# Patient Record
Sex: FEMALE | Race: WHITE | Employment: UNEMPLOYED | URBAN - NONMETROPOLITAN AREA
[De-identification: names, ages, dates, MRNs, and addresses within clinical notes are randomized per-mention and may not be internally consistent; named-entity substitution may affect disease eponyms.]

---

## 2017-02-14 ENCOUNTER — OFFICE VISIT (OUTPATIENT)
Dept: INTERNAL MEDICINE | Age: 57
End: 2017-02-14

## 2017-02-14 VITALS
BODY MASS INDEX: 29.66 KG/M2 | WEIGHT: 178 LBS | HEART RATE: 60 BPM | SYSTOLIC BLOOD PRESSURE: 140 MMHG | HEIGHT: 65 IN | DIASTOLIC BLOOD PRESSURE: 90 MMHG

## 2017-02-14 DIAGNOSIS — D75.89 MACROCYTIC: ICD-10-CM

## 2017-02-14 DIAGNOSIS — I10 ESSENTIAL HYPERTENSION: Primary | ICD-10-CM

## 2017-02-14 DIAGNOSIS — N95.1 MENOPAUSAL HOT FLUSHES: ICD-10-CM

## 2017-02-14 PROCEDURE — 99214 OFFICE O/P EST MOD 30 MIN: CPT | Performed by: INTERNAL MEDICINE

## 2017-02-14 RX ORDER — LISINOPRIL 10 MG/1
10 TABLET ORAL DAILY
Qty: 30 TABLET | Refills: 5 | Status: SHIPPED | OUTPATIENT
Start: 2017-02-14 | End: 2017-05-30 | Stop reason: SDUPTHER

## 2017-02-14 RX ORDER — VENLAFAXINE HYDROCHLORIDE 37.5 MG/1
37.5 CAPSULE, EXTENDED RELEASE ORAL DAILY
Qty: 90 CAPSULE | Refills: 1 | Status: CANCELLED | OUTPATIENT
Start: 2017-02-14

## 2017-02-14 RX ORDER — METOPROLOL SUCCINATE 50 MG/1
50 TABLET, EXTENDED RELEASE ORAL DAILY
Qty: 90 TABLET | Refills: 1 | Status: SHIPPED | OUTPATIENT
Start: 2017-02-14 | End: 2017-05-30 | Stop reason: SDUPTHER

## 2017-02-14 RX ORDER — VENLAFAXINE HYDROCHLORIDE 75 MG/1
75 CAPSULE, EXTENDED RELEASE ORAL DAILY
Qty: 30 CAPSULE | Refills: 5 | Status: SHIPPED | OUTPATIENT
Start: 2017-02-14 | End: 2017-05-30 | Stop reason: SDUPTHER

## 2017-02-14 RX ORDER — IBUPROFEN 800 MG/1
800 TABLET ORAL EVERY 8 HOURS PRN
Qty: 60 TABLET | Refills: 5 | Status: CANCELLED | OUTPATIENT
Start: 2017-02-14

## 2017-04-06 LAB
ABSOLUTE BASO #: 0 K/UL (ref 0–0.1)
ABSOLUTE EOS #: 0.1 K/UL (ref 0.1–0.4)
ABSOLUTE LYMPH #: 1.5 K/UL (ref 0.8–5.2)
ABSOLUTE MONO #: 0.5 K/UL (ref 0.1–0.9)
ABSOLUTE NEUT #: 1.7 K/UL (ref 1.3–9.1)
ANION GAP SERPL CALCULATED.3IONS-SCNC: 12 MMOL/L
BASOPHILS RELATIVE PERCENT: 0.5 %
BUN BLDV-MCNC: 14 MG/DL (ref 10–20)
CALCIUM SERPL-MCNC: 9.6 MG/DL (ref 8.7–10.8)
CHLORIDE BLD-SCNC: 101 MMOL/L (ref 95–111)
CO2: 28 MMOL/L (ref 21–32)
CREAT SERPL-MCNC: 0.7 MG/DL (ref 0.5–1.3)
EGFR AFRICAN AMERICAN: 105
EGFR IF NONAFRICAN AMERICAN: 87
EOSINOPHILS RELATIVE PERCENT: 2.3 %
FOLATE: 14.48 NG/ML
GLUCOSE: 89 MG/DL (ref 70–100)
HCT VFR BLD CALC: 38.2 % (ref 36–48)
HEMOGLOBIN: 13 G/DL (ref 12–16)
LYMPHOCYTE %: 40.2 %
MCH RBC QN AUTO: 31.8 PG (ref 27–34)
MCHC RBC AUTO-ENTMCNC: 34 G/DL (ref 31–36)
MCV RBC AUTO: 93.4 FL (ref 80–100)
MONOCYTES # BLD: 12 %
NEUTROPHILS RELATIVE PERCENT: 45 %
PDW BLD-RTO: 11.9 % (ref 10.8–14.8)
PLATELETS: 201 K/UL (ref 150–450)
POTASSIUM SERPL-SCNC: 4 MMOL/L (ref 3.5–5.4)
RBC: 4.09 M/UL (ref 4–5.5)
SODIUM BLD-SCNC: 137 MMOL/L (ref 134–147)
VITAMIN B-12: 286 PG/ML (ref 211–911)
WBC: 3.8 K/UL (ref 3.7–10.8)

## 2017-05-30 ENCOUNTER — OFFICE VISIT (OUTPATIENT)
Dept: INTERNAL MEDICINE | Age: 57
End: 2017-05-30

## 2017-05-30 VITALS — SYSTOLIC BLOOD PRESSURE: 116 MMHG | HEART RATE: 56 BPM | DIASTOLIC BLOOD PRESSURE: 80 MMHG

## 2017-05-30 DIAGNOSIS — E55.9 VITAMIN D DEFICIENCY: ICD-10-CM

## 2017-05-30 DIAGNOSIS — G89.29 CHRONIC PAIN OF RIGHT KNEE: ICD-10-CM

## 2017-05-30 DIAGNOSIS — I10 ESSENTIAL HYPERTENSION: Primary | ICD-10-CM

## 2017-05-30 DIAGNOSIS — R71.8 ELEVATED MCV: ICD-10-CM

## 2017-05-30 DIAGNOSIS — N95.1 MENOPAUSAL HOT FLUSHES: ICD-10-CM

## 2017-05-30 DIAGNOSIS — M25.561 CHRONIC PAIN OF RIGHT KNEE: ICD-10-CM

## 2017-05-30 PROCEDURE — 99213 OFFICE O/P EST LOW 20 MIN: CPT | Performed by: INTERNAL MEDICINE

## 2017-05-30 PROCEDURE — 93000 ELECTROCARDIOGRAM COMPLETE: CPT | Performed by: INTERNAL MEDICINE

## 2017-05-30 RX ORDER — VENLAFAXINE HYDROCHLORIDE 75 MG/1
75 CAPSULE, EXTENDED RELEASE ORAL DAILY
Qty: 90 CAPSULE | Refills: 1 | Status: SHIPPED | OUTPATIENT
Start: 2017-05-30 | End: 2017-09-14 | Stop reason: SDUPTHER

## 2017-05-30 RX ORDER — METOPROLOL SUCCINATE 50 MG/1
50 TABLET, EXTENDED RELEASE ORAL DAILY
Qty: 90 TABLET | Refills: 1 | Status: SHIPPED | OUTPATIENT
Start: 2017-05-30 | End: 2017-09-14 | Stop reason: SDUPTHER

## 2017-05-30 RX ORDER — LISINOPRIL 10 MG/1
10 TABLET ORAL DAILY
Qty: 90 TABLET | Refills: 1 | Status: SHIPPED | OUTPATIENT
Start: 2017-05-30 | End: 2017-09-14 | Stop reason: SDUPTHER

## 2017-05-30 ASSESSMENT — PATIENT HEALTH QUESTIONNAIRE - PHQ9
SUM OF ALL RESPONSES TO PHQ9 QUESTIONS 1 & 2: 0
SUM OF ALL RESPONSES TO PHQ QUESTIONS 1-9: 0
1. LITTLE INTEREST OR PLEASURE IN DOING THINGS: 0
2. FEELING DOWN, DEPRESSED OR HOPELESS: 0

## 2017-09-08 LAB
ABSOLUTE BASO #: 0 K/UL (ref 0–0.1)
ABSOLUTE EOS #: 0.1 K/UL (ref 0.1–0.4)
ABSOLUTE LYMPH #: 1.5 K/UL (ref 0.8–5.2)
ABSOLUTE MONO #: 0.6 K/UL (ref 0.1–0.9)
ABSOLUTE NEUT #: 1.9 K/UL (ref 1.3–9.1)
ANION GAP SERPL CALCULATED.3IONS-SCNC: 9 MMOL/L
BASOPHILS RELATIVE PERCENT: 0.7 %
BUN BLDV-MCNC: 11 MG/DL (ref 10–20)
CALCIUM SERPL-MCNC: 9.4 MG/DL (ref 8.7–10.8)
CHLORIDE BLD-SCNC: 101 MMOL/L (ref 95–111)
CHOLESTEROL: 151 MG/DL
CO2: 28 MMOL/L (ref 21–32)
CREAT SERPL-MCNC: 0.7 MG/DL (ref 0.5–1.3)
EGFR AFRICAN AMERICAN: 105
EGFR IF NONAFRICAN AMERICAN: 87
EOSINOPHILS RELATIVE PERCENT: 3.1 %
GLUCOSE: 90 MG/DL (ref 70–100)
HCT VFR BLD CALC: 38.7 % (ref 36–48)
HEMOGLOBIN: 13.3 G/DL (ref 12–16)
LYMPHOCYTE %: 37.1 %
MCH RBC QN AUTO: 32.4 PG (ref 27–34)
MCHC RBC AUTO-ENTMCNC: 34.4 G/DL (ref 31–36)
MCV RBC AUTO: 94.2 FL (ref 80–100)
MONOCYTES # BLD: 14.2 %
NEUTROPHILS RELATIVE PERCENT: 44.7 %
PDW BLD-RTO: 11.6 % (ref 10.8–14.8)
PLATELETS: 185 K/UL (ref 150–450)
POTASSIUM SERPL-SCNC: 4.2 MMOL/L (ref 3.5–5.4)
RBC: 4.11 M/UL (ref 4–5.5)
SODIUM BLD-SCNC: 134 MMOL/L (ref 134–147)
WBC: 4.2 K/UL (ref 3.7–10.8)

## 2017-09-09 LAB — VITAMIN D 25-HYDROXY: 61 NG/ML

## 2017-09-14 ENCOUNTER — OFFICE VISIT (OUTPATIENT)
Dept: INTERNAL MEDICINE CLINIC | Age: 57
End: 2017-09-14
Payer: COMMERCIAL

## 2017-09-14 VITALS
BODY MASS INDEX: 30.32 KG/M2 | DIASTOLIC BLOOD PRESSURE: 78 MMHG | HEIGHT: 65 IN | HEART RATE: 72 BPM | SYSTOLIC BLOOD PRESSURE: 124 MMHG | WEIGHT: 182 LBS

## 2017-09-14 DIAGNOSIS — I10 ESSENTIAL HYPERTENSION: ICD-10-CM

## 2017-09-14 DIAGNOSIS — N95.1 MENOPAUSAL HOT FLUSHES: ICD-10-CM

## 2017-09-14 DIAGNOSIS — Z12.39 BREAST CANCER SCREENING: ICD-10-CM

## 2017-09-14 DIAGNOSIS — Z00.00 NORMAL PHYSICAL EXAM: Primary | ICD-10-CM

## 2017-09-14 PROCEDURE — 99396 PREV VISIT EST AGE 40-64: CPT | Performed by: INTERNAL MEDICINE

## 2017-09-14 RX ORDER — LISINOPRIL 10 MG/1
10 TABLET ORAL DAILY
Qty: 90 TABLET | Refills: 1 | Status: CANCELLED | OUTPATIENT
Start: 2017-09-14

## 2017-09-14 RX ORDER — METOPROLOL SUCCINATE 50 MG/1
50 TABLET, EXTENDED RELEASE ORAL DAILY
Qty: 90 TABLET | Refills: 1 | Status: CANCELLED | OUTPATIENT
Start: 2017-09-14

## 2017-09-14 RX ORDER — VENLAFAXINE HYDROCHLORIDE 75 MG/1
75 CAPSULE, EXTENDED RELEASE ORAL DAILY
Qty: 90 CAPSULE | Refills: 1 | Status: CANCELLED | OUTPATIENT
Start: 2017-09-14

## 2017-09-15 RX ORDER — METOPROLOL SUCCINATE 50 MG/1
50 TABLET, EXTENDED RELEASE ORAL DAILY
Qty: 90 TABLET | Refills: 1 | Status: SHIPPED | OUTPATIENT
Start: 2017-09-15 | End: 2018-03-15 | Stop reason: SDUPTHER

## 2017-09-15 RX ORDER — VENLAFAXINE HYDROCHLORIDE 75 MG/1
75 CAPSULE, EXTENDED RELEASE ORAL DAILY
Qty: 90 CAPSULE | Refills: 1 | Status: SHIPPED | OUTPATIENT
Start: 2017-09-15 | End: 2018-03-15 | Stop reason: SDUPTHER

## 2017-09-15 RX ORDER — LISINOPRIL 10 MG/1
10 TABLET ORAL DAILY
Qty: 90 TABLET | Refills: 1 | Status: SHIPPED | OUTPATIENT
Start: 2017-09-15 | End: 2018-03-15 | Stop reason: SDUPTHER

## 2017-11-01 ENCOUNTER — HOSPITAL ENCOUNTER (OUTPATIENT)
Dept: WOMENS IMAGING | Age: 57
Discharge: HOME | End: 2017-11-01
Payer: COMMERCIAL

## 2017-11-01 DIAGNOSIS — Z12.39 BREAST CANCER SCREENING: ICD-10-CM

## 2017-11-01 PROCEDURE — G0202 SCR MAMMO BI INCL CAD: HCPCS

## 2018-03-15 ENCOUNTER — OFFICE VISIT (OUTPATIENT)
Dept: INTERNAL MEDICINE CLINIC | Age: 58
End: 2018-03-15
Payer: COMMERCIAL

## 2018-03-15 VITALS
SYSTOLIC BLOOD PRESSURE: 128 MMHG | BODY MASS INDEX: 30.82 KG/M2 | WEIGHT: 185 LBS | HEIGHT: 65 IN | DIASTOLIC BLOOD PRESSURE: 80 MMHG | HEART RATE: 68 BPM

## 2018-03-15 DIAGNOSIS — I10 ESSENTIAL HYPERTENSION: Primary | ICD-10-CM

## 2018-03-15 DIAGNOSIS — E55.9 VITAMIN D DEFICIENCY: ICD-10-CM

## 2018-03-15 DIAGNOSIS — N95.1 MENOPAUSAL HOT FLUSHES: ICD-10-CM

## 2018-03-15 PROCEDURE — 99213 OFFICE O/P EST LOW 20 MIN: CPT | Performed by: INTERNAL MEDICINE

## 2018-03-15 RX ORDER — METOPROLOL SUCCINATE 50 MG/1
50 TABLET, EXTENDED RELEASE ORAL DAILY
Qty: 90 TABLET | Refills: 1 | Status: SHIPPED | OUTPATIENT
Start: 2018-03-15 | End: 2019-02-20 | Stop reason: SDUPTHER

## 2018-03-15 RX ORDER — VENLAFAXINE HYDROCHLORIDE 75 MG/1
75 CAPSULE, EXTENDED RELEASE ORAL DAILY
Qty: 90 CAPSULE | Refills: 1 | Status: SHIPPED | OUTPATIENT
Start: 2018-03-15 | End: 2019-02-20 | Stop reason: SDUPTHER

## 2018-03-15 RX ORDER — LISINOPRIL 10 MG/1
10 TABLET ORAL DAILY
Qty: 90 TABLET | Refills: 1 | Status: SHIPPED | OUTPATIENT
Start: 2018-03-15 | End: 2019-02-20 | Stop reason: SDUPTHER

## 2018-03-15 RX ORDER — THIAMINE HCL 100 MG
TABLET ORAL DAILY
COMMUNITY
End: 2019-11-21 | Stop reason: DRUGHIGH

## 2018-03-15 NOTE — PROGRESS NOTES
Patient:  Liz Solorzano  Date:  3/15/2018      1960    Chief Complaint   Patient presents with    Hypertension    Other     hot flushes       Pt is a 65 y.o. female who presents for 6 month follow up. Hypertension - BP controlled at visit today. Continue metoprolol and lisinopril. Headaches now resolved that BP controlled. BMP normal 9/2017 - repeat BMP due now. Hot flushes x 10 years, with partial hysterectomy (hysterectomy done due to headaches associated with periods). She was having multiple episodes a day. At previous visit started Effexor. Did well at first but then hot flashes seem more frequent. At previous visit - increased Effexor -this initially helped but then a little worse. Suggested trying OTC black cohosh. Elevated MCV- repeated CBC with B12 and folate 4/2017 - normal MCV, B12 is low normal and folate normal. She is really asymptomatic - no fatigue, mental confusion, etc.  Will continue to follow. CBC 9/2017 normal.    Hip pain due to congenital hip dislocation - takes ibuprofen prn. She was having right knee pain but thinks related to uneven length of legs. Suggested [facility] Serna[facility] to get shoe lift. Now knee pain resolved and rarely using NSAIDs. She did not get shoe lift. Allergic rhinitis - stable, continue Flonase. History of vitamin D deficiency - level 68 8/2016, 61 9/2017. Continue current supplementation. HM -  9/2017. Past Medical History:   Diagnosis Date    Allergic rhinitis     Flonase prn    Congenital hip dislocation 1960    right leg is longer, better with weight loss    Hot flushes, perimenopausal     Hypertension 2011    Seen by MONI Florez CNP diagnosed < 1 year    IBS (irritable bowel syndrome)     ? diagnosis - diarrhea with milk products and meat    Osteoarthritis     right knee pain    Snoring     denies apnea, mild daytime somnolence, just lost 35# so has more energy, denies waking coughing or choking, BMI 27, neck circ.  13.5 OTC black cohosh. Vitamin D deficiency - normal level 9/2017, check lab yearly. Elevated MCV - now normal MCV and B12 low normal.  Continue to follow. Hip and knee pain - continue Ibuprofen but always take with food and call if any stomach upset. Suggested she take her shoe to [facility] and he can add a shoe lift. She states pain much improved on its own and never did get shoe lift. Allergic rhinitis - stable, continue Flonase. Lab due now, wellness visit in 6 months. She typically wants a wellness visit in fall and yearly visit in spring. Merrick Hendrickson MD    [facility] [place] [address]  [address]  [address] [place] [address]  Dept: [phone]  Dept Fax: [phone]  Loc: [phone]    Disregard below.   Review of Systems   Unable to perform ROS: Other

## 2018-04-14 LAB
ANION GAP SERPL CALCULATED.3IONS-SCNC: 13 MEQ/L (ref 10–19)
BUN BLDV-MCNC: 17 MG/DL (ref 8–23)
CALCIUM SERPL-MCNC: 9.6 MG/DL (ref 8.5–10.5)
CHLORIDE BLD-SCNC: 97 MEQ/L (ref 95–107)
CO2: 27 MEQ/L (ref 19–31)
CREAT SERPL-MCNC: 0.7 MG/DL (ref 0.6–1.3)
EGFR AFRICAN AMERICAN: 112.3 ML/MIN/1.73 M2
EGFR IF NONAFRICAN AMERICAN: 96.9 ML/MIN/1.73 M2
GLUCOSE: 89 MG/DL (ref 70–99)
POTASSIUM SERPL-SCNC: 5 MEQ/L (ref 3.5–5.4)
SODIUM BLD-SCNC: 137 MEQ/L (ref 135–146)

## 2018-04-18 ENCOUNTER — TELEPHONE (OUTPATIENT)
Dept: INTERNAL MEDICINE CLINIC | Age: 58
End: 2018-04-18

## 2018-07-14 ENCOUNTER — HOSPITAL ENCOUNTER (INPATIENT)
Age: 58
LOS: 1 days | Discharge: HOME | DRG: 343 | End: 2018-07-16
Attending: EMERGENCY MEDICINE | Admitting: SURGERY
Payer: COMMERCIAL

## 2018-07-14 DIAGNOSIS — K35.80 ACUTE APPENDICITIS, UNSPECIFIED ACUTE APPENDICITIS TYPE: Primary | ICD-10-CM

## 2018-07-14 LAB
AMYLASE: 57 U/L (ref 20–104)
ANION GAP SERPL CALCULATED.3IONS-SCNC: 15 MEQ/L (ref 8–16)
BASOPHILS # BLD: 0.1 %
BASOPHILS ABSOLUTE: 0 THOU/MM3 (ref 0–0.1)
BUN BLDV-MCNC: 12 MG/DL (ref 7–22)
CALCIUM SERPL-MCNC: 9.6 MG/DL (ref 8.5–10.5)
CHLORIDE BLD-SCNC: 99 MEQ/L (ref 98–111)
CO2: 23 MEQ/L (ref 23–33)
CREAT SERPL-MCNC: 0.7 MG/DL (ref 0.4–1.2)
EOSINOPHIL # BLD: 0.6 %
EOSINOPHILS ABSOLUTE: 0 THOU/MM3 (ref 0–0.4)
ERYTHROCYTE [DISTWIDTH] IN BLOOD BY AUTOMATED COUNT: 12 % (ref 11.5–14.5)
ERYTHROCYTE [DISTWIDTH] IN BLOOD BY AUTOMATED COUNT: 42 FL (ref 35–45)
GFR SERPL CREATININE-BSD FRML MDRD: 86 ML/MIN/1.73M2
GLUCOSE BLD-MCNC: 120 MG/DL (ref 70–108)
HCT VFR BLD CALC: 37.5 % (ref 37–47)
HEMOGLOBIN: 13.2 GM/DL (ref 12–16)
IMMATURE GRANS (ABS): 0.03 THOU/MM3 (ref 0–0.07)
IMMATURE GRANULOCYTES: 0.4 %
LIPASE: 22.3 U/L (ref 5.6–51.3)
LYMPHOCYTES # BLD: 11 %
LYMPHOCYTES ABSOLUTE: 0.8 THOU/MM3 (ref 1–4.8)
MAGNESIUM: 1.7 MG/DL (ref 1.6–2.4)
MCH RBC QN AUTO: 33.4 PG (ref 26–33)
MCHC RBC AUTO-ENTMCNC: 35.2 GM/DL (ref 32.2–35.5)
MCV RBC AUTO: 94.9 FL (ref 81–99)
MONOCYTES # BLD: 8.9 %
MONOCYTES ABSOLUTE: 0.6 THOU/MM3 (ref 0.4–1.3)
NUCLEATED RED BLOOD CELLS: 0 /100 WBC
OSMOLALITY CALCULATION: 274.8 MOSMOL/KG (ref 275–300)
PLATELET # BLD: 176 THOU/MM3 (ref 130–400)
PMV BLD AUTO: 11.3 FL (ref 9.4–12.4)
POTASSIUM SERPL-SCNC: 4.1 MEQ/L (ref 3.5–5.2)
RBC # BLD: 3.95 MILL/MM3 (ref 4.2–5.4)
SEG NEUTROPHILS: 79 %
SEGMENTED NEUTROPHILS ABSOLUTE COUNT: 5.6 THOU/MM3 (ref 1.8–7.7)
SODIUM BLD-SCNC: 137 MEQ/L (ref 135–145)
WBC # BLD: 7.1 THOU/MM3 (ref 4.8–10.8)

## 2018-07-14 PROCEDURE — 82150 ASSAY OF AMYLASE: CPT

## 2018-07-14 PROCEDURE — 85025 COMPLETE CBC W/AUTO DIFF WBC: CPT

## 2018-07-14 PROCEDURE — 83690 ASSAY OF LIPASE: CPT

## 2018-07-14 PROCEDURE — 36415 COLL VENOUS BLD VENIPUNCTURE: CPT

## 2018-07-14 PROCEDURE — 80048 BASIC METABOLIC PNL TOTAL CA: CPT

## 2018-07-14 PROCEDURE — 83735 ASSAY OF MAGNESIUM: CPT

## 2018-07-14 PROCEDURE — 99285 EMERGENCY DEPT VISIT HI MDM: CPT

## 2018-07-14 ASSESSMENT — PAIN DESCRIPTION - LOCATION
LOCATION: ABDOMEN
LOCATION: ABDOMEN

## 2018-07-14 ASSESSMENT — PAIN SCALES - GENERAL
PAINLEVEL_OUTOF10: 8
PAINLEVEL_OUTOF10: 9

## 2018-07-14 ASSESSMENT — PAIN DESCRIPTION - PAIN TYPE
TYPE: ACUTE PAIN
TYPE: ACUTE PAIN

## 2018-07-15 ENCOUNTER — APPOINTMENT (OUTPATIENT)
Dept: CT IMAGING | Age: 58
DRG: 343 | End: 2018-07-15
Payer: COMMERCIAL

## 2018-07-15 ENCOUNTER — ANESTHESIA EVENT (OUTPATIENT)
Dept: OPERATING ROOM | Age: 58
DRG: 343 | End: 2018-07-15
Payer: COMMERCIAL

## 2018-07-15 ENCOUNTER — ANESTHESIA (OUTPATIENT)
Dept: OPERATING ROOM | Age: 58
DRG: 343 | End: 2018-07-15
Payer: COMMERCIAL

## 2018-07-15 VITALS
DIASTOLIC BLOOD PRESSURE: 83 MMHG | SYSTOLIC BLOOD PRESSURE: 139 MMHG | OXYGEN SATURATION: 99 % | RESPIRATION RATE: 18 BRPM | TEMPERATURE: 98.6 F

## 2018-07-15 PROBLEM — K37 APPENDICITIS: Status: ACTIVE | Noted: 2018-07-15

## 2018-07-15 PROBLEM — R10.0 SURGICAL ABDOMEN: Status: ACTIVE | Noted: 2018-07-15

## 2018-07-15 LAB
BACTERIA: ABNORMAL /HPF
BILIRUBIN URINE: NEGATIVE
BLOOD, URINE: NEGATIVE
CASTS 2: ABNORMAL /LPF
CASTS UA: ABNORMAL /LPF
CHARACTER, URINE: CLEAR
COLOR: YELLOW
CRYSTALS, UA: ABNORMAL
EPITHELIAL CELLS, UA: ABNORMAL /HPF
GLUCOSE URINE: NEGATIVE MG/DL
KETONES, URINE: NEGATIVE
LEUKOCYTE ESTERASE, URINE: ABNORMAL
MISCELLANEOUS 2: ABNORMAL
NITRITE, URINE: NEGATIVE
PH UA: 6
PROTEIN UA: NEGATIVE
RBC URINE: ABNORMAL /HPF
RENAL EPITHELIAL, UA: ABNORMAL
SPECIFIC GRAVITY, URINE: > 1.03 (ref 1–1.03)
UROBILINOGEN, URINE: 0.2 EU/DL
WBC UA: ABNORMAL /HPF
YEAST: ABNORMAL

## 2018-07-15 PROCEDURE — 74177 CT ABD & PELVIS W/CONTRAST: CPT

## 2018-07-15 PROCEDURE — 44970 LAPAROSCOPY APPENDECTOMY: CPT | Performed by: SURGERY

## 2018-07-15 PROCEDURE — 6360000002 HC RX W HCPCS: Performed by: EMERGENCY MEDICINE

## 2018-07-15 PROCEDURE — 7100000000 HC PACU RECOVERY - FIRST 15 MIN: Performed by: SURGERY

## 2018-07-15 PROCEDURE — 99222 1ST HOSP IP/OBS MODERATE 55: CPT | Performed by: SURGERY

## 2018-07-15 PROCEDURE — 49585 REPAIR UMBILICAL HERN,5+Y/O,REDUC: CPT | Performed by: SURGERY

## 2018-07-15 PROCEDURE — 0DTJ4ZZ RESECTION OF APPENDIX, PERCUTANEOUS ENDOSCOPIC APPROACH: ICD-10-PCS | Performed by: SURGERY

## 2018-07-15 PROCEDURE — 6370000000 HC RX 637 (ALT 250 FOR IP): Performed by: NURSE ANESTHETIST, CERTIFIED REGISTERED

## 2018-07-15 PROCEDURE — 6360000002 HC RX W HCPCS: Performed by: NURSE ANESTHETIST, CERTIFIED REGISTERED

## 2018-07-15 PROCEDURE — 2580000003 HC RX 258: Performed by: SURGERY

## 2018-07-15 PROCEDURE — 0WQF4ZZ REPAIR ABDOMINAL WALL, PERCUTANEOUS ENDOSCOPIC APPROACH: ICD-10-PCS | Performed by: SURGERY

## 2018-07-15 PROCEDURE — 2500000003 HC RX 250 WO HCPCS: Performed by: SURGERY

## 2018-07-15 PROCEDURE — 3700000001 HC ADD 15 MINUTES (ANESTHESIA): Performed by: SURGERY

## 2018-07-15 PROCEDURE — 6370000000 HC RX 637 (ALT 250 FOR IP): Performed by: SURGERY

## 2018-07-15 PROCEDURE — 6360000004 HC RX CONTRAST MEDICATION: Performed by: EMERGENCY MEDICINE

## 2018-07-15 PROCEDURE — 2500000003 HC RX 250 WO HCPCS: Performed by: NURSE ANESTHETIST, CERTIFIED REGISTERED

## 2018-07-15 PROCEDURE — 3600000013 HC SURGERY LEVEL 3 ADDTL 15MIN: Performed by: SURGERY

## 2018-07-15 PROCEDURE — 2709999900 HC NON-CHARGEABLE SUPPLY

## 2018-07-15 PROCEDURE — 96374 THER/PROPH/DIAG INJ IV PUSH: CPT

## 2018-07-15 PROCEDURE — 87086 URINE CULTURE/COLONY COUNT: CPT

## 2018-07-15 PROCEDURE — 88304 TISSUE EXAM BY PATHOLOGIST: CPT

## 2018-07-15 PROCEDURE — 2709999900 HC NON-CHARGEABLE SUPPLY: Performed by: SURGERY

## 2018-07-15 PROCEDURE — 81001 URINALYSIS AUTO W/SCOPE: CPT

## 2018-07-15 PROCEDURE — 2720000010 HC SURG SUPPLY STERILE: Performed by: SURGERY

## 2018-07-15 PROCEDURE — 3600000003 HC SURGERY LEVEL 3 BASE: Performed by: SURGERY

## 2018-07-15 PROCEDURE — 7100000001 HC PACU RECOVERY - ADDTL 15 MIN: Performed by: SURGERY

## 2018-07-15 PROCEDURE — 2780000010 HC IMPLANT OTHER: Performed by: SURGERY

## 2018-07-15 PROCEDURE — 1200000000 HC SEMI PRIVATE

## 2018-07-15 PROCEDURE — C1773 RET DEV, INSERTABLE: HCPCS | Performed by: SURGERY

## 2018-07-15 PROCEDURE — 3700000000 HC ANESTHESIA ATTENDED CARE: Performed by: SURGERY

## 2018-07-15 DEVICE — RELOAD STPL H1-2.5X45MM VASC THN TISS WHT 6 ROW B FRM SGL: Type: IMPLANTABLE DEVICE | Site: ABDOMEN | Status: FUNCTIONAL

## 2018-07-15 RX ORDER — ONDANSETRON 2 MG/ML
INJECTION INTRAMUSCULAR; INTRAVENOUS PRN
Status: DISCONTINUED | OUTPATIENT
Start: 2018-07-15 | End: 2018-07-15 | Stop reason: SDUPTHER

## 2018-07-15 RX ORDER — MORPHINE SULFATE 4 MG/ML
4 INJECTION, SOLUTION INTRAMUSCULAR; INTRAVENOUS EVERY 4 HOURS PRN
Status: ACTIVE | OUTPATIENT
Start: 2018-07-15 | End: 2018-07-16

## 2018-07-15 RX ORDER — OXYCODONE HYDROCHLORIDE AND ACETAMINOPHEN 5; 325 MG/1; MG/1
1 TABLET ORAL EVERY 4 HOURS PRN
Status: DISCONTINUED | OUTPATIENT
Start: 2018-07-15 | End: 2018-07-16 | Stop reason: HOSPADM

## 2018-07-15 RX ORDER — METOPROLOL SUCCINATE 50 MG/1
50 TABLET, EXTENDED RELEASE ORAL DAILY
Status: DISCONTINUED | OUTPATIENT
Start: 2018-07-15 | End: 2018-07-16 | Stop reason: HOSPADM

## 2018-07-15 RX ORDER — ACETAMINOPHEN 325 MG/1
650 TABLET ORAL EVERY 4 HOURS PRN
Status: DISCONTINUED | OUTPATIENT
Start: 2018-07-15 | End: 2018-07-16 | Stop reason: HOSPADM

## 2018-07-15 RX ORDER — VENLAFAXINE HYDROCHLORIDE 75 MG/1
75 CAPSULE, EXTENDED RELEASE ORAL DAILY
Status: DISCONTINUED | OUTPATIENT
Start: 2018-07-15 | End: 2018-07-16 | Stop reason: HOSPADM

## 2018-07-15 RX ORDER — BUPIVACAINE HYDROCHLORIDE AND EPINEPHRINE 5; 5 MG/ML; UG/ML
INJECTION, SOLUTION EPIDURAL; INTRACAUDAL; PERINEURAL PRN
Status: DISCONTINUED | OUTPATIENT
Start: 2018-07-15 | End: 2018-07-15 | Stop reason: HOSPADM

## 2018-07-15 RX ORDER — LISINOPRIL 10 MG/1
10 TABLET ORAL DAILY
Status: DISCONTINUED | OUTPATIENT
Start: 2018-07-15 | End: 2018-07-16 | Stop reason: HOSPADM

## 2018-07-15 RX ORDER — GLYCOPYRROLATE 1 MG/5 ML
SYRINGE (ML) INTRAVENOUS PRN
Status: DISCONTINUED | OUTPATIENT
Start: 2018-07-15 | End: 2018-07-15 | Stop reason: SDUPTHER

## 2018-07-15 RX ORDER — ONDANSETRON 2 MG/ML
4 INJECTION INTRAMUSCULAR; INTRAVENOUS ONCE
Status: COMPLETED | OUTPATIENT
Start: 2018-07-15 | End: 2018-07-15

## 2018-07-15 RX ORDER — MIDAZOLAM HYDROCHLORIDE 1 MG/ML
INJECTION INTRAMUSCULAR; INTRAVENOUS PRN
Status: DISCONTINUED | OUTPATIENT
Start: 2018-07-15 | End: 2018-07-15 | Stop reason: SDUPTHER

## 2018-07-15 RX ORDER — SODIUM CHLORIDE 9 MG/ML
INJECTION, SOLUTION INTRAVENOUS CONTINUOUS
Status: DISCONTINUED | OUTPATIENT
Start: 2018-07-15 | End: 2018-07-15 | Stop reason: HOSPADM

## 2018-07-15 RX ORDER — NEOSTIGMINE METHYLSULFATE 1 MG/ML
INJECTION, SOLUTION INTRAVENOUS PRN
Status: DISCONTINUED | OUTPATIENT
Start: 2018-07-15 | End: 2018-07-15 | Stop reason: SDUPTHER

## 2018-07-15 RX ORDER — SODIUM CHLORIDE 0.9 % (FLUSH) 0.9 %
10 SYRINGE (ML) INJECTION PRN
Status: DISCONTINUED | OUTPATIENT
Start: 2018-07-15 | End: 2018-07-16 | Stop reason: HOSPADM

## 2018-07-15 RX ORDER — LIDOCAINE HYDROCHLORIDE 20 MG/ML
INJECTION, SOLUTION INFILTRATION; PERINEURAL PRN
Status: DISCONTINUED | OUTPATIENT
Start: 2018-07-15 | End: 2018-07-15 | Stop reason: SDUPTHER

## 2018-07-15 RX ORDER — SCOLOPAMINE TRANSDERMAL SYSTEM 1 MG/1
PATCH, EXTENDED RELEASE TRANSDERMAL PRN
Status: DISCONTINUED | OUTPATIENT
Start: 2018-07-15 | End: 2018-07-15 | Stop reason: SDUPTHER

## 2018-07-15 RX ORDER — PROPOFOL 10 MG/ML
INJECTION, EMULSION INTRAVENOUS PRN
Status: DISCONTINUED | OUTPATIENT
Start: 2018-07-15 | End: 2018-07-15 | Stop reason: SDUPTHER

## 2018-07-15 RX ORDER — ONDANSETRON 2 MG/ML
4 INJECTION INTRAMUSCULAR; INTRAVENOUS EVERY 6 HOURS PRN
Status: DISCONTINUED | OUTPATIENT
Start: 2018-07-15 | End: 2018-07-16 | Stop reason: HOSPADM

## 2018-07-15 RX ORDER — SODIUM CHLORIDE 0.9 % (FLUSH) 0.9 %
10 SYRINGE (ML) INJECTION EVERY 12 HOURS SCHEDULED
Status: DISCONTINUED | OUTPATIENT
Start: 2018-07-15 | End: 2018-07-16 | Stop reason: HOSPADM

## 2018-07-15 RX ORDER — ONDANSETRON 2 MG/ML
4 INJECTION INTRAMUSCULAR; INTRAVENOUS EVERY 6 HOURS PRN
Status: DISCONTINUED | OUTPATIENT
Start: 2018-07-15 | End: 2018-07-15 | Stop reason: SDUPTHER

## 2018-07-15 RX ORDER — ROCURONIUM BROMIDE 10 MG/ML
INJECTION, SOLUTION INTRAVENOUS PRN
Status: DISCONTINUED | OUTPATIENT
Start: 2018-07-15 | End: 2018-07-15 | Stop reason: SDUPTHER

## 2018-07-15 RX ORDER — DEXAMETHASONE SODIUM PHOSPHATE 4 MG/ML
INJECTION, SOLUTION INTRA-ARTICULAR; INTRALESIONAL; INTRAMUSCULAR; INTRAVENOUS; SOFT TISSUE PRN
Status: DISCONTINUED | OUTPATIENT
Start: 2018-07-15 | End: 2018-07-15 | Stop reason: SDUPTHER

## 2018-07-15 RX ORDER — SUCCINYLCHOLINE CHLORIDE 20 MG/ML
INJECTION INTRAMUSCULAR; INTRAVENOUS PRN
Status: DISCONTINUED | OUTPATIENT
Start: 2018-07-15 | End: 2018-07-15 | Stop reason: SDUPTHER

## 2018-07-15 RX ORDER — FENTANYL CITRATE 50 UG/ML
INJECTION, SOLUTION INTRAMUSCULAR; INTRAVENOUS PRN
Status: DISCONTINUED | OUTPATIENT
Start: 2018-07-15 | End: 2018-07-15 | Stop reason: SDUPTHER

## 2018-07-15 RX ORDER — CEFTRIAXONE 1 G/1
INJECTION, POWDER, FOR SOLUTION INTRAMUSCULAR; INTRAVENOUS PRN
Status: DISCONTINUED | OUTPATIENT
Start: 2018-07-15 | End: 2018-07-15 | Stop reason: SDUPTHER

## 2018-07-15 RX ORDER — SODIUM CHLORIDE 9 MG/ML
INJECTION, SOLUTION INTRAVENOUS CONTINUOUS
Status: DISCONTINUED | OUTPATIENT
Start: 2018-07-15 | End: 2018-07-16 | Stop reason: HOSPADM

## 2018-07-15 RX ADMIN — MIDAZOLAM HYDROCHLORIDE 2 MG: 1 INJECTION, SOLUTION INTRAMUSCULAR; INTRAVENOUS at 02:03

## 2018-07-15 RX ADMIN — PROPOFOL 200 MG: 10 INJECTION, EMULSION INTRAVENOUS at 02:11

## 2018-07-15 RX ADMIN — METOPROLOL SUCCINATE 50 MG: 50 TABLET, FILM COATED, EXTENDED RELEASE ORAL at 10:07

## 2018-07-15 RX ADMIN — DEXAMETHASONE SODIUM PHOSPHATE 10 MG: 4 INJECTION, SOLUTION INTRAMUSCULAR; INTRAVENOUS at 02:11

## 2018-07-15 RX ADMIN — Medication 0.6 MG: at 02:42

## 2018-07-15 RX ADMIN — LIDOCAINE HYDROCHLORIDE 100 MG: 20 INJECTION, SOLUTION INFILTRATION; PERINEURAL at 02:11

## 2018-07-15 RX ADMIN — Medication 0.2 MG: at 02:11

## 2018-07-15 RX ADMIN — OXYCODONE HYDROCHLORIDE AND ACETAMINOPHEN 1 TABLET: 5; 325 TABLET ORAL at 13:19

## 2018-07-15 RX ADMIN — SCOPALAMINE 1 PATCH: 1 PATCH, EXTENDED RELEASE TRANSDERMAL at 02:02

## 2018-07-15 RX ADMIN — SODIUM CHLORIDE: 9 INJECTION, SOLUTION INTRAVENOUS at 11:55

## 2018-07-15 RX ADMIN — ONDANSETRON HYDROCHLORIDE 4 MG: 4 INJECTION, SOLUTION INTRAMUSCULAR; INTRAVENOUS at 00:39

## 2018-07-15 RX ADMIN — IOPAMIDOL 80 ML: 755 INJECTION, SOLUTION INTRAVENOUS at 00:08

## 2018-07-15 RX ADMIN — CEFTRIAXONE SODIUM 1 G: 1 INJECTION, POWDER, FOR SOLUTION INTRAMUSCULAR; INTRAVENOUS at 02:21

## 2018-07-15 RX ADMIN — ONDANSETRON HYDROCHLORIDE 4 MG: 4 INJECTION, SOLUTION INTRAMUSCULAR; INTRAVENOUS at 02:11

## 2018-07-15 RX ADMIN — Medication 120 MG: at 02:11

## 2018-07-15 RX ADMIN — FENTANYL CITRATE 50 MCG: 50 INJECTION INTRAMUSCULAR; INTRAVENOUS at 02:25

## 2018-07-15 RX ADMIN — ROCURONIUM BROMIDE 30 MG: 10 INJECTION INTRAVENOUS at 02:21

## 2018-07-15 RX ADMIN — FENTANYL CITRATE 50 MCG: 50 INJECTION INTRAMUSCULAR; INTRAVENOUS at 02:11

## 2018-07-15 RX ADMIN — NEOSTIGMINE METHYLSULFATE 3 MG: 1 INJECTION, SOLUTION INTRAVENOUS at 02:42

## 2018-07-15 ASSESSMENT — PULMONARY FUNCTION TESTS
PIF_VALUE: 1
PIF_VALUE: 21
PIF_VALUE: 1
PIF_VALUE: 3
PIF_VALUE: 20
PIF_VALUE: 19
PIF_VALUE: 27
PIF_VALUE: 24
PIF_VALUE: 19
PIF_VALUE: 3
PIF_VALUE: 1
PIF_VALUE: 20
PIF_VALUE: 1
PIF_VALUE: 23
PIF_VALUE: 19
PIF_VALUE: 21
PIF_VALUE: 28
PIF_VALUE: 26
PIF_VALUE: 22
PIF_VALUE: 18
PIF_VALUE: 19
PIF_VALUE: 28
PIF_VALUE: 2
PIF_VALUE: 26
PIF_VALUE: 18
PIF_VALUE: 21
PIF_VALUE: 19
PIF_VALUE: 27
PIF_VALUE: 1
PIF_VALUE: 1
PIF_VALUE: 27
PIF_VALUE: 20
PIF_VALUE: 21
PIF_VALUE: 18
PIF_VALUE: 3
PIF_VALUE: 17
PIF_VALUE: 28
PIF_VALUE: 2
PIF_VALUE: 19
PIF_VALUE: 20
PIF_VALUE: 29
PIF_VALUE: 19
PIF_VALUE: 19
PIF_VALUE: 20
PIF_VALUE: 20
PIF_VALUE: 19
PIF_VALUE: 19
PIF_VALUE: 1
PIF_VALUE: 19
PIF_VALUE: 7
PIF_VALUE: 21
PIF_VALUE: 20
PIF_VALUE: 20
PIF_VALUE: 28
PIF_VALUE: 19
PIF_VALUE: 20
PIF_VALUE: 26
PIF_VALUE: 1
PIF_VALUE: 2
PIF_VALUE: 27
PIF_VALUE: 1

## 2018-07-15 ASSESSMENT — PAIN SCALES - GENERAL
PAINLEVEL_OUTOF10: 0
PAINLEVEL_OUTOF10: 2
PAINLEVEL_OUTOF10: 0
PAINLEVEL_OUTOF10: 2
PAINLEVEL_OUTOF10: 2
PAINLEVEL_OUTOF10: 4
PAINLEVEL_OUTOF10: 5
PAINLEVEL_OUTOF10: 0

## 2018-07-15 ASSESSMENT — PAIN DESCRIPTION - DESCRIPTORS
DESCRIPTORS: CRAMPING
DESCRIPTORS: CRAMPING
DESCRIPTORS: CRAMPING;DISCOMFORT;PRESSURE

## 2018-07-15 ASSESSMENT — PAIN DESCRIPTION - ORIENTATION
ORIENTATION: OTHER (COMMENT)
ORIENTATION: OTHER (COMMENT)
ORIENTATION: RIGHT;LEFT;MID

## 2018-07-15 ASSESSMENT — ENCOUNTER SYMPTOMS
NAUSEA: 1
VOMITING: 0
WHEEZING: 0
ABDOMINAL PAIN: 1
EYE PAIN: 0
DIARRHEA: 1
EYE DISCHARGE: 0
SHORTNESS OF BREATH: 0
BACK PAIN: 0
RHINORRHEA: 0
COUGH: 0
SORE THROAT: 0

## 2018-07-15 ASSESSMENT — PAIN DESCRIPTION - LOCATION
LOCATION: ABDOMEN

## 2018-07-15 ASSESSMENT — PAIN DESCRIPTION - PAIN TYPE
TYPE: SURGICAL PAIN
TYPE: ACUTE PAIN

## 2018-07-15 NOTE — ED NOTES
Dr. Retia Closs is at bedside to update the patient on POC. Will continue to monitor the patient.      Tamika Steiner RN  07/14/18 3146

## 2018-07-15 NOTE — H&P
[address] [place] [address]                         PREOPERATIVE HISTORY AND PHYSICAL    PATIENT NAME: Sasha Penaloza                       :        1960  MED REC NO:   [id number]                           ROOM:       5730  ACCOUNT NO:   [de-identified]                           ADMIT DATE: 2018  PROVIDER:     Alon Tenorio. Telly Gale MD    CHIEF COMPLAINT:  Right lower quadrant pain, probable appendicitis. HISTORY OF PRESENT ILLNESS:  The patient is a 80-year-old white female, who  states that approximately 48 hours ago she had onset of some nausea  followed by some diarrhea. She had a little bit of pain then on Friday,  but then approximately 5 o'clock on the evening of 2018, she began  having periumbilical pain that localized down to the right lower quadrant. She denies any prior history of pain. Otherwise, her bowel movements are  normal.  She has had no urinary symptomatology, and the patient presented  to the emergency room for evaluation. She does have a normal white count  of 7.1, but the CT scan shows what is felt to be appendicitis, and she has  an exam consistent with same, and surgical evaluation has been requested. She has had a partial hysterectomy. Her ovaries are still intact. She  does have a family history of appendicitis. PAST MEDICAL HISTORY:  Medical illnesses, the patient has a history of  hypertension and a history of some allergic rhinitis. MEDICATIONS:  Prinivil, Effexor, Toprol, and other supplements of vitamin  D, calcium, and magnesium. PAST SURGICAL HISTORY:  Include multiple surgeries on her left hip as she  had a congenital abnormality at birth and has had several surgeries on the  left hip. She has had orthopedic procedures on the right knee, and she has  also had a partial hysterectomy, ovaries intact. ALLERGIES:  SULFA.     FAMILY HISTORY:  Positive for hypercholesterolism and skin cancer in her  father. SOCIAL HISTORY:  She is nonsmoker. She is a social drinker. She works  with an office job. REVIEW OF SYSTEMS:  A 10-point review of systems is otherwise negative  including no recent weight loss. PHYSICAL EXAMINATION:  GENERAL:  The patient is a 79-year-old white female, who appears her age. HEAD, EARS, EYES, NOSE, AND THROAT:  The pupils are equal.  EOMs are  intact. Sclerae are clear. TMJs are normal.  External auditory canal is  normal.  NECK:  Shows no masses. CARDIOVASCULAR:  S1, S2 without extra sounds. LUNGS:  The respirations are equal bilaterally. ABDOMEN:  She is moderately obese. All quadrants are negative to palpation  except right over McBurney's point, she has clear-cut rebound tenderness. Groins are negative. EXTREMITIES:  Upper and lower extremities, she has good radial pulses. Good range of motion of the arms and hands. Lower extremities, well-healed  incisions over the left hip and right knee. Moves the feet appropriately. LABORATORY DATA:  Revealed sodium 137, potassium 4.1, BUN of 12, creatinine  of 0.7. Magnesium level was normal.  Calcium level was normal.  Amylase  was 57. White count was 7.1, hemoglobin 13.2. CT scan, again as  mentioned, has been read as probable acute appendicitis. No evidence of  abscess along with a fatty liver. ASSESSMENT AND PLAN:  1. Acute appendicitis by history, exam, and CT, although her white blood  cell count is normal.  2.  Hypertension. 3.  Allergic rhinitis. 4.  History of congenital abnormalities of the left hip. PLAN:  We discussed appendicitis with the patient and the current standard  of care in this [place] to be removing the appendix. We did discuss that  in the future, antibiotics may be used to treat appendicitis in this  country as has been going on in [facility]; however, again the current standard  of care is removing the appendix. We discussed the risk of rupture.   We  also

## 2018-07-15 NOTE — ANESTHESIA POSTPROCEDURE EVALUATION
Department of Anesthesiology  Postprocedure Note    Patient: Aleksandr Cobb  MRN: [id number]  YOB: 1960  Date of evaluation: 7/15/2018  Time:  3:08 AM     Procedure Summary     Date:  07/15/18 Room / Location:  [facility] [address] / [address] OR    Anesthesia Start:  0202 Anesthesia Stop:  0308    Procedure:  APPENDECTOMY LAPAROSCOPIC (N/A Abdomen) Diagnosis:  (Appendicitis)    Surgeon: Jesús Ruiz MD Responsible Provider:  Claudia Grimes MD    Anesthesia Type:  general ASA Status:  2 - Emergent          Anesthesia Type: general    Ramírez Phase I:      Ramírez Phase II:      Last vitals: Reviewed and per EMR flowsheets.        Anesthesia Post Evaluation    Patient location during evaluation: PACU  Patient participation: complete - patient participated  Level of consciousness: awake and alert  Pain score: 0  Airway patency: patent  Nausea & Vomiting: no nausea and no vomiting  Complications: no  Cardiovascular status: blood pressure returned to baseline and hemodynamically stable  Respiratory status: acceptable, spontaneous ventilation and room air  Hydration status: euvolemic

## 2018-07-15 NOTE — PLAN OF CARE
Problem: SAFETY  Goal: Free from accidental physical injury  Outcome: Met This Shift  Safety precautions maintained as documented. Call light within reach. Problem: PAIN  Goal: Patient's pain/discomfort is manageable  Outcome: Met This Shift  Not having much pain according to pt, denies the need for pain med    Problem: SKIN INTEGRITY  Goal: Skin integrity is maintained or improved  Outcome: Ongoing  Stab wounds covered with steri strips and bandaids, no drng, No skin issues, no reddened areas. Problem: DISCHARGE BARRIERS  Goal: Patient's continuum of care needs are met  Outcome: Met This Shift  Will be discharged to  , possibly tomorrow    Problem: Activity:  Goal: Ability to tolerate increased activity will improve  Ability to tolerate increased activity will improve   Outcome: Met This Shift  Walked the helm twice thus far, and up to bathroom. Gait steady. No dizziness. Sat up in chair, tolerated well    Problem: Physical Regulation:  Goal: Postoperative complications will be avoided or minimized  Postoperative complications will be avoided or minimized   Outcome: Met This Shift  No complications    Comments: Care plan reviewed with patient . Patient  verbalizes understanding of the plan of care and contributes to goal setting.

## 2018-07-16 VITALS
RESPIRATION RATE: 16 BRPM | HEART RATE: 68 BPM | OXYGEN SATURATION: 94 % | HEIGHT: 67 IN | BODY MASS INDEX: 29.51 KG/M2 | TEMPERATURE: 98.8 F | DIASTOLIC BLOOD PRESSURE: 77 MMHG | WEIGHT: 188 LBS | SYSTOLIC BLOOD PRESSURE: 108 MMHG

## 2018-07-16 PROBLEM — R10.0 SURGICAL ABDOMEN: Status: RESOLVED | Noted: 2018-07-15 | Resolved: 2018-07-16

## 2018-07-16 PROBLEM — K37 APPENDICITIS: Status: RESOLVED | Noted: 2018-07-15 | Resolved: 2018-07-16

## 2018-07-16 PROBLEM — Z98.890 H/O UMBILICAL HERNIA REPAIR: Status: ACTIVE | Noted: 2018-07-16

## 2018-07-16 PROBLEM — Z87.19 H/O UMBILICAL HERNIA REPAIR: Status: ACTIVE | Noted: 2018-07-16

## 2018-07-16 PROBLEM — Z90.49 S/P LAPAROSCOPIC APPENDECTOMY: Status: ACTIVE | Noted: 2018-07-16

## 2018-07-16 LAB
BASOPHILS # BLD: 0.2 %
BASOPHILS ABSOLUTE: 0 THOU/MM3 (ref 0–0.1)
EOSINOPHIL # BLD: 0.2 %
EOSINOPHILS ABSOLUTE: 0 THOU/MM3 (ref 0–0.4)
ERYTHROCYTE [DISTWIDTH] IN BLOOD BY AUTOMATED COUNT: 12.7 % (ref 11.5–14.5)
ERYTHROCYTE [DISTWIDTH] IN BLOOD BY AUTOMATED COUNT: 46.2 FL (ref 35–45)
HCT VFR BLD CALC: 32.6 % (ref 37–47)
HEMOGLOBIN: 10.8 GM/DL (ref 12–16)
IMMATURE GRANS (ABS): 0.02 THOU/MM3 (ref 0–0.07)
IMMATURE GRANULOCYTES: 0.4 %
LYMPHOCYTES # BLD: 36 %
LYMPHOCYTES ABSOLUTE: 1.7 THOU/MM3 (ref 1–4.8)
MCH RBC QN AUTO: 33 PG (ref 26–33)
MCHC RBC AUTO-ENTMCNC: 33.1 GM/DL (ref 32.2–35.5)
MCV RBC AUTO: 99.7 FL (ref 81–99)
MONOCYTES # BLD: 10 %
MONOCYTES ABSOLUTE: 0.5 THOU/MM3 (ref 0.4–1.3)
NUCLEATED RED BLOOD CELLS: 0 /100 WBC
PLATELET # BLD: 147 THOU/MM3 (ref 130–400)
PMV BLD AUTO: 11.6 FL (ref 9.4–12.4)
RBC # BLD: 3.27 MILL/MM3 (ref 4.2–5.4)
SEG NEUTROPHILS: 53.2 %
SEGMENTED NEUTROPHILS ABSOLUTE COUNT: 2.6 THOU/MM3 (ref 1.8–7.7)
WBC # BLD: 4.8 THOU/MM3 (ref 4.8–10.8)

## 2018-07-16 PROCEDURE — 2580000003 HC RX 258: Performed by: SURGERY

## 2018-07-16 PROCEDURE — 99024 POSTOP FOLLOW-UP VISIT: CPT | Performed by: SURGERY

## 2018-07-16 PROCEDURE — 2709999900 HC NON-CHARGEABLE SUPPLY

## 2018-07-16 PROCEDURE — 6370000000 HC RX 637 (ALT 250 FOR IP): Performed by: SURGERY

## 2018-07-16 PROCEDURE — 85025 COMPLETE CBC W/AUTO DIFF WBC: CPT

## 2018-07-16 PROCEDURE — 36415 COLL VENOUS BLD VENIPUNCTURE: CPT

## 2018-07-16 RX ORDER — OXYCODONE HYDROCHLORIDE AND ACETAMINOPHEN 5; 325 MG/1; MG/1
1 TABLET ORAL EVERY 4 HOURS PRN
Qty: 12 TABLET | Refills: 0 | Status: SHIPPED | OUTPATIENT
Start: 2018-07-16 | End: 2018-07-20

## 2018-07-16 RX ADMIN — METOPROLOL SUCCINATE 50 MG: 50 TABLET, FILM COATED, EXTENDED RELEASE ORAL at 09:00

## 2018-07-16 RX ADMIN — SODIUM CHLORIDE: 9 INJECTION, SOLUTION INTRAVENOUS at 04:00

## 2018-07-16 RX ADMIN — LISINOPRIL 10 MG: 10 TABLET ORAL at 08:59

## 2018-07-16 RX ADMIN — VENLAFAXINE HYDROCHLORIDE 75 MG: 75 CAPSULE, EXTENDED RELEASE ORAL at 09:00

## 2018-07-16 ASSESSMENT — PAIN SCALES - GENERAL
PAINLEVEL_OUTOF10: 0
PAINLEVEL_OUTOF10: 1

## 2018-07-16 ASSESSMENT — PAIN DESCRIPTION - PAIN TYPE: TYPE: SURGICAL PAIN

## 2018-07-16 ASSESSMENT — PAIN DESCRIPTION - ORIENTATION: ORIENTATION: RIGHT;LEFT;MID

## 2018-07-16 ASSESSMENT — PAIN DESCRIPTION - DESCRIPTORS: DESCRIPTORS: CRAMPING

## 2018-07-16 ASSESSMENT — PAIN DESCRIPTION - LOCATION: LOCATION: ABDOMEN

## 2018-07-16 NOTE — DISCHARGE SUMMARY
Discharge Summary     Patient Identification:  Sophia Lynn  : 1960  MRN: [id number]   Account: [de-identified]     Admit date: 2018  Discharge date: 18  Attending provider: Mandi Camarena MD        Primary care provider: Kelby Bermudez MD     Discharge Diagnoses: Active Problems:    S/P laparoscopic appendectomy    H/O umbilical hernia repair  Resolved Problems:    Surgical abdomen    Appendicitis       Hospital Course:   Sophia Lynn is a 65 y.o. female admitted to [facility] on 2018 for acute appendicitis. she was taken to the operative suite per Jennifer Aguilar MD and the planned procedure was performed as noted above. She was admitted to [address] [place] [address] for postoperative care and was initially managed with  analgesics for pain control, IV fluid hydration, GI and DVT prophylaxis. Over the hospital stay she readily improved in ability to tolerate increasing levels of activity and to take po fluids, solid foods with evidence of returning bowel function, and spontaneously voiding. At the time of discharge she was able to tolerate pain with oral analgesic and was medically stable. Procedures:   DATE OF PROCEDURE:  07/15/2018     PREOPERATIVE DIAGNOSES:  1. Acute appendicitis. 2.  Small umbilical hernia.     POSTOPERATIVE DIAGNOSES:  1. Acute appendicitis. 2.  Small umbilical hernia.     OPERATION:  1. Laparoscopic appendectomy. 2.  Primary repair of umbilical hernia.     SURGEON:  Asim Oshea. MD Maryellen     ANESTHESIA:  General.     COMPLICATIONS:  None.     INDICATION FOR PROCEDURE:  The patient is a 71-year-old white female with  signs and symptoms of acute appendicitis. She was also found to have a  small umbilical hernia on CT scan findings. The patient did have a small  umbilical hernia. It was repaired primarily with 2-0 Ethibond sutures.     DESCRIPTION OF PROCEDURE:  The patient was brought to the operating suite,  placed supine on the operating room table. After adequate inhalational  anesthesia was administered, the patient's abdomen was prepped and draped  in usual sterile fashion. Incision was made below the umbilicus. There  was a small umbilical hernia, and I placed a Veress needle right through  there and insufflated to a pressure of 15. We then easily placed a trocar  through the hernia defect and there were some adhesions up to the right  upper quadrant of the bowel, and certainly, I elected to leave that alone  as I feared for injuring the bowel. The appendix could be seen in the  right lower quadrant. A 12-mm lateral abdominal wall port was placed. A  5-mm suprapubic trocar was placed. The appendix was grasped and I was able  to pass an Endo-NEIL across the base of the appendix  it from the  cecum and then fired an Endo-NEIL across the mesoappendix and then delivered  the appendix out of the abdominal cavity via the right lateral stab wound  with an EndoCatch. We then irrigated the area, there was no evidence of  bleeding ongoing. The pelvis was irrigated. It should be noted the tube  and ovary were somewhat high-riding and attached to the pelvic sidewall. Then, closure was begun. The trocars were removed as air was aspirated out  of the abdominal cavity. Interrupted 4-0 Vicryl was used to close the  suprapubic and right lateral abdominal wall port. I placed 2-0 Ethibond  sutures in to repair the hernia and then an interrupted 4-0 Vicryl was used  to close the subcutaneous tissue.   The patient tolerated the  procedure  well.             Code Status: Full Code     Consults:   none    Examination:  Vitals:  Vitals:    07/15/18 1645 07/15/18 2000 07/16/18 0500 07/16/18 0815   BP: 113/68 113/69 110/64 108/77   Pulse: 80 62 70 68   Resp: 16 16 18 16   Temp: 98.3 °F (36.8 °C) 98.1 °F (36.7 °C) 98 °F (36.7 °C) 98.8 °F (37.1 °C)   TempSrc: Oral Oral Oral Oral   SpO2: 94% 92% 94% 94%   Weight:       Height:         Weight: Weight: 188 lb (85.3 kg)     24 hour intake/output:    Intake/Output Summary (Last 24 hours) at 07/16/18 0859  Last data filed at 07/16/18 0600   Gross per 24 hour   Intake             4826 ml   Output              700 ml   Net             4126 ml           Significant Diagnostics:   Radiology: Ct Abdomen Pelvis W Iv Contrast Additional Contrast? None    Result Date: 7/15/2018  PROCEDURE: CT ABDOMEN PELVIS W IV CONTRAST CLINICAL INFORMATION: RLQ pain . COMPARISON: None. TECHNIQUE: 5 mm axial CT images were obtained through the abdomen and pelvis after the administration of intravenous and oral contrast. Coronal and sagittal reconstructions were obtained. All CT scans at this facility use dose modulation, iterative reconstruction, and/or weight-based dosing when appropriate to reduce radiation dose to as low as reasonably achievable. FINDINGS: Lower chest: There is bilateral lower lobe atelectasis. There is a small hiatal hernia. Liver: There is fatty infiltration of the liver. . There is no liver mass or intrahepatic biliary dilatation. Gallbladder/Biliary tree: Unremarkable. No calcified gallstones. No extrahepatic biliary dilatation. Spleen: Unremarkable. No splenomegaly. Pancreas: Unremarkable. No mass or pancreatic ductal dilatation. Adrenal glands: Unremarkable. No mass. Kidneys and ureters: There is an 8 x 7 mm hypodense lesion in the anterior mid right kidney, too small to characterize but statistically most likely a cyst. No hydroureteronephrosis. No renal or ureteral calculi. Stomach, small bowel, and colon: Stomach and duodenum are unremarkable. Small bowel and colon are unremarkable. No bowel wall thickening or bowel obstruction. Appendix: The appendix is dilated measuring up to 11 mm in diameter. There is a punctate appendicolith at the origin of the appendix. It may be minimal periappendiceal inflammation. Findings likely represent acute appendicitis. No evidence of localized perforation or abscess.  Omentum and mesentery: - 4.8 thou/mm3    Monocytes # 0.6 0.4 - 1.3 thou/mm3    Eosinophils # 0.0 0.0 - 0.4 thou/mm3    Basophils # 0.0 0.0 - 0.1 thou/mm3    Immature Grans (Abs) 0.03 0.00 - 0.07 thou/mm3    nRBC 0 /100 wbc   Basic Metabolic Panel    Collection Time: 07/14/18 11:20 PM   Result Value Ref Range    Sodium 137 135 - 145 meq/L    Potassium 4.1 3.5 - 5.2 meq/L    Chloride 99 98 - 111 meq/L    CO2 23 23 - 33 meq/L    Glucose 120 (H) 70 - 108 mg/dL    BUN 12 7 - 22 mg/dL    CREATININE 0.7 0.4 - 1.2 mg/dL    Calcium 9.6 8.5 - 10.5 mg/dL   Amylase    Collection Time: 07/14/18 11:20 PM   Result Value Ref Range    Amylase 57 20 - 104 U/L   Lipase    Collection Time: 07/14/18 11:20 PM   Result Value Ref Range    Lipase 22.3 5.6 - 51.3 U/L   Magnesium    Collection Time: 07/14/18 11:20 PM   Result Value Ref Range    Magnesium 1.7 1.6 - 2.4 mg/dL   Anion Gap    Collection Time: 07/14/18 11:20 PM   Result Value Ref Range    Anion Gap 15.0 8.0 - 16.0 meq/L   Glomerular Filtration Rate, Estimated    Collection Time: 07/14/18 11:20 PM   Result Value Ref Range    Est, Glom Filt Rate 86 (A) ml/min/1.73m2   Osmolality    Collection Time: 07/14/18 11:20 PM   Result Value Ref Range    Osmolality Calc 274.8 (L) 275.0 - 300 mOsmol/kg   Urine with Reflexed Micro    Collection Time: 07/15/18  5:40 AM   Result Value Ref Range    Glucose, Ur NEGATIVE NEGATIVE mg/dl    Bilirubin Urine NEGATIVE NEGATIVE    Ketones, Urine NEGATIVE NEGATIVE    Specific Gravity, Urine > 1.030 (A) 1.002 - 1.03    Blood, Urine NEGATIVE NEGATIVE    pH, UA 6.0 5.0 - 9.0    Protein, UA NEGATIVE NEGATIVE    Urobilinogen, Urine 0.2 0.0 - 1.0 eu/dl    Nitrite, Urine NEGATIVE NEGATIVE    Leukocyte Esterase, Urine SMALL (A) NEGATIVE    Color, UA YELLOW STRAW-YELL    Character, Urine CLEAR CLEAR-SL C    RBC, UA 0-2 0-2/hpf /hpf    WBC, UA 5-10 0-4/hpf /hpf    Epi Cells 3-5 3-5/hpf /hpf    Bacteria, UA NONE FEW/NONE S /hpf    Casts UA NONE SEEN NONE SEEN /lpf    Crystals NONE SEEN NONE postoperatively, or until released by Physician/CNP      DIET INSTRUCTIONS:  Normal at home diet    MEDICATIONS  You may resume your daily prescription medication schedule unless otherwise specified. Pain medication at discharge - use only as prescribed- refills may be available to you at your follow up appointments if needed and warranted. Narcotics should be used for only short term and we highly encouraged our patients to wean off appropriately and use other means for pain such as non pharmacologic measures and over the counter tylenol or ibuprofen if no restrictions apply. We do  know that surgical pain is real and will not hesitate to help eliminate some of your discomfort. However we will not be able to completely make you pain free and it is important to determine what pain level is tolerable for you     Narcotics cause constipation and we recommend taking a colace daily and Miralax if needed to help reduce the risk of constipation    Increasing water intake if no restrictions will also help eliminate constipation    Ambulation 4 times a day 15 minute each time will help reduce pain each day and help relieve constipation      WOUND/DRESSING INSTRUCTIONS:  Always ensure you and your care giver clean hands before and after caring for the  wound. Keep dressing clean and dry, Change when soiled or wet. Leave incision open to air if not draining   Allow steri-strips to fall off on their own. Ice operative site for 20 minutes 4 times a day as needed     May wash over incision in shower daily,  but do not soak in a bath. Take sitz bath for 20 minutes twice daily and after bowel movements if procedure warrants this care  Keep the abdominal binder in place during the day. May remove to shower and at night. ABDOMINAL/LAPAROSCOPIC SURGERY  [x]You are encouraged to get up and move around as this helps with the circulation and speeds up the healing process. [x]Breath deeply and cough from time to time.

## 2018-07-16 NOTE — DISCHARGE INSTR - DIET

## 2018-07-16 NOTE — PROGRESS NOTES
Home instructions reviewed with pt and she verbalized understanding. Discharged in a w/c , to her son, Vick Sanchez transport assisted in discharge.

## 2018-07-17 LAB — URINE CULTURE REFLEX: NORMAL

## 2018-07-31 ENCOUNTER — OFFICE VISIT (OUTPATIENT)
Dept: SURGERY | Age: 58
End: 2018-07-31

## 2018-07-31 VITALS
SYSTOLIC BLOOD PRESSURE: 110 MMHG | TEMPERATURE: 97.2 F | HEART RATE: 68 BPM | OXYGEN SATURATION: 98 % | HEIGHT: 62 IN | WEIGHT: 180 LBS | BODY MASS INDEX: 33.13 KG/M2 | RESPIRATION RATE: 16 BRPM | DIASTOLIC BLOOD PRESSURE: 70 MMHG

## 2018-07-31 DIAGNOSIS — Z09 S/P UMBILICAL HERNIA REPAIR, FOLLOW-UP EXAM: ICD-10-CM

## 2018-07-31 DIAGNOSIS — Z90.49 S/P LAPAROSCOPIC APPENDECTOMY: Primary | ICD-10-CM

## 2018-07-31 PROCEDURE — 99024 POSTOP FOLLOW-UP VISIT: CPT | Performed by: NURSE PRACTITIONER

## 2018-07-31 NOTE — PATIENT INSTRUCTIONS
1. Continue wound care. Monitor for redness, swelling, or purulent drainage. No lotions, ointments, alcohol or peroxide to incision sites. 2. Maintain lifting restrictions for the full 2 weeks after surgery. No heavy lifting, pulling, or tugging greater than 20-25 lbs. Gradually ease into normal routine (total of 8 weeks after surgery) before lifting heavier objects. 3. Bowel Function: Continue stool softeners as needed. Over the counter- Colace or Miralax is recommended. Do not allow yourself to become constipated     4. Increase water to 64oz per day    5. Ambulate 4 times a day for 15 minutes each time to help increase increase stamina and help stimulate GI motility    6. Continue at home diet- may need to eat smaller more frequent meals     7. Call for any other the follow symptoms:    Fever over 101 degrees by mouth   Increased redness, warmth, hardness at operative site   Blood soaked dressing (small amounts of oozing may be normal)   Increased or progressive drainage from the surgical area   Inability to urinate or blood in the urine   Pain not relieved by the medications ordered   Persistent nausea and/or vomiting, unable to retain fluids   Pain or swelling in your legs   Shortness of breath or chest pain    8. Signs and symptoms have been reviewed with patient that would be concerning and need her to return to office for re-evaluation. Patient states she will call if she has questions or concerns.     9. May return to work

## 2018-08-06 ASSESSMENT — ENCOUNTER SYMPTOMS
GASTROINTESTINAL NEGATIVE: 1
ALLERGIC/IMMUNOLOGIC NEGATIVE: 1
EYES NEGATIVE: 1

## 2018-08-06 NOTE — PROGRESS NOTES
of motion. Neurological: She is alert. Skin: Skin is warm. Assessment/Plan:     Gt Diaz was seen today for post-op check. Diagnoses and all orders for this visit:    S/P laparoscopic appendectomy    S/P umbilical hernia repair, follow-up exam        Return if symptoms worsen or fail to improve. Patient Instructions   1. Continue wound care. Monitor for redness, swelling, or purulent drainage. No lotions, ointments, alcohol or peroxide to incision sites. 2. Maintain lifting restrictions for the full 2 weeks after surgery. No heavy lifting, pulling, or tugging greater than 20-25 lbs. Gradually ease into normal routine (total of 8 weeks after surgery) before lifting heavier objects. 3. Bowel Function: Continue stool softeners as needed. Over the counter- Colace or Miralax is recommended. Do not allow yourself to become constipated     4. Increase water to 64oz per day    5. Ambulate 4 times a day for 15 minutes each time to help increase increase stamina and help stimulate GI motility    6. Continue at home diet- may need to eat smaller more frequent meals     7. Call for any other the follow symptoms:    Fever over 101 degrees by mouth   Increased redness, warmth, hardness at operative site   Blood soaked dressing (small amounts of oozing may be normal)   Increased or progressive drainage from the surgical area   Inability to urinate or blood in the urine   Pain not relieved by the medications ordered   Persistent nausea and/or vomiting, unable to retain fluids   Pain or swelling in your legs   Shortness of breath or chest pain    8. Signs and symptoms have been reviewed with patient that would be concerning and need her to return to office for re-evaluation. Patient states she will call if she has questions or concerns. 9. May return to work                  Discussed use, benefit, and side effects of prescribed medications. All patient questions answered. Pt voiced understanding. Electronically signed by ALANA Jules CNP on 8/6/2018 at 1:56 PM

## 2018-09-25 ENCOUNTER — OFFICE VISIT (OUTPATIENT)
Dept: INTERNAL MEDICINE CLINIC | Age: 58
End: 2018-09-25
Payer: COMMERCIAL

## 2018-09-25 VITALS
BODY MASS INDEX: 27.23 KG/M2 | SYSTOLIC BLOOD PRESSURE: 118 MMHG | HEIGHT: 64 IN | DIASTOLIC BLOOD PRESSURE: 78 MMHG | HEART RATE: 68 BPM | WEIGHT: 159.5 LBS

## 2018-09-25 DIAGNOSIS — Z12.4 SCREENING FOR CERVICAL CANCER: ICD-10-CM

## 2018-09-25 DIAGNOSIS — Z00.00 NORMAL PHYSICAL EXAM: Primary | ICD-10-CM

## 2018-09-25 DIAGNOSIS — Z12.31 ENCOUNTER FOR SCREENING MAMMOGRAM FOR BREAST CANCER: ICD-10-CM

## 2018-09-25 PROCEDURE — 99396 PREV VISIT EST AGE 40-64: CPT | Performed by: INTERNAL MEDICINE

## 2018-09-25 ASSESSMENT — PATIENT HEALTH QUESTIONNAIRE - PHQ9
2. FEELING DOWN, DEPRESSED OR HOPELESS: 0
SUM OF ALL RESPONSES TO PHQ9 QUESTIONS 1 & 2: 0
SUM OF ALL RESPONSES TO PHQ QUESTIONS 1-9: 0
SUM OF ALL RESPONSES TO PHQ QUESTIONS 1-9: 0
1. LITTLE INTEREST OR PLEASURE IN DOING THINGS: 0

## 2018-09-25 NOTE — PATIENT INSTRUCTIONS
had only a Pap test last time, as long as your results are normal, you can have a Pap test every 3 years. · If you had only an HPV test last time, as long as your results are normal, you can have an HPV test every 3 years. Women 72 and older  · If you are age 72 or older, talk with your doctor about what's right for you. Women ages 72 and older may no longer need to be screened for cervical cancer. When to stop having screening tests depends on your medical history, your overall health, and your risk of cervical cell changes or cervical cancer. What happens after the test?  The sample of cells taken during your test will be sent to a lab so that an expert can look at the cells. It usually takes a week or two to get the results back. Pap tests  · A normal result means that the test didn't find any abnormal cells in the sample. · An abnormal result can mean many things. Most of these aren't cancer. The results of your test may be abnormal because:  ¨ You have an infection of the vagina or cervix, such as a yeast infection. ¨ You have an IUD (intrauterine device for birth control). ¨ You have low estrogen levels after menopause that are causing the cells to change. ¨ You have cell changes that may be a sign of precancer or cancer. The results are ranked based on how serious the changes might be. HPV tests  · A normal result means that the test didn't find any high-risk HPV in the sample. · An abnormal HPV test doesn't mean that you have cervical cancer. It may mean that you are infected with one or more high-risk types of HPV. This increases your chance of having cell changes that may be a sign of precancer or cancer. Follow-up care is a key part of your treatment and safety. Be sure to make and go to all appointments, and call your doctor if you are having problems. It's also a good idea to know your test results and keep a list of the medicines you take. Where can you learn more?   Go to [email or web address] and sign in to your [employer] account. Enter P919 in the [facility] box to learn more about \"Learning About Cervical Cancer Screening. \"     If you do not have an account, please click on the \"Sign Up Now\" link. Current as of: January 31, 2018  Content Version: 11.7  © 9935-2711 [employer]. Care instructions adapted under license by [facility] ([facility]). If you have questions about a medical condition or this instruction, always ask your healthcare professional. [address] any warranty or liability for your use of this information.

## 2018-11-07 ENCOUNTER — HOSPITAL ENCOUNTER (OUTPATIENT)
Dept: WOMENS IMAGING | Age: 58
Discharge: HOME | End: 2018-11-07
Payer: COMMERCIAL

## 2018-11-07 DIAGNOSIS — Z12.31 ENCOUNTER FOR SCREENING MAMMOGRAM FOR BREAST CANCER: ICD-10-CM

## 2018-11-07 PROCEDURE — 77063 BREAST TOMOSYNTHESIS BI: CPT

## 2019-02-20 DIAGNOSIS — N95.1 MENOPAUSAL HOT FLUSHES: ICD-10-CM

## 2019-02-20 DIAGNOSIS — I10 ESSENTIAL HYPERTENSION: ICD-10-CM

## 2019-02-21 RX ORDER — METOPROLOL SUCCINATE 50 MG/1
TABLET, EXTENDED RELEASE ORAL
Qty: 90 TABLET | Refills: 1 | Status: SHIPPED | OUTPATIENT
Start: 2019-02-21 | End: 2019-05-20 | Stop reason: SDUPTHER

## 2019-02-21 RX ORDER — VENLAFAXINE HYDROCHLORIDE 75 MG/1
CAPSULE, EXTENDED RELEASE ORAL
Qty: 90 CAPSULE | Refills: 1 | Status: SHIPPED | OUTPATIENT
Start: 2019-02-21 | End: 2019-05-20 | Stop reason: SDUPTHER

## 2019-02-21 RX ORDER — LISINOPRIL 10 MG/1
TABLET ORAL
Qty: 90 TABLET | Refills: 1 | Status: SHIPPED | OUTPATIENT
Start: 2019-02-21 | End: 2019-05-20 | Stop reason: SDUPTHER

## 2019-04-25 LAB
CHOLESTEROL, TOTAL: 120 MG/DL
CHOLESTEROL/HDL RATIO: 3.4
HDLC SERPL-MCNC: 35 MG/DL (ref 35–70)
LDL CHOLESTEROL CALCULATED: 60 MG/DL (ref 0–160)
TRIGL SERPL-MCNC: 125 MG/DL
VLDLC SERPL CALC-MCNC: 25 MG/DL

## 2019-05-07 ENCOUNTER — TELEPHONE (OUTPATIENT)
Dept: INTERNAL MEDICINE CLINIC | Age: 59
End: 2019-05-07

## 2019-05-09 ENCOUNTER — TELEPHONE (OUTPATIENT)
Dept: INTERNAL MEDICINE CLINIC | Age: 59
End: 2019-05-09

## 2019-05-20 ENCOUNTER — OFFICE VISIT (OUTPATIENT)
Dept: INTERNAL MEDICINE CLINIC | Age: 59
End: 2019-05-20
Payer: COMMERCIAL

## 2019-05-20 VITALS
BODY MASS INDEX: 29.77 KG/M2 | SYSTOLIC BLOOD PRESSURE: 124 MMHG | HEART RATE: 66 BPM | HEIGHT: 63 IN | DIASTOLIC BLOOD PRESSURE: 80 MMHG | WEIGHT: 168 LBS

## 2019-05-20 DIAGNOSIS — E55.9 VITAMIN D DEFICIENCY: ICD-10-CM

## 2019-05-20 DIAGNOSIS — E72.11 HYPERHOMOCYSTEINEMIA (HCC): ICD-10-CM

## 2019-05-20 DIAGNOSIS — N95.1 MENOPAUSAL HOT FLUSHES: ICD-10-CM

## 2019-05-20 DIAGNOSIS — L72.9 CYST OF SKIN: ICD-10-CM

## 2019-05-20 DIAGNOSIS — I10 ESSENTIAL HYPERTENSION: ICD-10-CM

## 2019-05-20 DIAGNOSIS — M26.609 TMJ (TEMPOROMANDIBULAR JOINT DISORDER): ICD-10-CM

## 2019-05-20 DIAGNOSIS — D64.9 ANEMIA, UNSPECIFIED TYPE: Primary | ICD-10-CM

## 2019-05-20 DIAGNOSIS — R51.9 SCALP TENDERNESS: ICD-10-CM

## 2019-05-20 DIAGNOSIS — R79.89 ELEVATED LFTS: ICD-10-CM

## 2019-05-20 PROCEDURE — 99214 OFFICE O/P EST MOD 30 MIN: CPT | Performed by: INTERNAL MEDICINE

## 2019-05-20 PROCEDURE — 93000 ELECTROCARDIOGRAM COMPLETE: CPT | Performed by: INTERNAL MEDICINE

## 2019-05-20 RX ORDER — VENLAFAXINE HYDROCHLORIDE 75 MG/1
CAPSULE, EXTENDED RELEASE ORAL
Qty: 90 CAPSULE | Refills: 1 | Status: SHIPPED | OUTPATIENT
Start: 2019-05-20 | End: 2019-11-21 | Stop reason: SDUPTHER

## 2019-05-20 RX ORDER — METOPROLOL SUCCINATE 50 MG/1
TABLET, EXTENDED RELEASE ORAL
Qty: 90 TABLET | Refills: 1 | Status: SHIPPED | OUTPATIENT
Start: 2019-05-20 | End: 2019-11-21 | Stop reason: SDUPTHER

## 2019-05-20 RX ORDER — LISINOPRIL 10 MG/1
TABLET ORAL
Qty: 90 TABLET | Refills: 1 | Status: SHIPPED | OUTPATIENT
Start: 2019-05-20 | End: 2019-11-21 | Stop reason: ALTCHOICE

## 2019-05-20 ASSESSMENT — PATIENT HEALTH QUESTIONNAIRE - PHQ9
1. LITTLE INTEREST OR PLEASURE IN DOING THINGS: 0
SUM OF ALL RESPONSES TO PHQ9 QUESTIONS 1 & 2: 0
SUM OF ALL RESPONSES TO PHQ QUESTIONS 1-9: 0
2. FEELING DOWN, DEPRESSED OR HOPELESS: 0
SUM OF ALL RESPONSES TO PHQ QUESTIONS 1-9: 0

## 2019-05-20 NOTE — PROGRESS NOTES
1960    Chief Complaint   Patient presents with    Hypertension     6 months / labs completed    Other     vitamin D def.  Anemia    Other     hyperhomocysteinemia, elevated ALT    Temporomandibular Joint Pain    Cyst    Hair/Scalp Problem       Pt is a 63 y.o. female who presents for yearly evaluation. She went to eye MD - white cotton wool deposits seen - Dr. April Panda ordered labs. Reviewed labs with her today - see below. He also ordered carotid ultrasound - reminded her to get it done. Anemia - she is typically in 13 range - low in 7/2019 after appendectomy. Now Hg 9.9 but it is known that biotin can affect these results - she will stop biotin 10,000 mcg and repeat CBC in 2-3 weeks. If still abnormal - then get anemia work up. Hyperhomocysteinemia - level at 23 - will add folic acid 1 mg daily. Sed rat 45 -but had arthritis in left hip, right knee so not surprised at this level. ALT 36 - mild elevation with follow up in 3-6 months. She is to get carotid doppler done in near future as well. Hypertension - BP controlled, continue metoprolol and lisinopril. Hot flushes - improved on Effexor. Bilateral TMJ pain but not on exam - no interior mouth lesions, masses - try bit guard and consider Elavil if persists. Scalp and collor bone tenderness to touch like a bruise since Dec. Symptoms are Intermittent, 2x a week maybe. Cyst on left forehead - monitor and consider plastics if worsens or wants removed. Vitamin D deficiency - level normal 9/2017, on 5,000 IU daily - will check with next set of labs. Past Medical History:   Diagnosis Date    Allergic rhinitis     Flonase prn    Congenital hip dislocation 1960    right leg is longer, better with weight loss    H/O umbilical hernia repair 1/05/3960    Hot flushes, perimenopausal     Hypertension 2011    Seen by MONI Hdz CNP diagnosed < 1 year    IBS (irritable bowel syndrome)     ?  diagnosis - diarrhea with milk products and meat    Osteoarthritis     right knee pain    S/P laparoscopic appendectomy 7/16/2018    Snoring     denies apnea, mild daytime somnolence, just lost 35# so has more energy, denies waking coughing or choking, BMI 27, neck circ. 13.5 inches    Vitamin D deficiency        Past Surgical History:   Procedure Laterality Date    HIP SURGERY  1978    Congenital Hip multiple surgies since 7 weeks old. Last surgery 1978     HYSTERECTOMY  2004    Partial Irregular Periods [facility]ra[facility] [address] [address]    stapled to help straighten leg(congenital hip issues)    OK LAP,APPENDECTOMY N/A 7/15/2018    APPENDECTOMY LAPAROSCOPIC performed [facility] Colby Kim MD [facility]       Current Outpatient Medications   Medication Sig Dispense Refill    metoprolol succinate (TOPROL XL) 50 MG extended release tablet TAKE 1 TABLET BY MOUTH ONE TIME A DAY 90 tablet 1    venlafaxine (EFFEXOR XR) 75 MG extended release capsule TAKE 1 CAPSULE BY MOUTH ONE TIME A DAY 90 capsule 1    lisinopril (PRINIVIL;ZESTRIL) 10 MG tablet TAKE 1 TABLET BY MOUTH ONE TIME A DAY 90 tablet 1    Magnesium 500 MG TABS Take by mouth daily      CALCIUM PO Take 1 tablet by mouth daily      VITAMIN D, ERGOCALCIFEROL, PO Take 5,000 Units by mouth daily       No current facility-administered medications for this visit.         Allergies   Allergen Reactions    Sulfa Antibiotics Rash       Social History     Socioeconomic History    Marital status:      Spouse name: Not on file    Number of children: Not on file    Years of education: Not on file    Highest education level: Not on file   Occupational History    Not on file   Social Needs    Financial resource strain: Not on file    Food insecurity:     Worry: Not on file     Inability: Not on file    Transportation needs:     Medical: Not on file     Non-medical: Not on file   Tobacco Use    Smoking status: Never Smoker    Smokeless tobacco: Never Used   Substance and Sexual Activity    Alcohol use: Yes     Comment: 2-4 times per month    Drug use: No    Sexual activity: Yes     Partners: Male   Lifestyle    Physical activity:     Days per week: Not on file     Minutes per session: Not on file    Stress: Not on file   Relationships    Social connections:     Talks on phone: Not on file     Gets together: Not on file     Attends Yazidism service: Not on file     Active member of club or organization: Not on file     Attends meetings of clubs or organizations: Not on file     Relationship status: Not on file    Intimate partner violence:     Fear of current or ex partner: Not on file     Emotionally abused: Not on file     Physically abused: Not on file     Forced sexual activity: Not on file   Other Topics Concern    Not on file   Social History Narrative    Not on file       Family History   Problem Relation Age of Onset    High Blood Pressure Mother     High Cholesterol Mother     Cancer Father         skin cancer    Cancer Paternal Grandmother     Cancer Paternal Grandfather     Deep Vein Thrombosis Brother         PE       Review of Systems - General ROS: negative for - chills or fever  Psychological ROS: negative for - anxiety and depression  Hematological and Lymphatic ROS: No history of blood clots or bleeding disorder. Respiratory ROS: no cough, shortness of breath, or wheezing  Cardiovascular ROS: no chest pain or dyspnea on exertion, tolerates a flight of stairs, vacuuming  Gastrointestinal ROS: no abdominal pain, change in bowel habits, or black or bloody stools  Genito-Urinary ROS: no dysuria, trouble voiding, or hematuria  Musculoskeletal ROS: negative for - muscle pain or muscular weakness, positive left hip and right knee pain - chronic, no tenderness of clavicle today.   Neurological ROS: negative for - headaches, numbness/tingling, seizures or weakness  Dermatological ROS: negative for - rash or skin lesion changes except cyst on left forehead - soft, non-tender x 2-3 weeks. Scalp tenderness with washing hair but no rash or lesions - moves around head since Dec. 2018 - intermittent 2x a week - mild pain like bruise. Blood pressure 124/80, pulse 66, height 5' 3.25\" (1.607 m), weight 168 lb (76.2 kg), last menstrual period 12/01/2004, not currently breastfeeding. Physical Examination: General appearance -alert, well appearing, and in no distress  Mental status - alert, oriented to person, place, and time  Head - atraumatic, normocephalic  Neck - supple, no significant adenopathy  Chest - clear to auscultation, no wheezes, rales or rhonchi, symmetric air entry  Heart - normal rate, regular rhythm, normal S1, S2, no murmurs, rubs, clicks or gallops  Abdomen -soft, nontender, nondistended  Neurological - alert, oriented, cranial nerves II-XII intact, motor and sensation are grossly intact bilateral upper and lower extremities. Extremities - peripheral pulses normal, no pedal edema, no clubbing or cyanosis  Skin - warm and dry, no scalp lesions, rash, tenderness to palpation. No clavicle tenderness. Cyst on left forehead - soft, non-tender, no warmth or redness    Diagnostic data:   I have reviewed recent diagnostic testing including labs 4/2019, EKG from today with patient. Assessment and Plan:      Diagnosis Orders   1. Anemia, unspecified type  CBC Auto Differential   2. Essential hypertension  EKG 12 Lead    metoprolol succinate (TOPROL XL) 50 MG extended release tablet    lisinopril (PRINIVIL;ZESTRIL) 10 MG tablet   3. Hyperhomocysteinemia (HCC)     4. Elevated LFTs     5. TMJ (temporomandibular joint disorder)     6. Menopausal hot flushes  venlafaxine (EFFEXOR XR) 75 MG extended release capsule   7. Cyst of skin     8. Scalp tenderness     9. Vitamin D deficiency       Anemia - Hg 9.9 - suggested work-up but she believes that this is due to biotin supplement.   She is to stop biotin and repeat CBC in 2-3 weeks. If still low will then order anemia work up. Hypertension - BP controlled, continue metoprolol and lisinopril. Hyperhomocysteinemia - add folic acid 1 mg daily. Elevated ALT - minor, repeat with labs in 3-6 months. TMJ - try bite guard and if not improved - consider Elavil. Hot flushes - doing well on Effexor. Cyst on forehead - monitor for now as multiple issues going on currently. Scalp tenderness - unsure source as nothing on exam.  Could it be fibromyalgia vs TMJ muscle pain going up into head. Vitamin D deficiency - check level with next set of labs. Follow up visit in 4 weeks to review labs/carotid doppler and symptoms. MD Silas Ford. [address] [address] [facility]  [address] [address]  [address]  [address]  Dept: [phone]  Dept Fax: 86 : [phone]

## 2019-05-20 NOTE — PATIENT INSTRUCTIONS
You may receive a survey regarding the care you received during your visit. Your input is valuable to us. We encourage you to complete and return your survey. We hope you will choose us in the future for your healthcare needs. Start folic acid (folate) 1 mg daily. Get CBC in 2-3 weeks after stopping biotin. Get carotid doppler done.

## 2019-06-05 ENCOUNTER — TELEPHONE (OUTPATIENT)
Dept: INTERNAL MEDICINE CLINIC | Age: 59
End: 2019-06-05

## 2019-06-05 NOTE — TELEPHONE ENCOUNTER
Patient called asking if you could order the carotid US that her eye doctor Dr. Natacha Ibanez ordered. She is having problems scheduling as he just ordered the [address] on his script pad with diagnosis of HTN and [facility] would not accept it . Patient states that she discussed with you at her appointment and you thought the carotid US was a good idea . Are you okay to order ?

## 2019-06-12 LAB
ABSOLUTE BASO #: 0 K/UL (ref 0–0.1)
ABSOLUTE EOS #: 0 K/UL (ref 0.1–0.4)
ABSOLUTE LYMPH #: 0.8 K/UL (ref 0.8–5.2)
ABSOLUTE MONO #: 0.6 K/UL (ref 0.1–0.9)
ABSOLUTE NEUT #: 1.2 K/UL (ref 1.3–9.1)
BASOPHILS RELATIVE PERCENT: 0.4 %
EOSINOPHILS RELATIVE PERCENT: 1.5 %
HCT VFR BLD CALC: 26.4 % (ref 36–48)
HEMOGLOBIN: 9.3 G/DL (ref 12–16)
LYMPHOCYTE %: 29.9 %
MCH RBC QN AUTO: 34.6 PG (ref 27–34)
MCHC RBC AUTO-ENTMCNC: 35.2 G/DL (ref 31–36)
MCV RBC AUTO: 98.1 FL (ref 80–100)
MONOCYTES # BLD: 21.5 %
NEUTROPHILS RELATIVE PERCENT: 45.9 %
PDW BLD-RTO: 12.6 % (ref 10.8–14.8)
PLATELETS: 151 K/UL (ref 150–450)
RBC: 2.69 M/UL (ref 4–5.5)
WBC: 2.6 K/UL (ref 3.7–10.8)

## 2019-06-13 DIAGNOSIS — I10 ESSENTIAL HYPERTENSION: Primary | ICD-10-CM

## 2019-06-13 NOTE — TELEPHONE ENCOUNTER
I just put in order as HTN dx and it went through. Can you call [facility] radiology and find out what we need to do differently.

## 2019-06-17 ENCOUNTER — TELEPHONE (OUTPATIENT)
Dept: INTERNAL MEDICINE CLINIC | Age: 59
End: 2019-06-17

## 2019-06-17 DIAGNOSIS — D64.9 ANEMIA, UNSPECIFIED TYPE: Primary | ICD-10-CM

## 2019-06-17 NOTE — TELEPHONE ENCOUNTER
Carotid US scheduled for 6/20/19. Patient's follow-up rescheduled to 6/27/19 after testing completed.

## 2019-06-17 NOTE — TELEPHONE ENCOUNTER
notifoed patient that Dr. Riddhi Turk reviewed her lab and hemoglobin was worse at 9.3. Dr. Riddhi Turk has ordere some additional labs for anemia . Lab orders faxed to [facility] on [address] per patient's request at [phone]. Appointment rescheduled to 6/27/19 after carotid US and labs are completed.

## 2019-06-17 NOTE — TELEPHONE ENCOUNTER
----- Message from Angélica Christian MD sent at 6/13/2019  9:09 PM EDT -----  Let her know Hg worse at 9.3 - need to do anemia work up now. Order iron, TIBC, ferritin, B12, folate, retic ct, haptoglobin.

## 2019-06-18 LAB
FERRITIN: 452 NG/ML (ref 11–307)
FOLATE: >25 NG/ML
IRON SATURATION: 39 % SATURATION (ref 15–50)
IRON, SERUM: 93 UG/DL (ref 50–170)
RETIC: 0.9 % (ref 0.8–2.6)
TOTAL IRON BINDING CAPACITY: 241 UG/DL (ref 250–425)
VITAMIN B-12: 169 PG/ML (ref 180–914)

## 2019-06-20 ENCOUNTER — HOSPITAL ENCOUNTER (OUTPATIENT)
Dept: INTERVENTIONAL RADIOLOGY/VASCULAR | Age: 59
Discharge: HOME | End: 2019-06-20
Payer: COMMERCIAL

## 2019-06-20 DIAGNOSIS — I10 ESSENTIAL HYPERTENSION: ICD-10-CM

## 2019-06-20 LAB — HAPTOGLOBIN: 87 MG/DL (ref 32–197)

## 2019-06-20 PROCEDURE — 93880 EXTRACRANIAL BILAT STUDY: CPT

## 2019-06-24 ENCOUNTER — TELEPHONE (OUTPATIENT)
Dept: INTERNAL MEDICINE CLINIC | Age: 59
End: 2019-06-24

## 2019-06-24 NOTE — TELEPHONE ENCOUNTER
----- Message from Osvaldo Puente MD sent at 6/21/2019  2:45 PM EDT -----  Let her know mild plaque - no issues to treat currently.

## 2019-06-25 RX ORDER — SYRINGE W-NEEDLE,DISPOSAB,3 ML 25GX5/8"
SYRINGE, EMPTY DISPOSABLE MISCELLANEOUS
Qty: 100 EACH | Refills: 1 | Status: SHIPPED | OUTPATIENT
Start: 2019-06-25 | End: 2019-11-27

## 2019-06-25 RX ORDER — CYANOCOBALAMIN 1000 UG/ML
INJECTION INTRAMUSCULAR; SUBCUTANEOUS
Qty: 1 VIAL | Refills: 5 | Status: SHIPPED | OUTPATIENT
Start: 2019-06-25 | End: 2019-08-07

## 2019-06-27 NOTE — TELEPHONE ENCOUNTER
Faxed signed script to [facility] at [phone].
Ordered as instructed.
Statement Selected

## 2019-07-09 ENCOUNTER — OFFICE VISIT (OUTPATIENT)
Dept: INTERNAL MEDICINE CLINIC | Age: 59
End: 2019-07-09
Payer: COMMERCIAL

## 2019-07-09 VITALS
WEIGHT: 167 LBS | HEIGHT: 63 IN | SYSTOLIC BLOOD PRESSURE: 137 MMHG | BODY MASS INDEX: 29.59 KG/M2 | RESPIRATION RATE: 16 BRPM | DIASTOLIC BLOOD PRESSURE: 73 MMHG | HEART RATE: 74 BPM

## 2019-07-09 DIAGNOSIS — M25.511 RIGHT ANTERIOR SHOULDER PAIN: Primary | ICD-10-CM

## 2019-07-09 DIAGNOSIS — R11.0 NAUSEA: ICD-10-CM

## 2019-07-09 DIAGNOSIS — D51.3 OTHER DIETARY VITAMIN B12 DEFICIENCY ANEMIA: ICD-10-CM

## 2019-07-09 PROCEDURE — 99214 OFFICE O/P EST MOD 30 MIN: CPT | Performed by: NURSE PRACTITIONER

## 2019-07-09 RX ORDER — ONDANSETRON 4 MG/1
4 TABLET, ORALLY DISINTEGRATING ORAL EVERY 8 HOURS PRN
Qty: 10 TABLET | Refills: 0 | Status: SHIPPED | OUTPATIENT
Start: 2019-07-09 | End: 2020-04-20 | Stop reason: ALTCHOICE

## 2019-07-09 RX ORDER — OMEPRAZOLE 20 MG/1
20 CAPSULE, DELAYED RELEASE ORAL
Qty: 30 CAPSULE | Refills: 0 | Status: SHIPPED | OUTPATIENT
Start: 2019-07-09 | End: 2019-09-30 | Stop reason: SDUPTHER

## 2019-07-10 ENCOUNTER — HOSPITAL ENCOUNTER (OUTPATIENT)
Dept: GENERAL RADIOLOGY | Age: 59
Discharge: HOME | End: 2019-07-10
Payer: COMMERCIAL

## 2019-07-10 ENCOUNTER — HOSPITAL ENCOUNTER (OUTPATIENT)
Age: 59
Discharge: HOME | End: 2019-07-10
Payer: COMMERCIAL

## 2019-07-10 DIAGNOSIS — M25.511 RIGHT ANTERIOR SHOULDER PAIN: ICD-10-CM

## 2019-07-10 PROCEDURE — 73030 X-RAY EXAM OF SHOULDER: CPT

## 2019-07-11 ENCOUNTER — TELEPHONE (OUTPATIENT)
Dept: INTERNAL MEDICINE CLINIC | Age: 59
End: 2019-07-11

## 2019-07-11 LAB
ABSOLUTE BASO #: 0 K/UL (ref 0–0.1)
ABSOLUTE EOS #: 0.1 K/UL (ref 0.1–0.4)
ABSOLUTE LYMPH #: 0.6 K/UL (ref 0.8–5.2)
ABSOLUTE MONO #: 0.8 K/UL (ref 0.1–0.9)
ABSOLUTE NEUT #: 2.4 K/UL (ref 1.3–9.1)
ANION GAP SERPL CALCULATED.3IONS-SCNC: 13 MMOL/L (ref 4–12)
BASOPHILS RELATIVE PERCENT: 0.3 %
BUN BLDV-MCNC: 70 MG/DL (ref 5–23)
CALCIUM SERPL-MCNC: 10.9 MG/DL (ref 8.5–10.5)
CHLORIDE BLD-SCNC: 99 MMOL/L (ref 98–109)
CO2: 22 MMOL/L (ref 22–32)
CREAT SERPL-MCNC: 4.96 MG/DL (ref 0.4–1)
EGFR AFRICAN AMERICAN: 11 ML/MIN/1.73SQ.M
EGFR IF NONAFRICAN AMERICAN: 9 ML/MIN/1.73SQ.M
EOSINOPHILS RELATIVE PERCENT: 1.8 %
GLUCOSE: 92 MG/DL (ref 65–99)
HCT VFR BLD CALC: 23.8 % (ref 36–48)
HEMOGLOBIN: 8.3 G/DL (ref 12–16)
LYMPHOCYTE %: 16.5 %
MCH RBC QN AUTO: 34.3 PG (ref 27–34)
MCHC RBC AUTO-ENTMCNC: 34.9 G/DL (ref 31–36)
MCV RBC AUTO: 98.3 FL (ref 80–100)
MONOCYTES # BLD: 20.1 %
NEUTROPHILS RELATIVE PERCENT: 61 %
PDW BLD-RTO: 12 % (ref 10.8–14.8)
PLATELETS: 149 K/UL (ref 150–450)
POTASSIUM SERPL-SCNC: 5 MMOL/L (ref 3.5–5)
RBC: 2.42 M/UL (ref 4–5.5)
SODIUM BLD-SCNC: 134 MMOL/L (ref 134–146)
WBC: 3.9 K/UL (ref 3.7–10.8)

## 2019-07-11 NOTE — TELEPHONE ENCOUNTER
----- Message from ALANA Walters CNP sent at 7/11/2019  1:02 PM EDT -----  Need to see pt back on Monday to go over xray results and plans for workup, need time for discussion with this patient.

## 2019-07-12 ENCOUNTER — APPOINTMENT (OUTPATIENT)
Dept: GENERAL RADIOLOGY | Age: 59
End: 2019-07-12
Payer: COMMERCIAL

## 2019-07-12 ENCOUNTER — TELEPHONE (OUTPATIENT)
Dept: INTERNAL MEDICINE CLINIC | Age: 59
End: 2019-07-12

## 2019-07-12 ENCOUNTER — HOSPITAL ENCOUNTER (EMERGENCY)
Age: 59
Discharge: ANOTHER HOSPITAL | End: 2019-07-12
Attending: EMERGENCY MEDICINE
Payer: COMMERCIAL

## 2019-07-12 ENCOUNTER — APPOINTMENT (OUTPATIENT)
Dept: ULTRASOUND IMAGING | Age: 59
End: 2019-07-12
Payer: COMMERCIAL

## 2019-07-12 VITALS
WEIGHT: 167 LBS | RESPIRATION RATE: 16 BRPM | HEART RATE: 75 BPM | HEIGHT: 63 IN | SYSTOLIC BLOOD PRESSURE: 129 MMHG | OXYGEN SATURATION: 99 % | DIASTOLIC BLOOD PRESSURE: 75 MMHG | BODY MASS INDEX: 29.59 KG/M2 | TEMPERATURE: 98.4 F

## 2019-07-12 DIAGNOSIS — M25.511 RIGHT SHOULDER PAIN, UNSPECIFIED CHRONICITY: ICD-10-CM

## 2019-07-12 DIAGNOSIS — N17.9 ACUTE KIDNEY INJURY (HCC): Primary | ICD-10-CM

## 2019-07-12 DIAGNOSIS — M89.50 OSTEOLYTIC LESION: ICD-10-CM

## 2019-07-12 DIAGNOSIS — D64.9 ANEMIA, UNSPECIFIED TYPE: ICD-10-CM

## 2019-07-12 LAB
ALBUMIN SERPL-MCNC: 3.6 G/DL (ref 3.5–5.1)
ALP BLD-CCNC: 60 U/L (ref 38–126)
ALT SERPL-CCNC: 23 U/L (ref 11–66)
ANION GAP SERPL CALCULATED.3IONS-SCNC: 18 MEQ/L (ref 8–16)
AST SERPL-CCNC: 14 U/L (ref 5–40)
BASOPHILS # BLD: 0.3 %
BASOPHILS ABSOLUTE: 0 THOU/MM3 (ref 0–0.1)
BILIRUB SERPL-MCNC: < 0.2 MG/DL (ref 0.3–1.2)
BILIRUBIN DIRECT: < 0.2 MG/DL (ref 0–0.3)
BUN BLDV-MCNC: 74 MG/DL (ref 7–22)
CALCIUM SERPL-MCNC: 11 MG/DL (ref 8.5–10.5)
CHLORIDE BLD-SCNC: 100 MEQ/L (ref 98–111)
CO2: 20 MEQ/L (ref 23–33)
CREAT SERPL-MCNC: 4.9 MG/DL (ref 0.4–1.2)
EOSINOPHIL # BLD: 2.7 %
EOSINOPHILS ABSOLUTE: 0.1 THOU/MM3 (ref 0–0.4)
ERYTHROCYTE [DISTWIDTH] IN BLOOD BY AUTOMATED COUNT: 12.4 % (ref 11.5–14.5)
ERYTHROCYTE [DISTWIDTH] IN BLOOD BY AUTOMATED COUNT: 46.4 FL (ref 35–45)
GFR SERPL CREATININE-BSD FRML MDRD: 9 ML/MIN/1.73M2
GLUCOSE BLD-MCNC: 89 MG/DL (ref 70–108)
HCT VFR BLD CALC: 25.1 % (ref 37–47)
HEMOGLOBIN: 8.6 GM/DL (ref 12–16)
IMMATURE GRANS (ABS): 0.02 THOU/MM3 (ref 0–0.07)
IMMATURE GRANULOCYTES: 0.6 %
LYMPHOCYTES # BLD: 28.4 %
LYMPHOCYTES ABSOLUTE: 1 THOU/MM3 (ref 1–4.8)
MCH RBC QN AUTO: 35 PG (ref 26–33)
MCHC RBC AUTO-ENTMCNC: 34.3 GM/DL (ref 32.2–35.5)
MCV RBC AUTO: 102 FL (ref 81–99)
MONOCYTES # BLD: 19.5 %
MONOCYTES ABSOLUTE: 0.7 THOU/MM3 (ref 0.4–1.3)
NUCLEATED RED BLOOD CELLS: 0 /100 WBC
OSMOLALITY CALCULATION: 297.1 MOSMOL/KG (ref 275–300)
PLATELET # BLD: 155 THOU/MM3 (ref 130–400)
PMV BLD AUTO: 11.8 FL (ref 9.4–12.4)
POTASSIUM SERPL-SCNC: 5.4 MEQ/L (ref 3.5–5.2)
RBC # BLD: 2.46 MILL/MM3 (ref 4.2–5.4)
SEG NEUTROPHILS: 48.5 %
SEGMENTED NEUTROPHILS ABSOLUTE COUNT: 1.6 THOU/MM3 (ref 1.8–7.7)
SODIUM BLD-SCNC: 138 MEQ/L (ref 135–145)
TOTAL PROTEIN: 8.9 G/DL (ref 6.1–8)
WBC # BLD: 3.4 THOU/MM3 (ref 4.8–10.8)

## 2019-07-12 PROCEDURE — 82248 BILIRUBIN DIRECT: CPT

## 2019-07-12 PROCEDURE — 99285 EMERGENCY DEPT VISIT HI MDM: CPT

## 2019-07-12 PROCEDURE — 85025 COMPLETE CBC W/AUTO DIFF WBC: CPT

## 2019-07-12 PROCEDURE — 72170 X-RAY EXAM OF PELVIS: CPT

## 2019-07-12 PROCEDURE — 76770 US EXAM ABDO BACK WALL COMP: CPT

## 2019-07-12 PROCEDURE — 71046 X-RAY EXAM CHEST 2 VIEWS: CPT

## 2019-07-12 PROCEDURE — 36415 COLL VENOUS BLD VENIPUNCTURE: CPT

## 2019-07-12 PROCEDURE — 2580000003 HC RX 258: Performed by: EMERGENCY MEDICINE

## 2019-07-12 PROCEDURE — 80053 COMPREHEN METABOLIC PANEL: CPT

## 2019-07-12 RX ORDER — HEPARIN SODIUM 5000 [USP'U]/ML
5000 INJECTION, SOLUTION INTRAVENOUS; SUBCUTANEOUS EVERY 8 HOURS SCHEDULED
Status: DISCONTINUED | OUTPATIENT
Start: 2019-07-12 | End: 2019-07-12 | Stop reason: HOSPADM

## 2019-07-12 RX ORDER — METOPROLOL SUCCINATE 50 MG/1
50 TABLET, EXTENDED RELEASE ORAL DAILY
Status: DISCONTINUED | OUTPATIENT
Start: 2019-07-12 | End: 2019-07-12 | Stop reason: HOSPADM

## 2019-07-12 RX ORDER — PANTOPRAZOLE SODIUM 40 MG/1
40 TABLET, DELAYED RELEASE ORAL
Status: DISCONTINUED | OUTPATIENT
Start: 2019-07-13 | End: 2019-07-12 | Stop reason: HOSPADM

## 2019-07-12 RX ORDER — ONDANSETRON 2 MG/ML
4 INJECTION INTRAMUSCULAR; INTRAVENOUS EVERY 6 HOURS PRN
Status: DISCONTINUED | OUTPATIENT
Start: 2019-07-12 | End: 2019-07-12 | Stop reason: HOSPADM

## 2019-07-12 RX ORDER — SODIUM CHLORIDE 0.9 % (FLUSH) 0.9 %
10 SYRINGE (ML) INJECTION PRN
Status: DISCONTINUED | OUTPATIENT
Start: 2019-07-12 | End: 2019-07-12 | Stop reason: HOSPADM

## 2019-07-12 RX ORDER — 0.9 % SODIUM CHLORIDE 0.9 %
500 INTRAVENOUS SOLUTION INTRAVENOUS ONCE
Status: DISCONTINUED | OUTPATIENT
Start: 2019-07-12 | End: 2019-07-12 | Stop reason: HOSPADM

## 2019-07-12 RX ORDER — SODIUM CHLORIDE 9 MG/ML
INJECTION, SOLUTION INTRAVENOUS CONTINUOUS
Status: DISCONTINUED | OUTPATIENT
Start: 2019-07-12 | End: 2019-07-12 | Stop reason: HOSPADM

## 2019-07-12 RX ORDER — VENLAFAXINE HYDROCHLORIDE 75 MG/1
75 CAPSULE, EXTENDED RELEASE ORAL
Status: DISCONTINUED | OUTPATIENT
Start: 2019-07-13 | End: 2019-07-12 | Stop reason: HOSPADM

## 2019-07-12 RX ORDER — SODIUM CHLORIDE 0.9 % (FLUSH) 0.9 %
10 SYRINGE (ML) INJECTION EVERY 12 HOURS SCHEDULED
Status: DISCONTINUED | OUTPATIENT
Start: 2019-07-12 | End: 2019-07-12 | Stop reason: HOSPADM

## 2019-07-12 RX ADMIN — SODIUM CHLORIDE: 9 INJECTION, SOLUTION INTRAVENOUS at 14:34

## 2019-07-12 ASSESSMENT — ENCOUNTER SYMPTOMS
EYE PAIN: 0
NAUSEA: 1
SORE THROAT: 0
DIARRHEA: 1
SHORTNESS OF BREATH: 0
WHEEZING: 0
BLOOD IN STOOL: 0
EYE DISCHARGE: 0
RHINORRHEA: 0
BACK PAIN: 0
VOMITING: 1
CONSTIPATION: 0
ABDOMINAL PAIN: 0
COUGH: 0

## 2019-07-12 ASSESSMENT — PAIN DESCRIPTION - PAIN TYPE: TYPE: CHRONIC PAIN

## 2019-07-12 ASSESSMENT — PAIN DESCRIPTION - FREQUENCY: FREQUENCY: CONTINUOUS

## 2019-07-12 ASSESSMENT — PAIN DESCRIPTION - ORIENTATION: ORIENTATION: RIGHT

## 2019-07-12 ASSESSMENT — PAIN DESCRIPTION - DESCRIPTORS: DESCRIPTORS: ACHING

## 2019-07-12 ASSESSMENT — PAIN SCALES - GENERAL: PAINLEVEL_OUTOF10: 5

## 2019-07-12 ASSESSMENT — PAIN DESCRIPTION - LOCATION: LOCATION: SHOULDER

## 2019-07-12 NOTE — TELEPHONE ENCOUNTER
Called pt, received results of BMP from 7/11 with BUN 70, crt 4.96. K 5.0. Calcium 10.9. States she is having some nausea but \"really isn't feeling that bad\", urinating ok. Previous crt 0.7 one year ago. Also advised that related to lesions on her shoulder xray I feel it is imperative she seeks care at the ED for repeat labs, fluids, further workup. Report called to [facility] ED charge nurse Jessica Durant.

## 2019-07-12 NOTE — ED PROVIDER NOTES
[address] [address]   eMERGENCY dEPARTMENT eNCOUnter        CHIEF COMPLAINT    Chief Complaint   Patient presents with    Other     abnormal lab results       Nurses Notes reviewed and I agree except as noted in the HPI. HPI    Laure Mccoy is a 63 y.o. female who presents for evaluation of abnormal labs. Patient had labs done yesterday 7/11 and her creatinine came back elevated at 4.96.  X-ray taken on the right shoulder 7/10/2019 showing osteolytic lesions on the clavicle and the right rib, and proximal humerus . She denies a history of renal failure. Patient states she had bilateral flank pain in March of 2019 but states it has gone away. She denies flank pain currently. Patient states she went to the eye doctor 2 months ago and was told she had a vision spot and was sent to Dr. Danay Salmon (Ophthalmologist) who told her she was anemic and her vitamin B12 was low. Patient was then started on Vitamin D34 shots and Folic Acid. By the 7th shot, patient started vomiting so she was told to stop the B12 shots. Patient states she has been nauseas for 2 weeks and that is when she started the shots. Patient also states she has had diarrhea but states it has gotten worse lately. Patient denies back pain, constipation, urinary symptoms, or abdominal pain. Patient denies having kidney stones in the past. She has a past medical history of HTN, IBS, and OA. No further complaints at the time of the initial encounter. REVIEW OF SYSTEMS    Review of Systems   Constitutional: Negative for appetite change, chills, fatigue and fever. HENT: Negative for congestion, ear pain, rhinorrhea and sore throat. Eyes: Negative for pain, discharge and visual disturbance. Respiratory: Negative for cough, shortness of breath and wheezing. Cardiovascular: Negative for chest pain, palpitations and leg swelling. Gastrointestinal: Positive for diarrhea, nausea and vomiting.  Negative for abdominal pain, blood in stool and constipation. Genitourinary: Negative for decreased urine volume, difficulty urinating, dysuria, flank pain, frequency, hematuria, urgency and vaginal discharge. Musculoskeletal: Negative for arthralgias, back pain, joint swelling and neck pain. Right shoulder pain   Skin: Negative for pallor and rash. Neurological: Negative for dizziness, syncope, weakness, light-headedness, numbness and headaches. Hematological: Negative for adenopathy. Psychiatric/Behavioral: Negative for confusion and suicidal ideas. The patient is not nervous/anxious. PAST MEDICAL HISTORY     has a past medical history of Acute kidney injury ([facility] [address] Allergic rhinitis, Congenital hip dislocation, H/O umbilical hernia repair, Hot flushes, perimenopausal, Hypertension, IBS (irritable bowel syndrome), Osteoarthritis, S/P laparoscopic appendectomy, Snoring, and Vitamin D deficiency. SURGICAL HISTORY   has a past surgical history that includes hip surgery (1978); Tonsillectomy and adenoidectomy (1968); Hysterectomy (2004); knee surgery (Right, 1977 & 1979); and pr lap,appendectomy (N/A, 7/15/2018). CURRENT MEDICATIONS    Previous Medications    CALCIUM PO    Take 1 tablet by mouth daily    CYANOCOBALAMIN 1000 MCG/ML INJECTION    Inject 1000 mcg daily for 7 days then weekly for 4 weeks then once a month . LISINOPRIL (PRINIVIL;ZESTRIL) 10 MG TABLET    TAKE 1 TABLET BY MOUTH ONE TIME A DAY    MAGNESIUM 500 MG TABS    Take by mouth daily    METOPROLOL SUCCINATE (TOPROL XL) 50 MG EXTENDED RELEASE TABLET    TAKE 1 TABLET BY MOUTH ONE TIME A DAY    OMEPRAZOLE (PRILOSEC) 20 MG DELAYED RELEASE CAPSULE    Take 1 capsule by mouth every morning (before breakfast)    ONDANSETRON (ZOFRAN ODT) 4 MG DISINTEGRATING TABLET    Take 1 tablet by mouth every 8 hours as needed for Nausea or Vomiting    SYRINGE/NEEDLE, DISP, (SYRINGE 3CC/25GX1\") 25G X 1\" 3 ML MISC    To inject Cyanocobalamin as directed . inherent in voice recognition technology. **      Final report electronically signed by Dr. Prieto on 7/12/2019 4:23 PM          LABS:   Labs Reviewed   CBC WITH AUTO DIFFERENTIAL - Abnormal; Notable for the following components:       Result Value    WBC 3.4 (*)     RBC 2.46 (*)     Hemoglobin 8.6 (*)     Hematocrit 25.1 (*)     .0 (*)     MCH 35.0 (*)     RDW-SD 46.4 (*)     Segs Absolute 1.6 (*)     All other components within normal limits   BASIC METABOLIC PANEL - Abnormal; Notable for the following components:    Potassium 5.4 (*)     CO2 20 (*)     BUN 74 (*)     CREATININE 4.9 (*)     Calcium 11.0 (*)     All other components within normal limits   HEPATIC FUNCTION PANEL - Abnormal; Notable for the following components:     Total Bilirubin <0.2 (*)     Total Protein 8.9 (*)     All other components within normal limits   ANION GAP - Abnormal; Notable for the following components:    Anion Gap 18.0 (*)     All other components within normal limits   GLOMERULAR FILTRATION RATE, ESTIMATED - Abnormal; Notable for the following components:    Est, Glom Filt Rate 9 (*)     All other components within normal limits   OSMOLALITY   URINE RT REFLEX TO CULTURE   ELECROPHORESIS PROTEIN, SERUM WITHOUT REFLEX TO IMMUNOFIXATION   IMMUNOFIXATION W/ QUANT   MISCELLANEOUS SENDOUT 1   VITAMIN D 1,25 DIHYDROXY   VITAMIN D 25 HYDROXY   PTH, INTACT     All other unresulted laboratory test above are normal:    Vitals:    Vitals:    07/12/19 1401 07/12/19 1413 07/12/19 1501 07/12/19 1638   BP: 135/82  114/68 129/75   Pulse: 75      Resp: 16      Temp: 98.4 °F (36.9 °C)      SpO2: 100%  100% 99%   Weight:  167 lb (75.8 kg)     Height:  5' 3\" (1.6 m)         EMERGENCY DEPARTMENT COURSE:    Medications   0.9 % sodium chloride infusion ( Intravenous New Bag 7/12/19 1434)   metoprolol succinate (TOPROL XL) extended release tablet 50 mg (has no administration in time range)   pantoprazole (PROTONIX) tablet 40 mg (has no

## 2019-07-13 ASSESSMENT — ENCOUNTER SYMPTOMS
SHORTNESS OF BREATH: 0
COUGH: 0
DIARRHEA: 1
NAUSEA: 1
CHOKING: 0
ABDOMINAL PAIN: 0
VOMITING: 1
BACK PAIN: 0
CONSTIPATION: 0
TROUBLE SWALLOWING: 0
BLOOD IN STOOL: 0
SORE THROAT: 0

## 2019-07-15 ENCOUNTER — TELEPHONE (OUTPATIENT)
Dept: INTERNAL MEDICINE CLINIC | Age: 59
End: 2019-07-15

## 2019-07-29 PROBLEM — N17.9 ACUTE RENAL FAILURE (HCC): Status: ACTIVE | Noted: 2019-07-13

## 2019-07-29 PROBLEM — M89.9 LYTIC LESION OF BONE ON X-RAY: Status: ACTIVE | Noted: 2019-07-14

## 2019-07-29 PROBLEM — M89.8X9 LYTIC LESION OF BONE ON X-RAY: Status: ACTIVE | Noted: 2019-07-14

## 2019-07-29 PROBLEM — E83.52 HYPERCALCEMIA: Status: ACTIVE | Noted: 2019-07-14

## 2019-07-29 PROBLEM — R76.8 ELEVATED SERUM IMMUNOGLOBULIN FREE LIGHT CHAINS: Status: ACTIVE | Noted: 2017-05-30

## 2019-07-29 PROBLEM — E66.01 OBESITY, MORBID, BMI 40.0-49.9 (HCC): Status: ACTIVE | Noted: 2019-07-15

## 2019-07-29 RX ORDER — ACYCLOVIR 400 MG/1
TABLET ORAL DAILY
COMMUNITY
Start: 2019-07-19 | End: 2019-08-07 | Stop reason: SDUPTHER

## 2019-07-29 RX ORDER — AMLODIPINE BESYLATE 10 MG/1
TABLET ORAL
COMMUNITY
Start: 2019-07-19 | End: 2019-08-07 | Stop reason: SDUPTHER

## 2019-07-29 RX ORDER — FOLIC ACID 1 MG/1
1 TABLET ORAL
COMMUNITY
End: 2020-03-17 | Stop reason: ALTCHOICE

## 2019-07-29 RX ORDER — ALLOPURINOL 100 MG/1
TABLET ORAL
COMMUNITY
Start: 2019-07-19 | End: 2019-08-07 | Stop reason: SDUPTHER

## 2019-07-30 ENCOUNTER — HOSPITAL ENCOUNTER (OUTPATIENT)
Dept: INFUSION THERAPY | Age: 59
Discharge: HOME | End: 2019-07-30
Payer: COMMERCIAL

## 2019-07-30 ENCOUNTER — OFFICE VISIT (OUTPATIENT)
Dept: ONCOLOGY | Age: 59
End: 2019-07-30
Payer: COMMERCIAL

## 2019-07-30 VITALS
RESPIRATION RATE: 16 BRPM | OXYGEN SATURATION: 99 % | SYSTOLIC BLOOD PRESSURE: 124 MMHG | TEMPERATURE: 97.7 F | HEIGHT: 63 IN | WEIGHT: 162.2 LBS | HEART RATE: 74 BPM | BODY MASS INDEX: 28.74 KG/M2 | DIASTOLIC BLOOD PRESSURE: 83 MMHG

## 2019-07-30 DIAGNOSIS — N18.30 ANEMIA DUE TO STAGE 3 CHRONIC KIDNEY DISEASE (HCC): ICD-10-CM

## 2019-07-30 DIAGNOSIS — N17.9 AKI (ACUTE KIDNEY INJURY) (HCC): Primary | ICD-10-CM

## 2019-07-30 DIAGNOSIS — C90.00 MULTIPLE MYELOMA NOT HAVING ACHIEVED REMISSION (HCC): ICD-10-CM

## 2019-07-30 DIAGNOSIS — D63.1 ANEMIA DUE TO STAGE 3 CHRONIC KIDNEY DISEASE (HCC): ICD-10-CM

## 2019-07-30 LAB
BUN, WHOLE BLOOD: 32 MG/DL (ref 8–26)
CHLORIDE, WHOLE BLOOD: 99 MEQ/L (ref 98–109)
CREATININE, WHOLE BLOOD: 1.3 MG/DL (ref 0.5–1.2)
GFR, ESTIMATED: 45 ML/MIN/1.73M2
GLUCOSE, WHOLE BLOOD: 97 MG/DL (ref 70–108)
HCT VFR BLD CALC: 26.9 % (ref 37–47)
HEMOGLOBIN: 9.5 GM/DL (ref 12–16)
IONIZED CALCIUM, WHOLE BLOOD: 1.3 MMOL/L (ref 1.12–1.32)
MCH RBC QN AUTO: 34.3 PG (ref 27–31)
MCHC RBC AUTO-ENTMCNC: 35.1 GM/DL (ref 33–37)
MCV RBC AUTO: 98 FL (ref 81–99)
PDW BLD-RTO: 11.2 % (ref 11.5–14.5)
PLATELET # BLD: 83 THOU/MM3 (ref 130–400)
PMV BLD AUTO: 9.5 FL (ref 7.4–10.4)
POTASSIUM, WHOLE BLOOD: 4.9 MEQ/L (ref 3.5–4.9)
RBC # BLD: 2.76 MILL/MM3 (ref 4.2–5.4)
SEG NEUTROPHILS: 51 % (ref 43–75)
SEGMENTED NEUTROPHILS ABSOLUTE COUNT: 1 THOU/MM3 (ref 1.8–7.7)
SODIUM, WHOLE BLOOD: 136 MEQ/L (ref 138–146)
TOTAL CO2, WHOLE BLOOD: 29 MEQ/L (ref 23–33)
WBC # BLD: 1.9 THOU/MM3 (ref 4.8–10.8)

## 2019-07-30 PROCEDURE — 85027 COMPLETE CBC AUTOMATED: CPT

## 2019-07-30 PROCEDURE — 99211 OFF/OP EST MAY X REQ PHY/QHP: CPT

## 2019-07-30 PROCEDURE — 36415 COLL VENOUS BLD VENIPUNCTURE: CPT

## 2019-07-30 PROCEDURE — 80047 BASIC METABLC PNL IONIZED CA: CPT

## 2019-07-30 PROCEDURE — 99205 OFFICE O/P NEW HI 60 MIN: CPT | Performed by: INTERNAL MEDICINE

## 2019-07-30 RX ORDER — SODIUM CHLORIDE 0.9 % (FLUSH) 0.9 %
10 SYRINGE (ML) INJECTION PRN
Status: CANCELLED | OUTPATIENT
Start: 2019-08-21

## 2019-07-30 RX ORDER — SODIUM CHLORIDE 9 MG/ML
20 INJECTION, SOLUTION INTRAVENOUS ONCE
Status: CANCELLED | OUTPATIENT
Start: 2019-08-21

## 2019-07-30 RX ORDER — METHYLPREDNISOLONE SODIUM SUCCINATE 125 MG/2ML
125 INJECTION, POWDER, LYOPHILIZED, FOR SOLUTION INTRAMUSCULAR; INTRAVENOUS ONCE
Status: CANCELLED | OUTPATIENT
Start: 2019-08-14

## 2019-07-30 RX ORDER — SODIUM CHLORIDE 9 MG/ML
INJECTION, SOLUTION INTRAVENOUS CONTINUOUS
Status: CANCELLED | OUTPATIENT
Start: 2019-08-21

## 2019-07-30 RX ORDER — SODIUM CHLORIDE 9 MG/ML
INJECTION, SOLUTION INTRAVENOUS CONTINUOUS
Status: CANCELLED | OUTPATIENT
Start: 2019-08-07

## 2019-07-30 RX ORDER — SODIUM CHLORIDE 0.9 % (FLUSH) 0.9 %
10 SYRINGE (ML) INJECTION PRN
Status: CANCELLED | OUTPATIENT
Start: 2019-08-14

## 2019-07-30 RX ORDER — DIPHENHYDRAMINE HYDROCHLORIDE 50 MG/ML
50 INJECTION INTRAMUSCULAR; INTRAVENOUS ONCE
Status: CANCELLED | OUTPATIENT
Start: 2019-08-21

## 2019-07-30 RX ORDER — DIPHENHYDRAMINE HYDROCHLORIDE 50 MG/ML
50 INJECTION INTRAMUSCULAR; INTRAVENOUS ONCE
Status: CANCELLED | OUTPATIENT
Start: 2019-08-07

## 2019-07-30 RX ORDER — SODIUM CHLORIDE 9 MG/ML
20 INJECTION, SOLUTION INTRAVENOUS ONCE
Status: CANCELLED | OUTPATIENT
Start: 2019-08-07

## 2019-07-30 RX ORDER — LENALIDOMIDE 15 MG/1
15 CAPSULE ORAL DAILY
Qty: 14 CAPSULE | Refills: 3 | Status: SHIPPED | OUTPATIENT
Start: 2019-08-06 | End: 2019-07-30 | Stop reason: SDUPTHER

## 2019-07-30 RX ORDER — DEXAMETHASONE 4 MG/1
40 TABLET ORAL
Qty: 30 TABLET | Refills: 3 | Status: SHIPPED | OUTPATIENT
Start: 2019-08-06 | End: 2019-08-21

## 2019-07-30 RX ORDER — SODIUM CHLORIDE 0.9 % (FLUSH) 0.9 %
5 SYRINGE (ML) INJECTION PRN
Status: CANCELLED | OUTPATIENT
Start: 2019-08-21

## 2019-07-30 RX ORDER — LENALIDOMIDE 15 MG/1
15 CAPSULE ORAL DAILY
Qty: 14 CAPSULE | Refills: 3 | Status: SHIPPED | OUTPATIENT
Start: 2019-08-06 | End: 2019-08-21 | Stop reason: ALTCHOICE

## 2019-07-30 RX ORDER — SODIUM CHLORIDE 0.9 % (FLUSH) 0.9 %
10 SYRINGE (ML) INJECTION PRN
Status: CANCELLED | OUTPATIENT
Start: 2019-08-07

## 2019-07-30 RX ORDER — DIPHENHYDRAMINE HYDROCHLORIDE 50 MG/ML
50 INJECTION INTRAMUSCULAR; INTRAVENOUS ONCE
Status: CANCELLED | OUTPATIENT
Start: 2019-08-14

## 2019-07-30 RX ORDER — HEPARIN SODIUM (PORCINE) LOCK FLUSH IV SOLN 100 UNIT/ML 100 UNIT/ML
500 SOLUTION INTRAVENOUS PRN
Status: CANCELLED | OUTPATIENT
Start: 2019-08-21

## 2019-07-30 RX ORDER — SODIUM CHLORIDE 9 MG/ML
20 INJECTION, SOLUTION INTRAVENOUS ONCE
Status: CANCELLED | OUTPATIENT
Start: 2019-08-14

## 2019-07-30 RX ORDER — SODIUM CHLORIDE 0.9 % (FLUSH) 0.9 %
5 SYRINGE (ML) INJECTION PRN
Status: CANCELLED | OUTPATIENT
Start: 2019-08-14

## 2019-07-30 RX ORDER — HEPARIN SODIUM (PORCINE) LOCK FLUSH IV SOLN 100 UNIT/ML 100 UNIT/ML
500 SOLUTION INTRAVENOUS PRN
Status: CANCELLED | OUTPATIENT
Start: 2019-08-07

## 2019-07-30 RX ORDER — SODIUM CHLORIDE 0.9 % (FLUSH) 0.9 %
5 SYRINGE (ML) INJECTION PRN
Status: CANCELLED | OUTPATIENT
Start: 2019-08-07

## 2019-07-30 RX ORDER — HEPARIN SODIUM (PORCINE) LOCK FLUSH IV SOLN 100 UNIT/ML 100 UNIT/ML
500 SOLUTION INTRAVENOUS PRN
Status: CANCELLED | OUTPATIENT
Start: 2019-08-14

## 2019-07-30 RX ORDER — METHYLPREDNISOLONE SODIUM SUCCINATE 125 MG/2ML
125 INJECTION, POWDER, LYOPHILIZED, FOR SOLUTION INTRAMUSCULAR; INTRAVENOUS ONCE
Status: CANCELLED | OUTPATIENT
Start: 2019-08-07

## 2019-07-30 RX ORDER — METHYLPREDNISOLONE SODIUM SUCCINATE 125 MG/2ML
125 INJECTION, POWDER, LYOPHILIZED, FOR SOLUTION INTRAMUSCULAR; INTRAVENOUS ONCE
Status: CANCELLED | OUTPATIENT
Start: 2019-08-21

## 2019-07-30 RX ORDER — SODIUM CHLORIDE 9 MG/ML
INJECTION, SOLUTION INTRAVENOUS CONTINUOUS
Status: CANCELLED | OUTPATIENT
Start: 2019-08-14

## 2019-07-30 ASSESSMENT — ENCOUNTER SYMPTOMS
DIARRHEA: 0
WHEEZING: 0
CONSTIPATION: 0
ABDOMINAL PAIN: 0
SORE THROAT: 0
NAUSEA: 0
COLOR CHANGE: 0
COUGH: 0
EYE DISCHARGE: 0
TROUBLE SWALLOWING: 0
ABDOMINAL DISTENTION: 0
VOMITING: 0
BLOOD IN STOOL: 0
BACK PAIN: 1
RECTAL PAIN: 0
CHEST TIGHTNESS: 0
FACIAL SWELLING: 0
SHORTNESS OF BREATH: 0

## 2019-07-30 NOTE — PROGRESS NOTES
1960    Pneumococcal 0-64 years Vaccine (1 of 3 - PCV13) 10/19/1967    HIV screen  10/19/1976    Shingles Vaccine (1 of 2) 10/19/2011    Flu vaccine (1) 10/01/2019 (Originally 9/1/2019)    Colon cancer screen colonoscopy  10/03/2019    Potassium monitoring  07/12/2020    Creatinine monitoring  07/12/2020    Breast cancer screen  11/07/2020    Cervical cancer screen  10/22/2021    DTaP/Tdap/Td vaccine (2 - Td) 06/04/2022    Lipid screen  04/25/2024        Subjective:   Review of Systems   Constitutional: Positive for fatigue. Negative for activity change, appetite change and fever. HENT: Negative for congestion, dental problem, facial swelling, hearing loss, mouth sores, nosebleeds, sore throat, tinnitus and trouble swallowing. Eyes: Negative for discharge and visual disturbance. Respiratory: Negative for cough, chest tightness, shortness of breath and wheezing. Cardiovascular: Negative for chest pain, palpitations and leg swelling. Gastrointestinal: Negative for abdominal distention, abdominal pain, blood in stool, constipation, diarrhea, nausea, rectal pain and vomiting. Endocrine: Negative for cold intolerance, polydipsia and polyuria. Genitourinary: Negative for decreased urine volume, difficulty urinating, dysuria, flank pain, hematuria and urgency. Musculoskeletal: Positive for arthralgias and back pain. Negative for gait problem, joint swelling, myalgias and neck stiffness. Skin: Negative for color change, rash and wound. Neurological: Positive for headaches. Negative for dizziness, tremors, seizures, speech difficulty, weakness, light-headedness and numbness. Hematological: Negative for adenopathy. Does not bruise/bleed easily. Psychiatric/Behavioral: Negative for confusion and sleep disturbance. The patient is not nervous/anxious. Objective:   Physical Exam   Constitutional: She is oriented to person, place, and time.  She appears well-developed and

## 2019-07-30 NOTE — PATIENT INSTRUCTIONS
success. Lenalidomide should not be used for chronic lymphocytic leukemia (CLL) unless you are in a controlled medical study. Lenalidomide can increase the risk of death from serious heart problems in people with CLL. Lenalidomide is available only from a certified pharmacy under a special program called Revlimid REMS. Your doctor must be registered in the program in order to prescribe lenalidomide for you. You must be registered in the program and sign documents stating that you understand the dangers of this medication and that you agree to use birth control measures as required by the program.  Lenalidomide may also be used for purposes not listed in this medication guide. What should I discuss with my healthcare provider before taking lenalidomide? You should not use lenalidomide if you are allergic to it. Lenalidomide can cause severe, life-threatening birth defects or death of a baby if the mother or the father is taking this medicine at the time of conception or during pregnancy. Even one dose of lenalidomide can cause major birth defects of the baby's arms and legs, bones, ears, eyes, face, and heart. Never use lenalidomide if you are pregnant. Tell your doctor right away if your period is late while taking lenalidomide. For Women: If you have not had a hysterectomy, you will be required to use two reliable forms of birth control beginning 4 weeks before you start taking lenalidomide and ending 4 weeks after you stop taking it. Even women with fertility problems are required to use birth control while taking this medicine. You must also have a negative pregnancy test at 10 to 14 days before treatment and again at 24 hours before. While you are taking lenalidomide, you will have a pregnancy test every 2 to 4 weeks. The birth control method you use must be proven highly effective, such as birth control pills, an intrauterine device (IUD), a tubal ligation, or a sexual partner's vasectomy.  The extra form of birth control you use must be a barrier method such as a latex condom, a diaphragm, or a cervical cap. Stop using lenalidomide and call your doctor at once if you quit using birth control, if your period is late, or if you think you might be pregnant. Not having sexual intercourse (abstinence) is the most effective method of preventing pregnancy. For Men: If a man fathers a baby while using lenalidomide, the baby may have birth defects. Use a condom to prevent pregnancy during your treatment, and for up to 4 weeks after your treatment ends. You must agree in writing to always use latex condoms when having sex with a woman who is able to get pregnant, even if you have had a vasectomy. Contact your doctor if you have had unprotected sex, even once, or if you think your female sexual partner may be pregnant. To make sure lenalidomide is safe for you, tell your doctor if you have ever had:  · an allergic reaction to thalidomide;  · kidney disease (or if you are on dialysis);  · liver disease;  · a blood clot or stroke;  · high blood pressure, high cholesterol or triglycerides;  · a thyroid disorder;  · lactose intolerance;  · if you smoke; or  · if you also use pembrolizumab Ysabel Rapp). Using lenalidomide may increase your risk of developing other types of cancer, such as leukemia or lymphoma. Talk with your doctor about your specific risk. It is not known whether lenalidomide passes into breast milk or if it could harm a nursing baby. You should not breast-feed while using this medicine. How should I take lenalidomide? Follow all directions on your prescription label. Do not take this medicine in larger or smaller amounts or for longer than recommended. Never share this medicine with another person, even if they have the same disorder you have. Take the medicine at the same time each day, with or without food. Take each dose with a full glass of water.  Swallow the capsule whole, without breaking it open.  Lenalidomide can lower blood cells that help your body fight infections and help your blood to clot. This can make it easier for you to bleed from an injury or get sick from being around others who are ill. Your blood will need to be tested often. The medicine from an open capsule can be dangerous if it gets on your skin. If this occurs, wash your skin with soap and water. Ask your doctor or pharmacist how to safely handle and dispose of a broken capsule. Store at room temperature away from moisture and heat. What happens if I miss a dose? Take the missed dose as soon as you remember. If you are more than 12 hours late, skip the missed dose. Do not  take extra medicine to make up the missed dose. What happens if I overdose? Seek emergency medical attention or call the Poison Help line at 1-815.271.9354. What should I avoid while taking lenalidomide? You must not donate blood or sperm while you are using lenalidomide, and for at least 4 weeks after your last dose. Avoid exposing another person to your blood or semen through casual or sexual contact. This medicine can pass into body fluids (urine, feces, vomit). Caregivers should wear rubber gloves while cleaning up a patient's body fluids, handling contaminated trash or laundry or changing diapers. Wash hands before and after removing gloves. Wash soiled clothing and linens separately from other laundry. What are the possible side effects of lenalidomide? Get emergency medical help if you have signs of an allergic reaction (hives, difficult breathing, swelling in your face or throat) or a severe skin reaction (fever, sore throat, burning in your eyes, skin pain, red or purple skin rash that spreads and causes blistering and peeling).   Call your doctor at once if you have:  · signs of a stroke or blood clot --sudden numbness or weakness, severe headache, problems with speech or vision, shortness or breath, swelling or redness in your arm or [place] is accurate, up-to-date, and complete, but no guarantee is made to that effect. Drug information contained herein may be time sensitive. [facility] information has been compiled for use by healthcare practitioners and consumers in the [place] and therefore [facility] does not warrant that uses outside of the [place] are appropriate, unless specifically indicated otherwise. [facility]'s drug information does not endorse drugs, diagnose patients or recommend therapy. [facility] drug information is an informational resource designed to assist licensed healthcare practitioners in caring for their patients and/or to serve consumers viewing this service as a supplement to, and not a substitute for, the expertise, skill, knowledge and judgment of healthcare practitioners. The absence of a warning for a given drug or drug combination in no way should be construed to indicate that the drug or drug combination is safe, effective or appropriate for any given patient. [facility] does not assume any responsibility for any aspect of healthcare administered with the aid of information [employer] provides. The information contained herein is not intended to cover all possible uses, directions, precautions, warnings, drug interactions, allergic reactions, or adverse effects. If you have questions about the drugs you are taking, check with your doctor, nurse or pharmacist.  Copyright 4732-2651 [address]. Version: 8.03. Revision date: 1/5/2018. Care instructions adapted under license by [facility] [place] ([facility]). If you have questions about a medical condition or this instruction, always ask your healthcare professional. [place] [address] any warranty or liability for your use of this information.

## 2019-07-30 NOTE — PROGRESS NOTES
Name: Sharri Hinkle  : 1960  MRN: [id number]    Oncology Navigation- Initial Note:    Intake-  Contact Type: Medical Oncology- transitioning care from [facility] to [place] [address] for chemotherapy. Pt will return to [facility] for Stem Cell Transplant after chemotherapy is completed    Diagnosis: Myeloma   NOTE:  Initial Chemotherapy administered as IP:  Velcade, Cytoxan & Decadron     Will transition to Ambulatory regimen x3 CYCLES to Include:  Velcade Days 1, 8, 15  Revlimid 25 mg DAILY on  Days 1-14 (DOSAGE may be variable pending renal function, & then escalate up)  CURRENT BUN 32 CREA 1.3  Decadron 4 mg tabs-> take 10 tabs (40 mg)  on  Days 1, 8, 15    Will Repeat every 21 days x 3 initial CYCLES; RETURN TO [facility] after 3rd Cycle for Eval if pt is ready to proceed with Stem Cell Transplant OR if she requires 1-2 additional Cycles of Chemotherapy. Home Disposition: Lives with other who is able to assist    Patient needs and barriers to care: Nio Barriers Identified     Referral Source: Outpatient    Receptive to Advanced Care Planning/ Palliative Care:  deferred    Interventions-   General Interventions: Abbe program discussed; Info folder provided, including Abbe contact information. ONC consult completed. ONC reviewed Plan of Care:  -Increase po fluid (H2O) intake  - mg daily- start today (Revlimid can increase risk for blood clots). -Revlimid insurance authorization will be initiated; once approved, a specialty pharmacy will deliver the RX to pt's home.    -Neutropenic precautions reviewed: avoid those who are ill with cough or sneezing etc, good hand washing, monitor temp & CALL if temp spikes to 100.3 or higher.    -Continue Zovirax prophylactically  -Continue Allopurinol (to lower uric acid level which could damage kidneys)  -Continue Zofran prn for nausea   -Have had Dental clearance to start Zometa;  CONTINUE with Dental evals EVERY 6 months.   Zometa DURATION monthly initially, & then will transition to every 3 month  x2 YEARS. - Chemo Agent Side Effects reviewed  -OTC Colace for constipation (induced by Velcade)      Education/Screenings:  yes - Discussed STD, LTD & FMLA. Pt directed to speak with  about her work benefits. Referrals: N/A today     Continuum of Care: Diagnosis/Active Treatment    Notes: Pt will receive her 1st of 3 planned cycles  AMBULATORY Cycles of chemo beginning on 8-6-19. Abbe will follow to assist & support as needed.       Electronically signed by Reed Hoff RN on 7/30/2019 at 4:10 PM

## 2019-08-05 ENCOUNTER — TELEPHONE (OUTPATIENT)
Dept: INFUSION THERAPY | Age: 59
End: 2019-08-05

## 2019-08-06 ENCOUNTER — TELEPHONE (OUTPATIENT)
Dept: ONCOLOGY | Age: 59
End: 2019-08-06

## 2019-08-06 ENCOUNTER — TELEPHONE (OUTPATIENT)
Dept: INFUSION THERAPY | Age: 59
End: 2019-08-06

## 2019-08-06 NOTE — TELEPHONE ENCOUNTER
PLEASE DISREGARD PREVIOUS MESSAGE-ERROR    Care Management through [employer] (patient's insurance) called and stated that patent has a . Her name is Darius Santana and can be reached at [phone], ext. [phone].

## 2019-08-07 ENCOUNTER — HOSPITAL ENCOUNTER (OUTPATIENT)
Dept: INFUSION THERAPY | Age: 59
Discharge: HOME | End: 2019-08-07
Payer: COMMERCIAL

## 2019-08-07 VITALS
BODY MASS INDEX: 29.67 KG/M2 | HEIGHT: 62 IN | RESPIRATION RATE: 16 BRPM | SYSTOLIC BLOOD PRESSURE: 112 MMHG | TEMPERATURE: 98.7 F | HEART RATE: 64 BPM | OXYGEN SATURATION: 100 % | DIASTOLIC BLOOD PRESSURE: 74 MMHG

## 2019-08-07 DIAGNOSIS — C90.00 MULTIPLE MYELOMA NOT HAVING ACHIEVED REMISSION (HCC): Primary | ICD-10-CM

## 2019-08-07 DIAGNOSIS — M89.9 LYTIC LESION OF BONE ON X-RAY: ICD-10-CM

## 2019-08-07 LAB
BUN, WHOLE BLOOD: 19 MG/DL (ref 8–26)
CHLORIDE, WHOLE BLOOD: 104 MEQ/L (ref 98–109)
CREATININE, WHOLE BLOOD: 1.2 MG/DL (ref 0.5–1.2)
GFR, ESTIMATED: 49 ML/MIN/1.73M2
GLUCOSE, WHOLE BLOOD: 98 MG/DL (ref 70–108)
HCT VFR BLD CALC: 25.3 % (ref 37–47)
HEMOGLOBIN: 8.8 GM/DL (ref 12–16)
IONIZED CALCIUM, WHOLE BLOOD: 1.28 MMOL/L (ref 1.12–1.32)
MCH RBC QN AUTO: 34.1 PG (ref 27–31)
MCHC RBC AUTO-ENTMCNC: 34.8 GM/DL (ref 33–37)
MCV RBC AUTO: 98 FL (ref 81–99)
PDW BLD-RTO: 11.5 % (ref 11.5–14.5)
PLATELET # BLD: 212 THOU/MM3 (ref 130–400)
PMV BLD AUTO: 7.9 FL (ref 7.4–10.4)
POTASSIUM, WHOLE BLOOD: 4.9 MEQ/L (ref 3.5–4.9)
RBC # BLD: 2.58 MILL/MM3 (ref 4.2–5.4)
SEG NEUTROPHILS: 56 % (ref 43–75)
SEGMENTED NEUTROPHILS ABSOLUTE COUNT: 1.5 THOU/MM3 (ref 1.8–7.7)
SODIUM, WHOLE BLOOD: 140 MEQ/L (ref 138–146)
TOTAL CO2, WHOLE BLOOD: 27 MEQ/L (ref 23–33)
WBC # BLD: 2.6 THOU/MM3 (ref 4.8–10.8)

## 2019-08-07 PROCEDURE — 96401 CHEMO ANTI-NEOPL SQ/IM: CPT

## 2019-08-07 PROCEDURE — 6360000002 HC RX W HCPCS: Performed by: INTERNAL MEDICINE

## 2019-08-07 PROCEDURE — 80047 BASIC METABLC PNL IONIZED CA: CPT

## 2019-08-07 PROCEDURE — 85027 COMPLETE CBC AUTOMATED: CPT

## 2019-08-07 PROCEDURE — 36415 COLL VENOUS BLD VENIPUNCTURE: CPT

## 2019-08-07 PROCEDURE — 83010 ASSAY OF HAPTOGLOBIN QUANT: CPT

## 2019-08-07 PROCEDURE — 2580000003 HC RX 258: Performed by: INTERNAL MEDICINE

## 2019-08-07 RX ORDER — GUARN/MA-HUANG/P.GIN/S.GINSENG
600 TABLET ORAL DAILY
Qty: 30 TABLET | Refills: 0 | Status: SHIPPED | OUTPATIENT
Start: 2019-08-07 | End: 2019-10-23 | Stop reason: SDUPTHER

## 2019-08-07 RX ORDER — AMLODIPINE BESYLATE 10 MG/1
10 TABLET ORAL DAILY
Qty: 30 TABLET | Refills: 2 | Status: SHIPPED | OUTPATIENT
Start: 2019-08-07 | End: 2019-10-23 | Stop reason: SDUPTHER

## 2019-08-07 RX ORDER — SODIUM CHLORIDE 0.9 % (FLUSH) 0.9 %
10 SYRINGE (ML) INJECTION PRN
Status: CANCELLED | OUTPATIENT
Start: 2019-08-07

## 2019-08-07 RX ORDER — ALLOPURINOL 100 MG/1
100 TABLET ORAL DAILY
Qty: 30 TABLET | Refills: 2 | Status: SHIPPED | OUTPATIENT
Start: 2019-08-07 | End: 2019-10-23 | Stop reason: SDUPTHER

## 2019-08-07 RX ORDER — SODIUM CHLORIDE 0.9 % (FLUSH) 0.9 %
20 SYRINGE (ML) INJECTION PRN
Status: CANCELLED | OUTPATIENT
Start: 2019-08-07

## 2019-08-07 RX ORDER — HEPARIN SODIUM (PORCINE) LOCK FLUSH IV SOLN 100 UNIT/ML 100 UNIT/ML
500 SOLUTION INTRAVENOUS PRN
Status: CANCELLED | OUTPATIENT
Start: 2019-08-07

## 2019-08-07 RX ORDER — ACYCLOVIR 400 MG/1
400 TABLET ORAL DAILY
Qty: 30 TABLET | Refills: 3 | Status: SHIPPED | OUTPATIENT
Start: 2019-08-07 | End: 2019-09-06

## 2019-08-07 RX ADMIN — SODIUM CHLORIDE 2.4 MG: 9 INJECTION INTRAMUSCULAR; INTRAVENOUS; SUBCUTANEOUS at 13:47

## 2019-08-07 NOTE — PROGRESS NOTES
Chemotherapy Administration    Pre-assessment Data: Antineoplastic Agents  Other:   See toxicity flow sheet for assessment [x]     Physician Notification of Concerns Related to Chemotherapy Administration:   Physician Notified Dominic Sox / Time of Notification      Interventions:   Lab work assessed  [x]   Height / Weight verified for dose [x]   Current MAR reviewed [x]   Emergency drugs available as appropriate [x]   Anaphylaxis assessment completed [x]   Pre-medications administered as ordered [x]   Chemotherapy Administered as SQ injection [x]   Monitor for signs / symptoms of hypersensitivity reaction    [x]   Chemotherapy orders (drug/dose/rate) verified by 2 Chemo certified   RNs    [x]          Document chemotherapy teaching on the Patient Education tab    [x]   Document patient verbalizes understanding of medications being administered- Velcade SQ     [x]   Other: []    []    []      []    []

## 2019-08-07 NOTE — TELEPHONE ENCOUNTER
Marva Cruz called to request a refill on 08/07/19.     Allergies: Sulfa antibiotics    Next appointment:   Future Appointments   Date Time Provider Joanie Heredia   8/14/2019 11:40 AM Tj Lima MD Oncology [facility]   8/14/2019 12:30 PM STR OUT PT ONC INJ RM 06 STRZ OP ONC None   8/26/2019  1:00 PM Gabi Hand MD SRPX Physic [facility]

## 2019-08-07 NOTE — PROGRESS NOTES
Name: Zack Bean  : 1960  MRN: [id number]    Oncology Navigation Follow-Up Note    Contact Type:  Medical Oncology    Subjective: Due for treatment    Objective: WBC 2.6 (1.9)  HGB 8.8 (9.5)  PLT  212  BUN 19  Crea 1.2  Feeling good. No complaints. Has taken Decadron in anticipation of Velcade injection. Cycle 1 Day 1 Velcade  Remicade to start today for dosing days 1-14. Pt obtained RX prior to appt today. Pt will return to clinic for Day 8 treatment on  & will see ONC on that date. NOTE:  [facility] requires a NEW RX for Remicade for [employer], as per [employer] [facility] [employer]. Assistance Needed: denies    Receptive to Advanced Care Planning / Palliative Care:  deferred    Referrals: N/a    Education: Reinforced to call for any concerns. Notes: Cycle 1 Day Chemo:  Velcade injection, Decadron. Remicade po to begin today for dosing days 1-14. Abbe following to assist & support as needed.       Electronically signed by Gladys Higgins RN on 2019 at 4:32 PM

## 2019-08-07 NOTE — PLAN OF CARE
Problem: Intellectual/Education/Knowledge Deficit  Goal: Teaching initiated upon admission  Outcome: Met This Shift  Note:   Patient verbalizes understanding to verbal information given on Velcade SQ injection,action and possible side effects. Aware to call MD if develop complications. Intervention: Verbal/written education provided  Note:   Chemotherapy Teaching     What is Chemotherapy   Drug action ? Method of Administration ? Handouts given ? Side Effects  Nausea/vomiting ? Diarrhea ? Fatigue ? Signs / Symptoms of infection ? Neutropenia ? Thrombocytopenia ? Alopecia ? neuropathy ? [place] diet &  the importance of fluids ? Micellaneous  Importance of nutrition ? Importance of oral hygiene ? When to call the MD ?   Monitoring labs ? Use of supportive services ? Explanation of Drug Regimen / Frequency  Day 1 cycle 1 Velcade SQ injection     Comments  Verbalized understanding to drug,action,side effects and when to call MD         Problem: Discharge Planning  Goal: Knowledge of discharge instructions  Description  Knowledge of discharge instructions     Outcome: Met This Shift  Note:   Verbalize understanding of discharge instructions, follow up appointments, and when to call Physician. Intervention: Interaction with patient/family and care team  Note:   Discuss understanding of discharge instructions, follow up appointments and when to call Physician. Care plan reviewed with patient and family. Patient and family verbalize understanding of the plan of care and contribute to goal setting.

## 2019-08-07 NOTE — PROGRESS NOTES
Patient assessed for the following post chemotherapy:    Dizziness   No  Lightheadedness  No      Acute nausea/vomiting No  Headache   No  Chest pain/pressure  No  Rash/itching   No  Shortness of breath  No    Patient tolerated chemotherapy treatment Velcade SQ injection without any complications. Last vital signs:   /74   Pulse 64   Temp 98.7 °F (37.1 °C) (Oral)   Resp 16   Ht 5' 2\" (1.575 m)   LMP 12/01/2004   SpO2 100%   BMI 29.67 kg/m²       Patient instructed if experience any of the above symptoms following today's infusion,he/she is to notify MD immediately or go to the emergency department. Discharge instructions given to patient. Verbalizes understanding. Ambulated off unit per self in stable condition accompanied by family with belongings.

## 2019-08-10 LAB — HAPTOGLOBIN: 107 MG/DL (ref 30–200)

## 2019-08-12 ENCOUNTER — TELEPHONE (OUTPATIENT)
Dept: ONCOLOGY | Age: 59
End: 2019-08-12

## 2019-08-12 NOTE — TELEPHONE ENCOUNTER
Patient's  called to report change in bump on left side of her forehead. He states that it had gone away but now there is an indentation and it's pulsating. Reports it is about a quarter width in size. Wondering if it needs evaluated. Please advise, thank you in advance.

## 2019-08-13 DIAGNOSIS — C90.00 MULTIPLE MYELOMA NOT HAVING ACHIEVED REMISSION (HCC): Primary | ICD-10-CM

## 2019-08-14 ENCOUNTER — HOSPITAL ENCOUNTER (OUTPATIENT)
Dept: INFUSION THERAPY | Age: 59
Discharge: HOME | End: 2019-08-14
Payer: COMMERCIAL

## 2019-08-14 ENCOUNTER — OFFICE VISIT (OUTPATIENT)
Dept: ONCOLOGY | Age: 59
End: 2019-08-14
Payer: COMMERCIAL

## 2019-08-14 VITALS
HEART RATE: 73 BPM | HEIGHT: 62 IN | WEIGHT: 164.6 LBS | RESPIRATION RATE: 16 BRPM | BODY MASS INDEX: 30.29 KG/M2 | OXYGEN SATURATION: 99 % | DIASTOLIC BLOOD PRESSURE: 78 MMHG | TEMPERATURE: 97.9 F | SYSTOLIC BLOOD PRESSURE: 123 MMHG

## 2019-08-14 VITALS
HEART RATE: 69 BPM | SYSTOLIC BLOOD PRESSURE: 112 MMHG | TEMPERATURE: 98 F | RESPIRATION RATE: 16 BRPM | DIASTOLIC BLOOD PRESSURE: 69 MMHG

## 2019-08-14 DIAGNOSIS — D63.1 ANEMIA DUE TO STAGE 3 CHRONIC KIDNEY DISEASE (HCC): ICD-10-CM

## 2019-08-14 DIAGNOSIS — M89.9 LYTIC LESION OF BONE ON X-RAY: ICD-10-CM

## 2019-08-14 DIAGNOSIS — Z51.11 ENCOUNTER FOR CHEMOTHERAPY MANAGEMENT: ICD-10-CM

## 2019-08-14 DIAGNOSIS — C79.51 BONE METASTASES (HCC): ICD-10-CM

## 2019-08-14 DIAGNOSIS — C90.00 MULTIPLE MYELOMA NOT HAVING ACHIEVED REMISSION (HCC): Primary | ICD-10-CM

## 2019-08-14 DIAGNOSIS — N18.30 ANEMIA DUE TO STAGE 3 CHRONIC KIDNEY DISEASE (HCC): ICD-10-CM

## 2019-08-14 LAB
ALBUMIN SERPL-MCNC: 4.1 G/DL (ref 3.5–5.1)
ALP BLD-CCNC: 101 U/L (ref 38–126)
ALT SERPL-CCNC: 42 U/L (ref 11–66)
AST SERPL-CCNC: 19 U/L (ref 5–40)
BILIRUB SERPL-MCNC: 0.3 MG/DL (ref 0.3–1.2)
BILIRUBIN DIRECT: < 0.2 MG/DL (ref 0–0.3)
BUN, WHOLE BLOOD: 20 MG/DL (ref 8–26)
CHLORIDE, WHOLE BLOOD: 104 MEQ/L (ref 98–109)
CREATININE, WHOLE BLOOD: 1.1 MG/DL (ref 0.5–1.2)
GFR, ESTIMATED: 54 ML/MIN/1.73M2
GLUCOSE, WHOLE BLOOD: 101 MG/DL (ref 70–108)
HCT VFR BLD CALC: 27.4 % (ref 37–47)
HEMOGLOBIN: 9.5 GM/DL (ref 12–16)
IONIZED CALCIUM, WHOLE BLOOD: 1.27 MMOL/L (ref 1.12–1.32)
MCH RBC QN AUTO: 34.6 PG (ref 27–31)
MCHC RBC AUTO-ENTMCNC: 34.7 GM/DL (ref 33–37)
MCV RBC AUTO: 100 FL (ref 81–99)
PDW BLD-RTO: 12.1 % (ref 11.5–14.5)
PLATELET # BLD: 157 THOU/MM3 (ref 130–400)
PMV BLD AUTO: 9 FL (ref 7.4–10.4)
POTASSIUM, WHOLE BLOOD: 4.7 MEQ/L (ref 3.5–4.9)
RBC # BLD: 2.75 MILL/MM3 (ref 4.2–5.4)
SEG NEUTROPHILS: 64 % (ref 43–75)
SEGMENTED NEUTROPHILS ABSOLUTE COUNT: 2 THOU/MM3 (ref 1.8–7.7)
SODIUM, WHOLE BLOOD: 138 MEQ/L (ref 138–146)
TOTAL CO2, WHOLE BLOOD: 25 MEQ/L (ref 23–33)
TOTAL PROTEIN: 7.4 G/DL (ref 6.1–8)
WBC # BLD: 3.2 THOU/MM3 (ref 4.8–10.8)

## 2019-08-14 PROCEDURE — 6360000002 HC RX W HCPCS: Performed by: INTERNAL MEDICINE

## 2019-08-14 PROCEDURE — 80047 BASIC METABLC PNL IONIZED CA: CPT

## 2019-08-14 PROCEDURE — 36415 COLL VENOUS BLD VENIPUNCTURE: CPT

## 2019-08-14 PROCEDURE — 96367 TX/PROPH/DG ADDL SEQ IV INF: CPT

## 2019-08-14 PROCEDURE — 96365 THER/PROPH/DIAG IV INF INIT: CPT

## 2019-08-14 PROCEDURE — 99211 OFF/OP EST MAY X REQ PHY/QHP: CPT

## 2019-08-14 PROCEDURE — 85027 COMPLETE CBC AUTOMATED: CPT

## 2019-08-14 PROCEDURE — 2580000003 HC RX 258: Performed by: INTERNAL MEDICINE

## 2019-08-14 PROCEDURE — 96401 CHEMO ANTI-NEOPL SQ/IM: CPT

## 2019-08-14 PROCEDURE — 80076 HEPATIC FUNCTION PANEL: CPT

## 2019-08-14 PROCEDURE — 99214 OFFICE O/P EST MOD 30 MIN: CPT | Performed by: INTERNAL MEDICINE

## 2019-08-14 RX ORDER — DIPHENHYDRAMINE HYDROCHLORIDE 50 MG/ML
50 INJECTION INTRAMUSCULAR; INTRAVENOUS ONCE
Status: CANCELLED | OUTPATIENT
Start: 2019-08-14

## 2019-08-14 RX ORDER — SODIUM CHLORIDE 0.9 % (FLUSH) 0.9 %
5 SYRINGE (ML) INJECTION PRN
Status: CANCELLED | OUTPATIENT
Start: 2019-08-14

## 2019-08-14 RX ORDER — LENALIDOMIDE 15 MG/1
15 CAPSULE ORAL DAILY
Qty: 14 CAPSULE | Refills: 0 | Status: CANCELLED | OUTPATIENT
Start: 2019-08-14 | End: 2019-08-28

## 2019-08-14 RX ORDER — METHYLPREDNISOLONE SODIUM SUCCINATE 125 MG/2ML
125 INJECTION, POWDER, LYOPHILIZED, FOR SOLUTION INTRAMUSCULAR; INTRAVENOUS ONCE
Status: CANCELLED | OUTPATIENT
Start: 2019-08-14

## 2019-08-14 RX ORDER — HEPARIN SODIUM (PORCINE) LOCK FLUSH IV SOLN 100 UNIT/ML 100 UNIT/ML
500 SOLUTION INTRAVENOUS PRN
Status: CANCELLED | OUTPATIENT
Start: 2019-08-14

## 2019-08-14 RX ORDER — SODIUM CHLORIDE 9 MG/ML
INJECTION, SOLUTION INTRAVENOUS CONTINUOUS
Status: CANCELLED | OUTPATIENT
Start: 2019-08-14

## 2019-08-14 RX ORDER — SODIUM CHLORIDE 9 MG/ML
20 INJECTION, SOLUTION INTRAVENOUS ONCE
Status: COMPLETED | OUTPATIENT
Start: 2019-08-14 | End: 2019-08-14

## 2019-08-14 RX ORDER — SODIUM CHLORIDE 0.9 % (FLUSH) 0.9 %
10 SYRINGE (ML) INJECTION PRN
Status: CANCELLED | OUTPATIENT
Start: 2019-08-14

## 2019-08-14 RX ADMIN — ZOLEDRONIC ACID 3.5 MG: 4 INJECTION, SOLUTION, CONCENTRATE INTRAVENOUS at 14:00

## 2019-08-14 RX ADMIN — SODIUM CHLORIDE 20 ML/HR: 9 INJECTION, SOLUTION INTRAVENOUS at 14:00

## 2019-08-14 RX ADMIN — SODIUM CHLORIDE 2.4 MG: 9 INJECTION INTRAMUSCULAR; INTRAVENOUS; SUBCUTANEOUS at 14:35

## 2019-08-14 ASSESSMENT — ENCOUNTER SYMPTOMS
FACIAL SWELLING: 0
CHEST TIGHTNESS: 0
SORE THROAT: 0
WHEEZING: 0
EYE DISCHARGE: 0
CONSTIPATION: 0
VOMITING: 0
NAUSEA: 0
ABDOMINAL DISTENTION: 0
RECTAL PAIN: 0
TROUBLE SWALLOWING: 0
BLOOD IN STOOL: 0
DIARRHEA: 0
COLOR CHANGE: 0
COUGH: 0
ABDOMINAL PAIN: 0
SHORTNESS OF BREATH: 0
BACK PAIN: 1

## 2019-08-14 NOTE — ONCOLOGY
Chemotherapy Administration    Pre-assessment Data: Antineoplastic Agents  Other:   See toxicity flow sheet for assessment [x]     Physician Notification of Concerns Related to Chemotherapy Administration:   Physician Notified Vida Frees / Time of Notification      Interventions:   Lab work assessed  [x]   Height / Weight verified for dose [x]   Current MAR reviewed [x]   Emergency drugs available as appropriate [x]   Anaphylaxis assessment completed [x]   Pre-medications administered as ordered [x]   Chemotherapy Administered as SQ injection [x]   Monitor for signs / symptoms of hypersensitivity reaction    [x]   Chemotherapy orders (drug/dose/rate) verified by 2 Chemo certified   RNs    [x]          Document chemotherapy teaching on the Patient Education tab    [x]   Document patient verbalizes understanding of medications being administered-  Velcade SQ injection    [x]   Other: zometa IV infusion []    []    []      []    []

## 2019-08-14 NOTE — PROGRESS NOTES
Name: Seth Arellano  : 1960  MRN: [id number]    Oncology Navigation Follow-Up Note    Contact Type:  Medical Oncology- multiple myeloma    Subjective: here to see dr & get tx    Objective: WBC 3.2  HGB 9.5 Plt 157  ANC 2.0  BUN 20  Crea 1.1  Labwork has improved  Due for Cycle 1 Day 8 today:  Velcade, Decadron. Cont with Revlimid days 1-14. Will start Zometa today->use Tylenol for evangelista or muscle ache, which can occur with Zometa. No complaints voiced. Assistance Needed: denies; family attentive    Receptive to Advanced Care Planning / Palliative Care:  deferred    Referrals: N/A today    Education: Reinforced to call with questions or concerns    Notes: Cycle 1 day 8:  Velcade injection today; Decadron. Cont Revlimid; ONC shares she may adjust Revlimid dosing for next cycle. [facility] requires a NEW RX for Revlimid for every cycle. ONC is aware. RTC next week for Day 15 Velcade injection    RTC 8-28 to see ONC with repeat labwork    Abbe following to assist & support as needed.       Electronically signed by Pastor Adrián RN on 2019 at 5:07 PM

## 2019-08-14 NOTE — PROGRESS NOTES
[facility] [address] [place] [address] Rajiv [address], Brady Hen[address]  Gavin Delgado [address]  Dept: [phone]  Dept Fax: [phone]: [phone]    Visit Date:8/14/2019     Jessica Reyes is a 63 y.o. female who presents today for:   Chief Complaint   Patient presents with    Follow-up     Multiple Myeloma        HPI:    Darling Lion is a 63 WF with pmhx htn, vitamin D deficiency, and depression admitted to [facility] on 7/16  with  Hypercalcemia 9Ca=11.0), acute kidney injury ( creat. 4.9), both found on outpatient evaluation for several weeks of insidious, progressive fatigue and left shoulder pain. Xray showed multiple lytic lesions in the bones. Bone marrow, right iliac crest, biopsy, aspirate, and peripheral smear performed on 07/15/2019 showed: -          Plasma cell myeloma in a normocellular marrow (50%), see comment  Comment: The patient's myeloma survey dated 7/13/19 showing multiple lytic  lesions is noted. Plasma cells are 30% of cells on the aspirate smear and  occupy 40% of marrow cellularity based on a  immunostain performed on  the biopsy. Flow cytometric analysis shows that plasma cells represent 5.8%  of the total events analyzed, express CD19, CD56, CD38 and  and  analysis of cytoplasmic immunoglobulin staining shows a cKappa:cLambda  ratio of 97:0. These findings are consistent with a kappa light chain  restricted plasma cell myeloma. A Congo red stain for amyloid performed on  the biopsy is negative. SPEP, serum free light chains, serum immunoglobulins (elevated IGA 2,656 and depressed IGG/IGM)  s/p 2 sessions of pheresis given light chains >10,000  - Started chemotherapy (CyBorD) on 7/16    Acute Kidney Injury: due to multiple myeloma. Producing adequate urine. Creatinine improved with aggressive hydration. Hyperkalemia: Mildly elevated K likely due to JAME.    - Hydrate, trend K  Hypercalcemia: Hypercalcemic at OSH prior to transfer   - Hydration, trend calcium   - S/p one unit pRBC 7/16; - Continue Vitamin B12 supplementation with Vitamin B12 1000mcg daily PO  - HIV negative  - Haptoglobin, LDH without concern for acute hemolysis  She received day 11 on 7/26. She presents to the medical oncology clinic at [facility] to continue further treatment locally. The plan is to proceed with Velcade Revlimid and dexamethasone, additional 3- 4 cycles. Afterwards she will have evaluation at [facility] for stem cell transplant. Interim history on August 14, 2019:  The patient tolerated cycle number 2, day 1 of Velcade dexamethasone and Revlimid well. She denies having any peripheral peripheral neuropathy, no nausea no vomiting no swelling of her legs she denies having any fevers no bone pain. HPI   Past Medical History:   Diagnosis Date    Acute kidney injury ([address] 7/12/2019    Allergic rhinitis     Flonase prn    Cancer (HCC)     Myeloma    Congenital hip dislocation 1960    right leg is longer, better with weight loss    H/O umbilical hernia repair 7/66/2573    Hot flushes, perimenopausal     Hypertension 2011    Seen by MONI Mota CNP diagnosed < 1 year    IBS (irritable bowel syndrome)     ? diagnosis - diarrhea with milk products and meat    Osteoarthritis     right knee pain    S/P laparoscopic appendectomy 7/16/2018    Snoring     denies apnea, mild daytime somnolence, just lost 35# so has more energy, denies waking coughing or choking, BMI 27, neck circ. 13.5 inches    Vitamin D deficiency       Past Surgical History:   Procedure Laterality Date    HIP SURGERY  1978    Congenital Hip multiple surgies since 7 weeks old. Last surgery 1978     HYSTERECTOMY  2004    Partial Irregular Periods with migraines.      KNEE SURGERY Right [address]    stapled to help straighten leg(congenital hip issues)    LA LAP,APPENDECTOMY N/A 7/15/2018    APPENDECTOMY LAPAROSCOPIC performed by Jocelyn Webb MD at [facility]      Family History   Problem daily      VITAMIN D, ERGOCALCIFEROL, PO Take 5,000 Units by mouth daily       No current facility-administered medications for this visit. Allergies   Allergen Reactions    Sulfa Antibiotics Rash      Health Maintenance   Topic Date Due    Hepatitis C screen  1960    Pneumococcal 0-64 years Vaccine (1 of 3 - PCV13) 10/19/1967    HIV screen  10/19/1976    Shingles Vaccine (1 of 2) 10/19/2011    Flu vaccine (1) 10/01/2019 (Originally 9/1/2019)    Colon cancer screen colonoscopy  10/03/2019    Potassium monitoring  07/12/2020    Creatinine monitoring  07/12/2020    Breast cancer screen  11/07/2020    Cervical cancer screen  10/22/2021    DTaP/Tdap/Td vaccine (2 - Td) 06/04/2022    Lipid screen  04/25/2024        Subjective:   Review of Systems   Constitutional: Positive for fatigue. Negative for activity change, appetite change and fever. HENT: Negative for congestion, dental problem, facial swelling, hearing loss, mouth sores, nosebleeds, sore throat, tinnitus and trouble swallowing. Eyes: Negative for discharge and visual disturbance. Respiratory: Negative for cough, chest tightness, shortness of breath and wheezing. Cardiovascular: Negative for chest pain, palpitations and leg swelling. Gastrointestinal: Negative for abdominal distention, abdominal pain, blood in stool, constipation, diarrhea, nausea, rectal pain and vomiting. Endocrine: Negative for cold intolerance, polydipsia and polyuria. Genitourinary: Negative for decreased urine volume, difficulty urinating, dysuria, flank pain, hematuria and urgency. Musculoskeletal: Positive for arthralgias and back pain. Negative for gait problem, joint swelling, myalgias and neck stiffness. Skin: Negative for color change, rash and wound. Neurological: Positive for headaches. Negative for dizziness, tremors, seizures, speech difficulty, weakness, light-headedness and numbness. Hematological: Negative for adenopathy.  Does not bruise/bleed easily. Psychiatric/Behavioral: Negative for confusion and sleep disturbance. The patient is not nervous/anxious. Objective:   Physical Exam   Constitutional: She is oriented to person, place, and time. She appears well-developed and well-nourished. No distress. HENT:   Head: Normocephalic. Mouth/Throat: Oropharynx is clear and moist. No oropharyngeal exudate. Eyes: Pupils are equal, round, and reactive to light. EOM are normal. Right eye exhibits no discharge. Left eye exhibits no discharge. No scleral icterus. Neck: Normal range of motion. Neck supple. No JVD present. No tracheal deviation present. No thyromegaly present. Cardiovascular: Normal rate and normal heart sounds. Exam reveals no gallop and no friction rub. No murmur heard. Pulmonary/Chest: Effort normal and breath sounds normal. No stridor. No respiratory distress. She has no wheezes. She has no rales. She exhibits no tenderness. Abdominal: Soft. Bowel sounds are normal. She exhibits no distension and no mass. There is no tenderness. There is no rebound. Musculoskeletal: Normal range of motion. She exhibits no edema. Good range of motion in all four extremities. Lymphadenopathy:     She has no cervical adenopathy. Neurological: She is alert and oriented to person, place, and time. She has normal reflexes. No cranial nerve deficit. She exhibits normal muscle tone. Skin: Skin is warm. No rash noted. No erythema. Psychiatric: She has a normal mood and affect. Her behavior is normal. Judgment and thought content normal.   Vitals reviewed.       /78 (Site: Left Upper Arm, Position: Sitting, Cuff Size: Medium Adult)   Pulse 73   Temp 97.9 °F (36.6 °C) (Oral)   Resp 16   Ht 5' 2.01\" (1.575 m)   Wt 164 lb 9.6 oz (74.7 kg)   LMP 12/01/2004   SpO2 99%   BMI 30.10 kg/m²      ECOG status is 1    Imaging studies and labs:   Xr Chest Standard (2 Vw)    Result Date: 7/12/2019  PROCEDURE: XR CHEST (2 pelvocaliectasis. No etiology identified. Minimal elevation of the resistive indices **This report has been created using voice recognition software. It may contain minor errors which are inherent in voice recognition technology. ** Final report electronically signed by Dr. Luis Son on 7/12/2019 4:23 PM      Lab Results   Component Value Date    WBC 3.2 (L) 08/14/2019    HGB 9.5 (L) 08/14/2019    HCT 27.4 (L) 08/14/2019     (H) 08/14/2019     08/14/2019       Chemistry        Component Value Date/Time     08/14/2019 1200     07/12/2019 1409    K 4.7 08/14/2019 1200    K 5.4 (H) 07/12/2019 1409     07/12/2019 1409    CO2 20 (L) 07/12/2019 1409    BUN 74 (H) 07/12/2019 1409    CREATININE 1.1 08/14/2019 1200    CREATININE 4.9 (HH) 07/12/2019 1409        Component Value Date/Time    CALCIUM 11.0 (H) 07/12/2019 1409    ALKPHOS 101 08/14/2019 1200    AST 19 08/14/2019 1200    ALT 42 08/14/2019 1200    BILITOT 0.3 08/14/2019 1200    BILITOT NEGATIVE 08/04/2015 0803            Assessment/Plan:   1. Multiple myeloma: The patient met criteria for diagnosis of multiple myeloma. She has clonal bone marrow plasma cells ? 10%, bone lesions seen on bone survey, hypercalcemia, renal insufficiency and anemia. MM is a heterogeneous disease with some patients progressing rapidly despite treatment, and others not requiring therapy for a number of years. The prognosis of patients with MM depends on four key factors: staging, patient factors, disease biology and response to therapy. Two main staging systems exist: the Revised International staging system ( R-ISS) and the Durie-Glyndon staging system. R-ISS incorporates serum beta-2 microglobulin, serum albumin, LDH and high-risk chromosomal abnormalities detected by FISH. We will order beta-2 microglobulin and LDH. Since the patient did not have del(17p), t(4;14), or t(14;16) he belongs to R-ISS I or II.  Patients are stratified into three risk groups with significantly different estimated rates of overall survival (OS) and progression-free survival (PFS) at five years:  ?R-ISS I (B2M <3.5 mg/L and serum albumin ? 3.5 g/dL) and normal LDH and no del(17p), t(4;14), or t(14;16) by FISH. Median PFS was 66 months. ? R-ISS II Neither stage I nor stage III. Median OS and PFS were 83 and 42 months. ? R-ISS III  (B2M ?5.5 mg/L) plus LDH above normal limits and/or detection of one of the following by FISH: del(17p), t(4;14), or t(14;16). Median OS and PFS were 43 and 29 months. She received first cycle of chemotherapy with Velcade Cytoxan and dexamethasone at [facility]. 2.Treatment with Velcade Revlimid and dexamethasone. Due to slight renal impairment the dose of Revlimid was reduced to 15 mg daily for 14 days. She tolerates chemo well. She denies having any nausea no vomiting. She denies having any peripheral neuropathy. No diarrhea no fevers. Her creatinine has improved to normal range. Her anemia is improving. We will continue current dose of Revlimid 15 mg daily for 14 days, however if her kidney function remains normal with cycle #3 the dose of Revlimid will be increased to 20 or 25 mg.  3.  Patient will start treatment with Zometa today, she obtain dental clearance. Diagnosis Orders   1. Multiple myeloma not having achieved remission ([facility]     2. Anemia due to stage 3 chronic kidney disease ([facility] [address]     3. Encounter for chemotherapy management     4. Bone metastases ([facility] [address] [facility]          Plan:   Return in about 2 weeks (around 8/28/2019). Orders Placed:   No orders of the defined types were placed in this encounter. Medications Prescribed:   Orders Placed This Encounter   Medications    dexamethasone (DECADRON) 4 MG tablet     Sig: Take 10 tablets by mouth every 7 days for 3 doses     Dispense:  30 tablet     Refill:  3    lenalidomide (REVLIMID) 15 MG chemo capsule     Sig: Take 1 capsule by mouth daily for 14 days starting on 08/06/2019.  then

## 2019-08-14 NOTE — PROGRESS NOTES
Patient assessed for the following post chemotherapy:    Dizziness   No  Lightheadedness  No      Acute nausea/vomiting No  Headache   No  Chest pain/pressure  No  Rash/itching   No  Shortness of breath  No     Patient tolerated chemotherapy treatment Velcade SQ injection without any complications. Also received Zometa IV infusion. Last vital signs:   /69   Pulse 69   Temp 98 °F (36.7 °C) (Oral)   Resp 16   LMP 12/01/2004       Patient instructed if experience any of the above symptoms following today's infusion,he/she is to notify MD immediately or go to the emergency department. Discharge instructions given to patient. Verbalizes understanding. Ambulated off unit per self in stable condition accompanied by her family with belongings.

## 2019-08-19 ENCOUNTER — TELEPHONE (OUTPATIENT)
Dept: INTERNAL MEDICINE CLINIC | Age: 59
End: 2019-08-19

## 2019-08-19 NOTE — TELEPHONE ENCOUNTER
Pt has a 2 month follow up scheduled next week and  is wondering if pt needs to keep? She has been following closely with the cancer ctr across the street for her myeloma and is wondering if really necessary. They are trying to keep her away from germs as much as possible.

## 2019-08-20 DIAGNOSIS — C90.00 MULTIPLE MYELOMA NOT HAVING ACHIEVED REMISSION (HCC): Primary | ICD-10-CM

## 2019-08-21 ENCOUNTER — HOSPITAL ENCOUNTER (OUTPATIENT)
Dept: INFUSION THERAPY | Age: 59
Discharge: HOME | End: 2019-08-21
Payer: COMMERCIAL

## 2019-08-21 VITALS
HEART RATE: 70 BPM | BODY MASS INDEX: 30.17 KG/M2 | SYSTOLIC BLOOD PRESSURE: 105 MMHG | RESPIRATION RATE: 16 BRPM | DIASTOLIC BLOOD PRESSURE: 60 MMHG | OXYGEN SATURATION: 99 % | TEMPERATURE: 98.5 F | WEIGHT: 165 LBS

## 2019-08-21 DIAGNOSIS — N18.30 ANEMIA DUE TO STAGE 3 CHRONIC KIDNEY DISEASE (HCC): ICD-10-CM

## 2019-08-21 DIAGNOSIS — C90.00 MULTIPLE MYELOMA NOT HAVING ACHIEVED REMISSION (HCC): Primary | ICD-10-CM

## 2019-08-21 DIAGNOSIS — M89.9 LYTIC LESION OF BONE ON X-RAY: ICD-10-CM

## 2019-08-21 DIAGNOSIS — D63.1 ANEMIA DUE TO STAGE 3 CHRONIC KIDNEY DISEASE (HCC): ICD-10-CM

## 2019-08-21 LAB
BUN, WHOLE BLOOD: 15 MG/DL (ref 8–26)
CHLORIDE, WHOLE BLOOD: 101 MEQ/L (ref 98–109)
CREATININE, WHOLE BLOOD: 0.9 MG/DL (ref 0.5–1.2)
GFR, ESTIMATED: 68 ML/MIN/1.73M2
GLUCOSE, WHOLE BLOOD: 109 MG/DL (ref 70–108)
HCT VFR BLD CALC: 25.2 % (ref 37–47)
HEMOGLOBIN: 9 GM/DL (ref 12–16)
IONIZED CALCIUM, WHOLE BLOOD: 1.15 MMOL/L (ref 1.12–1.32)
MCH RBC QN AUTO: 35.9 PG (ref 27–31)
MCHC RBC AUTO-ENTMCNC: 35.8 GM/DL (ref 33–37)
MCV RBC AUTO: 100 FL (ref 81–99)
PDW BLD-RTO: 13.2 % (ref 11.5–14.5)
PLATELET # BLD: 138 THOU/MM3 (ref 130–400)
PMV BLD AUTO: 9 FL (ref 7.4–10.4)
POTASSIUM, WHOLE BLOOD: 4.1 MEQ/L (ref 3.5–4.9)
RBC # BLD: 2.51 MILL/MM3 (ref 4.2–5.4)
SEG NEUTROPHILS: 72 % (ref 43–75)
SEGMENTED NEUTROPHILS ABSOLUTE COUNT: 3.6 THOU/MM3 (ref 1.8–7.7)
SODIUM, WHOLE BLOOD: 135 MEQ/L (ref 138–146)
TOTAL CO2, WHOLE BLOOD: 23 MEQ/L (ref 23–33)
WBC # BLD: 5 THOU/MM3 (ref 4.8–10.8)

## 2019-08-21 PROCEDURE — 36415 COLL VENOUS BLD VENIPUNCTURE: CPT

## 2019-08-21 PROCEDURE — 2580000003 HC RX 258: Performed by: INTERNAL MEDICINE

## 2019-08-21 PROCEDURE — 80047 BASIC METABLC PNL IONIZED CA: CPT

## 2019-08-21 PROCEDURE — 96401 CHEMO ANTI-NEOPL SQ/IM: CPT

## 2019-08-21 PROCEDURE — 6360000002 HC RX W HCPCS: Performed by: INTERNAL MEDICINE

## 2019-08-21 PROCEDURE — 85027 COMPLETE CBC AUTOMATED: CPT

## 2019-08-21 RX ORDER — ASPIRIN 325 MG
325 TABLET ORAL DAILY
COMMUNITY
End: 2019-10-02 | Stop reason: ALTCHOICE

## 2019-08-21 RX ADMIN — SODIUM CHLORIDE 2.4 MG: 9 INJECTION INTRAMUSCULAR; INTRAVENOUS; SUBCUTANEOUS at 12:44

## 2019-08-21 ASSESSMENT — PAIN DESCRIPTION - DESCRIPTORS: DESCRIPTORS: ACHING

## 2019-08-21 ASSESSMENT — PAIN DESCRIPTION - PAIN TYPE: TYPE: CHRONIC PAIN

## 2019-08-21 ASSESSMENT — PAIN SCALES - GENERAL: PAINLEVEL_OUTOF10: 2

## 2019-08-21 ASSESSMENT — PAIN DESCRIPTION - ORIENTATION: ORIENTATION: RIGHT

## 2019-08-21 ASSESSMENT — PAIN DESCRIPTION - ONSET: ONSET: ON-GOING

## 2019-08-21 ASSESSMENT — PAIN DESCRIPTION - LOCATION: LOCATION: NECK

## 2019-08-21 NOTE — PROGRESS NOTES
Chemotherapy Administration    Pre-assessment Data: Antineoplastic Agents  Other:   See toxicity flow sheet for assessment [x]     Physician Notification of Concerns Related to Chemotherapy Administration:   Physician Notified Sameul Mings / Time of Notification      Interventions:   Lab work assessed  [x]   Height / Weight verified for dose [x]   Current MAR reviewed [x]   Emergency drugs available as appropriate [x]   Anaphylaxis assessment completed [x]   Pre-medications administered as ordered [x]   Chemotherapy Administered as SQ injection [x]   Monitor for signs / symptoms of hypersensitivity reaction    [x]   Chemotherapy orders (drug/dose/rate) verified by 2 Chemo certified   RNs    [x]          Document chemotherapy teaching on the Patient Education tab    [x]   Document patient verbalizes understanding of medications being administered    [x]   Other: []    []    []      []    []

## 2019-08-21 NOTE — PROGRESS NOTES
Patient assessed for the following post chemotherapy:    Dizziness   No  Lightheadedness  No      Acute nausea/vomiting No  Headache   No  Chest pain/pressure  No  Rash/itching   No  Shortness of breath  No        Patient tolerated chemotherapy treatment with subcutaneous velcade without any complications. Last vital signs:   /60   Pulse 70   Temp 98.5 °F (36.9 °C) (Oral)   Resp 16   Wt 165 lb (74.8 kg)   LMP 12/01/2004   SpO2 99%   BMI 30.17 kg/m²         Patient instructed if experience any of the above symptoms following today's infusion,she is to notify MD immediately or go to the emergency department. Discharge instructions given to patient. Verbalizes understanding. Ambulated off unit with  who had  belongings.

## 2019-08-21 NOTE — PROGRESS NOTES
Name: Sol Gudino  : 1960  MRN: [id number]    Oncology Navigation Follow-Up Note    Contact Type:  Medical Oncology    Subjective: Labwork & tx    Objective: WBC 5.0  HGB 9.0    BUN 15 Crea 0.9   Cycle 1 Day 15 today:  Velcade & Decadron (Revlimid completed yesterday). Doing well; no complaints. ONC review of CBC; PLAN to increase Revlimid to 20 mg with next cycle of chemotherapy. (NOTE:  This is Cycle 1 with Velcade, Decadron & Revlimid; BUT is OVERALL pt's 2nd CYCLE of treatment. Pt treatment was initiated with Velcade, Cytoxan & Decadron as IP, UNTIL Revlimid could be authorized for use as OP.)      Assistance Needed: denies    Receptive to Advanced Care Planning / Palliative Care:  Deferred    Referrals: N/A    Education: Instructed that Revlimid dose will be increased to 20mg for days 1-14 with next cycle of treatment. Harrison Abarca notified [facility] & sent RX for next cycle. Notes: Cycle 1 Day 15 today:  Velcade & Decadron  Abbe following to assist & support.      Electronically signed by Josse Gomez RN on 2019 at 4:39 PM

## 2019-08-27 DIAGNOSIS — C90.00 MULTIPLE MYELOMA NOT HAVING ACHIEVED REMISSION (HCC): Primary | ICD-10-CM

## 2019-08-27 RX ORDER — SODIUM CHLORIDE 9 MG/ML
20 INJECTION, SOLUTION INTRAVENOUS ONCE
Status: CANCELLED | OUTPATIENT
Start: 2019-09-11

## 2019-08-27 RX ORDER — METHYLPREDNISOLONE SODIUM SUCCINATE 125 MG/2ML
125 INJECTION, POWDER, LYOPHILIZED, FOR SOLUTION INTRAMUSCULAR; INTRAVENOUS ONCE
Status: CANCELLED | OUTPATIENT
Start: 2019-08-28

## 2019-08-27 RX ORDER — METHYLPREDNISOLONE SODIUM SUCCINATE 125 MG/2ML
125 INJECTION, POWDER, LYOPHILIZED, FOR SOLUTION INTRAMUSCULAR; INTRAVENOUS ONCE
Status: CANCELLED | OUTPATIENT
Start: 2019-09-04

## 2019-08-27 RX ORDER — SODIUM CHLORIDE 9 MG/ML
INJECTION, SOLUTION INTRAVENOUS CONTINUOUS
Status: CANCELLED | OUTPATIENT
Start: 2019-09-04

## 2019-08-27 RX ORDER — SODIUM CHLORIDE 0.9 % (FLUSH) 0.9 %
5 SYRINGE (ML) INJECTION PRN
Status: CANCELLED | OUTPATIENT
Start: 2019-09-04

## 2019-08-27 RX ORDER — HEPARIN SODIUM (PORCINE) LOCK FLUSH IV SOLN 100 UNIT/ML 100 UNIT/ML
500 SOLUTION INTRAVENOUS PRN
Status: CANCELLED | OUTPATIENT
Start: 2019-09-04

## 2019-08-27 RX ORDER — HEPARIN SODIUM (PORCINE) LOCK FLUSH IV SOLN 100 UNIT/ML 100 UNIT/ML
500 SOLUTION INTRAVENOUS PRN
Status: CANCELLED | OUTPATIENT
Start: 2019-09-11

## 2019-08-27 RX ORDER — SODIUM CHLORIDE 9 MG/ML
INJECTION, SOLUTION INTRAVENOUS CONTINUOUS
Status: CANCELLED | OUTPATIENT
Start: 2019-08-28

## 2019-08-27 RX ORDER — DIPHENHYDRAMINE HYDROCHLORIDE 50 MG/ML
50 INJECTION INTRAMUSCULAR; INTRAVENOUS ONCE
Status: CANCELLED | OUTPATIENT
Start: 2019-09-11

## 2019-08-27 RX ORDER — METHYLPREDNISOLONE SODIUM SUCCINATE 125 MG/2ML
125 INJECTION, POWDER, LYOPHILIZED, FOR SOLUTION INTRAMUSCULAR; INTRAVENOUS ONCE
Status: CANCELLED | OUTPATIENT
Start: 2019-09-11

## 2019-08-27 RX ORDER — SODIUM CHLORIDE 0.9 % (FLUSH) 0.9 %
10 SYRINGE (ML) INJECTION PRN
Status: CANCELLED | OUTPATIENT
Start: 2019-08-28

## 2019-08-27 RX ORDER — SODIUM CHLORIDE 9 MG/ML
20 INJECTION, SOLUTION INTRAVENOUS ONCE
Status: CANCELLED | OUTPATIENT
Start: 2019-09-04

## 2019-08-27 RX ORDER — SODIUM CHLORIDE 0.9 % (FLUSH) 0.9 %
10 SYRINGE (ML) INJECTION PRN
Status: CANCELLED | OUTPATIENT
Start: 2019-09-04

## 2019-08-27 RX ORDER — DIPHENHYDRAMINE HYDROCHLORIDE 50 MG/ML
50 INJECTION INTRAMUSCULAR; INTRAVENOUS ONCE
Status: CANCELLED | OUTPATIENT
Start: 2019-08-28

## 2019-08-27 RX ORDER — SODIUM CHLORIDE 0.9 % (FLUSH) 0.9 %
5 SYRINGE (ML) INJECTION PRN
Status: CANCELLED | OUTPATIENT
Start: 2019-08-28

## 2019-08-27 RX ORDER — SODIUM CHLORIDE 9 MG/ML
20 INJECTION, SOLUTION INTRAVENOUS ONCE
Status: CANCELLED | OUTPATIENT
Start: 2019-08-28

## 2019-08-27 RX ORDER — SODIUM CHLORIDE 9 MG/ML
INJECTION, SOLUTION INTRAVENOUS CONTINUOUS
Status: CANCELLED | OUTPATIENT
Start: 2019-09-11

## 2019-08-27 RX ORDER — SODIUM CHLORIDE 0.9 % (FLUSH) 0.9 %
10 SYRINGE (ML) INJECTION PRN
Status: CANCELLED | OUTPATIENT
Start: 2019-09-11

## 2019-08-27 RX ORDER — DIPHENHYDRAMINE HYDROCHLORIDE 50 MG/ML
50 INJECTION INTRAMUSCULAR; INTRAVENOUS ONCE
Status: CANCELLED | OUTPATIENT
Start: 2019-09-04

## 2019-08-27 RX ORDER — HEPARIN SODIUM (PORCINE) LOCK FLUSH IV SOLN 100 UNIT/ML 100 UNIT/ML
500 SOLUTION INTRAVENOUS PRN
Status: CANCELLED | OUTPATIENT
Start: 2019-08-28

## 2019-08-27 RX ORDER — SODIUM CHLORIDE 0.9 % (FLUSH) 0.9 %
5 SYRINGE (ML) INJECTION PRN
Status: CANCELLED | OUTPATIENT
Start: 2019-09-11

## 2019-08-28 ENCOUNTER — OFFICE VISIT (OUTPATIENT)
Dept: ONCOLOGY | Age: 59
End: 2019-08-28
Payer: COMMERCIAL

## 2019-08-28 ENCOUNTER — HOSPITAL ENCOUNTER (OUTPATIENT)
Dept: INFUSION THERAPY | Age: 59
Discharge: HOME | End: 2019-08-28
Payer: COMMERCIAL

## 2019-08-28 VITALS
BODY MASS INDEX: 31.1 KG/M2 | HEIGHT: 62 IN | DIASTOLIC BLOOD PRESSURE: 68 MMHG | TEMPERATURE: 98.2 F | WEIGHT: 169 LBS | HEART RATE: 69 BPM | SYSTOLIC BLOOD PRESSURE: 103 MMHG | RESPIRATION RATE: 18 BRPM | OXYGEN SATURATION: 100 %

## 2019-08-28 VITALS
HEART RATE: 69 BPM | WEIGHT: 169 LBS | HEIGHT: 62 IN | OXYGEN SATURATION: 100 % | RESPIRATION RATE: 12 BRPM | DIASTOLIC BLOOD PRESSURE: 68 MMHG | SYSTOLIC BLOOD PRESSURE: 103 MMHG | BODY MASS INDEX: 31.1 KG/M2 | TEMPERATURE: 98.2 F

## 2019-08-28 DIAGNOSIS — C90.00 MULTIPLE MYELOMA NOT HAVING ACHIEVED REMISSION (HCC): Primary | ICD-10-CM

## 2019-08-28 DIAGNOSIS — D63.1 ANEMIA DUE TO STAGE 3 CHRONIC KIDNEY DISEASE (HCC): ICD-10-CM

## 2019-08-28 DIAGNOSIS — M89.9 LYTIC LESION OF BONE ON X-RAY: ICD-10-CM

## 2019-08-28 DIAGNOSIS — Z51.11 ENCOUNTER FOR CHEMOTHERAPY MANAGEMENT: ICD-10-CM

## 2019-08-28 DIAGNOSIS — N18.30 ANEMIA DUE TO STAGE 3 CHRONIC KIDNEY DISEASE (HCC): ICD-10-CM

## 2019-08-28 LAB
ALBUMIN SERPL-MCNC: 3.8 G/DL (ref 3.5–5.1)
ALP BLD-CCNC: 138 U/L (ref 38–126)
ALT SERPL-CCNC: 24 U/L (ref 11–66)
AST SERPL-CCNC: 12 U/L (ref 5–40)
BILIRUB SERPL-MCNC: 0.3 MG/DL (ref 0.3–1.2)
BILIRUBIN DIRECT: < 0.2 MG/DL (ref 0–0.3)
BUN, WHOLE BLOOD: 17 MG/DL (ref 8–26)
CHLORIDE, WHOLE BLOOD: 105 MEQ/L (ref 98–109)
CREATININE, WHOLE BLOOD: 0.9 MG/DL (ref 0.5–1.2)
GFR, ESTIMATED: 68 ML/MIN/1.73M2
GLUCOSE, WHOLE BLOOD: 82 MG/DL (ref 70–108)
HCT VFR BLD CALC: 24.6 % (ref 37–47)
HEMOGLOBIN: 8.6 GM/DL (ref 12–16)
IONIZED CALCIUM, WHOLE BLOOD: 1.28 MMOL/L (ref 1.12–1.32)
MCH RBC QN AUTO: 35.6 PG (ref 27–31)
MCHC RBC AUTO-ENTMCNC: 35.1 GM/DL (ref 33–37)
MCV RBC AUTO: 101 FL (ref 81–99)
PDW BLD-RTO: 13.3 % (ref 11.5–14.5)
PLATELET # BLD: 257 THOU/MM3 (ref 130–400)
PMV BLD AUTO: 7.6 FL (ref 7.4–10.4)
POTASSIUM, WHOLE BLOOD: 4.3 MEQ/L (ref 3.5–4.9)
RBC # BLD: 2.42 MILL/MM3 (ref 4.2–5.4)
SEG NEUTROPHILS: 60 % (ref 43–75)
SEGMENTED NEUTROPHILS ABSOLUTE COUNT: 2.2 THOU/MM3 (ref 1.8–7.7)
SODIUM, WHOLE BLOOD: 139 MEQ/L (ref 138–146)
TOTAL CO2, WHOLE BLOOD: 23 MEQ/L (ref 23–33)
TOTAL PROTEIN: 6.3 G/DL (ref 6.1–8)
WBC # BLD: 3.7 THOU/MM3 (ref 4.8–10.8)

## 2019-08-28 PROCEDURE — 80047 BASIC METABLC PNL IONIZED CA: CPT

## 2019-08-28 PROCEDURE — 80076 HEPATIC FUNCTION PANEL: CPT

## 2019-08-28 PROCEDURE — 99214 OFFICE O/P EST MOD 30 MIN: CPT | Performed by: INTERNAL MEDICINE

## 2019-08-28 PROCEDURE — 83883 ASSAY NEPHELOMETRY NOT SPEC: CPT

## 2019-08-28 PROCEDURE — 96401 CHEMO ANTI-NEOPL SQ/IM: CPT

## 2019-08-28 PROCEDURE — 6360000002 HC RX W HCPCS: Performed by: INTERNAL MEDICINE

## 2019-08-28 PROCEDURE — 85027 COMPLETE CBC AUTOMATED: CPT

## 2019-08-28 PROCEDURE — 36415 COLL VENOUS BLD VENIPUNCTURE: CPT

## 2019-08-28 PROCEDURE — 2580000003 HC RX 258: Performed by: INTERNAL MEDICINE

## 2019-08-28 PROCEDURE — 99211 OFF/OP EST MAY X REQ PHY/QHP: CPT

## 2019-08-28 RX ORDER — ACYCLOVIR 400 MG/1
400 TABLET ORAL 2 TIMES DAILY
Qty: 180 TABLET | Refills: 0 | Status: SHIPPED | OUTPATIENT
Start: 2019-08-28 | End: 2019-10-23 | Stop reason: SDUPTHER

## 2019-08-28 RX ORDER — ASPIRIN 325 MG
325 TABLET ORAL DAILY
Qty: 90 TABLET | Refills: 0 | Status: SHIPPED | OUTPATIENT
Start: 2019-08-28 | End: 2019-09-25 | Stop reason: ALTCHOICE

## 2019-08-28 RX ORDER — DEXAMETHASONE 4 MG/1
40 TABLET ORAL SEE ADMIN INSTRUCTIONS
Qty: 30 TABLET | Refills: 3 | Status: SHIPPED | OUTPATIENT
Start: 2019-08-28 | End: 2020-01-30 | Stop reason: SDUPTHER

## 2019-08-28 RX ORDER — LENALIDOMIDE 25 MG/1
20 CAPSULE ORAL DAILY
Qty: 14 CAPSULE | Refills: 0
Start: 2019-08-28 | End: 2019-09-11 | Stop reason: SDUPTHER

## 2019-08-28 RX ADMIN — SODIUM CHLORIDE 2.4 MG: 9 INJECTION INTRAMUSCULAR; INTRAVENOUS; SUBCUTANEOUS at 12:05

## 2019-08-28 ASSESSMENT — ENCOUNTER SYMPTOMS
COLOR CHANGE: 0
RECTAL PAIN: 0
DIARRHEA: 0
FACIAL SWELLING: 0
VOMITING: 0
NAUSEA: 0
ABDOMINAL DISTENTION: 0
SORE THROAT: 0
TROUBLE SWALLOWING: 0
SHORTNESS OF BREATH: 0
WHEEZING: 0
CONSTIPATION: 0
CHEST TIGHTNESS: 0
BLOOD IN STOOL: 0
BACK PAIN: 1
ABDOMINAL PAIN: 0
EYE DISCHARGE: 0
COUGH: 0

## 2019-08-28 NOTE — PROGRESS NOTES
Patient assessed for the following post chemotherapy:    Dizziness   No  Lightheadedness  No      Acute nausea/vomiting No  Headache   No  Chest pain/pressure  No  Rash/itching   No  Shortness of breath  No    Patient kept for 20 minutes observation post injection chemotherapy. Patient tolerated chemotherapy treatment Velcade  without any complications. Last vital signs:   /68   Pulse 69   Temp 98.2 °F (36.8 °C) (Oral)   Resp 18   Ht 5' 2.01\" (1.575 m)   Wt 169 lb (76.7 kg)   LMP 12/01/2004   SpO2 100%   BMI 30.90 kg/m²     Patient instructed if experience any of the above symptoms following today's infusion, she is to notify MD immediately or go to the emergency department. Discharge instructions given to patient. Verbalizes understanding. Ambulated off unit per self, accompanied by family, with belongings.

## 2019-08-28 NOTE — ONCOLOGY
Chemotherapy Administration    Pre-assessment Data: Antineoplastic Agents  Other:   See toxicity flow sheet for assessment [x]     Physician Notification of Concerns Related to Chemotherapy Administration:   Physician Notified Brandi Deng / Time of Notification      Interventions:   Lab work assessed  [x]   Height / Weight verified for dose [x]   Current MAR reviewed [x]   Emergency drugs available as appropriate [x]   Anaphylaxis assessment completed [x]   Pre-medications administered as ordered [x]   Chemotherapy Administered as SQ injection [x]   Monitor for signs / symptoms of hypersensitivity reaction    [x]   Chemotherapy orders (drug/dose/rate) verified by 2 Chemo certified   RNs    [x]          Document chemotherapy teaching on the Patient Education tab    [x]   Document patient verbalizes understanding of medications being administered    [x]   Other:  Velcade []    []    []      []    []

## 2019-08-28 NOTE — PLAN OF CARE
Problem: Musculor/Skeletal Functional Status  Goal: Absence of falls  Outcome: Met This Shift  Note:   Free from falls while in O.P. Oncology. Intervention: Fall precautions  Note:   Discussed the need to use the call light for assistance when getting up to ambulate. Problem: Discharge Planning  Goal: Knowledge of discharge instructions  Description  Knowledge of discharge instructions     Outcome: Met This Shift  Note:   Verbalized understanding of discharge instructions, follow-up appointments, and when to call the physician. Intervention: Interaction with patient/family and care team  Note:   Discuss understanding of discharge instructions,follow-up appointments, and when to call the physician. Problem: Intellectual/Education/Knowledge Deficit  Goal: Teaching initiated upon admission  Outcome: Met This Shift  Note:   Patient verbalizes understanding to verbal information given on Velcade SQ,action and possible side effects. Aware to call MD if develop complications. Goal: Written Disposition Instruction form completed  Outcome: Met This Shift  Intervention: Verbal/written education provided  Note:   Chemotherapy Teaching     What is Chemotherapy   Drug action ? Method of Administration ? Handouts given ? Side Effects  Nausea/vomiting ? Diarrhea ? Fatigue ? Signs / Symptoms of infection ? Neutropenia ? Thrombocytopenia ? Alopecia ? neuropathy ? [place] diet &  the importance of fluids ? Micellaneous  Importance of nutrition ? Importance of oral hygiene ? When to call the MD ?   Monitoring labs ? Use of supportive services ? Explanation of Drug Regimen / Frequency  Velcade     Comments  Verbalized understanding to drug,action,side effects and when to call MD         Care plan reviewed with patient and family. Patient and family verbalize understanding of the plan of care and contribute to goal setting.

## 2019-08-28 NOTE — PROGRESS NOTES
[facility] [address] [facility] [address], [address] [facility] [address]  [address] [facility]  Dept: [phone]  Dept Fax: [phone]: [phone]    Visit Date:8/28/2019     Marii Braxton is a 63 y.o. female who presents today for:   Chief Complaint   Patient presents with    Follow-up     Multiple Myeloma        HPI:    Mango Mata is a 63 WF with pmhx htn, vitamin D deficiency, and depression admitted to [facility] on 7/16  with  Hypercalcemia 9Ca=11.0), acute kidney injury ( creat. 4.9), both found on outpatient evaluation for several weeks of insidious, progressive fatigue and left shoulder pain. Xray showed multiple lytic lesions in the bones. Bone marrow, right iliac crest, biopsy, aspirate, and peripheral smear performed on 07/15/2019 showed: -          Plasma cell myeloma in a normocellular marrow (50%), see comment  Comment: The patient's myeloma survey dated 7/13/19 showing multiple lytic  lesions is noted. Plasma cells are 30% of cells on the aspirate smear and  occupy 40% of marrow cellularity based on a  immunostain performed on  the biopsy. Flow cytometric analysis shows that plasma cells represent 5.8%  of the total events analyzed, express CD19, CD56, CD38 and  and  analysis of cytoplasmic immunoglobulin staining shows a cKappa:cLambda  ratio of 97:0. These findings are consistent with a kappa light chain  restricted plasma cell myeloma. A Congo red stain for amyloid performed on  the biopsy is negative. SPEP, serum free light chains, serum immunoglobulins (elevated IGA 2,656 and depressed IGG/IGM)  s/p 2 sessions of pheresis given light chains >10,000  - Started chemotherapy (CyBorD) on 7/16    Acute Kidney Injury: due to multiple myeloma. Producing adequate urine. Creatinine improved with aggressive hydration. Hyperkalemia: Mildly elevated K likely due to JAME.    - Hydrate, trend K  Hypercalcemia: Hypercalcemic at OSH prior to transfer   - Hydration, trend calcium   - S/p one unit pRBC

## 2019-08-31 LAB — KAPPA/LAMBDA FREE LIGHT CHAINS: NORMAL

## 2019-09-04 ENCOUNTER — HOSPITAL ENCOUNTER (OUTPATIENT)
Dept: INFUSION THERAPY | Age: 59
Discharge: HOME | End: 2019-09-04
Payer: COMMERCIAL

## 2019-09-04 VITALS
RESPIRATION RATE: 18 BRPM | WEIGHT: 167.8 LBS | HEIGHT: 64 IN | TEMPERATURE: 98.3 F | BODY MASS INDEX: 28.65 KG/M2 | SYSTOLIC BLOOD PRESSURE: 117 MMHG | DIASTOLIC BLOOD PRESSURE: 65 MMHG | OXYGEN SATURATION: 97 % | HEART RATE: 66 BPM

## 2019-09-04 DIAGNOSIS — C90.00 MULTIPLE MYELOMA NOT HAVING ACHIEVED REMISSION (HCC): Primary | ICD-10-CM

## 2019-09-04 DIAGNOSIS — M89.9 LYTIC LESION OF BONE ON X-RAY: ICD-10-CM

## 2019-09-04 LAB
BASINOPHIL, AUTOMATED: 0 % (ref 0–3)
EOSINOPHILS RELATIVE PERCENT: 3 % (ref 0–4)
HCT VFR BLD CALC: 26.7 % (ref 37–47)
HEMOGLOBIN: 9.4 GM/DL (ref 12–16)
LYMPHOCYTES # BLD: 12 % (ref 15–47)
MCH RBC QN AUTO: 36.1 PG (ref 27–31)
MCHC RBC AUTO-ENTMCNC: 35.2 GM/DL (ref 33–37)
MCV RBC AUTO: 102 FL (ref 81–99)
MONOCYTES: 7 % (ref 0–12)
PDW BLD-RTO: 13.5 % (ref 11.5–14.5)
PLATELET # BLD: 184 THOU/MM3 (ref 130–400)
PMV BLD AUTO: 9.1 FL (ref 7.4–10.4)
RBC # BLD: 2.61 MILL/MM3 (ref 4.2–5.4)
SEG NEUTROPHILS: 77 % (ref 43–75)
WBC # BLD: 4.5 THOU/MM3 (ref 4.8–10.8)

## 2019-09-04 PROCEDURE — 6360000002 HC RX W HCPCS: Performed by: INTERNAL MEDICINE

## 2019-09-04 PROCEDURE — 2580000003 HC RX 258: Performed by: INTERNAL MEDICINE

## 2019-09-04 PROCEDURE — 96401 CHEMO ANTI-NEOPL SQ/IM: CPT

## 2019-09-04 PROCEDURE — 36415 COLL VENOUS BLD VENIPUNCTURE: CPT

## 2019-09-04 PROCEDURE — 85025 COMPLETE CBC W/AUTO DIFF WBC: CPT

## 2019-09-04 RX ADMIN — SODIUM CHLORIDE 2.4 MG: 9 INJECTION INTRAMUSCULAR; INTRAVENOUS; SUBCUTANEOUS at 12:57

## 2019-09-04 NOTE — PROGRESS NOTES
Patient assessed for the following post chemotherapy:    Dizziness   No  Lightheadedness  No      Acute nausea/vomiting No  Headache   No  Chest pain/pressure  No  Rash/itching   No  Shortness of breath  No    Patient opted to not stay for 20 minutes observation post injection chemotherapy. Patient tolerated chemotherapy treatment velcade sq without any complications. Last vital signs:   /65   Pulse 66   Temp 98.3 °F (36.8 °C) (Oral)   Resp 18   Ht 5' 4\" (1.626 m)   Wt 167 lb 12.8 oz (76.1 kg)   LMP 12/01/2004   SpO2 97%   BMI 28.80 kg/m²         Patient instructed if experience any of the above symptoms following today's infusion,he/she is to notify MD immediately or go to the emergency department. Discharge instructions given to patient. Verbalizes understanding. Ambulated off unit per self with belongings.

## 2019-09-04 NOTE — PROGRESS NOTES
Name: Agustin Acosta  : 1960  MRN: [id number]    Oncology Navigation Follow-Up Note    Contact Type:  Medical Oncology- multiple myeloma    Subjective: Here for tx;  Day 8 Velcade & Decadron. Cont with Revlimid Day  today. Objective: WBC 4.5  HGB 9.4  Plt 184  Segs 77   Increase in fatigue, some stomach upset with revlimid dose increase. Cont with some numbness & tingling in Rt toes. Some alexander calf pain, no leg swelling & minimal ankle swelling noted. Reports some SOB with exertion & Rt clavicular pain that radiates into rt side of neck. Proceed with Day 8 chemo with Velcade today with Decadron. Pt cont with acyclovir & ASA therapy for prophylaxis. Assistance Needed: Denies    Receptive to Advanced Care Planning / Palliative Care:  deferred    Referrals: N/A today    Education: reinforced to use antiemetic if she has nausea. Pt had stopped her Protonix on her own. Abbe encouraged her to resume it. Notes: Day 8 tx today with Velcade & decadron; Revlimid continues day  today. Abbe updated ONC with pt assessment today. ONC shared pt Kappa lab results, which showed a decrease in the number & ratio score, \"reflecting a great response to tx\" & requested Abbe to notify pt accordingly. Abbe phoned pt at home & updated her on her labwork results per [facility] request.   Pt appreciative of Abbe's phone call. Abbe following to support & assist as needed.     Electronically signed by Dennise Alvarez RN on 2019 at 4:08 PM

## 2019-09-04 NOTE — ONCOLOGY
Chemotherapy Administration    Pre-assessment Data: Antineoplastic Agents  Other:   See toxicity flow sheet for assessment [x]     Physician Notification of Concerns Related to Chemotherapy Administration:   Physician Notified Álvaro Plain / Time of Notification      Interventions:   Lab work assessed  [x]   Height / Weight verified for dose [x]   Current MAR reviewed [x]   Emergency drugs available as appropriate [x]   Anaphylaxis assessment completed [x]   Pre-medications administered as ordered [x]   Chemotherapy Administered as SQ injection [x]   Monitor for signs / symptoms of hypersensitivity reaction    [x]   Chemotherapy orders (drug/dose/rate) verified by 2 Chemo certified   RNs    [x]          Document chemotherapy teaching on the Patient Education tab    [x]   Document patient verbalizes understanding of medications being administered    [x]   Other: []    []    []      []    []

## 2019-09-10 ASSESSMENT — ENCOUNTER SYMPTOMS
DIARRHEA: 0
TROUBLE SWALLOWING: 0
CHEST TIGHTNESS: 0
BACK PAIN: 1
RECTAL PAIN: 0
BLOOD IN STOOL: 0
VOMITING: 0
COLOR CHANGE: 0
CONSTIPATION: 0
SHORTNESS OF BREATH: 0
COUGH: 0
ABDOMINAL PAIN: 0
WHEEZING: 0
SORE THROAT: 0
EYE DISCHARGE: 0
ABDOMINAL DISTENTION: 0
NAUSEA: 0
FACIAL SWELLING: 0

## 2019-09-11 ENCOUNTER — HOSPITAL ENCOUNTER (OUTPATIENT)
Dept: INFUSION THERAPY | Age: 59
Discharge: HOME | End: 2019-09-11
Payer: COMMERCIAL

## 2019-09-11 ENCOUNTER — OFFICE VISIT (OUTPATIENT)
Dept: ONCOLOGY | Age: 59
End: 2019-09-11
Payer: COMMERCIAL

## 2019-09-11 VITALS
DIASTOLIC BLOOD PRESSURE: 68 MMHG | HEIGHT: 64 IN | HEART RATE: 60 BPM | SYSTOLIC BLOOD PRESSURE: 102 MMHG | OXYGEN SATURATION: 99 % | RESPIRATION RATE: 16 BRPM | TEMPERATURE: 98.3 F | BODY MASS INDEX: 28.61 KG/M2 | WEIGHT: 167.6 LBS

## 2019-09-11 VITALS
WEIGHT: 167.6 LBS | SYSTOLIC BLOOD PRESSURE: 118 MMHG | HEART RATE: 77 BPM | RESPIRATION RATE: 16 BRPM | BODY MASS INDEX: 28.61 KG/M2 | OXYGEN SATURATION: 99 % | DIASTOLIC BLOOD PRESSURE: 69 MMHG | HEIGHT: 64 IN | TEMPERATURE: 98.3 F

## 2019-09-11 DIAGNOSIS — C90.00 MULTIPLE MYELOMA NOT HAVING ACHIEVED REMISSION (HCC): ICD-10-CM

## 2019-09-11 DIAGNOSIS — M89.9 LYTIC LESION OF BONE ON X-RAY: ICD-10-CM

## 2019-09-11 DIAGNOSIS — D63.1 ANEMIA DUE TO STAGE 3 CHRONIC KIDNEY DISEASE (HCC): Primary | ICD-10-CM

## 2019-09-11 DIAGNOSIS — Z51.11 ENCOUNTER FOR CHEMOTHERAPY MANAGEMENT: ICD-10-CM

## 2019-09-11 DIAGNOSIS — N18.30 ANEMIA DUE TO STAGE 3 CHRONIC KIDNEY DISEASE (HCC): ICD-10-CM

## 2019-09-11 DIAGNOSIS — C90.00 MULTIPLE MYELOMA NOT HAVING ACHIEVED REMISSION (HCC): Primary | ICD-10-CM

## 2019-09-11 DIAGNOSIS — D63.1 ANEMIA DUE TO STAGE 3 CHRONIC KIDNEY DISEASE (HCC): ICD-10-CM

## 2019-09-11 DIAGNOSIS — N18.30 ANEMIA DUE TO STAGE 3 CHRONIC KIDNEY DISEASE (HCC): Primary | ICD-10-CM

## 2019-09-11 LAB
ALBUMIN SERPL-MCNC: 4.1 G/DL (ref 3.5–5.1)
ALP BLD-CCNC: 143 U/L (ref 38–126)
ALT SERPL-CCNC: 22 U/L (ref 11–66)
AST SERPL-CCNC: 12 U/L (ref 5–40)
BILIRUB SERPL-MCNC: 0.4 MG/DL (ref 0.3–1.2)
BILIRUBIN DIRECT: < 0.2 MG/DL (ref 0–0.3)
BUN, WHOLE BLOOD: 17 MG/DL (ref 8–26)
CHLORIDE, WHOLE BLOOD: 106 MEQ/L (ref 98–109)
CREATININE, WHOLE BLOOD: 0.8 MG/DL (ref 0.5–1.2)
GFR, ESTIMATED: 78 ML/MIN/1.73M2
GLUCOSE, WHOLE BLOOD: 100 MG/DL (ref 70–108)
HCT VFR BLD CALC: 27.7 % (ref 37–47)
HEMOGLOBIN: 9.6 GM/DL (ref 12–16)
IONIZED CALCIUM, WHOLE BLOOD: 1.21 MMOL/L (ref 1.12–1.32)
MCH RBC QN AUTO: 35.8 PG (ref 27–31)
MCHC RBC AUTO-ENTMCNC: 34.8 GM/DL (ref 33–37)
MCV RBC AUTO: 103 FL (ref 81–99)
PDW BLD-RTO: 13.9 % (ref 11.5–14.5)
PLATELET # BLD: 169 THOU/MM3 (ref 130–400)
PMV BLD AUTO: 9.3 FL (ref 7.4–10.4)
POTASSIUM, WHOLE BLOOD: 4.3 MEQ/L (ref 3.5–4.9)
RBC # BLD: 2.69 MILL/MM3 (ref 4.2–5.4)
SEG NEUTROPHILS: 68 % (ref 43–75)
SEGMENTED NEUTROPHILS ABSOLUTE COUNT: 3.2 THOU/MM3 (ref 1.8–7.7)
SODIUM, WHOLE BLOOD: 140 MEQ/L (ref 138–146)
TOTAL CO2, WHOLE BLOOD: 25 MEQ/L (ref 23–33)
TOTAL PROTEIN: 6.2 G/DL (ref 6.1–8)
WBC # BLD: 4.7 THOU/MM3 (ref 4.8–10.8)

## 2019-09-11 PROCEDURE — 85027 COMPLETE CBC AUTOMATED: CPT

## 2019-09-11 PROCEDURE — 99214 OFFICE O/P EST MOD 30 MIN: CPT | Performed by: INTERNAL MEDICINE

## 2019-09-11 PROCEDURE — 80047 BASIC METABLC PNL IONIZED CA: CPT

## 2019-09-11 PROCEDURE — 99211 OFF/OP EST MAY X REQ PHY/QHP: CPT

## 2019-09-11 PROCEDURE — 96401 CHEMO ANTI-NEOPL SQ/IM: CPT

## 2019-09-11 PROCEDURE — 2580000003 HC RX 258: Performed by: INTERNAL MEDICINE

## 2019-09-11 PROCEDURE — 6360000002 HC RX W HCPCS: Performed by: INTERNAL MEDICINE

## 2019-09-11 PROCEDURE — 96365 THER/PROPH/DIAG IV INF INIT: CPT

## 2019-09-11 PROCEDURE — 96367 TX/PROPH/DG ADDL SEQ IV INF: CPT

## 2019-09-11 PROCEDURE — 80076 HEPATIC FUNCTION PANEL: CPT

## 2019-09-11 PROCEDURE — 36415 COLL VENOUS BLD VENIPUNCTURE: CPT

## 2019-09-11 RX ORDER — LENALIDOMIDE 25 MG/1
20 CAPSULE ORAL DAILY
Qty: 14 CAPSULE | Refills: 0 | Status: SHIPPED | OUTPATIENT
Start: 2019-09-11 | End: 2019-09-25 | Stop reason: SDUPTHER

## 2019-09-11 RX ORDER — DIPHENHYDRAMINE HYDROCHLORIDE 50 MG/ML
50 INJECTION INTRAMUSCULAR; INTRAVENOUS ONCE
Status: CANCELLED | OUTPATIENT
Start: 2019-09-11

## 2019-09-11 RX ORDER — SODIUM CHLORIDE 9 MG/ML
20 INJECTION, SOLUTION INTRAVENOUS ONCE
Status: CANCELLED | OUTPATIENT
Start: 2019-09-11

## 2019-09-11 RX ORDER — HEPARIN SODIUM (PORCINE) LOCK FLUSH IV SOLN 100 UNIT/ML 100 UNIT/ML
500 SOLUTION INTRAVENOUS PRN
Status: CANCELLED | OUTPATIENT
Start: 2019-09-11

## 2019-09-11 RX ORDER — SODIUM CHLORIDE 0.9 % (FLUSH) 0.9 %
10 SYRINGE (ML) INJECTION PRN
Status: CANCELLED | OUTPATIENT
Start: 2019-09-11

## 2019-09-11 RX ORDER — SODIUM CHLORIDE 9 MG/ML
INJECTION, SOLUTION INTRAVENOUS CONTINUOUS
Status: CANCELLED | OUTPATIENT
Start: 2019-09-11

## 2019-09-11 RX ORDER — SODIUM CHLORIDE 0.9 % (FLUSH) 0.9 %
5 SYRINGE (ML) INJECTION PRN
Status: CANCELLED | OUTPATIENT
Start: 2019-09-11

## 2019-09-11 RX ORDER — METHYLPREDNISOLONE SODIUM SUCCINATE 125 MG/2ML
125 INJECTION, POWDER, LYOPHILIZED, FOR SOLUTION INTRAMUSCULAR; INTRAVENOUS ONCE
Status: CANCELLED | OUTPATIENT
Start: 2019-09-11

## 2019-09-11 RX ORDER — EPINEPHRINE 1 MG/ML
0.3 INJECTION, SOLUTION, CONCENTRATE INTRAVENOUS PRN
Status: CANCELLED | OUTPATIENT
Start: 2019-09-11

## 2019-09-11 RX ORDER — SODIUM CHLORIDE 9 MG/ML
20 INJECTION, SOLUTION INTRAVENOUS ONCE
Status: COMPLETED | OUTPATIENT
Start: 2019-09-11 | End: 2019-09-11

## 2019-09-11 RX ADMIN — ZOLEDRONIC ACID 3.5 MG: 4 INJECTION, SOLUTION, CONCENTRATE INTRAVENOUS at 09:16

## 2019-09-11 RX ADMIN — SODIUM CHLORIDE 2.4 MG: 9 INJECTION INTRAMUSCULAR; INTRAVENOUS; SUBCUTANEOUS at 09:24

## 2019-09-11 RX ADMIN — SODIUM CHLORIDE 20 ML/HR: 9 INJECTION, SOLUTION INTRAVENOUS at 09:10

## 2019-09-11 NOTE — ONCOLOGY
Chemotherapy Administration    Pre-assessment Data: Antineoplastic Agents  Other:   See toxicity flow sheet for assessment [x]     Physician Notification of Concerns Related to Chemotherapy Administration:   Physician Notified Calista Saravia / Time of Notification      Interventions:   Lab work assessed  [x]   Height / Weight verified for dose [x]   Current MAR reviewed [x]   Emergency drugs available as appropriate [x]   Anaphylaxis assessment completed [x]   Pre-medications administered as ordered [x]   Chemotherapy Administered as SQ injection [x]   Monitor for signs / symptoms of hypersensitivity reaction    [x]   Chemotherapy orders (drug/dose/rate) verified by 2 Chemo certified   RNs- Velcade    [x]          Document chemotherapy teaching on the Patient Education tab    [x]   Document patient verbalizes understanding of medications being administered    [x]   Other: []    []    []      []    []

## 2019-09-11 NOTE — PROGRESS NOTES
Patient assessed for the following post chemotherapy:    Dizziness   No  Lightheadedness  No      Acute nausea/vomiting No  Headache   No  Chest pain/pressure  No  Rash/itching   No  Shortness of breath  No    Patient kept for 20 minutes observation post infusion chemotherapy. Patient tolerated chemotherapy treatment Velcade SQ injection without any complications. Last vital signs:   /68   Pulse 60   Temp 98.3 °F (36.8 °C) (Oral)   Resp 16   Ht 5' 4\" (1.626 m)   Wt 167 lb 9.6 oz (76 kg)   LMP 12/01/2004   SpO2 99%   BMI 28.77 kg/m²     Patient instructed if experience any of the above symptoms following today's infusion, she is to notify MD immediately or go to the emergency department. Discharge instructions given to patient. Verbalizes understanding. Ambulated off unit per self in stable condition with belongings.

## 2019-09-11 NOTE — PROGRESS NOTES
to person, place, and time. She has normal reflexes. No cranial nerve deficit. She exhibits normal muscle tone. Skin: Skin is warm. No rash noted. No erythema. Psychiatric: She has a normal mood and affect. Her behavior is normal. Judgment and thought content normal.   Vitals reviewed. /69 (Site: Left Upper Arm, Position: Sitting, Cuff Size: Large Adult)   Pulse 77   Temp 98.3 °F (36.8 °C) (Oral)   Resp 16   Ht 5' 4.02\" (1.626 m)   Wt 167 lb 9.6 oz (76 kg)   LMP 12/01/2004   SpO2 99%   BMI 28.75 kg/m²      ECOG status is 1    Imaging studies and labs:     XRAY Myeloma survey on 07/13/2019 showed:  IMPRESSION:   Innumerable lytic lesions bilaterally, compatible with multiple myeloma. Lab Results   Component Value Date    WBC 4.7 (L) 09/11/2019    HGB 9.6 (L) 09/11/2019    HCT 27.7 (L) 09/11/2019     (H) 09/11/2019     09/11/2019       Chemistry        Component Value Date/Time     09/11/2019 0833     07/12/2019 1409    K 4.3 09/11/2019 0833    K 5.4 (H) 07/12/2019 1409     07/12/2019 1409    CO2 20 (L) 07/12/2019 1409    BUN 74 (H) 07/12/2019 1409    CREATININE 0.8 09/11/2019 0833    CREATININE 4.9 (HH) 07/12/2019 1409        Component Value Date/Time    CALCIUM 11.0 (H) 07/12/2019 1409    ALKPHOS 143 (H) 09/11/2019 0833    AST 12 09/11/2019 0833    ALT 22 09/11/2019 0833    BILITOT 0.4 09/11/2019 0833    BILITOT NEGATIVE 08/04/2015 0803            Assessment/Plan:   1. Multiple myeloma: The patient met criteria for diagnosis of multiple myeloma. She has clonal bone marrow plasma cells ? 10%, bone lesions seen on bone survey, hypercalcemia, renal insufficiency and anemia. She received first cycle of chemotherapy with Velcade Cytoxan and dexamethasone at [facility]. 2.Treatment with Velcade Revlimid and dexamethasone.   She received second cycle of VdR in [facility], due to slight renal impairment the dose of Revlimid was reduced to 15 mg daily for 14 days during cycle

## 2019-09-11 NOTE — PROGRESS NOTES
Name: Cleo Black  : 1960  MRN: [id number]    Oncology Navigation Follow-Up Note    Contact Type:  Medical Oncology    Subjective: see doctor & get tx    Objective: WBC 4.7 HGB 9.6   BUN 17 Crea 0.8  Due for Cycle 2 Day 15 Velcade, Decadron  Tolerating Revlimid without complaints. ONC increasing revlimid to 25mg with next cycle; VASYL Cabezas updated to submit to [facility]. Assistance Needed: denies    Receptive to Advanced Care Planning / Palliative Care:  deferred    Referrals: N/A    Education: N/A    Notes: Cycle 2 Day 15 Velcade & Decadron. Abbe following to assist & support as needed.     Electronically signed by Shaina Taylor RN on 2019 at 4:47 PM

## 2019-09-11 NOTE — PLAN OF CARE
Care plan reviewed with patient  Patient verbalized understanding of the plan of care and contribute to goal setting. Problem: Intellectual/Education/Knowledge Deficit  Goal: Teaching initiated upon admission  Outcome: Met This Shift  Note:   Patient verbalizes understanding to verbal information given on Velcade and Zometa,action and possible side effects. Aware to call MD if develop complications. Goal: Written Disposition Instruction form completed  Outcome: Met This Shift  Note:   Verbalized when to call doctor  Intervention: Verbal/written education provided  Note:   Discuss discharge instructions, follow ups and when to call doctor. Problem: Discharge Planning  Goal: Knowledge of discharge instructions  Description  Knowledge of discharge instructions     Outcome: Met This Shift  Note:   Verbalized understanding of discharge instructions, follow ups and when to call doctor   Intervention: Interaction with patient/family and care team  Note:   Discuss discharge instructions, follow ups and when to call doctor. Intervention: Discharge to appropriate level of care  Note:   Discuss discharge instructions, follow ups and when to call doctor. Problem: Falls - Risk of:  Goal: Will remain free from falls  Description  Will remain free from falls  Outcome: Met This Shift  Note:   Free from falls while in infusion center.  Verbalized understanding of fall prevention and to ask for assistance with ambulation   Intervention: Assess risk factors for falls  Description  Assess risk factors for falls  Note:   Assess for fall risk, instruct to ask for assistance with ambulation

## 2019-09-11 NOTE — PATIENT INSTRUCTIONS
1. Proceed with cycle #3 day 15 today. Zometa infusion today. 2.  RTC to chemo suite next week to start cycle #4 over Velcade Revlimid and dexamethasone. 3. RTC to see me in 3 weeks.     4.On RTC labs: CBC, BMP, LFTs

## 2019-09-17 DIAGNOSIS — C90.00 MULTIPLE MYELOMA NOT HAVING ACHIEVED REMISSION (HCC): ICD-10-CM

## 2019-09-17 DIAGNOSIS — N18.30 ANEMIA DUE TO STAGE 3 CHRONIC KIDNEY DISEASE (HCC): Primary | ICD-10-CM

## 2019-09-17 DIAGNOSIS — D63.1 ANEMIA DUE TO STAGE 3 CHRONIC KIDNEY DISEASE (HCC): Primary | ICD-10-CM

## 2019-09-18 ENCOUNTER — HOSPITAL ENCOUNTER (OUTPATIENT)
Dept: INFUSION THERAPY | Age: 59
Discharge: HOME | End: 2019-09-18
Payer: COMMERCIAL

## 2019-09-18 VITALS
SYSTOLIC BLOOD PRESSURE: 114 MMHG | DIASTOLIC BLOOD PRESSURE: 71 MMHG | BODY MASS INDEX: 28.34 KG/M2 | RESPIRATION RATE: 16 BRPM | WEIGHT: 166 LBS | HEART RATE: 64 BPM | TEMPERATURE: 97.6 F | HEIGHT: 64 IN | OXYGEN SATURATION: 95 %

## 2019-09-18 DIAGNOSIS — D63.1 ANEMIA DUE TO STAGE 3 CHRONIC KIDNEY DISEASE (HCC): Primary | ICD-10-CM

## 2019-09-18 DIAGNOSIS — C90.00 MULTIPLE MYELOMA NOT HAVING ACHIEVED REMISSION (HCC): ICD-10-CM

## 2019-09-18 DIAGNOSIS — N18.30 ANEMIA DUE TO STAGE 3 CHRONIC KIDNEY DISEASE (HCC): Primary | ICD-10-CM

## 2019-09-18 DIAGNOSIS — M89.9 LYTIC LESION OF BONE ON X-RAY: ICD-10-CM

## 2019-09-18 LAB
ALBUMIN SERPL-MCNC: 4.2 G/DL (ref 3.5–5.1)
ALP BLD-CCNC: 134 U/L (ref 38–126)
ALT SERPL-CCNC: 22 U/L (ref 11–66)
AST SERPL-CCNC: 14 U/L (ref 5–40)
BILIRUB SERPL-MCNC: 0.3 MG/DL (ref 0.3–1.2)
BILIRUBIN DIRECT: < 0.2 MG/DL (ref 0–0.3)
BUN, WHOLE BLOOD: 16 MG/DL (ref 8–26)
CHLORIDE, WHOLE BLOOD: 108 MEQ/L (ref 98–109)
CREATININE, WHOLE BLOOD: 0.8 MG/DL (ref 0.5–1.2)
GFR, ESTIMATED: 78 ML/MIN/1.73M2
GLUCOSE, WHOLE BLOOD: 103 MG/DL (ref 70–108)
HCT VFR BLD CALC: 30.4 % (ref 37–47)
HEMOGLOBIN: 10.4 GM/DL (ref 12–16)
IONIZED CALCIUM, WHOLE BLOOD: 1.21 MMOL/L (ref 1.12–1.32)
MCH RBC QN AUTO: 35.3 PG (ref 27–31)
MCHC RBC AUTO-ENTMCNC: 34.3 GM/DL (ref 33–37)
MCV RBC AUTO: 103 FL (ref 81–99)
PDW BLD-RTO: 13.8 % (ref 11.5–14.5)
PLATELET # BLD: 241 THOU/MM3 (ref 130–400)
PMV BLD AUTO: 7.9 FL (ref 7.4–10.4)
POTASSIUM, WHOLE BLOOD: 4.7 MEQ/L (ref 3.5–4.9)
RBC # BLD: 2.95 MILL/MM3 (ref 4.2–5.4)
SEG NEUTROPHILS: 53 % (ref 43–75)
SEGMENTED NEUTROPHILS ABSOLUTE COUNT: 2.2 THOU/MM3 (ref 1.8–7.7)
SODIUM, WHOLE BLOOD: 139 MEQ/L (ref 138–146)
TOTAL CO2, WHOLE BLOOD: 24 MEQ/L (ref 23–33)
TOTAL PROTEIN: 6.7 G/DL (ref 6.1–8)
WBC # BLD: 4.1 THOU/MM3 (ref 4.8–10.8)

## 2019-09-18 PROCEDURE — 80076 HEPATIC FUNCTION PANEL: CPT

## 2019-09-18 PROCEDURE — 6360000002 HC RX W HCPCS: Performed by: INTERNAL MEDICINE

## 2019-09-18 PROCEDURE — 85027 COMPLETE CBC AUTOMATED: CPT

## 2019-09-18 PROCEDURE — 36415 COLL VENOUS BLD VENIPUNCTURE: CPT

## 2019-09-18 PROCEDURE — 2580000003 HC RX 258: Performed by: INTERNAL MEDICINE

## 2019-09-18 PROCEDURE — 96401 CHEMO ANTI-NEOPL SQ/IM: CPT

## 2019-09-18 PROCEDURE — 80047 BASIC METABLC PNL IONIZED CA: CPT

## 2019-09-18 RX ORDER — SODIUM CHLORIDE 9 MG/ML
20 INJECTION, SOLUTION INTRAVENOUS ONCE
Status: CANCELLED | OUTPATIENT
Start: 2019-09-25

## 2019-09-18 RX ORDER — SODIUM CHLORIDE 9 MG/ML
INJECTION, SOLUTION INTRAVENOUS CONTINUOUS
Status: CANCELLED | OUTPATIENT
Start: 2019-10-02

## 2019-09-18 RX ORDER — DIPHENHYDRAMINE HYDROCHLORIDE 50 MG/ML
50 INJECTION INTRAMUSCULAR; INTRAVENOUS ONCE
Status: CANCELLED | OUTPATIENT
Start: 2019-10-02

## 2019-09-18 RX ORDER — DIPHENHYDRAMINE HYDROCHLORIDE 50 MG/ML
50 INJECTION INTRAMUSCULAR; INTRAVENOUS ONCE
Status: CANCELLED | OUTPATIENT
Start: 2019-09-25

## 2019-09-18 RX ORDER — HEPARIN SODIUM (PORCINE) LOCK FLUSH IV SOLN 100 UNIT/ML 100 UNIT/ML
500 SOLUTION INTRAVENOUS PRN
Status: CANCELLED | OUTPATIENT
Start: 2019-09-18

## 2019-09-18 RX ORDER — METHYLPREDNISOLONE SODIUM SUCCINATE 125 MG/2ML
125 INJECTION, POWDER, LYOPHILIZED, FOR SOLUTION INTRAMUSCULAR; INTRAVENOUS ONCE
Status: CANCELLED | OUTPATIENT
Start: 2019-09-25

## 2019-09-18 RX ORDER — SODIUM CHLORIDE 9 MG/ML
INJECTION, SOLUTION INTRAVENOUS CONTINUOUS
Status: CANCELLED | OUTPATIENT
Start: 2019-09-18

## 2019-09-18 RX ORDER — HEPARIN SODIUM (PORCINE) LOCK FLUSH IV SOLN 100 UNIT/ML 100 UNIT/ML
500 SOLUTION INTRAVENOUS PRN
Status: CANCELLED | OUTPATIENT
Start: 2019-09-25

## 2019-09-18 RX ORDER — SODIUM CHLORIDE 0.9 % (FLUSH) 0.9 %
5 SYRINGE (ML) INJECTION PRN
Status: CANCELLED | OUTPATIENT
Start: 2019-09-18

## 2019-09-18 RX ORDER — HEPARIN SODIUM (PORCINE) LOCK FLUSH IV SOLN 100 UNIT/ML 100 UNIT/ML
500 SOLUTION INTRAVENOUS PRN
Status: CANCELLED | OUTPATIENT
Start: 2019-10-02

## 2019-09-18 RX ORDER — SODIUM CHLORIDE 9 MG/ML
20 INJECTION, SOLUTION INTRAVENOUS ONCE
Status: CANCELLED | OUTPATIENT
Start: 2019-09-18

## 2019-09-18 RX ORDER — SODIUM CHLORIDE 0.9 % (FLUSH) 0.9 %
10 SYRINGE (ML) INJECTION PRN
Status: CANCELLED | OUTPATIENT
Start: 2019-09-25

## 2019-09-18 RX ORDER — SODIUM CHLORIDE 9 MG/ML
INJECTION, SOLUTION INTRAVENOUS CONTINUOUS
Status: CANCELLED | OUTPATIENT
Start: 2019-09-25

## 2019-09-18 RX ORDER — SODIUM CHLORIDE 9 MG/ML
20 INJECTION, SOLUTION INTRAVENOUS ONCE
Status: CANCELLED | OUTPATIENT
Start: 2019-10-02

## 2019-09-18 RX ORDER — METHYLPREDNISOLONE SODIUM SUCCINATE 125 MG/2ML
125 INJECTION, POWDER, LYOPHILIZED, FOR SOLUTION INTRAMUSCULAR; INTRAVENOUS ONCE
Status: CANCELLED | OUTPATIENT
Start: 2019-10-02

## 2019-09-18 RX ORDER — SODIUM CHLORIDE 0.9 % (FLUSH) 0.9 %
5 SYRINGE (ML) INJECTION PRN
Status: CANCELLED | OUTPATIENT
Start: 2019-10-02

## 2019-09-18 RX ORDER — METHYLPREDNISOLONE SODIUM SUCCINATE 125 MG/2ML
125 INJECTION, POWDER, LYOPHILIZED, FOR SOLUTION INTRAMUSCULAR; INTRAVENOUS ONCE
Status: CANCELLED | OUTPATIENT
Start: 2019-09-18

## 2019-09-18 RX ORDER — SODIUM CHLORIDE 0.9 % (FLUSH) 0.9 %
10 SYRINGE (ML) INJECTION PRN
Status: CANCELLED | OUTPATIENT
Start: 2019-10-02

## 2019-09-18 RX ORDER — DIPHENHYDRAMINE HYDROCHLORIDE 50 MG/ML
50 INJECTION INTRAMUSCULAR; INTRAVENOUS ONCE
Status: CANCELLED | OUTPATIENT
Start: 2019-09-18

## 2019-09-18 RX ORDER — SODIUM CHLORIDE 0.9 % (FLUSH) 0.9 %
10 SYRINGE (ML) INJECTION PRN
Status: CANCELLED | OUTPATIENT
Start: 2019-09-18

## 2019-09-18 RX ORDER — SODIUM CHLORIDE 0.9 % (FLUSH) 0.9 %
5 SYRINGE (ML) INJECTION PRN
Status: CANCELLED | OUTPATIENT
Start: 2019-09-25

## 2019-09-18 RX ADMIN — SODIUM CHLORIDE 2.4 MG: 9 INJECTION INTRAMUSCULAR; INTRAVENOUS; SUBCUTANEOUS at 11:38

## 2019-09-18 NOTE — ONCOLOGY
Chemotherapy Administration    Pre-assessment Data: Antineoplastic Agents  Other:   See toxicity flow sheet for assessment [x]     Physician Notification of Concerns Related to Chemotherapy Administration:   Physician Notified Doimnic Sox / Time of Notification      Interventions:   Lab work assessed  [x]   Height / Weight verified for dose [x]   Current MAR reviewed [x]   Emergency drugs available as appropriate [x]   Anaphylaxis assessment completed [x]   Pre-medications administered as ordered [x]   Chemotherapy Administered as SQ injection [x]   Monitor for signs / symptoms of hypersensitivity reaction    [x]   Chemotherapy orders (drug/dose/rate) verified by 2 Chemo certified   RNs    [x]          Document chemotherapy teaching on the Patient Education tab    [x]   Document patient verbalizes understanding of medications being administered-  Velcade SQ injection    [x]   Other: []    []    []      []    []

## 2019-09-18 NOTE — PROGRESS NOTES
Patient assessed for the following post chemotherapy:    Dizziness   No  Lightheadedness  No      Acute nausea/vomiting No  Headache   No  Chest pain/pressure  No  Rash/itching   No  Shortness of breath  No    Patient tolerated chemotherapy treatment Velcade SQ injection without any complications. Last vital signs:   /71   Pulse 64   Temp 97.6 °F (36.4 °C) (Oral)   Resp 16   Ht 5' 4\" (1.626 m)   Wt 166 lb (75.3 kg)   LMP 12/01/2004   SpO2 95%   BMI 28.49 kg/m²       Patient instructed if experience any of the above symptoms following today's injection-she is to notify MD immediately or go to the emergency department. Discharge instructions given to patient. Verbalizes understanding. Ambulated off unit per self in stable condition accompanied by her spouse with belongings.

## 2019-09-18 NOTE — PLAN OF CARE
Problem: Intellectual/Education/Knowledge Deficit  Goal: Teaching initiated upon admission  Outcome: Met This Shift  Note:   Patient verbalizes understanding to verbal information given on Velcade SQ injection,action and possible side effects. Aware to call MD if develop complications. Goal: Written Disposition Instruction form completed  Outcome: Completed  Intervention: Verbal/written education provided  Note:   Chemotherapy Teaching     What is Chemotherapy   Drug action ? Method of Administration ? Handouts given ? Side Effects  Nausea/vomiting ? Diarrhea ? Fatigue ? Signs / Symptoms of infection ? Neutropenia ? Thrombocytopenia ? Alopecia ? neuropathy ? [place] diet &  the importance of fluids ? Micellaneous  Importance of nutrition ? Importance of oral hygiene ? When to call the MD ?   Monitoring labs ? Use of supportive services ? Explanation of Drug Regimen / Frequency  Day 1 cycle 3 Velcade SQ injection     Comments  Verbalized understanding to drug,action,side effects and when to call MD         Problem: Discharge Planning  Goal: Knowledge of discharge instructions  Description  Knowledge of discharge instructions     Outcome: Met This Shift  Note:   Verbalize understanding of discharge instructions, follow up appointments, and when to call Physician. Intervention: Interaction with patient/family and care team  Note:   Discuss understanding of discharge instructions, follow up appointments and when to call Physician. Care plan reviewed with patient and spouse. Patient and spouse verbalize understanding of the plan of care and contribute to goal setting.

## 2019-09-24 ENCOUNTER — TELEPHONE (OUTPATIENT)
Dept: ONCOLOGY | Age: 59
End: 2019-09-24

## 2019-09-24 DIAGNOSIS — C90.00 MULTIPLE MYELOMA NOT HAVING ACHIEVED REMISSION (HCC): Primary | ICD-10-CM

## 2019-09-24 NOTE — TELEPHONE ENCOUNTER
Patient called and asked if she should have taken an antibiotic before her appt today to have her teeth cleaned. Spoke with [facility] and she recommended that patient should have lab work the day before her teeth cleaning and depending on results an antibiotic would be decided than. This was relayed to Matilda Brennan and she stated that she understood and will reschedule her appt and let us know.

## 2019-09-25 ENCOUNTER — HOSPITAL ENCOUNTER (OUTPATIENT)
Dept: INTERVENTIONAL RADIOLOGY/VASCULAR | Age: 59
Discharge: HOME | End: 2019-09-25
Payer: COMMERCIAL

## 2019-09-25 ENCOUNTER — HOSPITAL ENCOUNTER (OUTPATIENT)
Dept: INFUSION THERAPY | Age: 59
Discharge: HOME | End: 2019-09-25
Payer: COMMERCIAL

## 2019-09-25 VITALS
DIASTOLIC BLOOD PRESSURE: 65 MMHG | WEIGHT: 168 LBS | HEART RATE: 63 BPM | BODY MASS INDEX: 28.68 KG/M2 | RESPIRATION RATE: 16 BRPM | OXYGEN SATURATION: 97 % | SYSTOLIC BLOOD PRESSURE: 103 MMHG | TEMPERATURE: 98.4 F | HEIGHT: 64 IN

## 2019-09-25 DIAGNOSIS — C90.00 MULTIPLE MYELOMA NOT HAVING ACHIEVED REMISSION (HCC): ICD-10-CM

## 2019-09-25 DIAGNOSIS — M89.9 LYTIC LESION OF BONE ON X-RAY: ICD-10-CM

## 2019-09-25 DIAGNOSIS — C90.00 MULTIPLE MYELOMA NOT HAVING ACHIEVED REMISSION (HCC): Primary | ICD-10-CM

## 2019-09-25 DIAGNOSIS — M79.89 LEG SWELLING: ICD-10-CM

## 2019-09-25 LAB
ALBUMIN SERPL-MCNC: 4.2 G/DL (ref 3.5–5.1)
ALP BLD-CCNC: 140 U/L (ref 38–126)
ALT SERPL-CCNC: 26 U/L (ref 11–66)
AST SERPL-CCNC: 15 U/L (ref 5–40)
BILIRUB SERPL-MCNC: 0.3 MG/DL (ref 0.3–1.2)
BILIRUBIN DIRECT: < 0.2 MG/DL (ref 0–0.3)
BUN, WHOLE BLOOD: 14 MG/DL (ref 8–26)
CHLORIDE, WHOLE BLOOD: 107 MEQ/L (ref 98–109)
CREATININE, WHOLE BLOOD: 0.8 MG/DL (ref 0.5–1.2)
GFR, ESTIMATED: 78 ML/MIN/1.73M2
GLUCOSE, WHOLE BLOOD: 98 MG/DL (ref 70–108)
HCT VFR BLD CALC: 32.2 % (ref 37–47)
HEMOGLOBIN: 11.1 GM/DL (ref 12–16)
IONIZED CALCIUM, WHOLE BLOOD: 1.18 MMOL/L (ref 1.12–1.32)
MCH RBC QN AUTO: 35.6 PG (ref 27–31)
MCHC RBC AUTO-ENTMCNC: 34.4 GM/DL (ref 33–37)
MCV RBC AUTO: 104 FL (ref 81–99)
PDW BLD-RTO: 13.3 % (ref 11.5–14.5)
PLATELET # BLD: 231 THOU/MM3 (ref 130–400)
PMV BLD AUTO: 9.1 FL (ref 7.4–10.4)
POTASSIUM, WHOLE BLOOD: 4.2 MEQ/L (ref 3.5–4.9)
RBC # BLD: 3.11 MILL/MM3 (ref 4.2–5.4)
SEG NEUTROPHILS: 63 % (ref 43–75)
SEGMENTED NEUTROPHILS ABSOLUTE COUNT: 2.5 THOU/MM3 (ref 1.8–7.7)
SODIUM, WHOLE BLOOD: 140 MEQ/L (ref 138–146)
TOTAL CO2, WHOLE BLOOD: 26 MEQ/L (ref 23–33)
TOTAL PROTEIN: 6.7 G/DL (ref 6.1–8)
WBC # BLD: 4 THOU/MM3 (ref 4.8–10.8)

## 2019-09-25 PROCEDURE — 80076 HEPATIC FUNCTION PANEL: CPT

## 2019-09-25 PROCEDURE — 85027 COMPLETE CBC AUTOMATED: CPT

## 2019-09-25 PROCEDURE — 93970 EXTREMITY STUDY: CPT

## 2019-09-25 PROCEDURE — 80047 BASIC METABLC PNL IONIZED CA: CPT

## 2019-09-25 PROCEDURE — 99211 OFF/OP EST MAY X REQ PHY/QHP: CPT

## 2019-09-25 PROCEDURE — 2580000003 HC RX 258: Performed by: INTERNAL MEDICINE

## 2019-09-25 PROCEDURE — 36415 COLL VENOUS BLD VENIPUNCTURE: CPT

## 2019-09-25 PROCEDURE — 96401 CHEMO ANTI-NEOPL SQ/IM: CPT

## 2019-09-25 PROCEDURE — 6360000002 HC RX W HCPCS: Performed by: INTERNAL MEDICINE

## 2019-09-25 RX ORDER — LENALIDOMIDE 25 MG/1
20 CAPSULE ORAL DAILY
Qty: 14 CAPSULE | Refills: 0 | Status: SHIPPED | OUTPATIENT
Start: 2019-09-25 | End: 2019-11-21 | Stop reason: SDUPTHER

## 2019-09-25 RX ADMIN — BORTEZOMIB 2.4 MG: 3.5 INJECTION, POWDER, LYOPHILIZED, FOR SOLUTION INTRAVENOUS; SUBCUTANEOUS at 14:57

## 2019-09-25 NOTE — PROGRESS NOTES
Patient assessed for the following post chemotherapy:    Dizziness   No  Lightheadedness  No      Acute nausea/vomiting No  Headache   No  Chest pain/pressure  No  Rash/itching   No  Shortness of breath  No    Patient opted to not stay for 20 minutes observation post infusion chemotherapy. Patient tolerated chemotherapy treatment velcade sq without any complications. Last vital signs:   /65   Pulse 63   Temp 98.4 °F (36.9 °C) (Oral)   Resp 16   Ht 5' 4\" (1.626 m)   Wt 168 lb (76.2 kg)   LMP 12/01/2004   SpO2 97%   BMI 28.84 kg/m²         Patient instructed if experience any of the above symptoms following today's infusion,he/she is to notify MD immediately or go to the emergency department. Discharge instructions given to patient. Verbalizes understanding. Ambulated off unit per self with belongings accompanied by spouse.

## 2019-09-25 NOTE — PROGRESS NOTES
Patient return to unit to receive velcade injection. Alberto Chen PA talked to patient and spouse about xarelto.

## 2019-09-25 NOTE — PLAN OF CARE
Problem: Intellectual/Education/Knowledge Deficit  Goal: Teaching initiated upon admission  Outcome: Met This Shift  Note:   Please contact your Oncologist if you have any questions regarding the chemotherapy velcade that you received today. No further questions on Xarelto           Intervention: Verbal/written education provided  Note:   Chemotherapy Teaching     What is Chemotherapy   Drug action ? Method of Administration ? Handouts given ? Side Effects  Nausea/vomiting ? Diarrhea ? Fatigue ? Signs / Symptoms of infection ? Neutropenia ? Thrombocytopenia ? Alopecia ? neuropathy ? [place] diet &  the importance of fluids ? Micellaneous  Importance of nutrition ? Importance of oral hygiene ? When to call the MD ?   Monitoring labs ? Use of supportive services ? Explanation of Drug Regimen / Frequency  Velcade sq [id number]     Comments  Verbalized understanding to drug,action,side effects and when to call MD         Problem: Discharge Planning  Goal: Knowledge of discharge instructions  Description  Knowledge of discharge instructions     Outcome: Met This Shift  Note:   Verbalized understanding of discharge instructions, follow-up appointments, and when to call the physician. Intervention: Interaction with patient/family and care team  Note:   Discuss understanding of discharge instructions,follow-up appointments, and when to call the physician. Problem: Falls - Risk of:  Goal: Will remain free from falls  Description  Will remain free from falls  Outcome: Met This Shift  Note:   No falls occurred with visit today. Intervention: Assess risk factors for falls  Description  Assess risk factors for falls  Note:   Verbalized understanding of fall prevention to ask for assistance with ambulation. Call light within reach. Care plan reviewed with patient and spouse. Patient and spouse verbalize understanding of the plan of care and contribute to goal setting.

## 2019-09-25 NOTE — PROGRESS NOTES
Patient presented to outpatient Oncology clinic for Velcade injection. She stated to her nurse, Klever Rodriguez RN that she had developed bilateral leg edema for four days. Patient reports increased bilateral leg edema beginning on 9/22/19. She states swelling was worse in the right lower extremity and associated with pain \"dull ache\" while walking. PE:  Bilateral lower extremity edema, right greater than left. With Right 2-3+ pitting and left 1+ pitting edema. Tenderness with calf palpation. Plan:  Concern for DVT. Patient was sent for STAT dopplers of bilateral LE. Dopplers were obtained showing:      FINDINGS:       Right leg:   Deep veins (common femoral, femoral, veins): Patent and compressible, with spontaneous flow, phasicity, and satisfactory augmentation. The right popliteal, posterior tibial and peroneal veins are noncompressible with absent flow consistent with acute thrombus. Superficial veins (greater saphenous vein): Patent where visualized.        Left leg:   Deep veins (common femoral, femoral, popliteal, and calf veins): Patent and compressible, with spontaneous flow, phasicity, and satisfactory augmentation.       Superficial veins (greater saphenous vein): Patent where visualized.        Baker's cyst: None               Impression   Acute lower extremity deep vein thrombus involving the RIGHT popliteal and calf veins as discussed above. Patient was discussed with Dr. Leeann Gutiérrez and prescription sent for Xarelto. Explained findings and anticoagulation with the patient.        Electronically signed by   Fabio Lira PA-C on 9/25/2019 at 2:27 PM

## 2019-09-25 NOTE — PROGRESS NOTES
Raghu BANSAL stated patient doppler was positive and patient will be returning to receive chemo injection.

## 2019-09-30 DIAGNOSIS — R11.0 NAUSEA: ICD-10-CM

## 2019-09-30 RX ORDER — OMEPRAZOLE 20 MG/1
CAPSULE, DELAYED RELEASE ORAL
Qty: 30 CAPSULE | Refills: 0 | Status: SHIPPED | OUTPATIENT
Start: 2019-09-30 | End: 2019-10-23 | Stop reason: SDUPTHER

## 2019-10-01 DIAGNOSIS — C90.00 MULTIPLE MYELOMA NOT HAVING ACHIEVED REMISSION (HCC): Primary | ICD-10-CM

## 2019-10-02 ENCOUNTER — HOSPITAL ENCOUNTER (OUTPATIENT)
Dept: INFUSION THERAPY | Age: 59
Discharge: HOME | End: 2019-10-02
Payer: COMMERCIAL

## 2019-10-02 ENCOUNTER — OFFICE VISIT (OUTPATIENT)
Dept: ONCOLOGY | Age: 59
End: 2019-10-02
Payer: COMMERCIAL

## 2019-10-02 VITALS
TEMPERATURE: 99.1 F | BODY MASS INDEX: 28.82 KG/M2 | OXYGEN SATURATION: 95 % | RESPIRATION RATE: 16 BRPM | DIASTOLIC BLOOD PRESSURE: 78 MMHG | HEART RATE: 89 BPM | SYSTOLIC BLOOD PRESSURE: 122 MMHG | WEIGHT: 168 LBS

## 2019-10-02 VITALS
SYSTOLIC BLOOD PRESSURE: 122 MMHG | RESPIRATION RATE: 16 BRPM | OXYGEN SATURATION: 95 % | HEART RATE: 89 BPM | HEIGHT: 64 IN | TEMPERATURE: 99.1 F | DIASTOLIC BLOOD PRESSURE: 78 MMHG | BODY MASS INDEX: 28.82 KG/M2

## 2019-10-02 DIAGNOSIS — C90.00 MULTIPLE MYELOMA NOT HAVING ACHIEVED REMISSION (HCC): Primary | ICD-10-CM

## 2019-10-02 DIAGNOSIS — D63.1 ANEMIA DUE TO STAGE 3 CHRONIC KIDNEY DISEASE (HCC): ICD-10-CM

## 2019-10-02 DIAGNOSIS — M89.9 LYTIC LESION OF BONE ON X-RAY: ICD-10-CM

## 2019-10-02 DIAGNOSIS — C79.51 BONE METASTASES (HCC): ICD-10-CM

## 2019-10-02 DIAGNOSIS — N18.30 ANEMIA DUE TO STAGE 3 CHRONIC KIDNEY DISEASE (HCC): ICD-10-CM

## 2019-10-02 DIAGNOSIS — Z51.11 ENCOUNTER FOR CHEMOTHERAPY MANAGEMENT: ICD-10-CM

## 2019-10-02 DIAGNOSIS — I82.461 ACUTE DEEP VEIN THROMBOSIS (DVT) OF CALF MUSCLE VEIN OF RIGHT LOWER EXTREMITY (HCC): ICD-10-CM

## 2019-10-02 LAB
ALBUMIN SERPL-MCNC: 4.4 G/DL (ref 3.5–5.1)
ALP BLD-CCNC: 126 U/L (ref 38–126)
ALT SERPL-CCNC: 18 U/L (ref 11–66)
AST SERPL-CCNC: 14 U/L (ref 5–40)
BILIRUB SERPL-MCNC: 0.3 MG/DL (ref 0.3–1.2)
BILIRUBIN DIRECT: < 0.2 MG/DL (ref 0–0.3)
BUN, WHOLE BLOOD: 22 MG/DL (ref 8–26)
CHLORIDE, WHOLE BLOOD: 105 MEQ/L (ref 98–109)
CREATININE, WHOLE BLOOD: 0.8 MG/DL (ref 0.5–1.2)
GFR, ESTIMATED: 78 ML/MIN/1.73M2
GLUCOSE, WHOLE BLOOD: 89 MG/DL (ref 70–108)
HCT VFR BLD CALC: 31.8 % (ref 37–47)
HEMOGLOBIN: 10.8 GM/DL (ref 12–16)
IONIZED CALCIUM, WHOLE BLOOD: 1.28 MMOL/L (ref 1.12–1.32)
MCH RBC QN AUTO: 34.9 PG (ref 27–31)
MCHC RBC AUTO-ENTMCNC: 33.8 GM/DL (ref 33–37)
MCV RBC AUTO: 103 FL (ref 81–99)
PDW BLD-RTO: 12.4 % (ref 11.5–14.5)
PLATELET # BLD: 165 THOU/MM3 (ref 130–400)
PMV BLD AUTO: 9.2 FL (ref 7.4–10.4)
POTASSIUM, WHOLE BLOOD: 4.6 MEQ/L (ref 3.5–4.9)
RBC # BLD: 3.08 MILL/MM3 (ref 4.2–5.4)
SEG NEUTROPHILS: 58 % (ref 43–75)
SEGMENTED NEUTROPHILS ABSOLUTE COUNT: 2.4 THOU/MM3 (ref 1.8–7.7)
SODIUM, WHOLE BLOOD: 139 MEQ/L (ref 138–146)
TOTAL CO2, WHOLE BLOOD: 27 MEQ/L (ref 23–33)
TOTAL PROTEIN: 6.5 G/DL (ref 6.1–8)
WBC # BLD: 4.1 THOU/MM3 (ref 4.8–10.8)

## 2019-10-02 PROCEDURE — 80047 BASIC METABLC PNL IONIZED CA: CPT

## 2019-10-02 PROCEDURE — 36415 COLL VENOUS BLD VENIPUNCTURE: CPT

## 2019-10-02 PROCEDURE — 80076 HEPATIC FUNCTION PANEL: CPT

## 2019-10-02 PROCEDURE — 6360000002 HC RX W HCPCS: Performed by: INTERNAL MEDICINE

## 2019-10-02 PROCEDURE — 2580000003 HC RX 258: Performed by: INTERNAL MEDICINE

## 2019-10-02 PROCEDURE — 99211 OFF/OP EST MAY X REQ PHY/QHP: CPT

## 2019-10-02 PROCEDURE — 96401 CHEMO ANTI-NEOPL SQ/IM: CPT

## 2019-10-02 PROCEDURE — 99215 OFFICE O/P EST HI 40 MIN: CPT | Performed by: INTERNAL MEDICINE

## 2019-10-02 PROCEDURE — 85027 COMPLETE CBC AUTOMATED: CPT

## 2019-10-02 RX ORDER — SODIUM CHLORIDE 9 MG/ML
20 INJECTION, SOLUTION INTRAVENOUS ONCE
Status: CANCELLED | OUTPATIENT
Start: 2019-10-09

## 2019-10-02 RX ORDER — SODIUM CHLORIDE 0.9 % (FLUSH) 0.9 %
10 SYRINGE (ML) INJECTION PRN
Status: CANCELLED | OUTPATIENT
Start: 2019-10-09

## 2019-10-02 RX ORDER — SODIUM CHLORIDE 9 MG/ML
INJECTION, SOLUTION INTRAVENOUS CONTINUOUS
Status: CANCELLED | OUTPATIENT
Start: 2019-10-09

## 2019-10-02 RX ORDER — METHYLPREDNISOLONE SODIUM SUCCINATE 125 MG/2ML
125 INJECTION, POWDER, LYOPHILIZED, FOR SOLUTION INTRAMUSCULAR; INTRAVENOUS ONCE
Status: CANCELLED | OUTPATIENT
Start: 2019-10-09

## 2019-10-02 RX ORDER — DIPHENHYDRAMINE HYDROCHLORIDE 50 MG/ML
50 INJECTION INTRAMUSCULAR; INTRAVENOUS ONCE
Status: CANCELLED | OUTPATIENT
Start: 2019-10-09

## 2019-10-02 RX ORDER — HEPARIN SODIUM (PORCINE) LOCK FLUSH IV SOLN 100 UNIT/ML 100 UNIT/ML
500 SOLUTION INTRAVENOUS PRN
Status: CANCELLED | OUTPATIENT
Start: 2019-10-09

## 2019-10-02 RX ORDER — SODIUM CHLORIDE 0.9 % (FLUSH) 0.9 %
5 SYRINGE (ML) INJECTION PRN
Status: CANCELLED | OUTPATIENT
Start: 2019-10-09

## 2019-10-02 RX ORDER — EPINEPHRINE 1 MG/ML
0.3 INJECTION, SOLUTION, CONCENTRATE INTRAVENOUS PRN
Status: CANCELLED | OUTPATIENT
Start: 2019-10-09

## 2019-10-02 RX ADMIN — BORTEZOMIB 2.4 MG: 3.5 INJECTION, POWDER, LYOPHILIZED, FOR SOLUTION INTRAVENOUS; SUBCUTANEOUS at 12:44

## 2019-10-02 ASSESSMENT — ENCOUNTER SYMPTOMS
BLOOD IN STOOL: 0
TROUBLE SWALLOWING: 0
NAUSEA: 0
CONSTIPATION: 0
SHORTNESS OF BREATH: 0
WHEEZING: 0
COLOR CHANGE: 0
FACIAL SWELLING: 0
CHEST TIGHTNESS: 0
EYE DISCHARGE: 0
DIARRHEA: 0
BACK PAIN: 1
RECTAL PAIN: 0
ABDOMINAL PAIN: 0
ABDOMINAL DISTENTION: 0
SORE THROAT: 0
VOMITING: 0
COUGH: 0

## 2019-10-02 ASSESSMENT — PAIN SCALES - GENERAL: PAINLEVEL_OUTOF10: 0

## 2019-10-02 NOTE — PROGRESS NOTES
Name: Earline Johnson  : 1960  MRN: [id number]    Oncology Navigation Follow-Up Note    Contact Type:  Medical Oncology    Subjective: Here to see doctor & get tx    Objective: WBC 4.1 HGB 10.8    BUN 22 CREA 0.8  Due for Cycle 3 Day 15 Velcade, Decadron (Completed Revlimid yesterday). Recent DX DVT Rt Leg->started on Xarelto 1 tab BID x21 days, & then will change to 1 tab (20 mg) daily x3-4 months pending timing of Stem Cell Transplant. Tolerating chemo tx well  \"Some\" tingling in toes; no GI upset,  Only Slight Fatigue. Assistance Needed: denies    Receptive to Advanced Care Planning / Palliative Care:  deferred    Referrals: ONC to refer pt to [facility] for consult for Stem Cell Transplant consideration. Education: ONC reviewed what is involved with a Stem Cell Transplant    Notes: Cycle 3- Day 15: Velcade, Decadron. Return next week to begin Cycle #4  Due for Zometa next week also. Abbe following to assist & support as needed.       Electronically signed by Romelia Wilkins RN on 10/2/2019 at 5:13 PM

## 2019-10-09 ENCOUNTER — HOSPITAL ENCOUNTER (OUTPATIENT)
Dept: INFUSION THERAPY | Age: 59
Discharge: HOME | End: 2019-10-09
Payer: COMMERCIAL

## 2019-10-09 VITALS
WEIGHT: 170 LBS | DIASTOLIC BLOOD PRESSURE: 73 MMHG | SYSTOLIC BLOOD PRESSURE: 127 MMHG | RESPIRATION RATE: 18 BRPM | BODY MASS INDEX: 29.02 KG/M2 | OXYGEN SATURATION: 100 % | TEMPERATURE: 98.2 F | HEIGHT: 64 IN | HEART RATE: 62 BPM

## 2019-10-09 DIAGNOSIS — C90.00 MULTIPLE MYELOMA NOT HAVING ACHIEVED REMISSION (HCC): Primary | ICD-10-CM

## 2019-10-09 DIAGNOSIS — M89.9 LYTIC LESION OF BONE ON X-RAY: ICD-10-CM

## 2019-10-09 DIAGNOSIS — I82.431 ACUTE DEEP VEIN THROMBOSIS (DVT) OF RIGHT POPLITEAL VEIN (HCC): ICD-10-CM

## 2019-10-09 LAB
ALBUMIN SERPL-MCNC: 4.2 G/DL (ref 3.5–5.1)
ALP BLD-CCNC: 129 U/L (ref 38–126)
ALT SERPL-CCNC: 19 U/L (ref 11–66)
AST SERPL-CCNC: 13 U/L (ref 5–40)
BILIRUB SERPL-MCNC: 0.3 MG/DL (ref 0.3–1.2)
BILIRUBIN DIRECT: < 0.2 MG/DL (ref 0–0.3)
BUN, WHOLE BLOOD: 16 MG/DL (ref 8–26)
CHLORIDE, WHOLE BLOOD: 106 MEQ/L (ref 98–109)
CREATININE, WHOLE BLOOD: 0.8 MG/DL (ref 0.5–1.2)
GFR, ESTIMATED: 78 ML/MIN/1.73M2
GLUCOSE, WHOLE BLOOD: 92 MG/DL (ref 70–108)
HCT VFR BLD CALC: 33.4 % (ref 37–47)
HEMOGLOBIN: 11.4 GM/DL (ref 12–16)
IONIZED CALCIUM, WHOLE BLOOD: 1.23 MMOL/L (ref 1.12–1.32)
MCH RBC QN AUTO: 35.3 PG (ref 27–31)
MCHC RBC AUTO-ENTMCNC: 34 GM/DL (ref 33–37)
MCV RBC AUTO: 104 FL (ref 81–99)
PDW BLD-RTO: 12.7 % (ref 11.5–14.5)
PLATELET # BLD: 200 THOU/MM3 (ref 130–400)
PMV BLD AUTO: 8.3 FL (ref 7.4–10.4)
POTASSIUM, WHOLE BLOOD: 4 MEQ/L (ref 3.5–4.9)
RBC # BLD: 3.21 MILL/MM3 (ref 4.2–5.4)
SEG NEUTROPHILS: 51 % (ref 43–75)
SEGMENTED NEUTROPHILS ABSOLUTE COUNT: 1.8 THOU/MM3 (ref 1.8–7.7)
SODIUM, WHOLE BLOOD: 140 MEQ/L (ref 138–146)
TOTAL CO2, WHOLE BLOOD: 25 MEQ/L (ref 23–33)
TOTAL PROTEIN: 6.3 G/DL (ref 6.1–8)
WBC # BLD: 3.6 THOU/MM3 (ref 4.8–10.8)

## 2019-10-09 PROCEDURE — 85027 COMPLETE CBC AUTOMATED: CPT

## 2019-10-09 PROCEDURE — 96365 THER/PROPH/DIAG IV INF INIT: CPT

## 2019-10-09 PROCEDURE — 36415 COLL VENOUS BLD VENIPUNCTURE: CPT

## 2019-10-09 PROCEDURE — 80047 BASIC METABLC PNL IONIZED CA: CPT

## 2019-10-09 PROCEDURE — 80076 HEPATIC FUNCTION PANEL: CPT

## 2019-10-09 PROCEDURE — 6360000002 HC RX W HCPCS: Performed by: PHYSICIAN ASSISTANT

## 2019-10-09 PROCEDURE — 6360000002 HC RX W HCPCS: Performed by: INTERNAL MEDICINE

## 2019-10-09 PROCEDURE — 2580000003 HC RX 258: Performed by: PHYSICIAN ASSISTANT

## 2019-10-09 PROCEDURE — 2580000003 HC RX 258: Performed by: INTERNAL MEDICINE

## 2019-10-09 PROCEDURE — 96401 CHEMO ANTI-NEOPL SQ/IM: CPT

## 2019-10-09 RX ORDER — SODIUM CHLORIDE 9 MG/ML
INJECTION, SOLUTION INTRAVENOUS CONTINUOUS
Status: CANCELLED | OUTPATIENT
Start: 2019-10-16

## 2019-10-09 RX ORDER — RIVAROXABAN 15 MG/1
TABLET, FILM COATED ORAL
Qty: 42 TABLET | Refills: 0 | OUTPATIENT
Start: 2019-10-09

## 2019-10-09 RX ORDER — DIPHENHYDRAMINE HYDROCHLORIDE 50 MG/ML
50 INJECTION INTRAMUSCULAR; INTRAVENOUS ONCE
Status: CANCELLED | OUTPATIENT
Start: 2019-10-09

## 2019-10-09 RX ORDER — SODIUM CHLORIDE 0.9 % (FLUSH) 0.9 %
10 SYRINGE (ML) INJECTION PRN
Status: CANCELLED | OUTPATIENT
Start: 2019-10-16

## 2019-10-09 RX ORDER — DEXAMETHASONE 4 MG/1
40 TABLET ORAL SEE ADMIN INSTRUCTIONS
Qty: 30 TABLET | Refills: 3 | Status: SHIPPED | OUTPATIENT
Start: 2019-10-09 | End: 2019-10-16

## 2019-10-09 RX ORDER — SODIUM CHLORIDE 0.9 % (FLUSH) 0.9 %
10 SYRINGE (ML) INJECTION PRN
Status: CANCELLED | OUTPATIENT
Start: 2019-10-23

## 2019-10-09 RX ORDER — SODIUM CHLORIDE 9 MG/ML
20 INJECTION, SOLUTION INTRAVENOUS ONCE
Status: CANCELLED | OUTPATIENT
Start: 2019-10-16

## 2019-10-09 RX ORDER — SODIUM CHLORIDE 9 MG/ML
INJECTION, SOLUTION INTRAVENOUS CONTINUOUS
Status: CANCELLED | OUTPATIENT
Start: 2019-10-23

## 2019-10-09 RX ORDER — SODIUM CHLORIDE 9 MG/ML
INJECTION, SOLUTION INTRAVENOUS CONTINUOUS
Status: CANCELLED | OUTPATIENT
Start: 2019-10-09

## 2019-10-09 RX ORDER — METHYLPREDNISOLONE SODIUM SUCCINATE 125 MG/2ML
125 INJECTION, POWDER, LYOPHILIZED, FOR SOLUTION INTRAMUSCULAR; INTRAVENOUS ONCE
Status: CANCELLED | OUTPATIENT
Start: 2019-10-23

## 2019-10-09 RX ORDER — HEPARIN SODIUM (PORCINE) LOCK FLUSH IV SOLN 100 UNIT/ML 100 UNIT/ML
500 SOLUTION INTRAVENOUS PRN
Status: CANCELLED | OUTPATIENT
Start: 2019-10-23

## 2019-10-09 RX ORDER — SODIUM CHLORIDE 9 MG/ML
20 INJECTION, SOLUTION INTRAVENOUS ONCE
Status: CANCELLED | OUTPATIENT
Start: 2019-10-23

## 2019-10-09 RX ORDER — METHYLPREDNISOLONE SODIUM SUCCINATE 125 MG/2ML
125 INJECTION, POWDER, LYOPHILIZED, FOR SOLUTION INTRAMUSCULAR; INTRAVENOUS ONCE
Status: CANCELLED | OUTPATIENT
Start: 2019-10-16

## 2019-10-09 RX ORDER — ASPIRIN 325 MG
325 TABLET ORAL DAILY
Qty: 90 TABLET | Refills: 0 | Status: SHIPPED | OUTPATIENT
Start: 2019-10-09 | End: 2019-10-16 | Stop reason: ALTCHOICE

## 2019-10-09 RX ORDER — DIPHENHYDRAMINE HYDROCHLORIDE 50 MG/ML
50 INJECTION INTRAMUSCULAR; INTRAVENOUS ONCE
Status: CANCELLED | OUTPATIENT
Start: 2019-10-16

## 2019-10-09 RX ORDER — HEPARIN SODIUM (PORCINE) LOCK FLUSH IV SOLN 100 UNIT/ML 100 UNIT/ML
500 SOLUTION INTRAVENOUS PRN
Status: CANCELLED | OUTPATIENT
Start: 2019-10-09

## 2019-10-09 RX ORDER — SODIUM CHLORIDE 0.9 % (FLUSH) 0.9 %
5 SYRINGE (ML) INJECTION PRN
Status: CANCELLED | OUTPATIENT
Start: 2019-10-09

## 2019-10-09 RX ORDER — SODIUM CHLORIDE 0.9 % (FLUSH) 0.9 %
10 SYRINGE (ML) INJECTION PRN
Status: CANCELLED | OUTPATIENT
Start: 2019-10-09

## 2019-10-09 RX ORDER — DIPHENHYDRAMINE HYDROCHLORIDE 50 MG/ML
50 INJECTION INTRAMUSCULAR; INTRAVENOUS ONCE
Status: CANCELLED | OUTPATIENT
Start: 2019-10-23

## 2019-10-09 RX ORDER — SODIUM CHLORIDE 0.9 % (FLUSH) 0.9 %
5 SYRINGE (ML) INJECTION PRN
Status: CANCELLED | OUTPATIENT
Start: 2019-10-23

## 2019-10-09 RX ORDER — LENALIDOMIDE 25 MG/1
20 CAPSULE ORAL DAILY
Qty: 14 CAPSULE | Refills: 0
Start: 2019-10-09 | End: 2019-10-16

## 2019-10-09 RX ORDER — SODIUM CHLORIDE 0.9 % (FLUSH) 0.9 %
5 SYRINGE (ML) INJECTION PRN
Status: CANCELLED | OUTPATIENT
Start: 2019-10-16

## 2019-10-09 RX ORDER — SODIUM CHLORIDE 9 MG/ML
20 INJECTION, SOLUTION INTRAVENOUS ONCE
Status: COMPLETED | OUTPATIENT
Start: 2019-10-09 | End: 2019-10-09

## 2019-10-09 RX ORDER — HEPARIN SODIUM (PORCINE) LOCK FLUSH IV SOLN 100 UNIT/ML 100 UNIT/ML
500 SOLUTION INTRAVENOUS PRN
Status: CANCELLED | OUTPATIENT
Start: 2019-10-16

## 2019-10-09 RX ORDER — ACYCLOVIR 400 MG/1
400 TABLET ORAL 2 TIMES DAILY
Qty: 180 TABLET | Refills: 0 | Status: SHIPPED | OUTPATIENT
Start: 2019-10-09 | End: 2019-10-16

## 2019-10-09 RX ORDER — METHYLPREDNISOLONE SODIUM SUCCINATE 125 MG/2ML
125 INJECTION, POWDER, LYOPHILIZED, FOR SOLUTION INTRAMUSCULAR; INTRAVENOUS ONCE
Status: CANCELLED | OUTPATIENT
Start: 2019-10-09

## 2019-10-09 RX ADMIN — BORTEZOMIB 2.4 MG: 3.5 INJECTION, POWDER, LYOPHILIZED, FOR SOLUTION INTRAVENOUS; SUBCUTANEOUS at 10:57

## 2019-10-09 RX ADMIN — ZOLEDRONIC ACID 4 MG: 4 INJECTION, SOLUTION, CONCENTRATE INTRAVENOUS at 11:05

## 2019-10-09 RX ADMIN — SODIUM CHLORIDE 20 ML/HR: 9 INJECTION, SOLUTION INTRAVENOUS at 11:00

## 2019-10-09 NOTE — ONCOLOGY
Chemotherapy Administration    Pre-assessment Data: Antineoplastic Agents  Other:   See toxicity flow sheet for assessment [x]     Physician Notification of Concerns Related to Chemotherapy Administration:   Physician Notified Merlinda Isaac / Time of Notification      Interventions:   Lab work assessed  [x]   Height / Weight verified for dose [x]   Current MAR reviewed [x]   Emergency drugs available as appropriate [x]   Anaphylaxis assessment completed [x]   Pre-medications administered as ordered [x]   Chemotherapy Administered as SQ injection [x]   Monitor for signs / symptoms of hypersensitivity reaction    [x]   Chemotherapy orders (drug/dose/rate) verified by 2 Chemo certified   RNs    [x]          Document chemotherapy teaching on the Patient Education tab    [x]   Document patient verbalizes understanding of medications being administered    [x]   Other:  Velcade []    []    []      []    []

## 2019-10-09 NOTE — PROGRESS NOTES
Patient assessed for the following post chemotherapy:    Dizziness   No  Lightheadedness  No      Acute nausea/vomiting No  Headache   No  Chest pain/pressure  No  Rash/itching   No  Shortness of breath  No    Patient kept for 20 minutes observation post injection chemotherapy. Patient tolerated chemotherapy treatment Velcade without any complications. Last vital signs:   /73   Pulse 62   Temp 98.2 °F (36.8 °C) (Oral)   Resp 18   Ht 5' 4\" (1.626 m)   Wt 170 lb (77.1 kg)   LMP 12/01/2004   SpO2 100%   BMI 29.18 kg/m²     Patient instructed if experience any of the above symptoms following today's infusion and injection ,she is to notify MD immediately or go to the emergency department. Discharge instructions given to patient. Verbalizes understanding. Ambulated off unit per self, accompanied by spouse, with belongings.

## 2019-10-09 NOTE — PLAN OF CARE
Problem: Musculor/Skeletal Functional Status  Goal: Absence of falls  Outcome: Met This Shift  Note:   Patient free from falls while in O.P. Oncology. Intervention: Fall precautions  Note:   Patient assessed for fall risk on admission to [facility]. Fall band placed on patient. Discussed the need to use the call light for assistance prior to getting up out of chair/bed. Problem: Intellectual/Education/Knowledge Deficit  Goal: Teaching initiated upon admission  Outcome: Met This Shift  Note:   Patient verbalizes understanding to verbal information given on Velcade and Zometa ,action and possible side effects. Aware to call MD if develop complications. Goal: Written Disposition Instruction form completed  Outcome: Met This Shift  Intervention: Verbal/written education provided  Note:   Chemotherapy Teaching     What is Chemotherapy   Drug action ? Method of Administration ? Handouts given ? Side Effects  Nausea/vomiting ? Diarrhea ? Fatigue ? Signs / Symptoms of infection ? Neutropenia ? Thrombocytopenia ? Alopecia ? neuropathy ? [place] diet &  the importance of fluids ? Micellaneous  Importance of nutrition ? Importance of oral hygiene ? When to call the MD ?   Monitoring labs ? Use of supportive services ? Explanation of Drug Regimen / Frequency  Velcade /Zometa     Comments  Verbalized understanding to drug,action,side effects and when to call MD         Problem: Discharge Planning  Goal: Knowledge of discharge instructions  Description  Knowledge of discharge instructions     Outcome: Met This Shift  Note:   Verbalized understanding of discharge instructions, follow-up appointments, and when to call the physician. Intervention: Interaction with patient/family and care team  Note:   Discuss understanding of discharge instructions,follow-up appointments, and when to call the physician. Care plan reviewed with patient and spouse.   Patient and spouse verbalize understanding of the plan of care and contribute to goal setting.

## 2019-10-15 DIAGNOSIS — C90.00 MULTIPLE MYELOMA NOT HAVING ACHIEVED REMISSION (HCC): Primary | ICD-10-CM

## 2019-10-16 ENCOUNTER — HOSPITAL ENCOUNTER (OUTPATIENT)
Dept: INFUSION THERAPY | Age: 59
Discharge: HOME | End: 2019-10-16
Payer: COMMERCIAL

## 2019-10-16 VITALS
HEART RATE: 67 BPM | OXYGEN SATURATION: 99 % | SYSTOLIC BLOOD PRESSURE: 108 MMHG | DIASTOLIC BLOOD PRESSURE: 65 MMHG | HEIGHT: 64 IN | BODY MASS INDEX: 29.19 KG/M2 | RESPIRATION RATE: 18 BRPM | WEIGHT: 171 LBS | TEMPERATURE: 97.8 F

## 2019-10-16 DIAGNOSIS — M89.9 LYTIC LESION OF BONE ON X-RAY: ICD-10-CM

## 2019-10-16 DIAGNOSIS — C90.00 MULTIPLE MYELOMA NOT HAVING ACHIEVED REMISSION (HCC): Primary | ICD-10-CM

## 2019-10-16 LAB
ALBUMIN SERPL-MCNC: 4.1 G/DL (ref 3.5–5.1)
ALP BLD-CCNC: 111 U/L (ref 38–126)
ALT SERPL-CCNC: 17 U/L (ref 11–66)
AST SERPL-CCNC: 14 U/L (ref 5–40)
BILIRUB SERPL-MCNC: 0.3 MG/DL (ref 0.3–1.2)
BILIRUBIN DIRECT: < 0.2 MG/DL (ref 0–0.3)
BUN, WHOLE BLOOD: 11 MG/DL (ref 8–26)
CHLORIDE, WHOLE BLOOD: 105 MEQ/L (ref 98–109)
CREATININE, WHOLE BLOOD: 0.8 MG/DL (ref 0.5–1.2)
GFR, ESTIMATED: 78 ML/MIN/1.73M2
GLUCOSE, WHOLE BLOOD: 105 MG/DL (ref 70–108)
HCT VFR BLD CALC: 32.9 % (ref 37–47)
HEMOGLOBIN: 11.1 GM/DL (ref 12–16)
IONIZED CALCIUM, WHOLE BLOOD: 1.14 MMOL/L (ref 1.12–1.32)
MCH RBC QN AUTO: 35.2 PG (ref 27–31)
MCHC RBC AUTO-ENTMCNC: 33.9 GM/DL (ref 33–37)
MCV RBC AUTO: 104 FL (ref 81–99)
PDW BLD-RTO: 12.2 % (ref 11.5–14.5)
PLATELET # BLD: 205 THOU/MM3 (ref 130–400)
PMV BLD AUTO: 9.1 FL (ref 7.4–10.4)
POTASSIUM, WHOLE BLOOD: 3.9 MEQ/L (ref 3.5–4.9)
RBC # BLD: 3.16 MILL/MM3 (ref 4.2–5.4)
SEG NEUTROPHILS: 77 % (ref 43–75)
SEGMENTED NEUTROPHILS ABSOLUTE COUNT: 3.1 THOU/MM3 (ref 1.8–7.7)
SODIUM, WHOLE BLOOD: 140 MEQ/L (ref 138–146)
TOTAL CO2, WHOLE BLOOD: 27 MEQ/L (ref 23–33)
TOTAL PROTEIN: 6.3 G/DL (ref 6.1–8)
WBC # BLD: 4 THOU/MM3 (ref 4.8–10.8)

## 2019-10-16 PROCEDURE — 80076 HEPATIC FUNCTION PANEL: CPT

## 2019-10-16 PROCEDURE — 85027 COMPLETE CBC AUTOMATED: CPT

## 2019-10-16 PROCEDURE — 80047 BASIC METABLC PNL IONIZED CA: CPT

## 2019-10-16 PROCEDURE — 36415 COLL VENOUS BLD VENIPUNCTURE: CPT

## 2019-10-16 PROCEDURE — 2580000003 HC RX 258: Performed by: INTERNAL MEDICINE

## 2019-10-16 PROCEDURE — 96401 CHEMO ANTI-NEOPL SQ/IM: CPT

## 2019-10-16 PROCEDURE — 6360000002 HC RX W HCPCS: Performed by: INTERNAL MEDICINE

## 2019-10-16 RX ADMIN — BORTEZOMIB 2.4 MG: 3.5 INJECTION, POWDER, LYOPHILIZED, FOR SOLUTION INTRAVENOUS; SUBCUTANEOUS at 10:57

## 2019-10-16 NOTE — PROGRESS NOTES
Name: Jorge Hahn  : 1960  MRN: [id number]    Oncology Navigation Follow-Up Note    Contact Type:  Medical Oncology    Subjective: treatment    Objective: Cycle 4 Day 8 today:  Velcade, cont with Revlimid. Pt shares she had her appt at [address] was told she \"may not have to have the Stem Cell Transplant bc she has had such a great response to the chemotherapy, as reflected in her lab results\". They will plan to harvest the stem cells, & hold them IF she does not need them transplanted. Assistance Needed: denies    Receptive to [address] [place] [address] / Palliative Care:  deferred    Referrals: N/A    Education: N/A    Notes: Cycle 4 day 8 with Velcade today. Revlimid continues to day 14. .  Pt tolerating treatment without difficulty. Abbe following to support & assist as needed.     Electronically signed by Andrew Morris RN on 10/16/2019 at 4:39 PM

## 2019-10-16 NOTE — PROGRESS NOTES
Chemotherapy Administration    Pre-assessment Data: Antineoplastic Agents  Other:   See toxicity flow sheet for assessment [x]     Physician Notification of Concerns Related to Chemotherapy Administration:   Physician Notified Berenda Ella / Time of Notification      Interventions:   Lab work assessed  [x]   Height / Weight verified for dose [x]   Current MAR reviewed [x]   Emergency drugs available as appropriate [x]   Anaphylaxis assessment completed [x]   Pre-medications administered as ordered [x]   Chemotherapy Administered as SQ injection [x]   Monitor for signs / symptoms of hypersensitivity reaction    [x]   Chemotherapy orders (drug/dose/rate) verified by 2 Chemo certified   RNs    [x]          Document chemotherapy teaching on the Patient Education tab    [x]   Document patient verbalizes understanding of medications being administered    [x]   Other:  Velcade []    []    []      []    []

## 2019-10-16 NOTE — PLAN OF CARE
Problem: Intellectual/Education/Knowledge Deficit  Goal: Teaching initiated upon admission  Outcome: Met This Shift  Note:   Patient verbalizes understanding to verbal information given on Velcade injection ,action and possible side effects. Aware to call MD if develop complications. Goal: Written Disposition Instruction form completed  Outcome: Met This Shift  Intervention: Verbal/written education provided  Note:   Chemotherapy Teaching     What is Chemotherapy   Drug action ? Method of Administration ? Handouts given ? Side Effects  Nausea/vomiting ? Diarrhea ? Fatigue ? Signs / Symptoms of infection ? Neutropenia ? Thrombocytopenia ? Alopecia ? neuropathy ? [place] diet &  the importance of fluids ? Micellaneous  Importance of nutrition ? Importance of oral hygiene ? When to call the MD ?   Monitoring labs ? Use of supportive services ? Explanation of Drug Regimen / Frequency  Velcade:  [id number]     Comments  Verbalized understanding to drug,action,side effects and when to call MD         Problem: Discharge Planning  Goal: Knowledge of discharge instructions  Description  Knowledge of discharge instructions     Outcome: Met This Shift  Note:   Verbalized understanding of discharge instructions, follow-up appointments, and when to call the physician. Intervention: Interaction with patient/family and care team  Note:   Discuss understanding of discharge instructions,follow-up appointments, and when to call the physician. Care plan reviewed with patient and spouse. Patient and spouse verbalize understanding of the plan of care and contribute to goal setting.

## 2019-10-16 NOTE — PROGRESS NOTES
Patient assessed for the following post chemotherapy:    Dizziness   No  Lightheadedness  No      Acute nausea/vomiting No  Headache   No  Chest pain/pressure  No  Rash/itching   No  Shortness of breath  No    Patient tolerated chemotherapy treatment Velcade injection without any complications. Last vital signs:   /65   Pulse 67   Temp 97.8 °F (36.6 °C) (Oral)   Resp 18   Ht 5' 4\" (1.626 m)   Wt 171 lb (77.6 kg)   LMP 12/01/2004   SpO2 99%   BMI 29.35 kg/m²     Patient instructed if experience any of the above symptoms following today's injection ,she is to notify MD immediately or go to the emergency department. Discharge instructions given to patient. Verbalizes understanding. Ambulated off unit per self, accompanied by spouse, with belongings.

## 2019-10-22 DIAGNOSIS — C90.00 MULTIPLE MYELOMA NOT HAVING ACHIEVED REMISSION (HCC): Primary | ICD-10-CM

## 2019-10-23 ENCOUNTER — OFFICE VISIT (OUTPATIENT)
Dept: ONCOLOGY | Age: 59
End: 2019-10-23
Payer: COMMERCIAL

## 2019-10-23 ENCOUNTER — HOSPITAL ENCOUNTER (OUTPATIENT)
Dept: INTERVENTIONAL RADIOLOGY/VASCULAR | Age: 59
Discharge: HOME | End: 2019-10-23
Payer: COMMERCIAL

## 2019-10-23 ENCOUNTER — HOSPITAL ENCOUNTER (OUTPATIENT)
Dept: INFUSION THERAPY | Age: 59
Discharge: HOME | End: 2019-10-23
Payer: COMMERCIAL

## 2019-10-23 VITALS
HEART RATE: 65 BPM | OXYGEN SATURATION: 98 % | WEIGHT: 173.4 LBS | BODY MASS INDEX: 29.6 KG/M2 | SYSTOLIC BLOOD PRESSURE: 131 MMHG | HEIGHT: 64 IN | DIASTOLIC BLOOD PRESSURE: 75 MMHG | TEMPERATURE: 98.3 F | RESPIRATION RATE: 16 BRPM

## 2019-10-23 VITALS
RESPIRATION RATE: 16 BRPM | SYSTOLIC BLOOD PRESSURE: 127 MMHG | DIASTOLIC BLOOD PRESSURE: 67 MMHG | HEIGHT: 64 IN | OXYGEN SATURATION: 98 % | TEMPERATURE: 98.1 F | HEART RATE: 58 BPM | WEIGHT: 173.4 LBS | BODY MASS INDEX: 29.6 KG/M2

## 2019-10-23 DIAGNOSIS — R11.0 NAUSEA: ICD-10-CM

## 2019-10-23 DIAGNOSIS — M89.9 LYTIC LESION OF BONE ON X-RAY: ICD-10-CM

## 2019-10-23 DIAGNOSIS — R22.43 LOCALIZED SWELLING OF BOTH LOWER LEGS: ICD-10-CM

## 2019-10-23 DIAGNOSIS — C90.00 MULTIPLE MYELOMA NOT HAVING ACHIEVED REMISSION (HCC): ICD-10-CM

## 2019-10-23 DIAGNOSIS — R22.43 LOCALIZED SWELLING OF BOTH LOWER LEGS: Primary | ICD-10-CM

## 2019-10-23 DIAGNOSIS — C90.00 MULTIPLE MYELOMA NOT HAVING ACHIEVED REMISSION (HCC): Primary | ICD-10-CM

## 2019-10-23 LAB
ALBUMIN SERPL-MCNC: 4.1 G/DL (ref 3.5–5.1)
ALP BLD-CCNC: 105 U/L (ref 38–126)
ALT SERPL-CCNC: 18 U/L (ref 11–66)
AST SERPL-CCNC: 13 U/L (ref 5–40)
BILIRUB SERPL-MCNC: 0.4 MG/DL (ref 0.3–1.2)
BILIRUBIN DIRECT: < 0.2 MG/DL (ref 0–0.3)
BUN, WHOLE BLOOD: 19 MG/DL (ref 8–26)
CHLORIDE, WHOLE BLOOD: 103 MEQ/L (ref 98–109)
CREATININE, WHOLE BLOOD: 0.8 MG/DL (ref 0.5–1.2)
GFR, ESTIMATED: 78 ML/MIN/1.73M2
GLUCOSE, WHOLE BLOOD: 94 MG/DL (ref 70–108)
HCT VFR BLD CALC: 33.7 % (ref 37–47)
HEMOGLOBIN: 11.5 GM/DL (ref 12–16)
IONIZED CALCIUM, WHOLE BLOOD: 1.2 MMOL/L (ref 1.12–1.32)
MCH RBC QN AUTO: 35.3 PG (ref 27–31)
MCHC RBC AUTO-ENTMCNC: 34 GM/DL (ref 33–37)
MCV RBC AUTO: 104 FL (ref 81–99)
PDW BLD-RTO: 11.9 % (ref 11.5–14.5)
PLATELET # BLD: 136 THOU/MM3 (ref 130–400)
PMV BLD AUTO: 9.3 FL (ref 7.4–10.4)
POTASSIUM, WHOLE BLOOD: 4.3 MEQ/L (ref 3.5–4.9)
RBC # BLD: 3.25 MILL/MM3 (ref 4.2–5.4)
SEG NEUTROPHILS: 68 % (ref 43–75)
SEGMENTED NEUTROPHILS ABSOLUTE COUNT: 3.1 THOU/MM3 (ref 1.8–7.7)
SODIUM, WHOLE BLOOD: 140 MEQ/L (ref 138–146)
TOTAL CO2, WHOLE BLOOD: 28 MEQ/L (ref 23–33)
TOTAL PROTEIN: 6.5 G/DL (ref 6.1–8)
WBC # BLD: 4.6 THOU/MM3 (ref 4.8–10.8)

## 2019-10-23 PROCEDURE — 85027 COMPLETE CBC AUTOMATED: CPT

## 2019-10-23 PROCEDURE — 2580000003 HC RX 258: Performed by: INTERNAL MEDICINE

## 2019-10-23 PROCEDURE — 99214 OFFICE O/P EST MOD 30 MIN: CPT | Performed by: INTERNAL MEDICINE

## 2019-10-23 PROCEDURE — 36415 COLL VENOUS BLD VENIPUNCTURE: CPT

## 2019-10-23 PROCEDURE — 99211 OFF/OP EST MAY X REQ PHY/QHP: CPT

## 2019-10-23 PROCEDURE — 6360000002 HC RX W HCPCS: Performed by: INTERNAL MEDICINE

## 2019-10-23 PROCEDURE — 80076 HEPATIC FUNCTION PANEL: CPT

## 2019-10-23 PROCEDURE — 96401 CHEMO ANTI-NEOPL SQ/IM: CPT

## 2019-10-23 PROCEDURE — 93970 EXTREMITY STUDY: CPT

## 2019-10-23 PROCEDURE — 80047 BASIC METABLC PNL IONIZED CA: CPT

## 2019-10-23 RX ORDER — ACYCLOVIR 400 MG/1
400 TABLET ORAL 2 TIMES DAILY
Qty: 180 TABLET | Refills: 0 | Status: SHIPPED | OUTPATIENT
Start: 2019-10-23 | End: 2020-01-21

## 2019-10-23 RX ORDER — ALLOPURINOL 100 MG/1
100 TABLET ORAL DAILY
Qty: 30 TABLET | Refills: 2 | Status: SHIPPED | OUTPATIENT
Start: 2019-10-23 | End: 2020-03-17 | Stop reason: ALTCHOICE

## 2019-10-23 RX ORDER — GUARN/MA-HUANG/P.GIN/S.GINSENG
600 TABLET ORAL DAILY
Qty: 30 TABLET | Refills: 0 | Status: SHIPPED | OUTPATIENT
Start: 2019-10-23

## 2019-10-23 RX ORDER — OMEPRAZOLE 20 MG/1
CAPSULE, DELAYED RELEASE ORAL
Qty: 30 CAPSULE | Refills: 0 | Status: SHIPPED | OUTPATIENT
Start: 2019-10-23 | End: 2019-11-21 | Stop reason: SDUPTHER

## 2019-10-23 RX ORDER — AMLODIPINE BESYLATE 10 MG/1
10 TABLET ORAL DAILY
Qty: 30 TABLET | Refills: 2 | Status: SHIPPED | OUTPATIENT
Start: 2019-10-23 | End: 2019-11-21 | Stop reason: SDUPTHER

## 2019-10-23 RX ADMIN — BORTEZOMIB 2.4 MG: 3.5 INJECTION, POWDER, LYOPHILIZED, FOR SOLUTION INTRAVENOUS; SUBCUTANEOUS at 11:48

## 2019-10-23 NOTE — PLAN OF CARE
Problem: Intellectual/Education/Knowledge Deficit  Goal: Teaching initiated upon admission  Outcome: Met This Shift  Note:   Patient verbalizes understanding to verbal information given on velcade,action and possible side effects. Aware to call MD if develop complications. Goal: Written Disposition Instruction form completed  Outcome: Met This Shift  Intervention: Verbal/written education provided  Note:   Chemotherapy Teaching     What is Chemotherapy   Drug action ? Method of Administration ? Handouts given ? Side Effects  Nausea/vomiting ? Diarrhea ? Fatigue ? Signs / Symptoms of infection ? Neutropenia ? Thrombocytopenia ? Alopecia ? neuropathy ? [place] diet &  the importance of fluids ? Micellaneous  Importance of nutrition ? Importance of oral hygiene ? When to call the MD ?   Monitoring labs ? Use of supportive services ? Explanation of Drug Regimen / Frequency  Velcade sq- [id number]     Comments  Verbalized understanding to drug,action,side effects and when to call MD         Problem: Discharge Planning  Goal: Knowledge of discharge instructions  Description  Knowledge of discharge instructions     Outcome: Met This Shift  Note:   Verbalized understanding of discharge instructions, follow-up appointments, and when to call the physician. Intervention: Interaction with patient/family and care team  Note:   Discuss understanding of discharge instructions,follow-up appointments, and when to call the physician. Problem: Musculor/Skeletal Functional Status  Goal: Absence of falls  Outcome: Met This Shift  Note:   No falls occurred with visit today. Intervention: Fall precautions  Note:   Verbalized understanding of fall prevention to ask for assistance with ambulation. Call light within reach. Care plan reviewed with patient and spouse. Patient and spouse verbalize understanding of the plan of care and contribute to goal setting.

## 2019-10-23 NOTE — ONCOLOGY
Chemotherapy Administration    Pre-assessment Data: Antineoplastic Agents  Other:   See toxicity flow sheet for assessment [x]     Physician Notification of Concerns Related to Chemotherapy Administration:   Physician Notified Nara Nobles / Time of Notification      Interventions:   Lab work assessed  [x]   Height / Weight verified for dose [x]   Current MAR reviewed [x]   Emergency drugs available as appropriate [x]   Anaphylaxis assessment completed [x]   Pre-medications administered as ordered [x]   Chemotherapy Administered as SQ injection [x]   Monitor for signs / symptoms of hypersensitivity reaction    [x]   Chemotherapy orders (drug/dose/rate) verified by 2 Chemo certified   RNs    [x]          Document chemotherapy teaching on the Patient Education tab    [x]   Document patient verbalizes understanding of medications being administered    [x]   Other: []    []    []      []    []

## 2019-10-24 ENCOUNTER — NURSE ONLY (OUTPATIENT)
Dept: LAB | Age: 59
End: 2019-10-24

## 2019-10-24 ENCOUNTER — TELEPHONE (OUTPATIENT)
Dept: ONCOLOGY | Age: 59
End: 2019-10-24

## 2019-10-27 RX ORDER — SODIUM CHLORIDE 0.9 % (FLUSH) 0.9 %
10 SYRINGE (ML) INJECTION PRN
Status: CANCELLED | OUTPATIENT
Start: 2019-11-06

## 2019-10-27 RX ORDER — SODIUM CHLORIDE 9 MG/ML
20 INJECTION, SOLUTION INTRAVENOUS ONCE
Status: CANCELLED | OUTPATIENT
Start: 2019-10-30

## 2019-10-27 RX ORDER — SODIUM CHLORIDE 0.9 % (FLUSH) 0.9 %
10 SYRINGE (ML) INJECTION PRN
Status: CANCELLED | OUTPATIENT
Start: 2019-10-30

## 2019-10-27 RX ORDER — EPINEPHRINE 1 MG/ML
0.3 INJECTION, SOLUTION, CONCENTRATE INTRAVENOUS PRN
Status: CANCELLED | OUTPATIENT
Start: 2019-10-30

## 2019-10-27 RX ORDER — EPINEPHRINE 1 MG/ML
0.3 INJECTION, SOLUTION, CONCENTRATE INTRAVENOUS PRN
Status: CANCELLED | OUTPATIENT
Start: 2019-11-06

## 2019-10-27 RX ORDER — HEPARIN SODIUM (PORCINE) LOCK FLUSH IV SOLN 100 UNIT/ML 100 UNIT/ML
500 SOLUTION INTRAVENOUS PRN
Status: CANCELLED | OUTPATIENT
Start: 2019-10-30

## 2019-10-27 RX ORDER — DIPHENHYDRAMINE HYDROCHLORIDE 50 MG/ML
50 INJECTION INTRAMUSCULAR; INTRAVENOUS ONCE
Status: CANCELLED | OUTPATIENT
Start: 2019-10-30

## 2019-10-27 RX ORDER — SODIUM CHLORIDE 9 MG/ML
20 INJECTION, SOLUTION INTRAVENOUS ONCE
Status: CANCELLED | OUTPATIENT
Start: 2019-11-06

## 2019-10-27 RX ORDER — SODIUM CHLORIDE 0.9 % (FLUSH) 0.9 %
5 SYRINGE (ML) INJECTION PRN
Status: CANCELLED | OUTPATIENT
Start: 2019-11-06

## 2019-10-27 RX ORDER — METHYLPREDNISOLONE SODIUM SUCCINATE 125 MG/2ML
125 INJECTION, POWDER, LYOPHILIZED, FOR SOLUTION INTRAMUSCULAR; INTRAVENOUS ONCE
Status: CANCELLED | OUTPATIENT
Start: 2019-10-30

## 2019-10-27 RX ORDER — METHYLPREDNISOLONE SODIUM SUCCINATE 125 MG/2ML
125 INJECTION, POWDER, LYOPHILIZED, FOR SOLUTION INTRAMUSCULAR; INTRAVENOUS ONCE
Status: CANCELLED | OUTPATIENT
Start: 2019-11-06

## 2019-10-27 RX ORDER — EPINEPHRINE 1 MG/ML
0.3 INJECTION, SOLUTION, CONCENTRATE INTRAVENOUS PRN
Status: CANCELLED | OUTPATIENT
Start: 2019-11-20

## 2019-10-27 RX ORDER — SODIUM CHLORIDE 9 MG/ML
20 INJECTION, SOLUTION INTRAVENOUS ONCE
Status: CANCELLED | OUTPATIENT
Start: 2019-11-20

## 2019-10-27 RX ORDER — HEPARIN SODIUM (PORCINE) LOCK FLUSH IV SOLN 100 UNIT/ML 100 UNIT/ML
500 SOLUTION INTRAVENOUS PRN
Status: CANCELLED | OUTPATIENT
Start: 2019-11-20

## 2019-10-27 RX ORDER — SODIUM CHLORIDE 0.9 % (FLUSH) 0.9 %
10 SYRINGE (ML) INJECTION PRN
Status: CANCELLED | OUTPATIENT
Start: 2019-11-20

## 2019-10-27 RX ORDER — SODIUM CHLORIDE 9 MG/ML
INJECTION, SOLUTION INTRAVENOUS CONTINUOUS
Status: CANCELLED | OUTPATIENT
Start: 2019-11-06

## 2019-10-27 RX ORDER — SODIUM CHLORIDE 0.9 % (FLUSH) 0.9 %
5 SYRINGE (ML) INJECTION PRN
Status: CANCELLED | OUTPATIENT
Start: 2019-11-20

## 2019-10-27 RX ORDER — METHYLPREDNISOLONE SODIUM SUCCINATE 125 MG/2ML
125 INJECTION, POWDER, LYOPHILIZED, FOR SOLUTION INTRAMUSCULAR; INTRAVENOUS ONCE
Status: CANCELLED | OUTPATIENT
Start: 2019-11-20

## 2019-10-27 RX ORDER — HEPARIN SODIUM (PORCINE) LOCK FLUSH IV SOLN 100 UNIT/ML 100 UNIT/ML
500 SOLUTION INTRAVENOUS PRN
Status: CANCELLED | OUTPATIENT
Start: 2019-11-06

## 2019-10-27 RX ORDER — DIPHENHYDRAMINE HYDROCHLORIDE 50 MG/ML
50 INJECTION INTRAMUSCULAR; INTRAVENOUS ONCE
Status: CANCELLED | OUTPATIENT
Start: 2019-11-06

## 2019-10-27 RX ORDER — SODIUM CHLORIDE 9 MG/ML
INJECTION, SOLUTION INTRAVENOUS CONTINUOUS
Status: CANCELLED | OUTPATIENT
Start: 2019-11-20

## 2019-10-27 RX ORDER — DIPHENHYDRAMINE HYDROCHLORIDE 50 MG/ML
50 INJECTION INTRAMUSCULAR; INTRAVENOUS ONCE
Status: CANCELLED | OUTPATIENT
Start: 2019-11-20

## 2019-10-27 RX ORDER — SODIUM CHLORIDE 9 MG/ML
INJECTION, SOLUTION INTRAVENOUS CONTINUOUS
Status: CANCELLED | OUTPATIENT
Start: 2019-10-30

## 2019-10-27 RX ORDER — SODIUM CHLORIDE 0.9 % (FLUSH) 0.9 %
5 SYRINGE (ML) INJECTION PRN
Status: CANCELLED | OUTPATIENT
Start: 2019-10-30

## 2019-10-27 ASSESSMENT — ENCOUNTER SYMPTOMS
CHEST TIGHTNESS: 0
SORE THROAT: 0
BLOOD IN STOOL: 0
SHORTNESS OF BREATH: 0
CONSTIPATION: 0
VOMITING: 0
DIARRHEA: 0
WHEEZING: 0
ABDOMINAL PAIN: 0
TROUBLE SWALLOWING: 0
EYE DISCHARGE: 0
ABDOMINAL DISTENTION: 0
COUGH: 0
NAUSEA: 0
FACIAL SWELLING: 0
RECTAL PAIN: 0
BACK PAIN: 1
COLOR CHANGE: 0

## 2019-10-29 DIAGNOSIS — C90.00 MULTIPLE MYELOMA NOT HAVING ACHIEVED REMISSION (HCC): Primary | ICD-10-CM

## 2019-10-30 ENCOUNTER — OFFICE VISIT (OUTPATIENT)
Dept: ONCOLOGY | Age: 59
End: 2019-10-30
Payer: COMMERCIAL

## 2019-10-30 ENCOUNTER — HOSPITAL ENCOUNTER (OUTPATIENT)
Dept: INFUSION THERAPY | Age: 59
Discharge: HOME | End: 2019-10-30
Payer: COMMERCIAL

## 2019-10-30 VITALS
HEIGHT: 64 IN | DIASTOLIC BLOOD PRESSURE: 79 MMHG | SYSTOLIC BLOOD PRESSURE: 120 MMHG | RESPIRATION RATE: 16 BRPM | HEART RATE: 72 BPM | WEIGHT: 173 LBS | BODY MASS INDEX: 29.53 KG/M2 | TEMPERATURE: 98.2 F | OXYGEN SATURATION: 98 %

## 2019-10-30 VITALS
BODY MASS INDEX: 29.53 KG/M2 | TEMPERATURE: 99.1 F | HEIGHT: 64 IN | RESPIRATION RATE: 18 BRPM | OXYGEN SATURATION: 97 % | SYSTOLIC BLOOD PRESSURE: 133 MMHG | WEIGHT: 173 LBS | DIASTOLIC BLOOD PRESSURE: 79 MMHG | HEART RATE: 75 BPM

## 2019-10-30 DIAGNOSIS — Z51.11 ENCOUNTER FOR CHEMOTHERAPY MANAGEMENT: ICD-10-CM

## 2019-10-30 DIAGNOSIS — I82.431 ACUTE DEEP VEIN THROMBOSIS (DVT) OF RIGHT POPLITEAL VEIN (HCC): ICD-10-CM

## 2019-10-30 DIAGNOSIS — C90.00 MULTIPLE MYELOMA NOT HAVING ACHIEVED REMISSION (HCC): Primary | ICD-10-CM

## 2019-10-30 DIAGNOSIS — M89.9 LYTIC LESION OF BONE ON X-RAY: ICD-10-CM

## 2019-10-30 LAB
ALBUMIN SERPL-MCNC: 4.2 G/DL (ref 3.5–5.1)
ALP BLD-CCNC: 103 U/L (ref 38–126)
ALT SERPL-CCNC: 19 U/L (ref 11–66)
AST SERPL-CCNC: 15 U/L (ref 5–40)
BILIRUB SERPL-MCNC: 0.2 MG/DL (ref 0.3–1.2)
BILIRUBIN DIRECT: < 0.2 MG/DL (ref 0–0.3)
BUN, WHOLE BLOOD: 20 MG/DL (ref 8–26)
CHLORIDE, WHOLE BLOOD: 106 MEQ/L (ref 98–109)
CREATININE, WHOLE BLOOD: 0.8 MG/DL (ref 0.5–1.2)
GFR, ESTIMATED: 78 ML/MIN/1.73M2
GLUCOSE, WHOLE BLOOD: 90 MG/DL (ref 70–108)
HCT VFR BLD CALC: 34.2 % (ref 37–47)
HEMOGLOBIN: 11.5 GM/DL (ref 12–16)
IONIZED CALCIUM, WHOLE BLOOD: 1.21 MMOL/L (ref 1.12–1.32)
MCH RBC QN AUTO: 35.1 PG (ref 27–31)
MCHC RBC AUTO-ENTMCNC: 33.7 GM/DL (ref 33–37)
MCV RBC AUTO: 104 FL (ref 81–99)
PDW BLD-RTO: 12.2 % (ref 11.5–14.5)
PLATELET # BLD: 204 THOU/MM3 (ref 130–400)
PMV BLD AUTO: 8.1 FL (ref 7.4–10.4)
POTASSIUM, WHOLE BLOOD: 4 MEQ/L (ref 3.5–4.9)
RBC # BLD: 3.28 MILL/MM3 (ref 4.2–5.4)
SEG NEUTROPHILS: 58 % (ref 43–75)
SEGMENTED NEUTROPHILS ABSOLUTE COUNT: 2 THOU/MM3 (ref 1.8–7.7)
SODIUM, WHOLE BLOOD: 141 MEQ/L (ref 138–146)
TOTAL CO2, WHOLE BLOOD: 26 MEQ/L (ref 23–33)
TOTAL PROTEIN: 6.4 G/DL (ref 6.1–8)
WBC # BLD: 3.4 THOU/MM3 (ref 4.8–10.8)

## 2019-10-30 PROCEDURE — 99211 OFF/OP EST MAY X REQ PHY/QHP: CPT

## 2019-10-30 PROCEDURE — 80047 BASIC METABLC PNL IONIZED CA: CPT

## 2019-10-30 PROCEDURE — 80076 HEPATIC FUNCTION PANEL: CPT

## 2019-10-30 PROCEDURE — 96401 CHEMO ANTI-NEOPL SQ/IM: CPT

## 2019-10-30 PROCEDURE — 6360000002 HC RX W HCPCS: Performed by: INTERNAL MEDICINE

## 2019-10-30 PROCEDURE — 36415 COLL VENOUS BLD VENIPUNCTURE: CPT

## 2019-10-30 PROCEDURE — 85027 COMPLETE CBC AUTOMATED: CPT

## 2019-10-30 PROCEDURE — 2580000003 HC RX 258: Performed by: INTERNAL MEDICINE

## 2019-10-30 PROCEDURE — 99214 OFFICE O/P EST MOD 30 MIN: CPT | Performed by: INTERNAL MEDICINE

## 2019-10-30 RX ORDER — ACYCLOVIR 400 MG/1
400 TABLET ORAL 2 TIMES DAILY
Qty: 180 TABLET | Refills: 0 | Status: SHIPPED | OUTPATIENT
Start: 2019-10-30 | End: 2019-11-21 | Stop reason: SDUPTHER

## 2019-10-30 RX ORDER — LENALIDOMIDE 25 MG/1
20 CAPSULE ORAL DAILY
Qty: 14 CAPSULE | Refills: 0
Start: 2019-10-30 | End: 2019-11-27 | Stop reason: DRUGHIGH

## 2019-10-30 RX ORDER — DEXAMETHASONE 4 MG/1
40 TABLET ORAL SEE ADMIN INSTRUCTIONS
Qty: 30 TABLET | Refills: 3 | Status: SHIPPED | OUTPATIENT
Start: 2019-10-30 | End: 2019-11-21 | Stop reason: SDUPTHER

## 2019-10-30 RX ORDER — ASPIRIN 325 MG
325 TABLET ORAL DAILY
Qty: 90 TABLET | Refills: 0 | Status: SHIPPED | OUTPATIENT
Start: 2019-10-30 | End: 2019-11-20 | Stop reason: ALTCHOICE

## 2019-10-30 RX ADMIN — BORTEZOMIB 2.4 MG: 3.5 INJECTION, POWDER, LYOPHILIZED, FOR SOLUTION INTRAVENOUS; SUBCUTANEOUS at 10:30

## 2019-10-30 ASSESSMENT — ENCOUNTER SYMPTOMS
TROUBLE SWALLOWING: 0
COLOR CHANGE: 0
NAUSEA: 0
BACK PAIN: 1
SORE THROAT: 0
DIARRHEA: 0
CONSTIPATION: 0
EYE DISCHARGE: 0
CHEST TIGHTNESS: 0
ABDOMINAL PAIN: 0
VOMITING: 0
RECTAL PAIN: 0
WHEEZING: 0
ABDOMINAL DISTENTION: 0
FACIAL SWELLING: 0
BLOOD IN STOOL: 0
SHORTNESS OF BREATH: 0
COUGH: 0

## 2019-10-30 NOTE — PROGRESS NOTES
Patient assessed for the following post chemotherapy:    Dizziness   No  Lightheadedness  No      Acute nausea/vomiting No  Headache   No  Chest pain/pressure  No  Rash/itching   No  Shortness of breath  No    Patient opted to not stay for 20 minutes observation post injection chemotherapy. Patient tolerated chemotherapy treatment velcade sq without any complications. Last vital signs:   /79   Pulse 72   Temp 98.2 °F (36.8 °C) (Oral)   Resp 16   Ht 5' 4\" (1.626 m)   Wt 173 lb (78.5 kg)   LMP 12/01/2004   SpO2 98%   BMI 29.70 kg/m²         Patient instructed if experience any of the above symptoms following today's infusion,he/she is to notify MD immediately or go to the emergency department. Discharge instructions given to patient. Verbalizes understanding. Ambulated off unit per self with belongings.

## 2019-10-30 NOTE — PLAN OF CARE
Problem: Musculor/Skeletal Functional Status  Goal: Absence of falls  Outcome: Met This Shift  Note:   No falls occurred with visit today. Intervention: Fall precautions  Note:   Verbalized understanding of fall prevention to ask for assistance with ambulation. Call light within reach. Problem: Intellectual/Education/Knowledge Deficit  Goal: Teaching initiated upon admission  Outcome: Met This Shift  Note:   Patient verbalizes understanding to verbal information given on velcade sq,action and possible side effects. Aware to call MD if develop complications. Goal: Written Disposition Instruction form completed  Outcome: Met This Shift  Intervention: Verbal/written education provided  Note:   Chemotherapy Teaching     What is Chemotherapy   Drug action ? Method of Administration ? Handouts given ? Side Effects  Nausea/vomiting ? Diarrhea ? Fatigue ? Signs / Symptoms of infection ? Neutropenia ? Thrombocytopenia ? Alopecia ? neuropathy ? [place] diet &  the importance of fluids ? Micellaneous  Importance of nutrition ? Importance of oral hygiene ? When to call the MD ?   Monitoring labs ? Use of supportive services ? Explanation of Drug Regimen / Frequency  Velcade sq- C5D1     Comments  Verbalized understanding to drug,action,side effects and when to call MD        Problem: Discharge Planning  Goal: Knowledge of discharge instructions  Description  Knowledge of discharge instructions     Outcome: Met This Shift  Note:   Discuss understanding of discharge instructions,follow-up appointments, and when to call the physician. Intervention: Interaction with patient/family and care team  Note:   Discuss understanding of discharge instructions,follow-up appointments, and when to call the physician. Care plan reviewed with patient and spouse. Patient and spouse verbalize understanding of the plan of care and contribute to goal setting.

## 2019-10-30 NOTE — ONCOLOGY
Chemotherapy Administration    Pre-assessment Data: Antineoplastic Agents  Other:   See toxicity flow sheet for assessment [x]     Physician Notification of Concerns Related to Chemotherapy Administration:   Physician Notified Rigo Lions / Time of Notification      Interventions:   Lab work assessed  [x]   Height / Weight verified for dose [x]   Current MAR reviewed [x]   Emergency drugs available as appropriate [x]   Anaphylaxis assessment completed [x]   Pre-medications administered as ordered [x]   Chemotherapy Administered as SQ injection [x]   Monitor for signs / symptoms of hypersensitivity reaction    [x]   Chemotherapy orders (drug/dose/rate) verified by 2 Chemo certified   RNs    [x]          Document chemotherapy teaching on the Patient Education tab    [x]   Document patient verbalizes understanding of medications being administered    [x]   Other: []    []    []      []    []

## 2019-11-05 DIAGNOSIS — C90.00 MULTIPLE MYELOMA NOT HAVING ACHIEVED REMISSION (HCC): Primary | ICD-10-CM

## 2019-11-06 ENCOUNTER — TELEPHONE (OUTPATIENT)
Dept: ONCOLOGY | Age: 59
End: 2019-11-06

## 2019-11-06 ENCOUNTER — HOSPITAL ENCOUNTER (OUTPATIENT)
Dept: INFUSION THERAPY | Age: 59
Discharge: HOME | End: 2019-11-06
Payer: COMMERCIAL

## 2019-11-06 VITALS
HEIGHT: 64 IN | SYSTOLIC BLOOD PRESSURE: 113 MMHG | WEIGHT: 173.8 LBS | OXYGEN SATURATION: 100 % | DIASTOLIC BLOOD PRESSURE: 69 MMHG | RESPIRATION RATE: 16 BRPM | BODY MASS INDEX: 29.67 KG/M2 | HEART RATE: 68 BPM | TEMPERATURE: 97.8 F

## 2019-11-06 DIAGNOSIS — C90.00 MULTIPLE MYELOMA NOT HAVING ACHIEVED REMISSION (HCC): Primary | ICD-10-CM

## 2019-11-06 DIAGNOSIS — M89.9 LYTIC LESION OF BONE ON X-RAY: ICD-10-CM

## 2019-11-06 LAB
ALBUMIN SERPL-MCNC: 4 G/DL (ref 3.5–5.1)
ALP BLD-CCNC: 92 U/L (ref 38–126)
ALT SERPL-CCNC: 15 U/L (ref 11–66)
AST SERPL-CCNC: 14 U/L (ref 5–40)
BILIRUB SERPL-MCNC: 0.3 MG/DL (ref 0.3–1.2)
BILIRUBIN DIRECT: < 0.2 MG/DL (ref 0–0.3)
BUN, WHOLE BLOOD: 18 MG/DL (ref 8–26)
CHLORIDE, WHOLE BLOOD: 106 MEQ/L (ref 98–109)
CREATININE, WHOLE BLOOD: 0.8 MG/DL (ref 0.5–1.2)
GFR, ESTIMATED: 78 ML/MIN/1.73M2
GLUCOSE, WHOLE BLOOD: 89 MG/DL (ref 70–108)
HCT VFR BLD CALC: 34.2 % (ref 37–47)
HEMOGLOBIN: 11.3 GM/DL (ref 12–16)
IONIZED CALCIUM, WHOLE BLOOD: 1.21 MMOL/L (ref 1.12–1.32)
MCH RBC QN AUTO: 34.2 PG (ref 27–31)
MCHC RBC AUTO-ENTMCNC: 33.1 GM/DL (ref 33–37)
MCV RBC AUTO: 103 FL (ref 81–99)
PDW BLD-RTO: 13 % (ref 11.5–14.5)
PLATELET # BLD: 187 THOU/MM3 (ref 130–400)
PMV BLD AUTO: 9.1 FL (ref 7.4–10.4)
POTASSIUM, WHOLE BLOOD: 4.6 MEQ/L (ref 3.5–4.9)
RBC # BLD: 3.3 MILL/MM3 (ref 4.2–5.4)
SEG NEUTROPHILS: 72 % (ref 43–75)
SEGMENTED NEUTROPHILS ABSOLUTE COUNT: 2.7 THOU/MM3 (ref 1.8–7.7)
SODIUM, WHOLE BLOOD: 140 MEQ/L (ref 138–146)
TOTAL CO2, WHOLE BLOOD: 28 MEQ/L (ref 23–33)
TOTAL PROTEIN: 6.5 G/DL (ref 6.1–8)
WBC # BLD: 3.7 THOU/MM3 (ref 4.8–10.8)

## 2019-11-06 PROCEDURE — 80076 HEPATIC FUNCTION PANEL: CPT

## 2019-11-06 PROCEDURE — 96401 CHEMO ANTI-NEOPL SQ/IM: CPT

## 2019-11-06 PROCEDURE — 85027 COMPLETE CBC AUTOMATED: CPT

## 2019-11-06 PROCEDURE — 80047 BASIC METABLC PNL IONIZED CA: CPT

## 2019-11-06 PROCEDURE — 36415 COLL VENOUS BLD VENIPUNCTURE: CPT

## 2019-11-06 PROCEDURE — 6360000002 HC RX W HCPCS: Performed by: INTERNAL MEDICINE

## 2019-11-06 PROCEDURE — 2580000003 HC RX 258: Performed by: INTERNAL MEDICINE

## 2019-11-06 PROCEDURE — 96365 THER/PROPH/DIAG IV INF INIT: CPT

## 2019-11-06 RX ORDER — SODIUM CHLORIDE 9 MG/ML
20 INJECTION, SOLUTION INTRAVENOUS ONCE
Status: COMPLETED | OUTPATIENT
Start: 2019-11-06 | End: 2019-11-06

## 2019-11-06 RX ADMIN — SODIUM CHLORIDE 20 ML/HR: 9 INJECTION, SOLUTION INTRAVENOUS at 10:39

## 2019-11-06 RX ADMIN — SODIUM CHLORIDE 2.4 MG: 9 INJECTION INTRAMUSCULAR; INTRAVENOUS; SUBCUTANEOUS at 11:13

## 2019-11-06 RX ADMIN — ZOLEDRONIC ACID 4 MG: 4 INJECTION, SOLUTION, CONCENTRATE INTRAVENOUS at 10:54

## 2019-11-06 NOTE — ONCOLOGY
Chemotherapy Administration    Pre-assessment Data: Antineoplastic Agents  Other:   See toxicity flow sheet for assessment [x]     Physician Notification of Concerns Related to Chemotherapy Administration:   Physician Notified Fredia [place] / Time of Notification      Interventions:   Lab work assessed  [x]   Height / Weight verified for dose [x]   Current MAR reviewed [x]   Emergency drugs available as appropriate [x]   Anaphylaxis assessment completed [x]   Pre-medications administered as ordered [x]   Chemotherapy Administered as SQ injection [x]   Monitor for signs / symptoms of hypersensitivity reaction    [x]   Chemotherapy orders (drug/dose/rate) verified by 2 Chemo certified   RNs    [x]          Document chemotherapy teaching on the Patient Education tab    [x]   Document patient verbalizes understanding of medications being administered    [x]   Other: []    []    []      []    []

## 2019-11-06 NOTE — PROGRESS NOTES
Patient assessed for the following post chemotherapy:    Dizziness   No  Lightheadedness  No      Acute nausea/vomiting No  Headache   No  Chest pain/pressure  No  Rash/itching   No  Shortness of breath  No    Patient opted to not stay for 20 minutes observation post infusion chemotherapy. Patient tolerated chemotherapy treatment velcade sq without any complications. Last vital signs:   /69   Pulse 68   Temp 97.8 °F (36.6 °C) (Oral)   Resp 16   Ht 5' 4\" (1.626 m)   Wt 173 lb 12.8 oz (78.8 kg)   LMP 12/01/2004   SpO2 100%   BMI 29.83 kg/m²         Patient instructed if experience any of the above symptoms following today's infusion,he/she is to notify MD immediately or go to the emergency department. Discharge instructions given to patient. Verbalizes understanding. Ambulated off unit per self with belongings.

## 2019-11-06 NOTE — PLAN OF CARE
Problem: Musculor/Skeletal Functional Status  Goal: Absence of falls  Outcome: Met This Shift  Note:   No falls occurred with visit today. Intervention: Fall precautions  Note:   Verbalized understanding of fall prevention to ask for assistance with ambulation. Call light within reach. Problem: Intellectual/Education/Knowledge Deficit  Goal: Teaching initiated upon admission  Outcome: Met This Shift  Note:   Patient verbalizes understanding to verbal information given on velcade sq,action and possible side effects. Aware to call MD if develop complications. Goal: Written Disposition Instruction form completed  Outcome: Met This Shift  Intervention: Verbal/written education provided  Note:   Chemotherapy Teaching     What is Chemotherapy   Drug action ? Method of Administration ? Handouts given ? Side Effects  Nausea/vomiting ? Diarrhea ? Fatigue ? Signs / Symptoms of infection ? Neutropenia ? Thrombocytopenia ? Alopecia ? neuropathy ? [place] diet &  the importance of fluids ? Micellaneous  Importance of nutrition ? Importance of oral hygiene ? When to call the MD ?   Monitoring labs ? Use of supportive services ? Explanation of Drug Regimen / Frequency  Velcade sq     Comments  Verbalized understanding to drug,action,side effects and when to call MD         Problem: Discharge Planning  Goal: Knowledge of discharge instructions  Description  Knowledge of discharge instructions     Outcome: Met This Shift  Note:   Verbalized understanding of discharge instructions, follow-up appointments, and when to call the physician. Intervention: Interaction with patient/family and care team  Note:   Discuss understanding of discharge instructions,follow-up appointments, and when to call the physician. Care plan reviewed with patient and spouse. Patient and spouse verbalize understanding of the plan of  care and contribute to goal setting.

## 2019-11-07 ENCOUNTER — TELEPHONE (OUTPATIENT)
Dept: ONCOLOGY | Age: 59
End: 2019-11-07

## 2019-11-14 ENCOUNTER — TELEPHONE (OUTPATIENT)
Dept: ONCOLOGY | Age: 59
End: 2019-11-14

## 2019-11-15 PROBLEM — R22.43 LOCALIZED SWELLING, MASS AND LUMP, LOWER LIMB, BILATERAL: Status: ACTIVE | Noted: 2019-11-15

## 2019-11-15 PROBLEM — D63.1 ANEMIA DUE TO STAGE 3 CHRONIC KIDNEY DISEASE (HCC): Status: ACTIVE | Noted: 2019-10-14

## 2019-11-15 PROBLEM — N18.30 ANEMIA DUE TO STAGE 3 CHRONIC KIDNEY DISEASE (HCC): Status: ACTIVE | Noted: 2019-10-14

## 2019-11-19 DIAGNOSIS — C90.00 MULTIPLE MYELOMA NOT HAVING ACHIEVED REMISSION (HCC): Primary | ICD-10-CM

## 2019-11-20 ENCOUNTER — HOSPITAL ENCOUNTER (OUTPATIENT)
Dept: INFUSION THERAPY | Age: 59
Discharge: HOME | End: 2019-11-20
Payer: COMMERCIAL

## 2019-11-20 ENCOUNTER — OFFICE VISIT (OUTPATIENT)
Dept: ONCOLOGY | Age: 59
End: 2019-11-20
Payer: COMMERCIAL

## 2019-11-20 VITALS
HEIGHT: 64 IN | OXYGEN SATURATION: 100 % | SYSTOLIC BLOOD PRESSURE: 128 MMHG | DIASTOLIC BLOOD PRESSURE: 75 MMHG | HEART RATE: 66 BPM | WEIGHT: 174.2 LBS | RESPIRATION RATE: 16 BRPM | TEMPERATURE: 97.8 F | BODY MASS INDEX: 29.74 KG/M2

## 2019-11-20 VITALS
OXYGEN SATURATION: 100 % | HEART RATE: 66 BPM | RESPIRATION RATE: 16 BRPM | SYSTOLIC BLOOD PRESSURE: 128 MMHG | BODY MASS INDEX: 29.74 KG/M2 | HEIGHT: 64 IN | TEMPERATURE: 97.8 F | WEIGHT: 174.2 LBS | DIASTOLIC BLOOD PRESSURE: 75 MMHG

## 2019-11-20 DIAGNOSIS — M89.9 LYTIC LESION OF BONE ON X-RAY: ICD-10-CM

## 2019-11-20 DIAGNOSIS — N18.30 ANEMIA DUE TO STAGE 3 CHRONIC KIDNEY DISEASE (HCC): ICD-10-CM

## 2019-11-20 DIAGNOSIS — I82.431 ACUTE DEEP VEIN THROMBOSIS (DVT) OF RIGHT POPLITEAL VEIN (HCC): ICD-10-CM

## 2019-11-20 DIAGNOSIS — D63.1 ANEMIA DUE TO STAGE 3 CHRONIC KIDNEY DISEASE (HCC): ICD-10-CM

## 2019-11-20 DIAGNOSIS — C90.00 MULTIPLE MYELOMA NOT HAVING ACHIEVED REMISSION (HCC): Primary | ICD-10-CM

## 2019-11-20 DIAGNOSIS — Z51.11 ENCOUNTER FOR CHEMOTHERAPY MANAGEMENT: ICD-10-CM

## 2019-11-20 LAB
ALBUMIN SERPL-MCNC: 4.4 G/DL (ref 3.5–5.1)
ALP BLD-CCNC: 86 U/L (ref 38–126)
ALT SERPL-CCNC: 16 U/L (ref 11–66)
AST SERPL-CCNC: 15 U/L (ref 5–40)
BASINOPHIL, AUTOMATED: 0 % (ref 0–3)
BILIRUB SERPL-MCNC: 0.3 MG/DL (ref 0.3–1.2)
BILIRUBIN DIRECT: < 0.2 MG/DL (ref 0–0.3)
BUN, WHOLE BLOOD: 16 MG/DL (ref 8–26)
CHLORIDE, WHOLE BLOOD: 105 MEQ/L (ref 98–109)
CREATININE, WHOLE BLOOD: 0.8 MG/DL (ref 0.5–1.2)
EOSINOPHILS RELATIVE PERCENT: 3 % (ref 0–4)
GFR, ESTIMATED: 78 ML/MIN/1.73M2
GLUCOSE, WHOLE BLOOD: 97 MG/DL (ref 70–108)
HCT VFR BLD CALC: 33.9 % (ref 37–47)
HEMOGLOBIN: 11.5 GM/DL (ref 12–16)
IONIZED CALCIUM, WHOLE BLOOD: 1.26 MMOL/L (ref 1.12–1.32)
LYMPHOCYTES # BLD: 24 % (ref 15–47)
MCH RBC QN AUTO: 34.5 PG (ref 27–31)
MCHC RBC AUTO-ENTMCNC: 33.8 GM/DL (ref 33–37)
MCV RBC AUTO: 102 FL (ref 81–99)
MONOCYTES: 16 % (ref 0–12)
PDW BLD-RTO: 12.4 % (ref 11.5–14.5)
PLATELET # BLD: 216 THOU/MM3 (ref 130–400)
PMV BLD AUTO: 8.3 FL (ref 7.4–10.4)
POTASSIUM, WHOLE BLOOD: 4.2 MEQ/L (ref 3.5–4.9)
RBC # BLD: 3.32 MILL/MM3 (ref 4.2–5.4)
SEG NEUTROPHILS: 56 % (ref 43–75)
SODIUM, WHOLE BLOOD: 140 MEQ/L (ref 138–146)
TOTAL CO2, WHOLE BLOOD: 26 MEQ/L (ref 23–33)
TOTAL PROTEIN: 6.6 G/DL (ref 6.1–8)
WBC # BLD: 3 THOU/MM3 (ref 4.8–10.8)

## 2019-11-20 PROCEDURE — 36415 COLL VENOUS BLD VENIPUNCTURE: CPT

## 2019-11-20 PROCEDURE — 99211 OFF/OP EST MAY X REQ PHY/QHP: CPT

## 2019-11-20 PROCEDURE — 85025 COMPLETE CBC W/AUTO DIFF WBC: CPT

## 2019-11-20 PROCEDURE — 2580000003 HC RX 258: Performed by: INTERNAL MEDICINE

## 2019-11-20 PROCEDURE — 80047 BASIC METABLC PNL IONIZED CA: CPT

## 2019-11-20 PROCEDURE — 6360000002 HC RX W HCPCS: Performed by: INTERNAL MEDICINE

## 2019-11-20 PROCEDURE — 80076 HEPATIC FUNCTION PANEL: CPT

## 2019-11-20 PROCEDURE — 99214 OFFICE O/P EST MOD 30 MIN: CPT | Performed by: INTERNAL MEDICINE

## 2019-11-20 PROCEDURE — 96401 CHEMO ANTI-NEOPL SQ/IM: CPT

## 2019-11-20 RX ORDER — HEPARIN SODIUM (PORCINE) LOCK FLUSH IV SOLN 100 UNIT/ML 100 UNIT/ML
500 SOLUTION INTRAVENOUS PRN
Status: CANCELLED | OUTPATIENT
Start: 2019-11-20

## 2019-11-20 RX ORDER — EPINEPHRINE 1 MG/ML
0.3 INJECTION, SOLUTION, CONCENTRATE INTRAVENOUS PRN
Status: CANCELLED | OUTPATIENT
Start: 2019-11-20

## 2019-11-20 RX ORDER — SODIUM CHLORIDE 0.9 % (FLUSH) 0.9 %
5 SYRINGE (ML) INJECTION PRN
Status: CANCELLED | OUTPATIENT
Start: 2019-11-27

## 2019-11-20 RX ORDER — SODIUM CHLORIDE 9 MG/ML
INJECTION, SOLUTION INTRAVENOUS CONTINUOUS
Status: CANCELLED | OUTPATIENT
Start: 2019-11-27

## 2019-11-20 RX ORDER — SODIUM CHLORIDE 0.9 % (FLUSH) 0.9 %
10 SYRINGE (ML) INJECTION PRN
Status: CANCELLED | OUTPATIENT
Start: 2019-11-20

## 2019-11-20 RX ORDER — SODIUM CHLORIDE 9 MG/ML
20 INJECTION, SOLUTION INTRAVENOUS ONCE
Status: CANCELLED | OUTPATIENT
Start: 2019-11-20

## 2019-11-20 RX ORDER — METHYLPREDNISOLONE SODIUM SUCCINATE 125 MG/2ML
125 INJECTION, POWDER, LYOPHILIZED, FOR SOLUTION INTRAMUSCULAR; INTRAVENOUS ONCE
Status: CANCELLED | OUTPATIENT
Start: 2019-11-27

## 2019-11-20 RX ORDER — SODIUM CHLORIDE 0.9 % (FLUSH) 0.9 %
5 SYRINGE (ML) INJECTION PRN
Status: CANCELLED | OUTPATIENT
Start: 2019-12-04

## 2019-11-20 RX ORDER — METHYLPREDNISOLONE SODIUM SUCCINATE 125 MG/2ML
125 INJECTION, POWDER, LYOPHILIZED, FOR SOLUTION INTRAMUSCULAR; INTRAVENOUS ONCE
Status: CANCELLED | OUTPATIENT
Start: 2019-12-04

## 2019-11-20 RX ORDER — HEPARIN SODIUM (PORCINE) LOCK FLUSH IV SOLN 100 UNIT/ML 100 UNIT/ML
500 SOLUTION INTRAVENOUS PRN
Status: CANCELLED | OUTPATIENT
Start: 2019-12-04

## 2019-11-20 RX ORDER — METHYLPREDNISOLONE SODIUM SUCCINATE 125 MG/2ML
125 INJECTION, POWDER, LYOPHILIZED, FOR SOLUTION INTRAMUSCULAR; INTRAVENOUS ONCE
Status: CANCELLED | OUTPATIENT
Start: 2019-11-20

## 2019-11-20 RX ORDER — HEPARIN SODIUM (PORCINE) LOCK FLUSH IV SOLN 100 UNIT/ML 100 UNIT/ML
500 SOLUTION INTRAVENOUS PRN
Status: CANCELLED | OUTPATIENT
Start: 2019-11-27

## 2019-11-20 RX ORDER — SODIUM CHLORIDE 9 MG/ML
20 INJECTION, SOLUTION INTRAVENOUS ONCE
Status: CANCELLED | OUTPATIENT
Start: 2019-11-27

## 2019-11-20 RX ORDER — SODIUM CHLORIDE 0.9 % (FLUSH) 0.9 %
10 SYRINGE (ML) INJECTION PRN
Status: CANCELLED | OUTPATIENT
Start: 2019-11-27

## 2019-11-20 RX ORDER — SODIUM CHLORIDE 9 MG/ML
INJECTION, SOLUTION INTRAVENOUS CONTINUOUS
Status: CANCELLED | OUTPATIENT
Start: 2019-11-20

## 2019-11-20 RX ORDER — DIPHENHYDRAMINE HYDROCHLORIDE 50 MG/ML
50 INJECTION INTRAMUSCULAR; INTRAVENOUS ONCE
Status: CANCELLED | OUTPATIENT
Start: 2019-11-20

## 2019-11-20 RX ORDER — SODIUM CHLORIDE 0.9 % (FLUSH) 0.9 %
5 SYRINGE (ML) INJECTION PRN
Status: CANCELLED | OUTPATIENT
Start: 2019-11-20

## 2019-11-20 RX ORDER — EPINEPHRINE 1 MG/ML
0.3 INJECTION, SOLUTION, CONCENTRATE INTRAVENOUS PRN
Status: CANCELLED | OUTPATIENT
Start: 2019-12-04

## 2019-11-20 RX ORDER — SODIUM CHLORIDE 9 MG/ML
INJECTION, SOLUTION INTRAVENOUS CONTINUOUS
Status: CANCELLED | OUTPATIENT
Start: 2019-12-04

## 2019-11-20 RX ORDER — DIPHENHYDRAMINE HYDROCHLORIDE 50 MG/ML
50 INJECTION INTRAMUSCULAR; INTRAVENOUS ONCE
Status: CANCELLED | OUTPATIENT
Start: 2019-11-27

## 2019-11-20 RX ORDER — SODIUM CHLORIDE 9 MG/ML
20 INJECTION, SOLUTION INTRAVENOUS ONCE
Status: CANCELLED | OUTPATIENT
Start: 2019-12-04

## 2019-11-20 RX ORDER — SODIUM CHLORIDE 0.9 % (FLUSH) 0.9 %
10 SYRINGE (ML) INJECTION PRN
Status: CANCELLED | OUTPATIENT
Start: 2019-12-04

## 2019-11-20 RX ORDER — DIPHENHYDRAMINE HYDROCHLORIDE 50 MG/ML
50 INJECTION INTRAMUSCULAR; INTRAVENOUS ONCE
Status: CANCELLED | OUTPATIENT
Start: 2019-12-04

## 2019-11-20 RX ORDER — EPINEPHRINE 1 MG/ML
0.3 INJECTION, SOLUTION, CONCENTRATE INTRAVENOUS PRN
Status: CANCELLED | OUTPATIENT
Start: 2019-11-27

## 2019-11-20 RX ADMIN — SODIUM CHLORIDE 2.4 MG: 9 INJECTION INTRAMUSCULAR; INTRAVENOUS; SUBCUTANEOUS at 12:55

## 2019-11-20 NOTE — ONCOLOGY
Chemotherapy Administration    Pre-assessment Data: Antineoplastic Agents  Other:   See toxicity flow sheet for assessment [x]     Physician Notification of Concerns Related to Chemotherapy Administration:   Physician Notified Martinez Martínez / Time of Notification      Interventions:   Lab work assessed  [x]   Height / Weight verified for dose [x]   Current MAR reviewed [x]   Emergency drugs available as appropriate [x]   Anaphylaxis assessment completed [x]   Pre-medications administered as ordered [x]   Chemotherapy Administered as SQ injection [x]   Monitor for signs / symptoms of hypersensitivity reaction    [x]   Chemotherapy orders (drug/dose/rate) verified by 2 Chemo certified   RNs-  Velcade SQ injection    [x]          Document chemotherapy teaching on the Patient Education tab    [x]   Document patient verbalizes understanding of medications being administered    [x]   Other: []    []    []      []    []

## 2019-11-20 NOTE — PROGRESS NOTES
Name: Erin Garcia  : 1960  MRN: [id number]    Oncology Navigation Follow-Up Note    Contact Type:  Medical Oncology    Subjective: appt with dr. Venanico Oropeza start Cycle 6 of 8 total cycles planned today. Objective: WBC 3.0 HGB 11.5  SEGS 56  BUN 16 Crea 0.8  Pt & spouse shared frustration that insurance if refusing coverage for Stem Cell collection, which was scheduled for this week. Pt spouse reports he is working to coordinate a peer to peer review. Abbe recommended pt spouse phone [facility] & request to speak with a Financial Counselor for discussion of cost if insurance continues to refuse coverage. ONC discussion with pt, & spouse re: cont chemo. PLAN:   -Proceed with chemotherapy today using Revlimid, Velcade & Decadron  -RETURN to clinic on  for repeat CBC to determine if the Revlimid course needs to be altered from the 14 day dosing in presence of the lower WBC today.   -Coordinate Stem Cell collection at [facility] once coverage is established.  -Subsequent Chemo Cycles (cycle 7 & 8) will be with Cytoxan & Velcade (NO Revlimid). Pt reports feeling ok, with only intermittent swelling of rt ankle. ONC explained that with HX DVT, the vessel valve incurs damage, so she may cont to have persistent intermittent swelling. Pt reports plan to fly to [place] in a few weeks. ONC reviewed need for compression stockings (knee high) for the flight, & to stay hydrated, along with doing leg pumps while sitting to reduce risk of DVT. Assistance Needed: denies today    Receptive to Advanced Care Planning / Palliative Care:  deferred    Referrals: N/A today    Education: POC reviewed by Oncologist in detail; questions answered    Notes: Chemo Cycle 6 Day 1 today Revlimid po (days 1-14), Velcade, Decadron. Pt will return for Day 8 & day 15 chemo with Velcade to complete Cycle 6 treatment. Abbe following to assist & support as needed.       Electronically signed by Eliana Bean RN on 2019 at 1:22 PM

## 2019-11-20 NOTE — PLAN OF CARE
Problem: Musculor/Skeletal Functional Status  Goal: Absence of falls  Outcome: Met This Shift  Note:   Free from falls while in [facility] On[facility]. Intervention: Fall precautions  Note:   Discussed the need to use the call light for assistance when getting up to ambulate. Problem: Intellectual/Education/Knowledge Deficit  Goal: Teaching initiated upon admission  Outcome: Met This Shift  Note:   Patient verbalizes understanding to verbal information given on Velcade SQ injection,action and possible side effects. Aware to call MD if develop complications. Goal: Written Disposition Instruction form completed  Outcome: Completed  Intervention: Verbal/written education provided  Note:   Chemotherapy Teaching     What is Chemotherapy   Drug action [x]   Method of Administration [x]   Handouts given []     Side Effects  Nausea/vomiting [x]   Diarrhea [x]   Fatigue [x]   Signs / Symptoms of infection [x]   Neutropenia [x]   Thrombocytopenia [x]   Alopecia [x]   neuropathy [x]   [place] diet &  the importance of fluids [x]       Micellaneous  Importance of nutrition [x]   Importance of oral hygiene [x]   When to call the MD [x]   Monitoring labs [x]   Use of supportive services []     Explanation of Drug Regimen / Frequency  Velcade sq injection     Comments  Verbalized understanding to drug,action,side effects and when to call MD         Problem: Discharge Planning  Goal: Knowledge of discharge instructions  Description  Knowledge of discharge instructions     Outcome: Met This Shift  Note:   Verbalize understanding of discharge instructions, follow up appointments, and when to call Physician. Intervention: Interaction with patient/family and care team  Note:   Discuss understanding of discharge instructions, follow up appointments and when to call Physician. Care plan reviewed with patient and spouse. Patient and spouse verbalize understanding of the plan of care and contribute to goal setting.

## 2019-11-20 NOTE — PROGRESS NOTES
Patient assessed for the following post Velcade SQ injection    Dizziness   No  Lightheadedness  No      Acute nausea/vomiting No  Headache   No  Chest pain/pressure  No  Rash/itching   No  Shortness of breath  No       Patient tolerated chemotherapy treatment velcade SQ injection without any complications. Last vital signs:   /75   Pulse 66   Temp 97.8 °F (36.6 °C) (Oral)   Resp 16   Ht 5' 4\" (1.626 m)   Wt 174 lb 3.2 oz (79 kg)   LMP 12/01/2004   SpO2 100%   BMI 29.90 kg/m²       Patient instructed if experience any of the above symptoms following today's injection she is to notify MD immediately or go to the emergency department. Discharge instructions given to patient. Verbalizes understanding. Ambulated off unit per self in stable condition with belongings.

## 2019-11-21 ENCOUNTER — OFFICE VISIT (OUTPATIENT)
Dept: INTERNAL MEDICINE CLINIC | Age: 59
End: 2019-11-21
Payer: COMMERCIAL

## 2019-11-21 VITALS
HEART RATE: 68 BPM | BODY MASS INDEX: 29.74 KG/M2 | HEIGHT: 64 IN | SYSTOLIC BLOOD PRESSURE: 130 MMHG | DIASTOLIC BLOOD PRESSURE: 78 MMHG | WEIGHT: 174.2 LBS

## 2019-11-21 DIAGNOSIS — I10 ESSENTIAL HYPERTENSION: Primary | ICD-10-CM

## 2019-11-21 DIAGNOSIS — R11.0 NAUSEA: ICD-10-CM

## 2019-11-21 DIAGNOSIS — E72.11 HYPERHOMOCYSTEINEMIA (HCC): ICD-10-CM

## 2019-11-21 DIAGNOSIS — E55.9 VITAMIN D DEFICIENCY: ICD-10-CM

## 2019-11-21 DIAGNOSIS — N95.1 MENOPAUSAL HOT FLUSHES: ICD-10-CM

## 2019-11-21 DIAGNOSIS — D51.3 OTHER DIETARY VITAMIN B12 DEFICIENCY ANEMIA: ICD-10-CM

## 2019-11-21 PROCEDURE — 99214 OFFICE O/P EST MOD 30 MIN: CPT | Performed by: INTERNAL MEDICINE

## 2019-11-21 RX ORDER — MAGNESIUM OXIDE 400 MG/1
400 TABLET ORAL DAILY
COMMUNITY
End: 2020-05-19

## 2019-11-21 RX ORDER — VENLAFAXINE HYDROCHLORIDE 75 MG/1
CAPSULE, EXTENDED RELEASE ORAL
Qty: 90 CAPSULE | Refills: 1 | Status: SHIPPED | OUTPATIENT
Start: 2019-11-21 | End: 2020-05-19 | Stop reason: SDUPTHER

## 2019-11-21 RX ORDER — OMEPRAZOLE 20 MG/1
CAPSULE, DELAYED RELEASE ORAL
Qty: 30 CAPSULE | Refills: 5 | Status: SHIPPED | OUTPATIENT
Start: 2019-11-21 | End: 2020-05-19 | Stop reason: SDUPTHER

## 2019-11-21 RX ORDER — METOPROLOL SUCCINATE 50 MG/1
TABLET, EXTENDED RELEASE ORAL
Qty: 90 TABLET | Refills: 1 | Status: SHIPPED | OUTPATIENT
Start: 2019-11-21 | End: 2020-03-11 | Stop reason: SDUPTHER

## 2019-11-21 RX ORDER — LENALIDOMIDE 25 MG/1
25 CAPSULE ORAL DAILY
COMMUNITY
End: 2019-11-27 | Stop reason: SDUPTHER

## 2019-11-21 RX ORDER — AMLODIPINE BESYLATE 10 MG/1
10 TABLET ORAL DAILY
Qty: 30 TABLET | Refills: 5 | Status: SHIPPED | OUTPATIENT
Start: 2019-11-21 | End: 2020-01-30 | Stop reason: SDUPTHER

## 2019-11-26 ENCOUNTER — TELEPHONE (OUTPATIENT)
Dept: INFUSION THERAPY | Age: 59
End: 2019-11-26

## 2019-11-27 ENCOUNTER — HOSPITAL ENCOUNTER (OUTPATIENT)
Dept: INFUSION THERAPY | Age: 59
Discharge: HOME | End: 2019-11-27
Payer: COMMERCIAL

## 2019-11-27 ENCOUNTER — HOSPITAL ENCOUNTER (OUTPATIENT)
Dept: WOMENS IMAGING | Age: 59
Discharge: HOME | End: 2019-11-27
Payer: COMMERCIAL

## 2019-11-27 ENCOUNTER — OFFICE VISIT (OUTPATIENT)
Dept: ONCOLOGY | Age: 59
End: 2019-11-27
Payer: COMMERCIAL

## 2019-11-27 VITALS
SYSTOLIC BLOOD PRESSURE: 122 MMHG | HEIGHT: 62 IN | DIASTOLIC BLOOD PRESSURE: 74 MMHG | BODY MASS INDEX: 32.17 KG/M2 | RESPIRATION RATE: 18 BRPM | HEART RATE: 60 BPM | OXYGEN SATURATION: 99 % | WEIGHT: 174.8 LBS | TEMPERATURE: 98.3 F

## 2019-11-27 VITALS
HEIGHT: 62 IN | DIASTOLIC BLOOD PRESSURE: 79 MMHG | HEART RATE: 58 BPM | SYSTOLIC BLOOD PRESSURE: 125 MMHG | RESPIRATION RATE: 16 BRPM | TEMPERATURE: 98.2 F | BODY MASS INDEX: 32.17 KG/M2 | WEIGHT: 174.8 LBS

## 2019-11-27 DIAGNOSIS — C90.00 MULTIPLE MYELOMA NOT HAVING ACHIEVED REMISSION (HCC): Primary | ICD-10-CM

## 2019-11-27 DIAGNOSIS — M89.9 LYTIC LESION OF BONE ON X-RAY: ICD-10-CM

## 2019-11-27 DIAGNOSIS — Z12.31 VISIT FOR SCREENING MAMMOGRAM: ICD-10-CM

## 2019-11-27 DIAGNOSIS — C90.00 MULTIPLE MYELOMA NOT HAVING ACHIEVED REMISSION (HCC): ICD-10-CM

## 2019-11-27 DIAGNOSIS — E55.9 VITAMIN D DEFICIENCY: Primary | ICD-10-CM

## 2019-11-27 DIAGNOSIS — Z51.11 ENCOUNTER FOR CHEMOTHERAPY MANAGEMENT: ICD-10-CM

## 2019-11-27 DIAGNOSIS — I82.431 ACUTE DEEP VEIN THROMBOSIS (DVT) OF RIGHT POPLITEAL VEIN (HCC): ICD-10-CM

## 2019-11-27 LAB
BASINOPHIL, AUTOMATED: 0 % (ref 0–3)
EOSINOPHILS RELATIVE PERCENT: 4 % (ref 0–4)
HCT VFR BLD CALC: 33.5 % (ref 37–47)
HEMOGLOBIN: 11.3 GM/DL (ref 12–16)
LYMPHOCYTES # BLD: 20 % (ref 15–47)
MCH RBC QN AUTO: 34.7 PG (ref 27–31)
MCHC RBC AUTO-ENTMCNC: 33.8 GM/DL (ref 33–37)
MCV RBC AUTO: 103 FL (ref 81–99)
MONOCYTES: 12 % (ref 0–12)
PDW BLD-RTO: 12.9 % (ref 11.5–14.5)
PLATELET # BLD: 180 THOU/MM3 (ref 130–400)
PMV BLD AUTO: 9 FL (ref 7.4–10.4)
RBC # BLD: 3.25 MILL/MM3 (ref 4.2–5.4)
SEG NEUTROPHILS: 63 % (ref 43–75)
VITAMIN D 25-HYDROXY: 58 NG/ML (ref 30–100)
WBC # BLD: 3.1 THOU/MM3 (ref 4.8–10.8)

## 2019-11-27 PROCEDURE — 77063 BREAST TOMOSYNTHESIS BI: CPT

## 2019-11-27 PROCEDURE — 6360000002 HC RX W HCPCS: Performed by: INTERNAL MEDICINE

## 2019-11-27 PROCEDURE — 85025 COMPLETE CBC W/AUTO DIFF WBC: CPT

## 2019-11-27 PROCEDURE — 36415 COLL VENOUS BLD VENIPUNCTURE: CPT

## 2019-11-27 PROCEDURE — 99211 OFF/OP EST MAY X REQ PHY/QHP: CPT

## 2019-11-27 PROCEDURE — 82306 VITAMIN D 25 HYDROXY: CPT

## 2019-11-27 PROCEDURE — 99214 OFFICE O/P EST MOD 30 MIN: CPT | Performed by: INTERNAL MEDICINE

## 2019-11-27 PROCEDURE — 2580000003 HC RX 258: Performed by: INTERNAL MEDICINE

## 2019-11-27 PROCEDURE — 96401 CHEMO ANTI-NEOPL SQ/IM: CPT

## 2019-11-27 RX ORDER — LENALIDOMIDE 15 MG/1
15 CAPSULE ORAL DAILY
Qty: 14 CAPSULE | Refills: 2 | Status: SHIPPED | OUTPATIENT
Start: 2019-12-19 | End: 2020-06-12 | Stop reason: ALTCHOICE

## 2019-11-27 RX ADMIN — SODIUM CHLORIDE 2.4 MG: 9 INJECTION INTRAMUSCULAR; INTRAVENOUS; SUBCUTANEOUS at 14:08

## 2019-11-27 NOTE — ONCOLOGY
Chemotherapy Administration    Pre-assessment Data: Antineoplastic Agents  Other:   See toxicity flow sheet for assessment [x]     Physician Notification of Concerns Related to Chemotherapy Administration:   Physician Notified Esaw Barges / Time of Notification Dr Mansi Henderson seen today.      Interventions:   Lab work assessed  [x]   Height / Weight verified for dose [x]   Current MAR reviewed [x]   Emergency drugs available as appropriate [x]   Anaphylaxis assessment completed [x]   Pre-medications administered as ordered [x]   Chemotherapy Administered as SQ injection [x]   Monitor for signs / symptoms of hypersensitivity reaction    [x]   Chemotherapy orders (drug/dose/rate) verified by 2 Chemo certified   RNs    [x]          Document chemotherapy teaching on the Patient Education tab    [x]   Document patient verbalizes understanding of medications being administered  velcade    [x]   Other: []    []    []      []    []

## 2019-11-27 NOTE — PROGRESS NOTES
Patient assessed for the following post chemotherapy:    Dizziness   No  Lightheadedness  No     Acute nausea/vomiting No  Headache   No  Chest pain/pressure  No  Rash/itching   No  Shortness of breath  No     Patient tolerated chemotherapy treatment velcade subq injection without any complications. Last vital signs:   /79   Pulse 58   Temp 98.2 °F (36.8 °C) (Oral)   Resp 16   Ht 5' 2\" (1.575 m)   Wt 174 lb 12.8 oz (79.3 kg)   LMP 12/01/2004   BMI 31.97 kg/m²     Patient instructed if experience any of the above symptoms following today's injection, he/she is to notify MD immediately or go to the emergency department. Discharge instructions given to patient. Verbalizes understanding. Ambulated off unit per self with spouse with belongings.

## 2019-11-27 NOTE — PLAN OF CARE
Problem: Intellectual/Education/Knowledge Deficit  Goal: Teaching initiated upon admission  Outcome: Met This Shift  Note:   Patient verbalizes understanding to verbal information given on velcade,action and possible side effects. Aware to call MD if develop complications. Intervention: Verbal/written education provided  Note:   Chemotherapy Teaching     What is Chemotherapy   Drug action [x]   Method of Administration [x]   Handouts given []     Side Effects  Nausea/vomiting [x]   Diarrhea [x]   Fatigue [x]   Signs / Symptoms of infection [x]   Neutropenia [x]   Thrombocytopenia [x]   Alopecia [x]   neuropathy [x]   [place] diet &  the importance of fluids [x]       Micellaneous  Importance of nutrition [x]   Importance of oral hygiene [x]   When to call the MD [x]   Monitoring labs [x]   Use of supportive services []     Explanation of Drug Regimen / Frequency  Velcade injection   D8 C6     Comments  Verbalized understanding to drug,action,side effects and when to call MD         Problem: Discharge Planning  Goal: Knowledge of discharge instructions  Description  Knowledge of discharge instructions     Outcome: Met This Shift  Note:   Verbalize understanding of discharge instructions, follow up appointments, and when to call Physician. Intervention: Interaction with patient/family and care team  Note:   Provide discharge instructions. Care plan reviewed with patient and spouse. Patient and spouse verbalize understanding of the plan of care and contribute to goal setting.

## 2019-12-02 ENCOUNTER — TELEPHONE (OUTPATIENT)
Dept: INTERNAL MEDICINE CLINIC | Age: 59
End: 2019-12-02

## 2019-12-03 ENCOUNTER — HOSPITAL ENCOUNTER (OUTPATIENT)
Dept: WOMENS IMAGING | Age: 59
Discharge: HOME | End: 2019-12-03
Payer: COMMERCIAL

## 2019-12-03 DIAGNOSIS — R92.0 MICROCALCIFICATION OF RIGHT BREAST ON MAMMOGRAPHY: ICD-10-CM

## 2019-12-03 DIAGNOSIS — C90.00 MULTIPLE MYELOMA NOT HAVING ACHIEVED REMISSION (HCC): Primary | ICD-10-CM

## 2019-12-03 PROCEDURE — G0279 TOMOSYNTHESIS, MAMMO: HCPCS

## 2019-12-04 ENCOUNTER — HOSPITAL ENCOUNTER (OUTPATIENT)
Dept: INFUSION THERAPY | Age: 59
Discharge: HOME | End: 2019-12-04
Payer: COMMERCIAL

## 2019-12-04 VITALS
HEART RATE: 65 BPM | SYSTOLIC BLOOD PRESSURE: 117 MMHG | TEMPERATURE: 97.9 F | WEIGHT: 173.4 LBS | RESPIRATION RATE: 16 BRPM | HEIGHT: 62 IN | BODY MASS INDEX: 31.91 KG/M2 | DIASTOLIC BLOOD PRESSURE: 72 MMHG

## 2019-12-04 DIAGNOSIS — M89.9 LYTIC LESION OF BONE ON X-RAY: ICD-10-CM

## 2019-12-04 DIAGNOSIS — C90.00 MULTIPLE MYELOMA NOT HAVING ACHIEVED REMISSION (HCC): Primary | ICD-10-CM

## 2019-12-04 LAB
BASINOPHIL, AUTOMATED: 0 % (ref 0–3)
EOSINOPHILS RELATIVE PERCENT: 2 % (ref 0–4)
HCT VFR BLD CALC: 35.2 % (ref 37–47)
HEMOGLOBIN: 11.8 GM/DL (ref 12–16)
LYMPHOCYTES # BLD: 11 % (ref 15–47)
MCH RBC QN AUTO: 34.4 PG (ref 27–31)
MCHC RBC AUTO-ENTMCNC: 33.5 GM/DL (ref 33–37)
MCV RBC AUTO: 103 FL (ref 81–99)
MONOCYTES: 16 % (ref 0–12)
PDW BLD-RTO: 12.9 % (ref 11.5–14.5)
PLATELET # BLD: 144 THOU/MM3 (ref 130–400)
PMV BLD AUTO: 8.8 FL (ref 7.4–10.4)
RBC # BLD: 3.42 MILL/MM3 (ref 4.2–5.4)
SEG NEUTROPHILS: 71 % (ref 43–75)
WBC # BLD: 7.3 THOU/MM3 (ref 4.8–10.8)

## 2019-12-04 PROCEDURE — 6360000002 HC RX W HCPCS: Performed by: INTERNAL MEDICINE

## 2019-12-04 PROCEDURE — 2580000003 HC RX 258: Performed by: INTERNAL MEDICINE

## 2019-12-04 PROCEDURE — 36415 COLL VENOUS BLD VENIPUNCTURE: CPT

## 2019-12-04 PROCEDURE — 96401 CHEMO ANTI-NEOPL SQ/IM: CPT

## 2019-12-04 PROCEDURE — 96365 THER/PROPH/DIAG IV INF INIT: CPT

## 2019-12-04 PROCEDURE — 85025 COMPLETE CBC W/AUTO DIFF WBC: CPT

## 2019-12-04 RX ORDER — METHYLPREDNISOLONE SODIUM SUCCINATE 125 MG/2ML
125 INJECTION, POWDER, LYOPHILIZED, FOR SOLUTION INTRAMUSCULAR; INTRAVENOUS ONCE
Status: CANCELLED | OUTPATIENT
Start: 2019-12-04

## 2019-12-04 RX ORDER — SODIUM CHLORIDE 0.9 % (FLUSH) 0.9 %
5 SYRINGE (ML) INJECTION PRN
Status: CANCELLED | OUTPATIENT
Start: 2019-12-04

## 2019-12-04 RX ORDER — DIPHENHYDRAMINE HYDROCHLORIDE 50 MG/ML
50 INJECTION INTRAMUSCULAR; INTRAVENOUS ONCE
Status: CANCELLED | OUTPATIENT
Start: 2019-12-04

## 2019-12-04 RX ORDER — SODIUM CHLORIDE 0.9 % (FLUSH) 0.9 %
10 SYRINGE (ML) INJECTION PRN
Status: CANCELLED | OUTPATIENT
Start: 2019-12-04

## 2019-12-04 RX ORDER — SODIUM CHLORIDE 9 MG/ML
20 INJECTION, SOLUTION INTRAVENOUS ONCE
Status: CANCELLED | OUTPATIENT
Start: 2019-12-04

## 2019-12-04 RX ORDER — SODIUM CHLORIDE 9 MG/ML
20 INJECTION, SOLUTION INTRAVENOUS ONCE
Status: COMPLETED | OUTPATIENT
Start: 2019-12-04 | End: 2019-12-04

## 2019-12-04 RX ORDER — SODIUM CHLORIDE 9 MG/ML
INJECTION, SOLUTION INTRAVENOUS CONTINUOUS
Status: CANCELLED | OUTPATIENT
Start: 2019-12-04

## 2019-12-04 RX ORDER — HEPARIN SODIUM (PORCINE) LOCK FLUSH IV SOLN 100 UNIT/ML 100 UNIT/ML
500 SOLUTION INTRAVENOUS PRN
Status: CANCELLED | OUTPATIENT
Start: 2019-12-04

## 2019-12-04 RX ADMIN — SODIUM CHLORIDE 20 ML/HR: 9 INJECTION, SOLUTION INTRAVENOUS at 08:44

## 2019-12-04 RX ADMIN — BORTEZOMIB 2.4 MG: 3.5 INJECTION, POWDER, LYOPHILIZED, FOR SOLUTION INTRAVENOUS; SUBCUTANEOUS at 08:54

## 2019-12-04 RX ADMIN — ZOLEDRONIC ACID 4 MG: 4 INJECTION, SOLUTION, CONCENTRATE INTRAVENOUS at 08:47

## 2019-12-04 NOTE — PLAN OF CARE
Problem: Discharge Planning  Goal: Knowledge of discharge instructions  Description  Knowledge of discharge instructions     Outcome: Met This Shift  Note:   Verbalized understanding of discharge instructions, follow-up appointments, and when to call the physician. Intervention: Interaction with patient/family and care team  Note:   Discuss understanding of discharge instructions,follow-up appointments, and when to call the physician. Problem: Intellectual/Education/Knowledge Deficit  Goal: Teaching initiated upon admission  Outcome: Met This Shift  Note:   Patient verbalizes understanding to verbal information given on velcade,action and possible side effects. Aware to call MD if develop complications. Intervention: Verbal/written education provided  Note:   Chemotherapy Teaching     What is Chemotherapy   Drug action [x]   Method of Administration [x]   Handouts given []     Side Effects  Nausea/vomiting [x]   Diarrhea [x]   Fatigue [x]   Signs / Symptoms of infection [x]   Neutropenia [x]   Thrombocytopenia [x]   Alopecia [x]   neuropathy [x]   [place] diet &  the importance of fluids [x]       Micellaneous  Importance of nutrition [x]   Importance of oral hygiene [x]   When to call the MD [x]   Monitoring labs [x]   Use of supportive services []     Explanation of Drug Regimen / Frequency  Velcade  sq [id number]     Comments  Verbalized understanding to drug,action,side effects and when to call MD         Problem: Musculor/Skeletal Functional Status  Goal: Absence of falls  Outcome: Met This Shift  Note:   No falls occurred with visit today. Intervention: Fall precautions  Note:   Verbalized understanding of fall prevention to ask for assistance with ambulation. Call light within reach. Care plan reviewed with patient . Patient  verbalize understanding of the plan of care and contribute to goal setting.

## 2019-12-04 NOTE — PROGRESS NOTES
Patient assessed for the following post chemotherapy:    Dizziness   No  Lightheadedness  No      Acute nausea/vomiting No  Headache   No  Chest pain/pressure  No  Rash/itching   No  Shortness of breath  No    Patient tolerated chemotherapy treatment velcade sq/ zometa IV without any complications. Last vital signs:   /72   Pulse 65   Temp 97.9 °F (36.6 °C) (Oral)   Resp 16   Ht 5' 2\" (1.575 m)   Wt 173 lb 6.4 oz (78.7 kg)   LMP 12/01/2004   BMI 31.72 kg/m²         Patient instructed if experience any of the above symptoms following today's infusion,he/she is to notify MD immediately or go to the emergency department. Discharge instructions given to patient. Verbalizes understanding. Ambulated off unit per self with belongings.

## 2019-12-11 ASSESSMENT — ENCOUNTER SYMPTOMS
DIARRHEA: 0
RECTAL PAIN: 0
COUGH: 0
COLOR CHANGE: 0
NAUSEA: 0
CHEST TIGHTNESS: 0
CONSTIPATION: 0
TROUBLE SWALLOWING: 0
BLOOD IN STOOL: 0
EYE DISCHARGE: 0
ABDOMINAL PAIN: 0
FACIAL SWELLING: 0
SORE THROAT: 0
BACK PAIN: 1
VOMITING: 0
SHORTNESS OF BREATH: 0
WHEEZING: 0
ABDOMINAL DISTENTION: 0

## 2019-12-15 ASSESSMENT — ENCOUNTER SYMPTOMS
CHEST TIGHTNESS: 0
EYE DISCHARGE: 0
CONSTIPATION: 0
VOMITING: 0
COLOR CHANGE: 0
DIARRHEA: 0
TROUBLE SWALLOWING: 0
FACIAL SWELLING: 0
BLOOD IN STOOL: 0
SORE THROAT: 0
ABDOMINAL PAIN: 0
ABDOMINAL DISTENTION: 0
SHORTNESS OF BREATH: 0
BACK PAIN: 1
COUGH: 0
RECTAL PAIN: 0
NAUSEA: 0
WHEEZING: 0

## 2019-12-18 DIAGNOSIS — C90.00 MULTIPLE MYELOMA NOT HAVING ACHIEVED REMISSION (HCC): Primary | ICD-10-CM

## 2019-12-19 ENCOUNTER — OFFICE VISIT (OUTPATIENT)
Dept: ONCOLOGY | Age: 59
End: 2019-12-19
Payer: COMMERCIAL

## 2019-12-19 ENCOUNTER — HOSPITAL ENCOUNTER (OUTPATIENT)
Dept: INFUSION THERAPY | Age: 59
Discharge: HOME | End: 2019-12-19
Payer: COMMERCIAL

## 2019-12-19 ENCOUNTER — APPOINTMENT (OUTPATIENT)
Dept: GENERAL RADIOLOGY | Age: 59
End: 2019-12-19
Payer: COMMERCIAL

## 2019-12-19 ENCOUNTER — HOSPITAL ENCOUNTER (EMERGENCY)
Age: 59
Discharge: HOME | End: 2019-12-19
Attending: EMERGENCY MEDICINE
Payer: COMMERCIAL

## 2019-12-19 VITALS
HEIGHT: 62 IN | RESPIRATION RATE: 20 BRPM | TEMPERATURE: 98.5 F | OXYGEN SATURATION: 98 % | WEIGHT: 173 LBS | BODY MASS INDEX: 31.83 KG/M2 | SYSTOLIC BLOOD PRESSURE: 133 MMHG | DIASTOLIC BLOOD PRESSURE: 79 MMHG | HEART RATE: 78 BPM

## 2019-12-19 VITALS
OXYGEN SATURATION: 96 % | DIASTOLIC BLOOD PRESSURE: 79 MMHG | HEART RATE: 78 BPM | BODY MASS INDEX: 31.17 KG/M2 | HEIGHT: 62 IN | TEMPERATURE: 99.6 F | WEIGHT: 169.4 LBS | SYSTOLIC BLOOD PRESSURE: 139 MMHG | RESPIRATION RATE: 18 BRPM

## 2019-12-19 DIAGNOSIS — J10.1 INFLUENZA B: Primary | ICD-10-CM

## 2019-12-19 DIAGNOSIS — C90.00 MULTIPLE MYELOMA NOT HAVING ACHIEVED REMISSION (HCC): Primary | ICD-10-CM

## 2019-12-19 DIAGNOSIS — J10.1 INFLUENZA A: ICD-10-CM

## 2019-12-19 DIAGNOSIS — B99.9 FEVER DUE TO INFECTION: ICD-10-CM

## 2019-12-19 DIAGNOSIS — D69.59: ICD-10-CM

## 2019-12-19 DIAGNOSIS — I82.431 ACUTE DEEP VEIN THROMBOSIS (DVT) OF RIGHT POPLITEAL VEIN (HCC): ICD-10-CM

## 2019-12-19 LAB
ALBUMIN SERPL-MCNC: 3.9 G/DL (ref 3.5–5.1)
ALP BLD-CCNC: 139 U/L (ref 38–126)
ALT SERPL-CCNC: 19 U/L (ref 11–66)
ANION GAP SERPL CALCULATED.3IONS-SCNC: 13 MEQ/L (ref 8–16)
AST SERPL-CCNC: 20 U/L (ref 5–40)
BACTERIA: ABNORMAL /HPF
BASOPHILS # BLD: 0.5 %
BASOPHILS ABSOLUTE: 0 THOU/MM3 (ref 0–0.1)
BILIRUB SERPL-MCNC: 0.3 MG/DL (ref 0.3–1.2)
BILIRUBIN DIRECT: < 0.2 MG/DL (ref 0–0.3)
BILIRUBIN URINE: NEGATIVE
BLOOD, URINE: NEGATIVE
BUN BLDV-MCNC: 10 MG/DL (ref 7–22)
CALCIUM SERPL-MCNC: 8.9 MG/DL (ref 8.5–10.5)
CASTS 2: ABNORMAL /LPF
CASTS UA: ABNORMAL /LPF
CHARACTER, URINE: CLEAR
CHLORIDE BLD-SCNC: 99 MEQ/L (ref 98–111)
CO2: 24 MEQ/L (ref 23–33)
COLOR: YELLOW
CREAT SERPL-MCNC: 0.8 MG/DL (ref 0.4–1.2)
CRYSTALS, UA: ABNORMAL
DIFFERENTIAL TYPE: ABNORMAL
EOSINOPHIL # BLD: 0.1 %
EOSINOPHILS ABSOLUTE: 0 THOU/MM3 (ref 0–0.4)
EPITHELIAL CELLS, UA: ABNORMAL /HPF
ERYTHROCYTE [DISTWIDTH] IN BLOOD BY AUTOMATED COUNT: 15.2 % (ref 11.5–14.5)
ERYTHROCYTE [DISTWIDTH] IN BLOOD BY AUTOMATED COUNT: 57.6 FL (ref 35–45)
FLU A ANTIGEN: NEGATIVE
FLU B ANTIGEN: POSITIVE
GFR SERPL CREATININE-BSD FRML MDRD: 74 ML/MIN/1.73M2
GLUCOSE BLD-MCNC: 111 MG/DL (ref 70–108)
GLUCOSE URINE: NEGATIVE MG/DL
HCT VFR BLD CALC: 33.5 % (ref 37–47)
HEMOGLOBIN: 11 GM/DL (ref 12–16)
IMMATURE GRANS (ABS): 0.54 THOU/MM3 (ref 0–0.07)
IMMATURE GRANULOCYTES: 5.6 %
KETONES, URINE: NEGATIVE
LACTIC ACID, SEPSIS: 0.8 MMOL/L (ref 0.5–1.9)
LEUKOCYTE ESTERASE, URINE: ABNORMAL
LIPASE: 20.2 U/L (ref 5.6–51.3)
LYMPHOCYTES # BLD: 4.5 %
LYMPHOCYTES ABSOLUTE: 0.4 THOU/MM3 (ref 1–4.8)
MAGNESIUM: 1.6 MG/DL (ref 1.6–2.4)
MCH RBC QN AUTO: 34.2 PG (ref 26–33)
MCHC RBC AUTO-ENTMCNC: 32.8 GM/DL (ref 32.2–35.5)
MCV RBC AUTO: 104 FL (ref 81–99)
MISCELLANEOUS 2: ABNORMAL
MONOCYTES # BLD: 13 %
MONOCYTES ABSOLUTE: 1.2 THOU/MM3 (ref 0.4–1.3)
NITRITE, URINE: NEGATIVE
NUCLEATED RED BLOOD CELLS: 0 /100 WBC
OSMOLALITY CALCULATION: 271.7 MOSMOL/KG (ref 275–300)
PATHOLOGIST REVIEW: ABNORMAL
PH UA: 6.5 (ref 5–9)
PLATELET # BLD: 69 THOU/MM3 (ref 130–400)
PLATELET ESTIMATE: ABNORMAL
PMV BLD AUTO: 10.7 FL (ref 9.4–12.4)
POTASSIUM SERPL-SCNC: 3.5 MEQ/L (ref 3.5–5.2)
PROTEIN UA: NEGATIVE
RBC # BLD: 3.22 MILL/MM3 (ref 4.2–5.4)
RBC URINE: ABNORMAL /HPF
RENAL EPITHELIAL, UA: ABNORMAL
SCAN OF BLOOD SMEAR: NORMAL
SEG NEUTROPHILS: 76.3 %
SEGMENTED NEUTROPHILS ABSOLUTE COUNT: 7.3 THOU/MM3 (ref 1.8–7.7)
SODIUM BLD-SCNC: 136 MEQ/L (ref 135–145)
SPECIFIC GRAVITY, URINE: 1.01 (ref 1–1.03)
TOTAL PROTEIN: 6.3 G/DL (ref 6.1–8)
UROBILINOGEN, URINE: 0.2 EU/DL (ref 0–1)
WBC # BLD: 9.6 THOU/MM3 (ref 4.8–10.8)
WBC UA: ABNORMAL /HPF
YEAST: ABNORMAL

## 2019-12-19 PROCEDURE — 36415 COLL VENOUS BLD VENIPUNCTURE: CPT

## 2019-12-19 PROCEDURE — 82248 BILIRUBIN DIRECT: CPT

## 2019-12-19 PROCEDURE — 87040 BLOOD CULTURE FOR BACTERIA: CPT

## 2019-12-19 PROCEDURE — 6370000000 HC RX 637 (ALT 250 FOR IP): Performed by: EMERGENCY MEDICINE

## 2019-12-19 PROCEDURE — 99283 EMERGENCY DEPT VISIT LOW MDM: CPT

## 2019-12-19 PROCEDURE — 71046 X-RAY EXAM CHEST 2 VIEWS: CPT

## 2019-12-19 PROCEDURE — 81001 URINALYSIS AUTO W/SCOPE: CPT

## 2019-12-19 PROCEDURE — 85025 COMPLETE CBC W/AUTO DIFF WBC: CPT

## 2019-12-19 PROCEDURE — 83735 ASSAY OF MAGNESIUM: CPT

## 2019-12-19 PROCEDURE — 99214 OFFICE O/P EST MOD 30 MIN: CPT | Performed by: INTERNAL MEDICINE

## 2019-12-19 PROCEDURE — 80053 COMPREHEN METABOLIC PANEL: CPT

## 2019-12-19 PROCEDURE — 83605 ASSAY OF LACTIC ACID: CPT

## 2019-12-19 PROCEDURE — 99211 OFF/OP EST MAY X REQ PHY/QHP: CPT

## 2019-12-19 PROCEDURE — 87804 INFLUENZA ASSAY W/OPTIC: CPT

## 2019-12-19 PROCEDURE — 83690 ASSAY OF LIPASE: CPT

## 2019-12-19 RX ORDER — OSELTAMIVIR PHOSPHATE 75 MG/1
75 CAPSULE ORAL 2 TIMES DAILY
Qty: 7 CAPSULE | Refills: 0 | Status: SHIPPED | OUTPATIENT
Start: 2019-12-19 | End: 2019-12-26

## 2019-12-19 RX ORDER — OSELTAMIVIR PHOSPHATE 75 MG/1
75 CAPSULE ORAL DAILY
Qty: 7 CAPSULE | Refills: 0 | Status: SHIPPED | OUTPATIENT
Start: 2019-12-19 | End: 2019-12-19 | Stop reason: SDUPTHER

## 2019-12-19 RX ORDER — ACETAMINOPHEN 325 MG/1
650 TABLET ORAL ONCE
Status: DISCONTINUED | OUTPATIENT
Start: 2019-12-19 | End: 2019-12-19 | Stop reason: HOSPADM

## 2019-12-19 RX ORDER — OSELTAMIVIR PHOSPHATE 75 MG/1
75 CAPSULE ORAL ONCE
Status: COMPLETED | OUTPATIENT
Start: 2019-12-19 | End: 2019-12-19

## 2019-12-19 RX ADMIN — OSELTAMIVIR PHOSPHATE 75 MG: 75 CAPSULE ORAL at 06:27

## 2019-12-19 ASSESSMENT — ENCOUNTER SYMPTOMS
ABDOMINAL DISTENTION: 0
SORE THROAT: 0
DIARRHEA: 1
NAUSEA: 0
TROUBLE SWALLOWING: 0
VOMITING: 0
BACK PAIN: 1
RHINORRHEA: 1
EYE DISCHARGE: 0
VOMITING: 0
CHOKING: 0
BLOOD IN STOOL: 0
WHEEZING: 0
EYE PAIN: 0
CONSTIPATION: 0
SHORTNESS OF BREATH: 0
DIARRHEA: 0
CHEST TIGHTNESS: 0
SHORTNESS OF BREATH: 0
RECTAL PAIN: 0
CHEST TIGHTNESS: 0
FACIAL SWELLING: 0
VOICE CHANGE: 0
BACK PAIN: 0
SORE THROAT: 0
COUGH: 0
ABDOMINAL PAIN: 0
ABDOMINAL DISTENTION: 0
COUGH: 0
WHEEZING: 0
PHOTOPHOBIA: 0
NAUSEA: 0
TROUBLE SWALLOWING: 0
SINUS PRESSURE: 0
EYE REDNESS: 0
EYE ITCHING: 0
COLOR CHANGE: 0
EYE DISCHARGE: 0
BLOOD IN STOOL: 0
CONSTIPATION: 0
ABDOMINAL PAIN: 0

## 2019-12-19 ASSESSMENT — PAIN DESCRIPTION - DESCRIPTORS: DESCRIPTORS: ACHING

## 2019-12-19 ASSESSMENT — PAIN SCALES - GENERAL: PAINLEVEL_OUTOF10: 4

## 2019-12-19 ASSESSMENT — PAIN DESCRIPTION - PAIN TYPE: TYPE: ACUTE PAIN

## 2019-12-19 ASSESSMENT — PAIN DESCRIPTION - LOCATION: LOCATION: GENERALIZED

## 2019-12-20 ENCOUNTER — TELEPHONE (OUTPATIENT)
Dept: ONCOLOGY | Age: 59
End: 2019-12-20

## 2019-12-20 ENCOUNTER — TELEPHONE (OUTPATIENT)
Dept: INTERNAL MEDICINE CLINIC | Age: 59
End: 2019-12-20

## 2019-12-24 LAB
BLOOD CULTURE, ROUTINE: NORMAL
BLOOD CULTURE, ROUTINE: NORMAL

## 2019-12-27 ENCOUNTER — HOSPITAL ENCOUNTER (OUTPATIENT)
Dept: INFUSION THERAPY | Age: 59
Discharge: HOME | End: 2019-12-27
Payer: COMMERCIAL

## 2019-12-27 ENCOUNTER — OFFICE VISIT (OUTPATIENT)
Dept: ONCOLOGY | Age: 59
End: 2019-12-27
Payer: COMMERCIAL

## 2019-12-27 VITALS
HEIGHT: 62 IN | OXYGEN SATURATION: 99 % | HEART RATE: 57 BPM | SYSTOLIC BLOOD PRESSURE: 106 MMHG | WEIGHT: 173.8 LBS | DIASTOLIC BLOOD PRESSURE: 68 MMHG | BODY MASS INDEX: 31.98 KG/M2 | RESPIRATION RATE: 18 BRPM | TEMPERATURE: 98.4 F

## 2019-12-27 VITALS
WEIGHT: 173.8 LBS | BODY MASS INDEX: 31.98 KG/M2 | HEART RATE: 71 BPM | TEMPERATURE: 97.2 F | RESPIRATION RATE: 18 BRPM | HEIGHT: 62 IN | SYSTOLIC BLOOD PRESSURE: 126 MMHG | OXYGEN SATURATION: 100 % | DIASTOLIC BLOOD PRESSURE: 76 MMHG

## 2019-12-27 DIAGNOSIS — C90.00 MULTIPLE MYELOMA NOT HAVING ACHIEVED REMISSION (HCC): Primary | ICD-10-CM

## 2019-12-27 DIAGNOSIS — D70.3 NEUTROPENIA ASSOCIATED WITH INFECTION (HCC): ICD-10-CM

## 2019-12-27 DIAGNOSIS — I82.431 ACUTE DEEP VEIN THROMBOSIS (DVT) OF RIGHT POPLITEAL VEIN (HCC): ICD-10-CM

## 2019-12-27 DIAGNOSIS — M89.9 LYTIC LESION OF BONE ON X-RAY: ICD-10-CM

## 2019-12-27 DIAGNOSIS — Z51.11 ENCOUNTER FOR CHEMOTHERAPY MANAGEMENT: ICD-10-CM

## 2019-12-27 LAB
ALBUMIN SERPL-MCNC: 3.8 G/DL (ref 3.5–5.1)
ALP BLD-CCNC: 79 U/L (ref 38–126)
ALT SERPL-CCNC: 13 U/L (ref 11–66)
AST SERPL-CCNC: 17 U/L (ref 5–40)
BILIRUB SERPL-MCNC: 0.4 MG/DL (ref 0.3–1.2)
BILIRUBIN DIRECT: < 0.2 MG/DL (ref 0–0.3)
BUN, WHOLE BLOOD: 15 MG/DL (ref 8–26)
CHLORIDE, WHOLE BLOOD: 109 MEQ/L (ref 98–109)
CREATININE, WHOLE BLOOD: 0.6 MG/DL (ref 0.5–1.2)
GFR, ESTIMATED: > 90 ML/MIN/1.73M2
GLUCOSE, WHOLE BLOOD: 68 MG/DL (ref 70–108)
HCT VFR BLD CALC: 31.4 % (ref 37–47)
HEMOGLOBIN: 10.7 GM/DL (ref 12–16)
IONIZED CALCIUM, WHOLE BLOOD: 1.21 MMOL/L (ref 1.12–1.32)
MCH RBC QN AUTO: 33.9 PG (ref 27–31)
MCHC RBC AUTO-ENTMCNC: 34 GM/DL (ref 33–37)
MCV RBC AUTO: 100 FL (ref 81–99)
PDW BLD-RTO: 12.9 % (ref 11.5–14.5)
PLATELET # BLD: 217 THOU/MM3 (ref 130–400)
PMV BLD AUTO: 8.3 FL (ref 7.4–10.4)
POTASSIUM, WHOLE BLOOD: 4 MEQ/L (ref 3.5–4.9)
RBC # BLD: 3.15 MILL/MM3 (ref 4.2–5.4)
SEG NEUTROPHILS: 48 % (ref 43–75)
SEGMENTED NEUTROPHILS ABSOLUTE COUNT: 1.1 THOU/MM3 (ref 1.8–7.7)
SODIUM, WHOLE BLOOD: 140 MEQ/L (ref 138–146)
TOTAL CO2, WHOLE BLOOD: 26 MEQ/L (ref 23–33)
TOTAL PROTEIN: 6.3 G/DL (ref 6.1–8)
WBC # BLD: 2.2 THOU/MM3 (ref 4.8–10.8)

## 2019-12-27 PROCEDURE — 85027 COMPLETE CBC AUTOMATED: CPT

## 2019-12-27 PROCEDURE — 99214 OFFICE O/P EST MOD 30 MIN: CPT | Performed by: INTERNAL MEDICINE

## 2019-12-27 PROCEDURE — 6360000002 HC RX W HCPCS: Performed by: INTERNAL MEDICINE

## 2019-12-27 PROCEDURE — 80047 BASIC METABLC PNL IONIZED CA: CPT

## 2019-12-27 PROCEDURE — 36415 COLL VENOUS BLD VENIPUNCTURE: CPT

## 2019-12-27 PROCEDURE — 2580000003 HC RX 258: Performed by: INTERNAL MEDICINE

## 2019-12-27 PROCEDURE — 96401 CHEMO ANTI-NEOPL SQ/IM: CPT

## 2019-12-27 PROCEDURE — 99211 OFF/OP EST MAY X REQ PHY/QHP: CPT

## 2019-12-27 PROCEDURE — 80076 HEPATIC FUNCTION PANEL: CPT

## 2019-12-27 RX ORDER — SODIUM CHLORIDE 9 MG/ML
20 INJECTION, SOLUTION INTRAVENOUS ONCE
Status: CANCELLED | OUTPATIENT
Start: 2020-01-09

## 2019-12-27 RX ORDER — HEPARIN SODIUM (PORCINE) LOCK FLUSH IV SOLN 100 UNIT/ML 100 UNIT/ML
500 SOLUTION INTRAVENOUS PRN
Status: CANCELLED | OUTPATIENT
Start: 2020-01-16

## 2019-12-27 RX ORDER — HEPARIN SODIUM (PORCINE) LOCK FLUSH IV SOLN 100 UNIT/ML 100 UNIT/ML
500 SOLUTION INTRAVENOUS PRN
Status: CANCELLED | OUTPATIENT
Start: 2019-12-27

## 2019-12-27 RX ORDER — SODIUM CHLORIDE 0.9 % (FLUSH) 0.9 %
5 SYRINGE (ML) INJECTION PRN
Status: CANCELLED | OUTPATIENT
Start: 2019-12-27

## 2019-12-27 RX ORDER — SODIUM CHLORIDE 0.9 % (FLUSH) 0.9 %
5 SYRINGE (ML) INJECTION PRN
Status: CANCELLED | OUTPATIENT
Start: 2020-01-09

## 2019-12-27 RX ORDER — SODIUM CHLORIDE 0.9 % (FLUSH) 0.9 %
10 SYRINGE (ML) INJECTION PRN
Status: CANCELLED | OUTPATIENT
Start: 2020-01-16

## 2019-12-27 RX ORDER — DIPHENHYDRAMINE HYDROCHLORIDE 50 MG/ML
50 INJECTION INTRAMUSCULAR; INTRAVENOUS ONCE
Status: CANCELLED | OUTPATIENT
Start: 2020-01-16

## 2019-12-27 RX ORDER — HEPARIN SODIUM (PORCINE) LOCK FLUSH IV SOLN 100 UNIT/ML 100 UNIT/ML
500 SOLUTION INTRAVENOUS PRN
Status: CANCELLED | OUTPATIENT
Start: 2020-01-09

## 2019-12-27 RX ORDER — METHYLPREDNISOLONE SODIUM SUCCINATE 125 MG/2ML
125 INJECTION, POWDER, LYOPHILIZED, FOR SOLUTION INTRAMUSCULAR; INTRAVENOUS ONCE
Status: CANCELLED | OUTPATIENT
Start: 2019-12-27

## 2019-12-27 RX ORDER — DIPHENHYDRAMINE HYDROCHLORIDE 50 MG/ML
50 INJECTION INTRAMUSCULAR; INTRAVENOUS ONCE
Status: CANCELLED | OUTPATIENT
Start: 2020-01-09

## 2019-12-27 RX ORDER — SODIUM CHLORIDE 0.9 % (FLUSH) 0.9 %
10 SYRINGE (ML) INJECTION PRN
Status: CANCELLED | OUTPATIENT
Start: 2020-01-09

## 2019-12-27 RX ORDER — EPINEPHRINE 1 MG/ML
0.3 INJECTION, SOLUTION, CONCENTRATE INTRAVENOUS PRN
Status: CANCELLED | OUTPATIENT
Start: 2020-01-09

## 2019-12-27 RX ORDER — DIPHENHYDRAMINE HYDROCHLORIDE 50 MG/ML
50 INJECTION INTRAMUSCULAR; INTRAVENOUS ONCE
Status: CANCELLED | OUTPATIENT
Start: 2019-12-27

## 2019-12-27 RX ORDER — EPINEPHRINE 1 MG/ML
0.3 INJECTION, SOLUTION, CONCENTRATE INTRAVENOUS PRN
Status: CANCELLED | OUTPATIENT
Start: 2019-12-27

## 2019-12-27 RX ORDER — EPINEPHRINE 1 MG/ML
0.3 INJECTION, SOLUTION, CONCENTRATE INTRAVENOUS PRN
Status: CANCELLED | OUTPATIENT
Start: 2020-01-16

## 2019-12-27 RX ORDER — SODIUM CHLORIDE 0.9 % (FLUSH) 0.9 %
5 SYRINGE (ML) INJECTION PRN
Status: CANCELLED | OUTPATIENT
Start: 2020-01-16

## 2019-12-27 RX ORDER — METHYLPREDNISOLONE SODIUM SUCCINATE 125 MG/2ML
125 INJECTION, POWDER, LYOPHILIZED, FOR SOLUTION INTRAMUSCULAR; INTRAVENOUS ONCE
Status: CANCELLED | OUTPATIENT
Start: 2020-01-09

## 2019-12-27 RX ORDER — METHYLPREDNISOLONE SODIUM SUCCINATE 125 MG/2ML
125 INJECTION, POWDER, LYOPHILIZED, FOR SOLUTION INTRAMUSCULAR; INTRAVENOUS ONCE
Status: CANCELLED | OUTPATIENT
Start: 2020-01-16

## 2019-12-27 RX ORDER — SODIUM CHLORIDE 9 MG/ML
20 INJECTION, SOLUTION INTRAVENOUS ONCE
Status: CANCELLED | OUTPATIENT
Start: 2020-01-16

## 2019-12-27 RX ORDER — SODIUM CHLORIDE 9 MG/ML
INJECTION, SOLUTION INTRAVENOUS CONTINUOUS
Status: CANCELLED | OUTPATIENT
Start: 2020-01-16

## 2019-12-27 RX ORDER — SODIUM CHLORIDE 0.9 % (FLUSH) 0.9 %
10 SYRINGE (ML) INJECTION PRN
Status: CANCELLED | OUTPATIENT
Start: 2019-12-27

## 2019-12-27 RX ORDER — SODIUM CHLORIDE 9 MG/ML
INJECTION, SOLUTION INTRAVENOUS CONTINUOUS
Status: CANCELLED | OUTPATIENT
Start: 2020-01-09

## 2019-12-27 RX ORDER — SODIUM CHLORIDE 9 MG/ML
INJECTION, SOLUTION INTRAVENOUS CONTINUOUS
Status: CANCELLED | OUTPATIENT
Start: 2019-12-27

## 2019-12-27 RX ORDER — SODIUM CHLORIDE 9 MG/ML
20 INJECTION, SOLUTION INTRAVENOUS ONCE
Status: CANCELLED | OUTPATIENT
Start: 2019-12-27

## 2019-12-27 RX ADMIN — BORTEZOMIB 2.4 MG: 3.5 INJECTION, POWDER, LYOPHILIZED, FOR SOLUTION INTRAVENOUS; SUBCUTANEOUS at 11:02

## 2019-12-27 ASSESSMENT — ENCOUNTER SYMPTOMS
CONSTIPATION: 0
ABDOMINAL PAIN: 0
COUGH: 0
EYE DISCHARGE: 0
COLOR CHANGE: 0
RECTAL PAIN: 0
DIARRHEA: 0
BACK PAIN: 1
BLOOD IN STOOL: 0
WHEEZING: 0
CHEST TIGHTNESS: 0
FACIAL SWELLING: 0
TROUBLE SWALLOWING: 0
SORE THROAT: 0
ABDOMINAL DISTENTION: 0
VOMITING: 0
SHORTNESS OF BREATH: 0
NAUSEA: 0

## 2019-12-27 NOTE — PROGRESS NOTES
Patient assessed for the following post chemotherapy:    Dizziness   No  Lightheadedness  No     Acute nausea/vomiting No  Headache   No  Chest pain/pressure  No  Rash/itching   No  Shortness of breath  No    Patient tolerated chemotherapy treatment velcade subq injection without any complications. Last vital signs:   /68   Pulse 57   Temp 98.4 °F (36.9 °C) (Oral)   Resp 18   Ht 5' 2\" (1.575 m)   Wt 173 lb 12.8 oz (78.8 kg)   LMP 12/01/2004   SpO2 99%   BMI 31.79 kg/m²     Patient instructed if experience any of the above symptoms following today's injection, he/she is to notify MD immediately or go to the emergency department. Discharge instructions given to patient. Verbalizes understanding. Ambulated off unit per self with spouse with belongings.

## 2019-12-27 NOTE — PLAN OF CARE
Problem: Intellectual/Education/Knowledge Deficit  Goal: Teaching initiated upon admission  Outcome: Met This Shift  Note:   Patient verbalizes understanding to verbal information given on velcade injection,action and possible side effects. Aware to call MD if develop complications. Intervention: Verbal/written education provided  Note:   Chemotherapy Teaching     What is Chemotherapy   Drug action [x]   Method of Administration [x]   Handouts given []     Side Effects  Nausea/vomiting [x]   Diarrhea [x]   Fatigue [x]   Signs / Symptoms of infection [x]   Neutropenia [x]   Thrombocytopenia [x]   Alopecia [x]   neuropathy [x]   [place] diet &  the importance of fluids [x]       Micellaneous  Importance of nutrition [x]   Importance of oral hygiene [x]   When to call the MD [x]   Monitoring labs [x]   Use of supportive services []     Explanation of Drug Regimen / Frequency  Velcade subq injection   D1 C7     Comments  Verbalized understanding to drug,action,side effects and when to call MD         Problem: Discharge Planning  Goal: Knowledge of discharge instructions  Description  Knowledge of discharge instructions     Outcome: Met This Shift  Note:   Verbalize understanding of discharge instructions, follow up appointments, and when to call Physician. Intervention: Interaction with patient/family and care team  Note:   Provide discharge instructions. Care plan reviewed with patient and spouse. Patient and spouse verbalize understanding of the plan of care and contribute to goal setting.

## 2020-01-03 ENCOUNTER — OFFICE VISIT (OUTPATIENT)
Dept: ONCOLOGY | Age: 60
End: 2020-01-03
Payer: COMMERCIAL

## 2020-01-03 ENCOUNTER — HOSPITAL ENCOUNTER (OUTPATIENT)
Dept: INFUSION THERAPY | Age: 60
Discharge: HOME | End: 2020-01-03
Payer: COMMERCIAL

## 2020-01-03 VITALS
HEIGHT: 62 IN | DIASTOLIC BLOOD PRESSURE: 75 MMHG | WEIGHT: 171.2 LBS | TEMPERATURE: 99.2 F | OXYGEN SATURATION: 97 % | HEART RATE: 75 BPM | RESPIRATION RATE: 16 BRPM | SYSTOLIC BLOOD PRESSURE: 120 MMHG | BODY MASS INDEX: 31.5 KG/M2

## 2020-01-03 DIAGNOSIS — C90.00 MULTIPLE MYELOMA NOT HAVING ACHIEVED REMISSION (HCC): ICD-10-CM

## 2020-01-03 DIAGNOSIS — Z51.11 ENCOUNTER FOR CHEMOTHERAPY MANAGEMENT: ICD-10-CM

## 2020-01-03 LAB
BASINOPHIL, AUTOMATED: 1 % (ref 0–3)
EOSINOPHILS RELATIVE PERCENT: 3 % (ref 0–4)
HCT VFR BLD CALC: 35.3 % (ref 37–47)
HEMOGLOBIN: 11.9 GM/DL (ref 12–16)
LYMPHOCYTES # BLD: 20 % (ref 15–47)
MCH RBC QN AUTO: 34.4 PG (ref 27–31)
MCHC RBC AUTO-ENTMCNC: 33.8 GM/DL (ref 33–37)
MCV RBC AUTO: 102 FL (ref 81–99)
MONOCYTES: 17 % (ref 0–12)
PDW BLD-RTO: 15.2 % (ref 11.5–14.5)
PLATELET # BLD: 217 THOU/MM3 (ref 130–400)
PMV BLD AUTO: 9.1 FL (ref 7.4–10.4)
RBC # BLD: 3.46 MILL/MM3 (ref 4.2–5.4)
SEG NEUTROPHILS: 59 % (ref 43–75)
WBC # BLD: 3.7 THOU/MM3 (ref 4.8–10.8)

## 2020-01-03 PROCEDURE — G8484 FLU IMMUNIZE NO ADMIN: HCPCS | Performed by: INTERNAL MEDICINE

## 2020-01-03 PROCEDURE — G8417 CALC BMI ABV UP PARAM F/U: HCPCS | Performed by: INTERNAL MEDICINE

## 2020-01-03 PROCEDURE — G8427 DOCREV CUR MEDS BY ELIG CLIN: HCPCS | Performed by: INTERNAL MEDICINE

## 2020-01-03 PROCEDURE — 99211 OFF/OP EST MAY X REQ PHY/QHP: CPT

## 2020-01-03 PROCEDURE — 3017F COLORECTAL CA SCREEN DOC REV: CPT | Performed by: INTERNAL MEDICINE

## 2020-01-03 PROCEDURE — 1036F TOBACCO NON-USER: CPT | Performed by: INTERNAL MEDICINE

## 2020-01-03 PROCEDURE — 36415 COLL VENOUS BLD VENIPUNCTURE: CPT

## 2020-01-03 PROCEDURE — 99213 OFFICE O/P EST LOW 20 MIN: CPT | Performed by: INTERNAL MEDICINE

## 2020-01-03 PROCEDURE — 85025 COMPLETE CBC W/AUTO DIFF WBC: CPT

## 2020-01-09 ENCOUNTER — HOSPITAL ENCOUNTER (OUTPATIENT)
Dept: INFUSION THERAPY | Age: 60
Discharge: HOME | End: 2020-01-09
Payer: COMMERCIAL

## 2020-01-09 VITALS
BODY MASS INDEX: 31.91 KG/M2 | RESPIRATION RATE: 18 BRPM | SYSTOLIC BLOOD PRESSURE: 111 MMHG | WEIGHT: 173.4 LBS | DIASTOLIC BLOOD PRESSURE: 69 MMHG | HEIGHT: 62 IN | TEMPERATURE: 97.7 F | HEART RATE: 65 BPM | OXYGEN SATURATION: 98 %

## 2020-01-09 DIAGNOSIS — M89.9 LYTIC LESION OF BONE ON X-RAY: ICD-10-CM

## 2020-01-09 DIAGNOSIS — C90.00 MULTIPLE MYELOMA NOT HAVING ACHIEVED REMISSION (HCC): Primary | ICD-10-CM

## 2020-01-09 LAB
ALBUMIN SERPL-MCNC: 4.4 G/DL (ref 3.5–5.1)
ALP BLD-CCNC: 83 U/L (ref 38–126)
ALT SERPL-CCNC: 21 U/L (ref 11–66)
AST SERPL-CCNC: 20 U/L (ref 5–40)
BILIRUB SERPL-MCNC: 0.4 MG/DL (ref 0.3–1.2)
BILIRUBIN DIRECT: < 0.2 MG/DL (ref 0–0.3)
BUN, WHOLE BLOOD: 15 MG/DL (ref 8–26)
CHLORIDE, WHOLE BLOOD: 106 MEQ/L (ref 98–109)
CREATININE, WHOLE BLOOD: 0.7 MG/DL (ref 0.5–1.2)
GFR, ESTIMATED: > 90 ML/MIN/1.73M2
GLUCOSE, WHOLE BLOOD: 93 MG/DL (ref 70–108)
HCT VFR BLD CALC: 34.8 % (ref 37–47)
HEMOGLOBIN: 11.8 GM/DL (ref 12–16)
IONIZED CALCIUM, WHOLE BLOOD: 1.3 MMOL/L (ref 1.12–1.32)
MCH RBC QN AUTO: 34 PG (ref 27–31)
MCHC RBC AUTO-ENTMCNC: 34.1 GM/DL (ref 33–37)
MCV RBC AUTO: 100 FL (ref 81–99)
PDW BLD-RTO: 15.1 % (ref 11.5–14.5)
PLATELET # BLD: 284 THOU/MM3 (ref 130–400)
PMV BLD AUTO: 7.8 FL (ref 7.4–10.4)
POTASSIUM, WHOLE BLOOD: 4.3 MEQ/L (ref 3.5–4.9)
RBC # BLD: 3.49 MILL/MM3 (ref 4.2–5.4)
SEG NEUTROPHILS: 64 % (ref 43–75)
SEGMENTED NEUTROPHILS ABSOLUTE COUNT: 2.8 THOU/MM3 (ref 1.8–7.7)
SODIUM, WHOLE BLOOD: 142 MEQ/L (ref 138–146)
TOTAL CO2, WHOLE BLOOD: 27 MEQ/L (ref 23–33)
TOTAL PROTEIN: 6.9 G/DL (ref 6.1–8)
WBC # BLD: 4.4 THOU/MM3 (ref 4.8–10.8)

## 2020-01-09 PROCEDURE — 96365 THER/PROPH/DIAG IV INF INIT: CPT

## 2020-01-09 PROCEDURE — 36415 COLL VENOUS BLD VENIPUNCTURE: CPT

## 2020-01-09 PROCEDURE — 2580000003 HC RX 258: Performed by: INTERNAL MEDICINE

## 2020-01-09 PROCEDURE — 96401 CHEMO ANTI-NEOPL SQ/IM: CPT

## 2020-01-09 PROCEDURE — 80047 BASIC METABLC PNL IONIZED CA: CPT

## 2020-01-09 PROCEDURE — 85027 COMPLETE CBC AUTOMATED: CPT

## 2020-01-09 PROCEDURE — 80076 HEPATIC FUNCTION PANEL: CPT

## 2020-01-09 PROCEDURE — 6360000002 HC RX W HCPCS: Performed by: INTERNAL MEDICINE

## 2020-01-09 RX ORDER — SODIUM CHLORIDE 9 MG/ML
20 INJECTION, SOLUTION INTRAVENOUS ONCE
Status: COMPLETED | OUTPATIENT
Start: 2020-01-09 | End: 2020-01-09

## 2020-01-09 RX ADMIN — BORTEZOMIB 2.4 MG: 3.5 INJECTION, POWDER, LYOPHILIZED, FOR SOLUTION INTRAVENOUS; SUBCUTANEOUS at 11:47

## 2020-01-09 RX ADMIN — SODIUM CHLORIDE 20 ML/HR: 9 INJECTION, SOLUTION INTRAVENOUS at 11:00

## 2020-01-09 RX ADMIN — ZOLEDRONIC ACID 4 MG: 4 INJECTION, SOLUTION, CONCENTRATE INTRAVENOUS at 11:03

## 2020-01-09 NOTE — PROGRESS NOTES
Patient assessed for the following post chemotherapy:    Dizziness   No  Lightheadedness  No      Acute nausea/vomiting No  Headache   No  Chest pain/pressure  No  Rash/itching   No  Shortness of breath  No    Patient declined to be kept for 20 minutes observation post infusion chemotherapy. Patient tolerated chemotherapy treatment Velcade SQ and Zometa infusion without any complications. Last vital signs:   /69   Pulse 65   Temp 97.7 °F (36.5 °C) (Oral)   Resp 18   Ht 5' 2\" (1.575 m)   Wt 173 lb 6.4 oz (78.7 kg)   LMP 12/01/2004   SpO2 98%   BMI 31.72 kg/m²     Patient instructed if experience any of the above symptoms following today's infusion/injection ,she is to notify MD immediately or go to the emergency department. Discharge instructions given to patient. Verbalizes understanding. Ambulated off unit per self, accompanied by spouse, with belongings.

## 2020-01-09 NOTE — ONCOLOGY
Chemotherapy Administration    Pre-assessment Data: Antineoplastic Agents  Other:   See toxicity flow sheet for assessment [x]     Physician Notification of Concerns Related to Chemotherapy Administration:   Physician Notified Rigo Lions / Time of Notification      Interventions:   Lab work assessed  [x]   Height / Weight verified for dose [x]   Current MAR reviewed [x]   Emergency drugs available as appropriate [x]   Anaphylaxis assessment completed [x]   Pre-medications administered as ordered [x]   Chemotherapy Administered as SQ injection [x]   Monitor for signs / symptoms of hypersensitivity reaction    [x]   Chemotherapy orders (drug/dose/rate) verified by 2 Chemo certified   RNs    [x]          Document chemotherapy teaching on the Patient Education tab    [x]   Document patient verbalizes understanding of medications being administered    [x]   Other:  Velcade []    []    []      []    []

## 2020-01-09 NOTE — PLAN OF CARE
Problem: Musculor/Skeletal Functional Status  Goal: Absence of falls  Outcome: Met This Shift  Note:   Patient free from falls while in O.P. Oncology. Intervention: Fall precautions  Note:   Patient assessed for fall risk on admission to [facility]. Fall band placed on patient. Discussed the need to use the call light for assistance prior to getting up out of chair/bed. Problem: Intellectual/Education/Knowledge Deficit  Goal: Teaching initiated upon admission  Outcome: Met This Shift  Note:   Patient verbalizes understanding to verbal information given on Velcade /Zometa ,action and possible side effects. Aware to call MD if develop complications. Intervention: Verbal/written education provided  Note:   Chemotherapy Teaching     What is Chemotherapy   Drug action [x]   Method of Administration [x]   Handouts given []     Side Effects  Nausea/vomiting [x]   Diarrhea [x]   Fatigue [x]   Signs / Symptoms of infection [x]   Neutropenia [x]   Thrombocytopenia [x]   Alopecia [x]   neuropathy [x]   [place] diet &  the importance of fluids [x]       Micellaneous  Importance of nutrition [x]   Importance of oral hygiene [x]   When to call the MD [x]   Monitoring labs [x]   Use of supportive services []     Explanation of Drug Regimen / Frequency  Velace /Zometa     Comments  Verbalized understanding to drug,action,side effects and when to call MD         Problem: Discharge Planning  Goal: Knowledge of discharge instructions  Description  Knowledge of discharge instructions     Outcome: Met This Shift  Note:   Verbalized understanding of discharge instructions, follow-up appointments, and when to call the physician. Intervention: Interaction with patient/family and care team  Note:   Discuss understanding of discharge instructions,follow-up appointments, and when to call the physician. Care plan reviewed with patient and spouse.   Patient and spouse verbalize understanding of the plan of care and contribute to goal setting.

## 2020-01-13 ENCOUNTER — TELEPHONE (OUTPATIENT)
Dept: ONCOLOGY | Age: 60
End: 2020-01-13

## 2020-01-13 NOTE — TELEPHONE ENCOUNTER
Called Dre Bauman to inform her that the alex from the [employer] was approved in the amount of $11,000. Patient was informed to bring chemotherapy bills to [facility] [phone] to submit for alex payment. Patient voiced understanding.  Billing info is as follows:  ID [id number]  [facility] [id number]  [facility] [id number]  N [id number]

## 2020-01-15 ASSESSMENT — ENCOUNTER SYMPTOMS
EYE DISCHARGE: 0
SORE THROAT: 0
CHEST TIGHTNESS: 0
SHORTNESS OF BREATH: 0
WHEEZING: 0
BLOOD IN STOOL: 0
COLOR CHANGE: 0
COUGH: 0
TROUBLE SWALLOWING: 0
BACK PAIN: 1
RECTAL PAIN: 0
VOMITING: 0
ABDOMINAL PAIN: 0
NAUSEA: 0
FACIAL SWELLING: 0
CONSTIPATION: 0
ABDOMINAL DISTENTION: 0
DIARRHEA: 0

## 2020-01-16 ENCOUNTER — OFFICE VISIT (OUTPATIENT)
Dept: ONCOLOGY | Age: 60
End: 2020-01-16
Payer: COMMERCIAL

## 2020-01-16 ENCOUNTER — HOSPITAL ENCOUNTER (OUTPATIENT)
Dept: INFUSION THERAPY | Age: 60
Discharge: HOME | End: 2020-01-16
Payer: COMMERCIAL

## 2020-01-16 VITALS
SYSTOLIC BLOOD PRESSURE: 108 MMHG | RESPIRATION RATE: 18 BRPM | HEART RATE: 77 BPM | DIASTOLIC BLOOD PRESSURE: 85 MMHG | OXYGEN SATURATION: 100 % | WEIGHT: 176.2 LBS | BODY MASS INDEX: 32.42 KG/M2 | HEIGHT: 62 IN | TEMPERATURE: 97.9 F

## 2020-01-16 VITALS
WEIGHT: 176.2 LBS | TEMPERATURE: 97.9 F | RESPIRATION RATE: 18 BRPM | SYSTOLIC BLOOD PRESSURE: 108 MMHG | BODY MASS INDEX: 32.42 KG/M2 | OXYGEN SATURATION: 100 % | HEART RATE: 77 BPM | DIASTOLIC BLOOD PRESSURE: 85 MMHG | HEIGHT: 62 IN

## 2020-01-16 DIAGNOSIS — M89.9 LYTIC LESION OF BONE ON X-RAY: ICD-10-CM

## 2020-01-16 DIAGNOSIS — C90.00 MULTIPLE MYELOMA NOT HAVING ACHIEVED REMISSION (HCC): Primary | ICD-10-CM

## 2020-01-16 PROBLEM — R22.43 LOCALIZED SWELLING, MASS AND LUMP, LOWER LIMB, BILATERAL: Status: RESOLVED | Noted: 2019-11-15 | Resolved: 2020-01-16

## 2020-01-16 LAB
BASINOPHIL, AUTOMATED: 0 % (ref 0–3)
EOSINOPHILS RELATIVE PERCENT: 3 % (ref 0–4)
HCT VFR BLD CALC: 36.7 % (ref 37–47)
HEMOGLOBIN: 12.3 GM/DL (ref 12–16)
LYMPHOCYTES # BLD: 12 % (ref 15–47)
MCH RBC QN AUTO: 33.4 PG (ref 27–31)
MCHC RBC AUTO-ENTMCNC: 33.5 GM/DL (ref 33–37)
MCV RBC AUTO: 100 FL (ref 81–99)
MONOCYTES: 10 % (ref 0–12)
PDW BLD-RTO: 14 % (ref 11.5–14.5)
PLATELET # BLD: 201 THOU/MM3 (ref 130–400)
PMV BLD AUTO: 8.6 FL (ref 7.4–10.4)
RBC # BLD: 3.67 MILL/MM3 (ref 4.2–5.4)
SEG NEUTROPHILS: 75 % (ref 43–75)
WBC # BLD: 5.1 THOU/MM3 (ref 4.8–10.8)

## 2020-01-16 PROCEDURE — G8417 CALC BMI ABV UP PARAM F/U: HCPCS | Performed by: INTERNAL MEDICINE

## 2020-01-16 PROCEDURE — 36415 COLL VENOUS BLD VENIPUNCTURE: CPT

## 2020-01-16 PROCEDURE — 2580000003 HC RX 258: Performed by: INTERNAL MEDICINE

## 2020-01-16 PROCEDURE — 99214 OFFICE O/P EST MOD 30 MIN: CPT | Performed by: INTERNAL MEDICINE

## 2020-01-16 PROCEDURE — 3017F COLORECTAL CA SCREEN DOC REV: CPT | Performed by: INTERNAL MEDICINE

## 2020-01-16 PROCEDURE — 85025 COMPLETE CBC W/AUTO DIFF WBC: CPT

## 2020-01-16 PROCEDURE — 99211 OFF/OP EST MAY X REQ PHY/QHP: CPT

## 2020-01-16 PROCEDURE — 6360000002 HC RX W HCPCS: Performed by: INTERNAL MEDICINE

## 2020-01-16 PROCEDURE — G8427 DOCREV CUR MEDS BY ELIG CLIN: HCPCS | Performed by: INTERNAL MEDICINE

## 2020-01-16 PROCEDURE — 1036F TOBACCO NON-USER: CPT | Performed by: INTERNAL MEDICINE

## 2020-01-16 PROCEDURE — 96401 CHEMO ANTI-NEOPL SQ/IM: CPT

## 2020-01-16 PROCEDURE — G8484 FLU IMMUNIZE NO ADMIN: HCPCS | Performed by: INTERNAL MEDICINE

## 2020-01-16 RX ADMIN — BORTEZOMIB 2.4 MG: 3.5 INJECTION, POWDER, LYOPHILIZED, FOR SOLUTION INTRAVENOUS; SUBCUTANEOUS at 14:51

## 2020-01-16 NOTE — PLAN OF CARE
Problem: Musculor/Skeletal Functional Status  Goal: Absence of falls  Outcome: Met This Shift  Note:   Free from falls while in O.P. Oncology. Intervention: Fall precautions  Note:   Discussed the need to use the call light for assistance when getting up to ambulate. Problem: Intellectual/Education/Knowledge Deficit  Goal: Teaching initiated upon admission  Outcome: Met This Shift  Note:   Patient verbalizes understanding to verbal information given on velcade SQ injection,action and possible side effects. Aware to call MD if develop complications. Intervention: Verbal/written education provided  Note:   Chemotherapy Teaching     What is Chemotherapy   Drug action [x]   Method of Administration [x]   Handouts given []     Side Effects  Nausea/vomiting [x]   Diarrhea [x]   Fatigue [x]   Signs / Symptoms of infection [x]   Neutropenia [x]   Thrombocytopenia [x]   Alopecia [x]   neuropathy [x]   [place] diet &  the importance of fluids [x]       Micellaneous  Importance of nutrition [x]   Importance of oral hygiene [x]   When to call the MD [x]   Monitoring labs [x]   Use of supportive services []     Explanation of Drug Regimen / Frequency  Day 15 cycle 7     Comments  Verbalized understanding to drug,action,side effects and when to call MD         Problem: Discharge Planning  Goal: Knowledge of discharge instructions  Description  Knowledge of discharge instructions     Outcome: Met This Shift  Note:   Verbalize understanding of discharge instructions, follow up appointments, and when to call Physician. Intervention: Interaction with patient/family and care team  Note:   Discuss understanding of discharge instructions, follow up appointments and when to call Physician. Care plan reviewed with patient her spouse. Patient and spouse verbalize understanding of the plan of care and contribute to goal setting.

## 2020-01-16 NOTE — PATIENT INSTRUCTIONS
1. RTC to see me last dose of RVd in 2 weeks.  On RTC labs: kappa/lambda light chain, serum electophoresis, CBC, BMP

## 2020-01-16 NOTE — PROGRESS NOTES
Patient assessed for the following post Velcade SQ injection:    Dizziness   No  Lightheadedness  No      Acute nausea/vomiting No  Headache   No  Chest pain/pressure  No  Rash/itching   No  Shortness of breath  No    Patient tolerated Velcade SQ injection without any complications. Last vital signs:   /85   Pulse 77   Temp 97.9 °F (36.6 °C) (Tympanic)   Resp 18   Ht 5' 2\" (1.575 m)   Wt 176 lb 3.2 oz (79.9 kg)   LMP 12/01/2004   SpO2 100%   BMI 32.23 kg/m²       Patient instructed if experience any of the above symptoms following today's injection she is to notify MD immediately or go to the emergency department. Discharge instructions given to patient. Verbalizes understanding. Ambulated off unit per self in stable condition with belongings.

## 2020-01-16 NOTE — ONCOLOGY
Chemotherapy Administration    Pre-assessment Data: Antineoplastic Agents  Other:   See toxicity flow sheet for assessment [x]     Physician Notification of Concerns Related to Chemotherapy Administration:   Physician Notified Brandi Deng / Time of Notification      Interventions:   Lab work assessed  [x]   Height / Weight verified for dose [x]   Current MAR reviewed [x]   Emergency drugs available as appropriate [x]   Anaphylaxis assessment completed [x]   Pre-medications administered as ordered [x]   Chemotherapy Administered as SQ injection [x]   Monitor for signs / symptoms of hypersensitivity reaction    [x]   Chemotherapy orders (drug/dose/rate) verified by 2 Chemo certified   RNs    [x]          Document chemotherapy teaching on the Patient Education tab    [x]   Document patient verbalizes understanding of medications being administered- Velcade SQ injection    [x]   Other: []    []    []      []    []

## 2020-01-23 ENCOUNTER — HOSPITAL ENCOUNTER (OUTPATIENT)
Dept: INFUSION THERAPY | Age: 60
Discharge: HOME | End: 2020-01-23
Payer: COMMERCIAL

## 2020-01-23 ENCOUNTER — TELEPHONE (OUTPATIENT)
Dept: ONCOLOGY | Age: 60
End: 2020-01-23

## 2020-01-23 VITALS
HEART RATE: 67 BPM | WEIGHT: 176 LBS | DIASTOLIC BLOOD PRESSURE: 74 MMHG | BODY MASS INDEX: 32.19 KG/M2 | TEMPERATURE: 98.3 F | SYSTOLIC BLOOD PRESSURE: 113 MMHG | OXYGEN SATURATION: 98 % | RESPIRATION RATE: 18 BRPM

## 2020-01-23 DIAGNOSIS — M89.9 LYTIC LESION OF BONE ON X-RAY: ICD-10-CM

## 2020-01-23 DIAGNOSIS — C90.00 MULTIPLE MYELOMA NOT HAVING ACHIEVED REMISSION (HCC): Primary | ICD-10-CM

## 2020-01-23 LAB
ALBUMIN SERPL-MCNC: 4.3 G/DL (ref 3.5–5.1)
ALP BLD-CCNC: 85 U/L (ref 38–126)
ALT SERPL-CCNC: 18 U/L (ref 11–66)
AST SERPL-CCNC: 14 U/L (ref 5–40)
BILIRUB SERPL-MCNC: 0.4 MG/DL (ref 0.3–1.2)
BILIRUBIN DIRECT: < 0.2 MG/DL (ref 0–0.3)
BUN, WHOLE BLOOD: 16 MG/DL (ref 8–26)
CHLORIDE, WHOLE BLOOD: 103 MEQ/L (ref 98–109)
CREATININE, WHOLE BLOOD: 0.7 MG/DL (ref 0.5–1.2)
GFR, ESTIMATED: > 90 ML/MIN/1.73M2
GLUCOSE, WHOLE BLOOD: 89 MG/DL (ref 70–108)
HCT VFR BLD CALC: 34.3 % (ref 37–47)
HEMOGLOBIN: 11.8 GM/DL (ref 12–16)
IONIZED CALCIUM, WHOLE BLOOD: 1.22 MMOL/L (ref 1.12–1.32)
MCH RBC QN AUTO: 34.5 PG (ref 27–31)
MCHC RBC AUTO-ENTMCNC: 34.3 GM/DL (ref 33–37)
MCV RBC AUTO: 101 FL (ref 81–99)
PDW BLD-RTO: 13.6 % (ref 11.5–14.5)
PLATELET # BLD: 174 THOU/MM3 (ref 130–400)
PMV BLD AUTO: 8.8 FL (ref 7.4–10.4)
POTASSIUM, WHOLE BLOOD: 3.9 MEQ/L (ref 3.5–4.9)
RBC # BLD: 3.41 MILL/MM3 (ref 4.2–5.4)
SEG NEUTROPHILS: 69 % (ref 43–75)
SEGMENTED NEUTROPHILS ABSOLUTE COUNT: 4.6 THOU/MM3 (ref 1.8–7.7)
SODIUM, WHOLE BLOOD: 138 MEQ/L (ref 138–146)
TOTAL CO2, WHOLE BLOOD: 27 MEQ/L (ref 23–33)
TOTAL PROTEIN: 6.9 G/DL (ref 6.1–8)
WBC # BLD: 6.6 THOU/MM3 (ref 4.8–10.8)

## 2020-01-23 PROCEDURE — 86334 IMMUNOFIX E-PHORESIS SERUM: CPT

## 2020-01-23 PROCEDURE — 84156 ASSAY OF PROTEIN URINE: CPT

## 2020-01-23 PROCEDURE — 85027 COMPLETE CBC AUTOMATED: CPT

## 2020-01-23 PROCEDURE — 36415 COLL VENOUS BLD VENIPUNCTURE: CPT

## 2020-01-23 PROCEDURE — 80076 HEPATIC FUNCTION PANEL: CPT

## 2020-01-23 PROCEDURE — 86335 IMMUNFIX E-PHORSIS/URINE/CSF: CPT

## 2020-01-23 PROCEDURE — 82784 ASSAY IGA/IGD/IGG/IGM EACH: CPT

## 2020-01-23 PROCEDURE — 84165 PROTEIN E-PHORESIS SERUM: CPT

## 2020-01-23 PROCEDURE — 96401 CHEMO ANTI-NEOPL SQ/IM: CPT

## 2020-01-23 PROCEDURE — 80047 BASIC METABLC PNL IONIZED CA: CPT

## 2020-01-23 PROCEDURE — 6360000002 HC RX W HCPCS: Performed by: INTERNAL MEDICINE

## 2020-01-23 PROCEDURE — 84155 ASSAY OF PROTEIN SERUM: CPT

## 2020-01-23 PROCEDURE — 2580000003 HC RX 258: Performed by: INTERNAL MEDICINE

## 2020-01-23 PROCEDURE — 83883 ASSAY NEPHELOMETRY NOT SPEC: CPT

## 2020-01-23 RX ORDER — SODIUM CHLORIDE 0.9 % (FLUSH) 0.9 %
10 SYRINGE (ML) INJECTION PRN
Status: CANCELLED | OUTPATIENT
Start: 2020-02-13

## 2020-01-23 RX ORDER — SODIUM CHLORIDE 9 MG/ML
20 INJECTION, SOLUTION INTRAVENOUS ONCE
Status: CANCELLED | OUTPATIENT
Start: 2020-01-23

## 2020-01-23 RX ORDER — HEPARIN SODIUM (PORCINE) LOCK FLUSH IV SOLN 100 UNIT/ML 100 UNIT/ML
500 SOLUTION INTRAVENOUS PRN
Status: CANCELLED | OUTPATIENT
Start: 2020-02-13

## 2020-01-23 RX ORDER — SODIUM CHLORIDE 0.9 % (FLUSH) 0.9 %
10 SYRINGE (ML) INJECTION PRN
Status: CANCELLED | OUTPATIENT
Start: 2020-01-30

## 2020-01-23 RX ORDER — SODIUM CHLORIDE 0.9 % (FLUSH) 0.9 %
5 SYRINGE (ML) INJECTION PRN
Status: CANCELLED | OUTPATIENT
Start: 2020-02-13

## 2020-01-23 RX ORDER — METHYLPREDNISOLONE SODIUM SUCCINATE 125 MG/2ML
125 INJECTION, POWDER, LYOPHILIZED, FOR SOLUTION INTRAMUSCULAR; INTRAVENOUS ONCE
Status: CANCELLED | OUTPATIENT
Start: 2020-01-23

## 2020-01-23 RX ORDER — SODIUM CHLORIDE 0.9 % (FLUSH) 0.9 %
5 SYRINGE (ML) INJECTION PRN
Status: CANCELLED | OUTPATIENT
Start: 2020-01-23

## 2020-01-23 RX ORDER — HEPARIN SODIUM (PORCINE) LOCK FLUSH IV SOLN 100 UNIT/ML 100 UNIT/ML
500 SOLUTION INTRAVENOUS PRN
Status: CANCELLED | OUTPATIENT
Start: 2020-01-23

## 2020-01-23 RX ORDER — METHYLPREDNISOLONE SODIUM SUCCINATE 125 MG/2ML
125 INJECTION, POWDER, LYOPHILIZED, FOR SOLUTION INTRAMUSCULAR; INTRAVENOUS ONCE
Status: CANCELLED | OUTPATIENT
Start: 2020-02-13

## 2020-01-23 RX ORDER — SODIUM CHLORIDE 9 MG/ML
INJECTION, SOLUTION INTRAVENOUS CONTINUOUS
Status: CANCELLED | OUTPATIENT
Start: 2020-02-13

## 2020-01-23 RX ORDER — SODIUM CHLORIDE 0.9 % (FLUSH) 0.9 %
5 SYRINGE (ML) INJECTION PRN
Status: CANCELLED | OUTPATIENT
Start: 2020-01-30

## 2020-01-23 RX ORDER — HEPARIN SODIUM (PORCINE) LOCK FLUSH IV SOLN 100 UNIT/ML 100 UNIT/ML
500 SOLUTION INTRAVENOUS PRN
Status: CANCELLED | OUTPATIENT
Start: 2020-01-30

## 2020-01-23 RX ORDER — SODIUM CHLORIDE 9 MG/ML
INJECTION, SOLUTION INTRAVENOUS CONTINUOUS
Status: CANCELLED | OUTPATIENT
Start: 2020-01-23

## 2020-01-23 RX ORDER — SODIUM CHLORIDE 0.9 % (FLUSH) 0.9 %
10 SYRINGE (ML) INJECTION PRN
Status: CANCELLED | OUTPATIENT
Start: 2020-01-23

## 2020-01-23 RX ORDER — DIPHENHYDRAMINE HYDROCHLORIDE 50 MG/ML
50 INJECTION INTRAMUSCULAR; INTRAVENOUS ONCE
Status: CANCELLED | OUTPATIENT
Start: 2020-02-13

## 2020-01-23 RX ORDER — SODIUM CHLORIDE 9 MG/ML
20 INJECTION, SOLUTION INTRAVENOUS ONCE
Status: CANCELLED | OUTPATIENT
Start: 2020-01-30

## 2020-01-23 RX ORDER — SODIUM CHLORIDE 9 MG/ML
INJECTION, SOLUTION INTRAVENOUS CONTINUOUS
Status: CANCELLED | OUTPATIENT
Start: 2020-01-30

## 2020-01-23 RX ORDER — DIPHENHYDRAMINE HYDROCHLORIDE 50 MG/ML
50 INJECTION INTRAMUSCULAR; INTRAVENOUS ONCE
Status: CANCELLED | OUTPATIENT
Start: 2020-01-30

## 2020-01-23 RX ORDER — SODIUM CHLORIDE 9 MG/ML
20 INJECTION, SOLUTION INTRAVENOUS ONCE
Status: CANCELLED | OUTPATIENT
Start: 2020-02-13

## 2020-01-23 RX ORDER — METHYLPREDNISOLONE SODIUM SUCCINATE 125 MG/2ML
125 INJECTION, POWDER, LYOPHILIZED, FOR SOLUTION INTRAMUSCULAR; INTRAVENOUS ONCE
Status: CANCELLED | OUTPATIENT
Start: 2020-01-30

## 2020-01-23 RX ORDER — DIPHENHYDRAMINE HYDROCHLORIDE 50 MG/ML
50 INJECTION INTRAMUSCULAR; INTRAVENOUS ONCE
Status: CANCELLED | OUTPATIENT
Start: 2020-01-23

## 2020-01-23 RX ADMIN — BORTEZOMIB 2.4 MG: 3.5 INJECTION, POWDER, LYOPHILIZED, FOR SOLUTION INTRAVENOUS; SUBCUTANEOUS at 15:17

## 2020-01-23 NOTE — PLAN OF CARE
Problem: Musculor/Skeletal Functional Status  Goal: Absence of falls  Outcome: Met This Shift  Note:   No falls this admission   Intervention: Fall precautions  Note:   Patient aware of fall precautions for here and at home -call light in reach while here       Problem: Intellectual/Education/Knowledge Deficit  Goal: Teaching initiated upon admission  Outcome: Met This Shift  Note:   Chemotherapy Teaching     What is Chemotherapy   Drug action [x]   Method of Administration [x]   Handouts given []     Side Effects  Nausea/vomiting [x]   Diarrhea [x]   Fatigue [x]   Signs / Symptoms of infection [x]   Neutropenia [x]   Thrombocytopenia [x]   Alopecia [x]   neuropathy [x]   [place] diet &  the importance of fluids [x]       Micellaneous  Importance of nutrition [x]   Importance of oral hygiene [x]   When to call the MD [x]   Monitoring labs [x]   Use of supportive services []     Explanation of Drug   Velcade      Comments  Verbalized understanding to drug,action,side effects and when to call MD      Goal: Written Disposition Instruction form completed  Outcome: Met This Shift  Note:   Discharge instructions given and reviewed with patient. All questions answered. Patient verbalized understanding   Intervention: Verbal/written education provided  Note:   Discharge instruction sheets     Problem: Discharge Planning  Goal: Knowledge of discharge instructions  Description  Knowledge of discharge instructions     Outcome: Met This Shift  Note:   Patient and family member able to teach back follow up appointments and when to call the doctor. Patient offers no questions at this time   Intervention: Interaction with patient/family and care team  Note:   kPatient and family currently denies any needs or concerns    Intervention: Discharge to appropriate level of care  Note:   Discharge instruction sheets  Care plan reviewed with patient and  .   Patient and  verbalize understanding of the plan of care and contribute to goal setting.

## 2020-01-23 NOTE — PROGRESS NOTES
Chemotherapy Administration    Pre-assessment Data: Antineoplastic Agents  Other:   See toxicity flow sheet for assessment [x]     Physician Notification of Concerns Related to Chemotherapy Administration:   Physician Notified Martinez Martínez / Time of Notification      Interventions:   Lab work assessed  [x]   Height / Weight verified for dose [x]   Current MAR reviewed [x]   Emergency drugs available as appropriate [x]   Anaphylaxis assessment completed [x]   Pre-medications administered as ordered [x]   Chemotherapy Administered as SQ injection [x]   Monitor for signs / symptoms of hypersensitivity reaction    [x]   Chemotherapy orders (drug/dose/rate) verified by 2 Chemo certified   RNs    [x]          Document chemotherapy teaching on the Patient Education tab    [x]   Document patient verbalizes understanding of medications being administered    [x]   Other: []    []    []      []    []

## 2020-01-23 NOTE — PROGRESS NOTES
Patient assessed for the following post chemotherapy:    Dizziness   No  Lightheadedness  No      Acute nausea/vomiting No  Headache   No  Chest pain/pressure  No  Rash/itching   No  Shortness of breath  No    Patient kept for 20 minutes observation post infusion chemotherapy. Patient tolerated chemotherapy treatment with velcade subcutaneous without any complications. Last vital signs:   /74   Pulse 67   Temp 98.3 °F (36.8 °C) (Oral)   Resp 18   Wt 176 lb (79.8 kg)   LMP 12/01/2004   SpO2 98%   BMI 32.19 kg/m²           Patient instructed if experience any of the above symptoms following today's infusion,she is to notify MD immediately or go to the emergency department. Discharge instructions given to patient. Verbalizes understanding. Ambulated off unit with  and  with belongings.

## 2020-01-26 LAB — IMMUNOFIXATION URINE: NORMAL

## 2020-01-27 LAB — KAPPA/LAMBDA FREE LIGHT CHAINS: NORMAL

## 2020-01-27 RX ORDER — LENALIDOMIDE 25 MG/1
25 CAPSULE ORAL DAILY
COMMUNITY
End: 2020-01-27 | Stop reason: SDUPTHER

## 2020-01-28 RX ORDER — LENALIDOMIDE 25 MG/1
25 CAPSULE ORAL DAILY
Qty: 21 CAPSULE | Refills: 3 | Status: SHIPPED | OUTPATIENT
Start: 2020-01-28 | End: 2020-03-17 | Stop reason: SDUPTHER

## 2020-01-29 ENCOUNTER — TELEPHONE (OUTPATIENT)
Dept: ONCOLOGY | Age: 60
End: 2020-01-29

## 2020-01-29 LAB — IMMUNOFIXATION WITH QUANT: NORMAL

## 2020-01-29 NOTE — TELEPHONE ENCOUNTER
Received a phone call from [facility] stating patient's Revlimid requires a prior authorization. Called and got it approved from 1/29/2020-1/29/2021. Approval # [id number]. Called [facility] pharmacy back to let them know it was approved. The claim did go through and they are calling to schedule delivery.

## 2020-01-30 ENCOUNTER — HOSPITAL ENCOUNTER (OUTPATIENT)
Dept: INFUSION THERAPY | Age: 60
Discharge: HOME | End: 2020-01-30
Payer: COMMERCIAL

## 2020-01-30 ENCOUNTER — OFFICE VISIT (OUTPATIENT)
Dept: ONCOLOGY | Age: 60
End: 2020-01-30
Payer: COMMERCIAL

## 2020-01-30 VITALS
BODY MASS INDEX: 32.24 KG/M2 | TEMPERATURE: 98.4 F | WEIGHT: 175.2 LBS | SYSTOLIC BLOOD PRESSURE: 125 MMHG | OXYGEN SATURATION: 98 % | RESPIRATION RATE: 18 BRPM | DIASTOLIC BLOOD PRESSURE: 79 MMHG | HEART RATE: 72 BPM | HEIGHT: 62 IN

## 2020-01-30 VITALS
OXYGEN SATURATION: 98 % | DIASTOLIC BLOOD PRESSURE: 79 MMHG | TEMPERATURE: 98.4 F | HEART RATE: 72 BPM | SYSTOLIC BLOOD PRESSURE: 125 MMHG | RESPIRATION RATE: 18 BRPM

## 2020-01-30 DIAGNOSIS — M89.9 LYTIC LESION OF BONE ON X-RAY: ICD-10-CM

## 2020-01-30 DIAGNOSIS — C90.00 MULTIPLE MYELOMA NOT HAVING ACHIEVED REMISSION (HCC): Primary | ICD-10-CM

## 2020-01-30 LAB
BASINOPHIL, AUTOMATED: 0 % (ref 0–3)
EOSINOPHILS RELATIVE PERCENT: 2 % (ref 0–4)
HCT VFR BLD CALC: 34.9 % (ref 37–47)
HEMOGLOBIN: 11.9 GM/DL (ref 12–16)
LYMPHOCYTES # BLD: 18 % (ref 15–47)
MCH RBC QN AUTO: 34.6 PG (ref 27–31)
MCHC RBC AUTO-ENTMCNC: 34 GM/DL (ref 33–37)
MCV RBC AUTO: 102 FL (ref 81–99)
MONOCYTES: 19 % (ref 0–12)
PDW BLD-RTO: 13.3 % (ref 11.5–14.5)
PLATELET # BLD: 233 THOU/MM3 (ref 130–400)
PMV BLD AUTO: 8.2 FL (ref 7.4–10.4)
RBC # BLD: 3.43 MILL/MM3 (ref 4.2–5.4)
SEG NEUTROPHILS: 61 % (ref 43–75)
WBC # BLD: 5.1 THOU/MM3 (ref 4.8–10.8)

## 2020-01-30 PROCEDURE — 99211 OFF/OP EST MAY X REQ PHY/QHP: CPT

## 2020-01-30 PROCEDURE — G8484 FLU IMMUNIZE NO ADMIN: HCPCS | Performed by: INTERNAL MEDICINE

## 2020-01-30 PROCEDURE — 36415 COLL VENOUS BLD VENIPUNCTURE: CPT

## 2020-01-30 PROCEDURE — 6360000002 HC RX W HCPCS: Performed by: INTERNAL MEDICINE

## 2020-01-30 PROCEDURE — G8417 CALC BMI ABV UP PARAM F/U: HCPCS | Performed by: INTERNAL MEDICINE

## 2020-01-30 PROCEDURE — 96401 CHEMO ANTI-NEOPL SQ/IM: CPT

## 2020-01-30 PROCEDURE — 85025 COMPLETE CBC W/AUTO DIFF WBC: CPT

## 2020-01-30 PROCEDURE — G8427 DOCREV CUR MEDS BY ELIG CLIN: HCPCS | Performed by: INTERNAL MEDICINE

## 2020-01-30 PROCEDURE — 99214 OFFICE O/P EST MOD 30 MIN: CPT | Performed by: INTERNAL MEDICINE

## 2020-01-30 PROCEDURE — 2580000003 HC RX 258: Performed by: INTERNAL MEDICINE

## 2020-01-30 PROCEDURE — 1036F TOBACCO NON-USER: CPT | Performed by: INTERNAL MEDICINE

## 2020-01-30 PROCEDURE — 96372 THER/PROPH/DIAG INJ SC/IM: CPT

## 2020-01-30 PROCEDURE — 3017F COLORECTAL CA SCREEN DOC REV: CPT | Performed by: INTERNAL MEDICINE

## 2020-01-30 RX ORDER — ACYCLOVIR 400 MG/1
400 TABLET ORAL 2 TIMES DAILY
Qty: 180 TABLET | Refills: 0 | Status: SHIPPED | OUTPATIENT
Start: 2020-01-30 | End: 2020-04-20 | Stop reason: SDUPTHER

## 2020-01-30 RX ORDER — RIVAROXABAN 20 MG/1
TABLET, FILM COATED ORAL
Qty: 30 TABLET | Refills: 3 | Status: SHIPPED | OUTPATIENT
Start: 2020-01-30 | End: 2020-03-11 | Stop reason: SDUPTHER

## 2020-01-30 RX ORDER — AMLODIPINE BESYLATE 10 MG/1
10 TABLET ORAL DAILY
Qty: 30 TABLET | Refills: 5 | Status: SHIPPED | OUTPATIENT
Start: 2020-01-30 | End: 2020-03-11 | Stop reason: SDUPTHER

## 2020-01-30 RX ORDER — DEXAMETHASONE 4 MG/1
40 TABLET ORAL SEE ADMIN INSTRUCTIONS
Qty: 30 TABLET | Refills: 3 | Status: SHIPPED | OUTPATIENT
Start: 2020-01-30 | End: 2020-10-12 | Stop reason: ALTCHOICE

## 2020-01-30 RX ADMIN — BORTEZOMIB 2.4 MG: 3.5 INJECTION, POWDER, LYOPHILIZED, FOR SOLUTION INTRAVENOUS; SUBCUTANEOUS at 14:14

## 2020-01-30 ASSESSMENT — PAIN SCALES - GENERAL: PAINLEVEL_OUTOF10: 0

## 2020-01-30 NOTE — PROGRESS NOTES
Name: Earline Johnson  : 1960  MRN: [id number]    Oncology Navigation Follow-Up Note    Contact Type:  Medical Oncology    Subjective: \"see doctor, get treatment-last cycle to start today. \"    Objective: WBC 5.1 HGB 11.9 Plt 233 Segs 61    Pt reports \"inflammation around bottom 3 teeth\"  Teeth not lose. Pt shares she had dental appt today->informed \"related to the Zometa\". -referral to [address] completed in December, but was only able to harvest enough for 1 transplant. Pt reports \"fatigue after the harvest, but improving now\". Proceeding with Day 1 Cycle #8 chemo today:  Velcade, dexamethasone; Revlimid 25 mg  Days 1-14. Financial Abbe updated that pt has not received Revlimid RX for this cycle. Babe phoned specialty pharmacy & issue resolved; Revlimid will be delivered to pt home tomorrow. Assistance Needed: denies    Receptive to Advanced Care Planning / Palliative Care:  deferred    Referrals: [facility]    Education: POC reviewed by ONC     Notes: Cycle 8 Day 1 chemo today: Velcade, Dexamethasone, Revlimid days 1-14. Abbe following to assist & support as needed.       Electronically signed by Romelia Wilkins RN on 2020 at 5:32 PM

## 2020-01-30 NOTE — PROGRESS NOTES
Chemotherapy Administration    Pre-assessment Data: Antineoplastic Agents  Other:   See toxicity flow sheet for assessment [x]     Physician Notification of Concerns Related to Chemotherapy Administration:   Physician Notified Taiwo Inch / Time of Notification      Interventions:   Lab work assessed  [x]   Height / Weight verified for dose [x]   Current MAR reviewed [x]   Emergency drugs available as appropriate [x]   Anaphylaxis assessment completed [x]   Pre-medications administered as ordered [x]   Chemotherapy Administered as SQ injection [x]   Monitor for signs / symptoms of hypersensitivity reaction    [x]   Chemotherapy orders (drug/dose/rate) verified by 2 Chemo certified   RNs    [x]          Document chemotherapy teaching on the Patient Education tab    [x]   Document patient verbalizes understanding of medications being administered    [x]   Other: []    []    []      []    []

## 2020-01-30 NOTE — PLAN OF CARE
Problem: Musculor/Skeletal Functional Status  Goal: Absence of falls  Outcome: Met This Shift  Note:   No falls this admission   Intervention: Fall precautions  Note:   Patient aware of fall precautions for here and at home -call light in reach while here       Problem: Intellectual/Education/Knowledge Deficit  Goal: Teaching initiated upon admission  Outcome: Met This Shift  Note:   Chemotherapy Teaching     What is Chemotherapy   Drug action [x]   Method of Administration [x]   Handouts given []     Side Effects  Nausea/vomiting [x]   Diarrhea [x]   Fatigue [x]   Signs / Symptoms of infection [x]   Neutropenia [x]   Thrombocytopenia [x]   Alopecia [x]   neuropathy [x]   [place] diet &  the importance of fluids [x]       Micellaneous  Importance of nutrition [x]   Importance of oral hygiene [x]   When to call the MD [x]   Monitoring labs [x]   Use of supportive services []     Explanation of Drug   Velcade subq     Comments  Verbalized understanding to drug,action,side effects and when to call MD      Goal: Written Disposition Instruction form completed  Outcome: Met This Shift  Note:   Discharge instructions given and reviewed with patient. All questions answered. Patient verbalized understanding   Intervention: Verbal/written education provided  Note:   Discharge instruction sheets     Problem: Discharge Planning  Goal: Knowledge of discharge instructions  Description  Knowledge of discharge instructions     Outcome: Met This Shift  Note:   Patient and family member able to teach back follow up appointments and when to call the doctor. Patient offers no questions at this time   Intervention: Interaction with patient/family and care team  Note:   Patient denies concerns  Intervention: Discharge to appropriate level of care  Note:   Discharge home   Care plan reviewed with patient and  . Patient and  verbalize understanding of the plan of care and contribute to goal setting.

## 2020-01-30 NOTE — PROGRESS NOTES
Patient assessed for the following post chemotherapy:    Dizziness   No  Lightheadedness  No      Acute nausea/vomiting No  Headache   No  Chest pain/pressure  No  Rash/itching   No  Shortness of breath  No    Patient kept for 20 minutes observation post infusion chemotherapy. Patient tolerated chemotherapy treatment with velcade subcutaneous without any complications. Last vital signs:   /79   Pulse 72   Temp 98.4 °F (36.9 °C) (Oral)   Resp 18   LMP 12/01/2004   SpO2 98%       Patient instructed if experience any of the above symptoms following today's infusion,she is to notify MD immediately or go to the emergency department. Discharge instructions given to patient. Verbalizes understanding. Ambulated off unit with  with belongings.

## 2020-01-30 NOTE — PATIENT INSTRUCTIONS
1. CBC today. 2. Cycle#8, day 1 of Velcade dexamethasone and Revlimid  3. Prescription for Magic mouthwash sent to the pharmacy  4. Referral to dental clinic at [facility]  5. RTC in 2 weeks.   On RTC labs: CBC

## 2020-01-31 ASSESSMENT — ENCOUNTER SYMPTOMS
SORE THROAT: 0
RECTAL PAIN: 0
DIARRHEA: 0
BACK PAIN: 1
CONSTIPATION: 0
COLOR CHANGE: 0
ABDOMINAL PAIN: 0
VOMITING: 0
TROUBLE SWALLOWING: 0
NAUSEA: 0
SHORTNESS OF BREATH: 0
FACIAL SWELLING: 0
BLOOD IN STOOL: 0
WHEEZING: 0
COUGH: 0
CHEST TIGHTNESS: 0
EYE DISCHARGE: 0
ABDOMINAL DISTENTION: 0

## 2020-02-01 NOTE — PROGRESS NOTES
[facility] [address] [facility], [address] [facility] [address]  [facility] [place]  Dept: [phone]  Dept Fax: [phone]: [phone]    Visit Date:1/16/2020     Faye Gaona is a 63 y.o. female who presents today for:   Chief Complaint   Patient presents with    Follow-up     Multiple Myeloma        HPI:    Juan Loo is a 63 WF with multiple myeloma. She was admitted to [facility] on 7/16  with  Hypercalcemia 9Ca=11.0), acute kidney injury ( creat. 4.9), both found on outpatient evaluation for several weeks of insidious, progressive fatigue and left shoulder pain. Xray showed multiple lytic lesions in the bones. Bone marrow, right iliac crest, biopsy, aspirate, and peripheral smear performed on 07/15/2019 showed: -          Plasma cell myeloma in a normocellular marrow (50%), see comment  Comment: The patient's myeloma survey dated 7/13/19 showing multiple lytic  lesions is noted. Plasma cells are 30% of cells on the aspirate smear and  occupy 40% of marrow cellularity based on a  immunostain performed on  the biopsy. Flow cytometric analysis shows that plasma cells represent 5.8%  of the total events analyzed, express CD19, CD56, CD38 and  and  analysis of cytoplasmic immunoglobulin staining shows a cKappa:cLambda  ratio of 97:0. These findings are consistent with a kappa light chain  restricted plasma cell myeloma. A Congo red stain for amyloid performed on  the biopsy is negative. SPEP, serum free light chains, serum immunoglobulins (elevated IGA 2,656 and depressed IGG/IGM)  s/p 2 sessions of pheresis given light chains >10,000  - Started chemotherapy (CyBorD) on 7/16    Acute Kidney Injury: due to multiple myeloma. Producing adequate urine. Creatinine improved with aggressive hydration. Hyperkalemia: Mildly elevated K likely due to JAME.    - Hydrate, trend K  Hypercalcemia: Hypercalcemic at OSH prior to transfer   - Hydration, trend calcium   - S/p one unit pRBC 7/16; - Continue Vitamin B12 supplementation with Vitamin B12 1000mcg daily PO  - HIV negative  - Haptoglobin, LDH without concern for acute hemolysis  She received day 1 on 7/26. On September 25, 2019 she was diagnosed with right popliteal and calf veins DVT. She is treated with Xarelto. Interim history on 01/16/2020: The patient presents to the medical oncology clinic to continue treatment with  VdR. Her treatment has been delayed for 1 week due to flu infection. The patient completed course of Tamiflu, she feels much better, her fever is resolved no cough no shortness of breath no diarrhea. The patient underwent stem cell collection at [address] [place] [address]. Past Medical History:   Diagnosis Date    Acute kidney injury ([address] 7/12/2019    Allergic rhinitis     Flonase prn    Cancer (HCC)     Myeloma    Congenital hip dislocation 1961    right leg is longer, better with weight loss    H/O umbilical hernia repair 4/74/0709    Hot flushes, perimenopausal     Hypertension 2011    Seen by MONI Contreras CNP diagnosed < 1 year    IBS (irritable bowel syndrome)     ? diagnosis - diarrhea with milk products and meat    Osteoarthritis     right knee pain    S/P laparoscopic appendectomy 7/16/2018    Snoring     denies apnea, mild daytime somnolence, just lost 35# so has more energy, denies waking coughing or choking, BMI 27, neck circ. 13.5 inches    Vitamin D deficiency       Past Surgical History:   Procedure Laterality Date    HIP SURGERY  1978    Congenital Hip multiple surgies since 7 weeks old. Last surgery 1978     HYSTERECTOMY  2004    Partial Irregular Periods with migraines.      KNEE SURGERY Right [address]    stapled to help straighten leg(congenital hip issues)    IN LAP,APPENDECTOMY N/A 7/15/2018    APPENDECTOMY LAPAROSCOPIC performed by Karyn Cortez MD at [facility]      Family History   Problem Relation Age of Onset    High Blood Pressure Mother     High Cholesterol and neck stiffness. Skin: Negative for color change, rash and wound. Neurological: Positive for headaches. Negative for dizziness, tremors, seizures, speech difficulty, weakness, light-headedness and numbness. Hematological: Negative for adenopathy. Does not bruise/bleed easily. Psychiatric/Behavioral: Negative for confusion and sleep disturbance. The patient is not nervous/anxious. Objective:   Physical Exam  Vitals signs reviewed. Constitutional:       General: She is not in acute distress. Appearance: She is well-developed. HENT:      Head: Normocephalic. Mouth/Throat:      Pharynx: No oropharyngeal exudate. Eyes:      General: No scleral icterus. Right eye: No discharge. Left eye: No discharge. Pupils: Pupils are equal, round, and reactive to light. Neck:      Musculoskeletal: Normal range of motion and neck supple. Thyroid: No thyromegaly. Vascular: No JVD. Trachea: No tracheal deviation. Cardiovascular:      Rate and Rhythm: Normal rate. Heart sounds: Normal heart sounds. No murmur. No friction rub. No gallop. Pulmonary:      Effort: Pulmonary effort is normal. No respiratory distress. Breath sounds: Normal breath sounds. No stridor. No wheezing or rales. Chest:      Chest wall: No tenderness. Abdominal:      General: Bowel sounds are normal. There is no distension. Palpations: Abdomen is soft. There is no mass. Tenderness: There is no abdominal tenderness. There is no rebound. Musculoskeletal: Normal range of motion. Comments: Good range of motion in all four extremities. Lymphadenopathy:      Cervical: No cervical adenopathy. Skin:     General: Skin is warm. Findings: No erythema or rash. Neurological:      Mental Status: She is alert and oriented to person, place, and time. Cranial Nerves: No cranial nerve deficit. Motor: No abnormal muscle tone.       Deep Tendon Reflexes: Reflexes are normal and symmetric. Psychiatric:         Behavior: Behavior normal.         Thought Content: Thought content normal.         Judgment: Judgment normal.         /85 (Site: Left Upper Arm, Position: Sitting, Cuff Size: Small Adult)   Pulse 77   Temp 97.9 °F (36.6 °C) (Tympanic)   Resp 18   Ht 5' 2\" (1.575 m)   Wt 176 lb 3.2 oz (79.9 kg)   LMP 12/01/2004   SpO2 100%   BMI 32.23 kg/m²      ECOG status is 1    Imaging studies and labs:     XRAY Myeloma survey on 07/13/2019 showed:  IMPRESSION:   Innumerable lytic lesions bilaterally, compatible with multiple myeloma. Lab Results   Component Value Date    WBC 5.1 01/30/2020    HGB 11.9 (L) 01/30/2020    HCT 34.9 (L) 01/30/2020     (H) 01/30/2020     01/30/2020       Chemistry        Component Value Date/Time     01/23/2020 1436     12/19/2019 0412    K 3.9 01/23/2020 1436    K 3.5 12/19/2019 0412    CL 99 12/19/2019 0412    CO2 24 12/19/2019 0412    BUN 10 12/19/2019 0412    CREATININE 0.7 01/23/2020 1436    CREATININE 0.8 12/19/2019 0412        Component Value Date/Time    CALCIUM 8.9 12/19/2019 0412    ALKPHOS 85 01/23/2020 1436    AST 14 01/23/2020 1436    ALT 18 01/23/2020 1436    BILITOT 0.4 01/23/2020 1436    BILITOT NEGATIVE 08/04/2015 0803            Assessment/Plan:   1. Multiple myeloma: The patient met criteria for diagnosis of multiple myeloma. She has clonal bone marrow plasma cells ? 10%, bone lesions seen on bone survey, hypercalcemia, renal insufficiency and anemia. She received first cycle of chemotherapy with Velcade Cytoxan and dexamethasone at [address] [place] [address]. 2.Treatment with Velcade Revlimid and dexamethasone. She received second cycle of VdR in [facility], due to slight renal impairment the dose of Revlimid was reduced to 15 mg daily for 14 days during cycle #2. Her creatinine has improved to normal range. Her anemia is improved as well. Therefore with cycle #4 the dose of Revlimid was increased to 25 mg.   She

## 2020-02-03 ENCOUNTER — TELEPHONE (OUTPATIENT)
Dept: ONCOLOGY | Age: 60
End: 2020-02-03

## 2020-02-06 ENCOUNTER — HOSPITAL ENCOUNTER (OUTPATIENT)
Dept: INFUSION THERAPY | Age: 60
Discharge: HOME | End: 2020-02-06
Payer: COMMERCIAL

## 2020-02-06 VITALS
RESPIRATION RATE: 18 BRPM | SYSTOLIC BLOOD PRESSURE: 130 MMHG | BODY MASS INDEX: 32.13 KG/M2 | OXYGEN SATURATION: 100 % | HEART RATE: 76 BPM | WEIGHT: 174.6 LBS | HEIGHT: 62 IN | TEMPERATURE: 98.5 F | DIASTOLIC BLOOD PRESSURE: 61 MMHG

## 2020-02-06 DIAGNOSIS — M89.9 LYTIC LESION OF BONE ON X-RAY: ICD-10-CM

## 2020-02-06 DIAGNOSIS — C90.00 MULTIPLE MYELOMA NOT HAVING ACHIEVED REMISSION (HCC): ICD-10-CM

## 2020-02-06 LAB
BASOPHILS # BLD: 0.9 %
BASOPHILS ABSOLUTE: 0 THOU/MM3 (ref 0–0.1)
EOSINOPHIL # BLD: 3.6 %
EOSINOPHILS ABSOLUTE: 0.2 THOU/MM3 (ref 0–0.4)
HCT VFR BLD CALC: 35.1 % (ref 37–47)
HEMOGLOBIN: 12 GM/DL (ref 12–16)
IMMATURE GRANS (ABS): 0.02 THOU/MM3 (ref 0–0.07)
IMMATURE GRANULOCYTES: 0.4 %
LYMPHOCYTES # BLD: 7.9 %
LYMPHOCYTES ABSOLUTE: 0.4 THOU/MM3 (ref 1–4.8)
MCH RBC QN AUTO: 34.7 PG (ref 27–31)
MCHC RBC AUTO-ENTMCNC: 34.1 GM/DL (ref 33–37)
MCV RBC AUTO: 102 FL (ref 81–99)
MONOCYTES # BLD: 8.7 %
MONOCYTES ABSOLUTE: 0.4 THOU/MM3 (ref 0.4–1.3)
NUCLEATED RED BLOOD CELLS: 0 /100 WBC
PDW BLD-RTO: 12.6 % (ref 11.5–14.5)
PLATELET # BLD: 230 THOU/MM3 (ref 130–400)
PMV BLD AUTO: 9.1 FL (ref 7.4–10.4)
RBC # BLD: 3.44 MILL/MM3 (ref 4.2–5.4)
SEG NEUTROPHILS: 78.5 %
SEGMENTED NEUTROPHILS ABSOLUTE COUNT: 3.7 THOU/MM3 (ref 1.8–7.7)
WBC # BLD: 4.7 THOU/MM3 (ref 4.8–10.8)

## 2020-02-06 PROCEDURE — 99211 OFF/OP EST MAY X REQ PHY/QHP: CPT

## 2020-02-06 PROCEDURE — 36415 COLL VENOUS BLD VENIPUNCTURE: CPT

## 2020-02-06 PROCEDURE — 85025 COMPLETE CBC W/AUTO DIFF WBC: CPT

## 2020-02-06 ASSESSMENT — PAIN DESCRIPTION - DESCRIPTORS: DESCRIPTORS: CONSTANT

## 2020-02-06 ASSESSMENT — PAIN DESCRIPTION - PAIN TYPE: TYPE: ACUTE PAIN

## 2020-02-06 ASSESSMENT — PAIN DESCRIPTION - FREQUENCY: FREQUENCY: CONTINUOUS

## 2020-02-06 ASSESSMENT — PAIN DESCRIPTION - ONSET: ONSET: ON-GOING

## 2020-02-06 ASSESSMENT — PAIN DESCRIPTION - ORIENTATION: ORIENTATION: LOWER

## 2020-02-06 ASSESSMENT — PAIN DESCRIPTION - PROGRESSION: CLINICAL_PROGRESSION: NOT CHANGED

## 2020-02-06 ASSESSMENT — PAIN SCALES - GENERAL: PAINLEVEL_OUTOF10: 5

## 2020-02-06 ASSESSMENT — PAIN DESCRIPTION - LOCATION: LOCATION: JAW

## 2020-02-06 NOTE — PROGRESS NOTES
Verbalize understanding of discharge instructions, follow up appointments, and when to call Physician. Patient having pain in lower jaw. Patient going to [employer].

## 2020-02-13 ENCOUNTER — HOSPITAL ENCOUNTER (OUTPATIENT)
Dept: INFUSION THERAPY | Age: 60
Discharge: HOME | End: 2020-02-13
Payer: COMMERCIAL

## 2020-02-13 ENCOUNTER — OFFICE VISIT (OUTPATIENT)
Dept: ONCOLOGY | Age: 60
End: 2020-02-13
Payer: COMMERCIAL

## 2020-02-13 VITALS
BODY MASS INDEX: 32.13 KG/M2 | HEART RATE: 65 BPM | DIASTOLIC BLOOD PRESSURE: 72 MMHG | OXYGEN SATURATION: 97 % | WEIGHT: 174.6 LBS | TEMPERATURE: 98.3 F | SYSTOLIC BLOOD PRESSURE: 137 MMHG | RESPIRATION RATE: 18 BRPM | HEIGHT: 62 IN

## 2020-02-13 VITALS
HEIGHT: 62 IN | WEIGHT: 174.6 LBS | TEMPERATURE: 98.1 F | OXYGEN SATURATION: 97 % | SYSTOLIC BLOOD PRESSURE: 123 MMHG | BODY MASS INDEX: 32.13 KG/M2 | DIASTOLIC BLOOD PRESSURE: 71 MMHG | RESPIRATION RATE: 16 BRPM | HEART RATE: 71 BPM

## 2020-02-13 DIAGNOSIS — M89.9 LYTIC LESION OF BONE ON X-RAY: ICD-10-CM

## 2020-02-13 DIAGNOSIS — C90.00 MULTIPLE MYELOMA NOT HAVING ACHIEVED REMISSION (HCC): Primary | ICD-10-CM

## 2020-02-13 LAB
BASOPHILS # BLD: 1.2 %
BASOPHILS ABSOLUTE: 0.1 THOU/MM3 (ref 0–0.1)
EOSINOPHIL # BLD: 3.4 %
EOSINOPHILS ABSOLUTE: 0.1 THOU/MM3 (ref 0–0.4)
HCT VFR BLD CALC: 35.4 % (ref 37–47)
HEMOGLOBIN: 12.2 GM/DL (ref 12–16)
IMMATURE GRANS (ABS): 0.02 THOU/MM3 (ref 0–0.07)
IMMATURE GRANULOCYTES: 0.5 %
LYMPHOCYTES # BLD: 10.9 %
LYMPHOCYTES ABSOLUTE: 0.5 THOU/MM3 (ref 1–4.8)
MCH RBC QN AUTO: 35.3 PG (ref 27–31)
MCHC RBC AUTO-ENTMCNC: 34.5 GM/DL (ref 33–37)
MCV RBC AUTO: 102 FL (ref 81–99)
MONOCYTES # BLD: 13.5 %
MONOCYTES ABSOLUTE: 0.6 THOU/MM3 (ref 0.4–1.3)
NUCLEATED RED BLOOD CELLS: 0 /100 WBC
PDW BLD-RTO: 12.3 % (ref 11.5–14.5)
PLATELET # BLD: 183 THOU/MM3 (ref 130–400)
PMV BLD AUTO: 9.1 FL (ref 7.4–10.4)
RBC # BLD: 3.46 MILL/MM3 (ref 4.2–5.4)
SEG NEUTROPHILS: 70.5 %
SEGMENTED NEUTROPHILS ABSOLUTE COUNT: 3 THOU/MM3 (ref 1.8–7.7)
WBC # BLD: 4.2 THOU/MM3 (ref 4.8–10.8)

## 2020-02-13 PROCEDURE — G8427 DOCREV CUR MEDS BY ELIG CLIN: HCPCS | Performed by: PHYSICIAN ASSISTANT

## 2020-02-13 PROCEDURE — 36415 COLL VENOUS BLD VENIPUNCTURE: CPT

## 2020-02-13 PROCEDURE — 85025 COMPLETE CBC W/AUTO DIFF WBC: CPT

## 2020-02-13 PROCEDURE — G8417 CALC BMI ABV UP PARAM F/U: HCPCS | Performed by: PHYSICIAN ASSISTANT

## 2020-02-13 PROCEDURE — G8484 FLU IMMUNIZE NO ADMIN: HCPCS | Performed by: PHYSICIAN ASSISTANT

## 2020-02-13 PROCEDURE — 1036F TOBACCO NON-USER: CPT | Performed by: PHYSICIAN ASSISTANT

## 2020-02-13 PROCEDURE — 3017F COLORECTAL CA SCREEN DOC REV: CPT | Performed by: PHYSICIAN ASSISTANT

## 2020-02-13 PROCEDURE — 96401 CHEMO ANTI-NEOPL SQ/IM: CPT

## 2020-02-13 PROCEDURE — 99211 OFF/OP EST MAY X REQ PHY/QHP: CPT

## 2020-02-13 PROCEDURE — 6360000002 HC RX W HCPCS: Performed by: INTERNAL MEDICINE

## 2020-02-13 PROCEDURE — 99214 OFFICE O/P EST MOD 30 MIN: CPT | Performed by: PHYSICIAN ASSISTANT

## 2020-02-13 PROCEDURE — 2580000003 HC RX 258: Performed by: INTERNAL MEDICINE

## 2020-02-13 RX ADMIN — BORTEZOMIB 2.4 MG: 3.5 INJECTION, POWDER, LYOPHILIZED, FOR SOLUTION INTRAVENOUS; SUBCUTANEOUS at 11:00

## 2020-02-13 ASSESSMENT — PAIN DESCRIPTION - DESCRIPTORS: DESCRIPTORS: CONSTANT

## 2020-02-13 ASSESSMENT — PAIN DESCRIPTION - ONSET: ONSET: ON-GOING

## 2020-02-13 ASSESSMENT — PAIN SCALES - GENERAL: PAINLEVEL_OUTOF10: 4

## 2020-02-13 ASSESSMENT — PAIN DESCRIPTION - ORIENTATION: ORIENTATION: LOWER

## 2020-02-13 ASSESSMENT — PAIN DESCRIPTION - FREQUENCY: FREQUENCY: CONTINUOUS

## 2020-02-13 ASSESSMENT — PAIN DESCRIPTION - PAIN TYPE: TYPE: ACUTE PAIN

## 2020-02-13 ASSESSMENT — PAIN DESCRIPTION - PROGRESSION: CLINICAL_PROGRESSION: GRADUALLY IMPROVING

## 2020-02-13 ASSESSMENT — PAIN DESCRIPTION - LOCATION: LOCATION: JAW

## 2020-02-13 NOTE — PLAN OF CARE
Care plan reviewed with patient. Patient verbalized understanding of the plan of care and contribute to goal setting. Problem: Intellectual/Education/Knowledge Deficit  Goal: Teaching initiated upon admission  Outcome: Met This Shift  Note:   Patient verbalizes understanding to verbal information given on velcade,action and possible side effects. Aware to call MD if develop complications. Intervention: Verbal/written education provided  Note:   Chemotherapy Teaching     What is Chemotherapy   Drug action [x]   Method of Administration [x]   Handouts given []     Side Effects  Nausea/vomiting [x]   Diarrhea [x]   Fatigue [x]   Signs / Symptoms of infection [x]   Neutropenia [x]   Thrombocytopenia [x]   Alopecia [x]   neuropathy [x]   [place] diet &  the importance of fluids [x]       Micellaneous  Importance of nutrition [x]   Importance of oral hygiene [x]   When to call the MD [x]   Monitoring labs [x]   Use of supportive services []     Explanation of Drug Regimen / Frequency  velcade     Comments  Verbalized understanding to drug,action,side effects and when to call MD         Problem: Discharge Planning  Goal: Knowledge of discharge instructions  Description  Knowledge of discharge instructions     Outcome: Met This Shift  Note:   Verbalized understanding of discharge instructions, follow ups and when to call doctor   Intervention: Discharge to appropriate level of care  Note:   Discuss discharge instructions, follow ups and when to call doctor. Problem: Falls - Risk of:  Goal: Will remain free from falls  Description  Will remain free from falls  Outcome: Met This Shift  Note:   Free from falls while in infusion center.  Verbalized understanding of fall prevention and to ask for assistance with ambulation   Intervention: Assess risk factors for falls  Description  Assess risk factors for falls  Note:   Assess for fall risk, instruct to ask for assistance with ambulation

## 2020-02-13 NOTE — PROGRESS NOTES
Patient assessed for the following post chemotherapy:    Dizziness   No  Lightheadedness  No      Acute nausea/vomiting No  Headache   No  Chest pain/pressure  No  Rash/itching   No  Shortness of breath  No      Patient tolerated chemotherapy treatment velcade injection without any complications. Last vital signs:   /72   Pulse 65   Temp 98.3 °F (36.8 °C) (Oral)   Resp 18   Ht 5' 2\" (1.575 m)   Wt 174 lb 9.6 oz (79.2 kg)   LMP 12/01/2004   SpO2 97%   BMI 31.93 kg/m²         Patient instructed if experience any of the above symptoms following today's infusion,he/she is to notify MD immediately or go to the emergency department. Discharge instructions given to patient. Verbalizes understanding. Ambulated off unit per self with belongings.

## 2020-02-13 NOTE — PROGRESS NOTES
Hypercalcemic at OSH prior to transfer   - Hydration, trend calcium   - S/p one unit pRBC 7/16; - Continue Vitamin B12 supplementation with Vitamin B12 1000mcg daily PO  - HIV negative  - Haptoglobin, LDH without concern for acute hemolysis  She received day 1 on 7/26. On September 25, 2019 she was diagnosed with right popliteal and calf veins DVT. She is treated with Xarelto. Interval History 2/13/2020: The patient is here for follow up evaluation. She was seen by Dr. Toña Gardner and began cycle #8, day 1 of Velcade, dexamethasone and Revlimid on 1/30/20. Day #8 of Velcade was held one week ago due to dental pain and possible infection. 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 She has been on Zometa. She was started on penicillin and given mouthwash. Note in Care Everywhere pending. She has follow up in four weeks. Today, the patient states she is feeling well. She denies signs/symptoms of infection. Denies chest pain, cough, shortness of breath, abdominal pain, nausea, vomiting, bowel or urinary changes. Denies mucositis. She affirms bilateral peripheral edema in the ankles after sitting for long periods of time. Affirms intermittent peripheral neuropathy of the toes in her right foot which is short lasting and resolves on its own. HPI   Past Medical History:   Diagnosis Date    Acute kidney injury ([address] 7/12/2019    Allergic rhinitis     Flonase prn    Cancer (HCC)     Myeloma    Congenital hip dislocation 1961    right leg is longer, better with weight loss    H/O umbilical hernia repair 1/39/6171    Hot flushes, perimenopausal     Hypertension 2011    Seen by MONI Cazares CNP diagnosed < 1 year    IBS (irritable bowel syndrome)     ?  diagnosis - diarrhea with milk products and meat    Osteoarthritis     right knee pain    S/P laparoscopic appendectomy 7/16/2018    Snoring     denies apnea, mild daytime somnolence, just lost 35# so has more energy, denies waking coughing or choking, BMI 27, neck circ. 13.5 inches    Vitamin D deficiency       Past Surgical History:   Procedure Laterality Date    HIP SURGERY  1978    Congenital Hip multiple surgies since 7 weeks old. Last surgery 1978     HYSTERECTOMY  2004    Partial Irregular Periods with migraines.     [address]    stapled to help straighten leg(congenital hip issues)    [place] LAP,APPENDECTOMY N/A 7/15/2018    APPENDECTOMY LAPAROSCOPIC performed by Rufina Chun [facility] at [facility]      Family History   Problem Relation Age of Onset    High Blood Pressure Mother     High Cholesterol Mother     Cancer Father         skin cancer    Cancer Paternal Grandmother     Cancer Paternal Grandfather     Deep Vein Thrombosis Brother         PE      Social History     Tobacco Use    Smoking status: Never Smoker    Smokeless tobacco: Never Used   Substance Use Topics    Alcohol use: Yes     Comment: Socially      Current Outpatient Medications   Medication Sig Dispense Refill    Magic Mouthwash (MIRACLE MOUTHWASH) Swish and spit 5 mLs 4 times daily as needed for Irritation Equal parts:  Maalox: lidocaine: benadryl: nystatin 200 mL 1    amLODIPine (NORVASC) 10 MG tablet Take 1 tablet by mouth daily 30 tablet 5    cyanocobalamin 1000 MCG tablet Take 1 tablet by mouth daily 30 tablet 2    acyclovir (ZOVIRAX) 400 MG tablet Take 1 tablet by mouth 2 times daily 180 tablet 0    dexamethasone (DECADRON) 4 MG tablet Take 10 tablets by mouth See Admin Instructions for 3 doses Take 40 mg (10 tabs) on Days 1, 8, 15. 30 tablet 3    XARELTO 20 MG TABS tablet TAKE 1 TABLET BY MOUTH EVERY DAY WITH BREAKFAST 30 tablet 3    lenalidomide (REVLIMID) 25 MG chemo capsule Take 1 capsule by mouth daily 21 days on, 7 days off. 21 capsule 3    lenalidomide (REVLIMID) 20 MG chemo capsule Take 1 capsule by mouth

## 2020-02-13 NOTE — PROGRESS NOTES
Chemotherapy Administration    Pre-assessment Data: Antineoplastic Agents  Other:   See toxicity flow sheet for assessment [x]     Physician Notification of Concerns Related to Chemotherapy Administration:   Physician Notified Geronimo Good / Time of Notification      Interventions:   Lab work assessed  [x]   Height / Weight verified for dose [x]   Current MAR reviewed [x]   Emergency drugs available as appropriate [x]   Anaphylaxis assessment completed [x]   Pre-medications administered as ordered [x]   Chemotherapy Administered as SQ injection [x]   Monitor for signs / symptoms of hypersensitivity reaction    [x]   Chemotherapy orders (drug/dose/rate) verified by 2 Chemo certified   RNs    [x]          Document chemotherapy teaching on the Patient Education tab    [x]   Document patient verbalizes understanding of medications being administered    [x]   Other: []    []    []      []    []

## 2020-02-18 ASSESSMENT — ENCOUNTER SYMPTOMS
COLOR CHANGE: 0
SHORTNESS OF BREATH: 0
RECTAL PAIN: 0
EYE DISCHARGE: 0
ABDOMINAL PAIN: 0
TROUBLE SWALLOWING: 0
CHEST TIGHTNESS: 0
BACK PAIN: 1
DIARRHEA: 0
WHEEZING: 0
VOMITING: 0
ABDOMINAL DISTENTION: 0
BLOOD IN STOOL: 0
SORE THROAT: 0
NAUSEA: 0
CONSTIPATION: 0
COUGH: 0
FACIAL SWELLING: 0

## 2020-02-19 NOTE — PROGRESS NOTES
Vitamin B12 supplementation with Vitamin B12 1000mcg daily PO  - HIV negative  - Haptoglobin, LDH without concern for acute hemolysis  She received day 1 on 7/26. On September 25, 2019 she was diagnosed with right popliteal and calf veins DVT. She is treated with Xarelto. The patient underwent stem cell collection at [facility]. Interim history on 01/30/2020: The patient presents to the medical oncology clinic to continue treatment with  VdR. Today she will start her cycle number 8-day 1. The patient reports worsening pain and discomfort in her her right jaw. It started around the time of her influenza A infection. She denies having any fevers      Past Medical History:   Diagnosis Date    Acute kidney injury ([address] 7/12/2019    Allergic rhinitis     Flonase prn    Cancer (HCC)     Myeloma    Congenital hip dislocation 1961    right leg is longer, better with weight loss    H/O umbilical hernia repair 1/19/3433    Hot flushes, perimenopausal     Hypertension 2011    Seen by MONI Puckett CNP diagnosed < 1 year    IBS (irritable bowel syndrome)     ? diagnosis - diarrhea with milk products and meat    Osteoarthritis     right knee pain    S/P laparoscopic appendectomy 7/16/2018    Snoring     denies apnea, mild daytime somnolence, just lost 35# so has more energy, denies waking coughing or choking, BMI 27, neck circ. 13.5 inches    Vitamin D deficiency       Past Surgical History:   Procedure Laterality Date    HIP SURGERY  1978    Congenital Hip multiple surgies since 7 weeks old. Last surgery 1978     HYSTERECTOMY  2004    Partial Irregular Periods with migraines.      KNEE SURGERY Right [address]    stapled to help straighten leg(congenital hip issues)    NM LAP,APPENDECTOMY N/A 7/15/2018    APPENDECTOMY LAPAROSCOPIC performed by Arben Hernandez MD at [facility]      Family History   Problem Relation Age of Onset    High Blood Pressure Mother     High Mouthwash (MIRACLE MOUTHWASH) Swish and spit 5 mLs 4 times daily as needed for Irritation Equal parts:  Maalox: lidocaine: benadryl: nystatin 200 mL 1    XARELTO 20 MG TABS tablet TAKE 1 TABLET BY MOUTH EVERY DAY WITH BREAKFAST 30 tablet 3    lenalidomide (REVLIMID) 15 MG chemo capsule Take 1 capsule by mouth daily for 14 days 14 capsule 2    UNABLE TO FIND valcade injection once weekly on wed      Zoledronic Acid (ZOMETA IV) Infuse intravenously       No current facility-administered medications for this visit. Allergies   Allergen Reactions    Sulfa Antibiotics Rash      Health Maintenance   Topic Date Due    Hepatitis C screen  1960    Pneumococcal 0-64 years Vaccine (1 of 3 - PCV13) 10/19/1967    HIV screen  10/19/1976    Diabetes screen  10/19/2001    Shingles Vaccine (1 of 2) 10/19/2011    Flu vaccine (1) 09/01/2019    Colon cancer screen colonoscopy  10/03/2019    Potassium monitoring  12/19/2020    Creatinine monitoring  12/19/2020    Cervical cancer screen  10/22/2021    Breast cancer screen  12/03/2021    DTaP/Tdap/Td vaccine (2 - Td) 06/04/2022    Lipid screen  04/25/2024    Hepatitis A vaccine  Aged Out    Hepatitis B vaccine  Aged Out    Hib vaccine  Aged Out    Meningococcal (ACWY) vaccine  Aged Out        Subjective:   Review of Systems   Constitutional: Positive for fatigue. Negative for activity change, appetite change and fever. HENT: Negative for congestion, dental problem, facial swelling, hearing loss, mouth sores, nosebleeds, sore throat, tinnitus and trouble swallowing. Eyes: Negative for discharge and visual disturbance. Respiratory: Negative for cough, chest tightness, shortness of breath and wheezing. Cardiovascular: Negative for chest pain, palpitations and leg swelling. Gastrointestinal: Negative for abdominal distention, abdominal pain, blood in stool, constipation, diarrhea, nausea, rectal pain and vomiting.    Endocrine: Negative for cold intolerance, polydipsia and polyuria. Genitourinary: Negative for decreased urine volume, difficulty urinating, dysuria, flank pain, hematuria and urgency. Musculoskeletal: Positive for arthralgias and back pain. Negative for gait problem, joint swelling, myalgias and neck stiffness. Skin: Negative for color change, rash and wound. Neurological: Positive for headaches. Negative for dizziness, tremors, seizures, speech difficulty, weakness, light-headedness and numbness. Hematological: Negative for adenopathy. Does not bruise/bleed easily. Psychiatric/Behavioral: Negative for confusion and sleep disturbance. The patient is not nervous/anxious. Objective:   Physical Exam  Vitals signs reviewed. Constitutional:       General: She is not in acute distress. Appearance: She is well-developed. HENT:      Head: Normocephalic. Mouth/Throat:      Pharynx: No oropharyngeal exudate. Eyes:      General: No scleral icterus. Right eye: No discharge. Left eye: No discharge. Pupils: Pupils are equal, round, and reactive to light. Neck:      Musculoskeletal: Normal range of motion and neck supple. Thyroid: No thyromegaly. Vascular: No JVD. Trachea: No tracheal deviation. Cardiovascular:      Rate and Rhythm: Normal rate. Heart sounds: Normal heart sounds. No murmur. No friction rub. No gallop. Pulmonary:      Effort: Pulmonary effort is normal. No respiratory distress. Breath sounds: Normal breath sounds. No stridor. No wheezing or rales. Chest:      Chest wall: No tenderness. Abdominal:      General: Bowel sounds are normal. There is no distension. Palpations: Abdomen is soft. There is no mass. Tenderness: There is no abdominal tenderness. There is no rebound. Musculoskeletal: Normal range of motion. Comments: Good range of motion in all four extremities. Lymphadenopathy:      Cervical: No cervical adenopathy.    Skin: General: Skin is warm. Findings: No erythema or rash. Neurological:      Mental Status: She is alert and oriented to person, place, and time. Cranial Nerves: No cranial nerve deficit. Motor: No abnormal muscle tone. Deep Tendon Reflexes: Reflexes are normal and symmetric. Psychiatric:         Behavior: Behavior normal.         Thought Content: Thought content normal.         Judgment: Judgment normal.         /79 (Site: Right Upper Arm, Position: Sitting, Cuff Size: Medium Adult)   Pulse 72   Temp 98.4 °F (36.9 °C) (Oral)   Resp 18   Ht 5' 2\" (1.575 m)   Wt 175 lb 3.2 oz (79.5 kg)   LMP 12/01/2004   SpO2 98%   BMI 32.04 kg/m²      ECOG status is 1    Imaging studies and labs:     XRAY Myeloma survey on 07/13/2019 showed:  IMPRESSION:   Innumerable lytic lesions bilaterally, compatible with multiple myeloma. Lab Results   Component Value Date    WBC 4.2 (L) 02/13/2020    HGB 12.2 02/13/2020    HCT 35.4 (L) 02/13/2020     (H) 02/13/2020     02/13/2020       Chemistry        Component Value Date/Time     01/23/2020 1436     12/19/2019 0412    K 3.9 01/23/2020 1436    K 3.5 12/19/2019 0412    CL 99 12/19/2019 0412    CO2 24 12/19/2019 0412    BUN 10 12/19/2019 0412    CREATININE 0.7 01/23/2020 1436    CREATININE 0.8 12/19/2019 0412        Component Value Date/Time    CALCIUM 8.9 12/19/2019 0412    ALKPHOS 85 01/23/2020 1436    AST 14 01/23/2020 1436    ALT 18 01/23/2020 1436    BILITOT 0.4 01/23/2020 1436    BILITOT NEGATIVE 08/04/2015 0803            Assessment/Plan:   1. Multiple myeloma: The patient met criteria for diagnosis of multiple myeloma. She has clonal bone marrow plasma cells ? 10%, bone lesions seen on bone survey, hypercalcemia, renal insufficiency and anemia. She received first cycle of chemotherapy with Velcade Cytoxan and dexamethasone at [facility]. 2.Treatment with Velcade Revlimid and dexamethasone.   She received second cycle of VdR in [place], due to slight renal impairment the dose of Revlimid was reduced to 15 mg daily for 14 days during cycle #2. Her creatinine has improved to normal range. Her anemia is improved as well. Therefore with cycle #4 the dose of Revlimid was increased to 25 mg. She received 6 cycles of RVD when she underwent stem cell collection at [facility] this week. The plan is to continue with 2 more cycles of RVD after stem cell collection and then place her on maintenance Revlimid. Her cycle #7 was delayed by 1 week due to influenza A infection. She presents to medical oncology clinic for cycle #8 which will be her last before maintenance Revlimid  3. Right leg DVT. Known side effect from Revlimid and dexamethasone. The patient is on Xarelto  4. New gum and  jaw pain. The patient is referred to dental clinic at [facility] for further evaluation, meantime she will be using Magic mouthwash. 5.  Neutropenia. Most likely secondary to recent viral infection. Resolved. Diagnosis Orders   1. Multiple myeloma not having achieved remission (HCC)  acyclovir (ZOVIRAX) 400 MG tablet    dexamethasone (DECADRON) 4 MG tablet   2. Essential hypertension  amLODIPine (NORVASC) 10 MG tablet   3. Lytic lesion of bone on x-ray  acyclovir (ZOVIRAX) 400 MG tablet    dexamethasone (DECADRON) 4 MG tablet   4. Acute deep vein thrombosis (DVT) of right popliteal vein (HCC)     5. Encounter for chemotherapy management          Plan:   No follow-ups on file. Orders Placed:   No orders of the defined types were placed in this encounter.

## 2020-02-20 ENCOUNTER — HOSPITAL ENCOUNTER (OUTPATIENT)
Dept: INFUSION THERAPY | Age: 60
Discharge: HOME | End: 2020-02-20
Payer: COMMERCIAL

## 2020-02-20 VITALS
SYSTOLIC BLOOD PRESSURE: 126 MMHG | WEIGHT: 174.6 LBS | DIASTOLIC BLOOD PRESSURE: 70 MMHG | HEART RATE: 68 BPM | RESPIRATION RATE: 18 BRPM | TEMPERATURE: 98.8 F | OXYGEN SATURATION: 98 % | HEIGHT: 62 IN | BODY MASS INDEX: 32.13 KG/M2

## 2020-02-20 DIAGNOSIS — C90.00 MULTIPLE MYELOMA NOT HAVING ACHIEVED REMISSION (HCC): Primary | ICD-10-CM

## 2020-02-20 DIAGNOSIS — Z51.11 ENCOUNTER FOR CHEMOTHERAPY MANAGEMENT: ICD-10-CM

## 2020-02-20 DIAGNOSIS — M89.9 LYTIC LESION OF BONE ON X-RAY: ICD-10-CM

## 2020-02-20 LAB
ALBUMIN SERPL-MCNC: 4.3 G/DL (ref 3.5–5.1)
ALP BLD-CCNC: 78 U/L (ref 38–126)
ALT SERPL-CCNC: 16 U/L (ref 11–66)
AST SERPL-CCNC: 15 U/L (ref 5–40)
BILIRUB SERPL-MCNC: 0.3 MG/DL (ref 0.3–1.2)
BILIRUBIN DIRECT: < 0.2 MG/DL (ref 0–0.3)
BUN BLDV-MCNC: 17 MG/DL (ref 7–22)
CHLORIDE, WHOLE BLOOD: 105 MEQ/L (ref 98–109)
CO2: 24 MEQ/L (ref 23–33)
CREATININE, WHOLE BLOOD: 0.9 MG/DL (ref 0.5–1.2)
GFR, ESTIMATED: 68 ML/MIN/1.73M2
GLUCOSE, WHOLE BLOOD: 101 MG/DL (ref 70–108)
HCT VFR BLD CALC: 36.4 % (ref 37–47)
HEMOGLOBIN: 12.5 GM/DL (ref 12–16)
IONIZED CALCIUM, WHOLE BLOOD: 1.25 MMOL/L (ref 1.12–1.32)
MCH RBC QN AUTO: 35.4 PG (ref 27–31)
MCHC RBC AUTO-ENTMCNC: 34.4 GM/DL (ref 33–37)
MCV RBC AUTO: 103 FL (ref 81–99)
PDW BLD-RTO: 12.1 % (ref 11.5–14.5)
PLATELET # BLD: 209 THOU/MM3 (ref 130–400)
PMV BLD AUTO: 8.5 FL (ref 7.4–10.4)
POTASSIUM, WHOLE BLOOD: 4.5 MEQ/L (ref 3.5–4.9)
RBC # BLD: 3.54 MILL/MM3 (ref 4.2–5.4)
SEG NEUTROPHILS: 48 % (ref 43–75)
SEGMENTED NEUTROPHILS ABSOLUTE COUNT: 1.7 THOU/MM3 (ref 1.8–7.7)
SODIUM, WHOLE BLOOD: 146 MEQ/L (ref 138–146)
TOTAL PROTEIN: 6.9 G/DL (ref 6.1–8)
WBC # BLD: 3.5 THOU/MM3 (ref 4.8–10.8)

## 2020-02-20 PROCEDURE — 80076 HEPATIC FUNCTION PANEL: CPT

## 2020-02-20 PROCEDURE — 85027 COMPLETE CBC AUTOMATED: CPT

## 2020-02-20 PROCEDURE — 36415 COLL VENOUS BLD VENIPUNCTURE: CPT

## 2020-02-20 PROCEDURE — 2580000003 HC RX 258: Performed by: INTERNAL MEDICINE

## 2020-02-20 PROCEDURE — 84520 ASSAY OF UREA NITROGEN: CPT

## 2020-02-20 PROCEDURE — 82374 ASSAY BLOOD CARBON DIOXIDE: CPT

## 2020-02-20 PROCEDURE — 80047 BASIC METABLC PNL IONIZED CA: CPT

## 2020-02-20 PROCEDURE — 6360000002 HC RX W HCPCS: Performed by: INTERNAL MEDICINE

## 2020-02-20 PROCEDURE — 96401 CHEMO ANTI-NEOPL SQ/IM: CPT

## 2020-02-20 RX ORDER — CHLORHEXIDINE GLUCONATE 0.12 MG/ML
15 RINSE ORAL 2 TIMES DAILY PRN
COMMUNITY
End: 2022-02-11

## 2020-02-20 RX ORDER — DIPHENHYDRAMINE HYDROCHLORIDE 50 MG/ML
50 INJECTION INTRAMUSCULAR; INTRAVENOUS ONCE
Status: CANCELLED | OUTPATIENT
Start: 2020-03-13

## 2020-02-20 RX ORDER — HEPARIN SODIUM (PORCINE) LOCK FLUSH IV SOLN 100 UNIT/ML 100 UNIT/ML
500 SOLUTION INTRAVENOUS PRN
Status: CANCELLED | OUTPATIENT
Start: 2020-03-13

## 2020-02-20 RX ORDER — HEPARIN SODIUM (PORCINE) LOCK FLUSH IV SOLN 100 UNIT/ML 100 UNIT/ML
500 SOLUTION INTRAVENOUS PRN
Status: CANCELLED | OUTPATIENT
Start: 2020-02-20

## 2020-02-20 RX ORDER — SODIUM CHLORIDE 9 MG/ML
20 INJECTION, SOLUTION INTRAVENOUS ONCE
Status: CANCELLED | OUTPATIENT
Start: 2020-03-13

## 2020-02-20 RX ORDER — LACTOBACILLUS RHAMNOSUS GG 10B CELL
1 CAPSULE ORAL DAILY
COMMUNITY
End: 2020-03-17 | Stop reason: ALTCHOICE

## 2020-02-20 RX ORDER — SODIUM CHLORIDE 0.9 % (FLUSH) 0.9 %
5 SYRINGE (ML) INJECTION PRN
Status: CANCELLED | OUTPATIENT
Start: 2020-02-20

## 2020-02-20 RX ORDER — METHYLPREDNISOLONE SODIUM SUCCINATE 125 MG/2ML
125 INJECTION, POWDER, LYOPHILIZED, FOR SOLUTION INTRAMUSCULAR; INTRAVENOUS ONCE
Status: CANCELLED | OUTPATIENT
Start: 2020-02-20

## 2020-02-20 RX ORDER — AMOXICILLIN AND CLAVULANATE POTASSIUM 875; 125 MG/1; MG/1
1 TABLET, FILM COATED ORAL 2 TIMES DAILY
Qty: 14 TABLET | Refills: 0 | Status: SHIPPED | OUTPATIENT
Start: 2020-02-20 | End: 2020-02-27

## 2020-02-20 RX ORDER — SODIUM CHLORIDE 9 MG/ML
20 INJECTION, SOLUTION INTRAVENOUS ONCE
Status: CANCELLED | OUTPATIENT
Start: 2020-02-20

## 2020-02-20 RX ORDER — SODIUM CHLORIDE 0.9 % (FLUSH) 0.9 %
10 SYRINGE (ML) INJECTION PRN
Status: CANCELLED | OUTPATIENT
Start: 2020-02-20

## 2020-02-20 RX ORDER — DIPHENHYDRAMINE HYDROCHLORIDE 50 MG/ML
50 INJECTION INTRAMUSCULAR; INTRAVENOUS ONCE
Status: CANCELLED | OUTPATIENT
Start: 2020-02-20

## 2020-02-20 RX ORDER — METHYLPREDNISOLONE SODIUM SUCCINATE 125 MG/2ML
125 INJECTION, POWDER, LYOPHILIZED, FOR SOLUTION INTRAMUSCULAR; INTRAVENOUS ONCE
Status: CANCELLED | OUTPATIENT
Start: 2020-03-13

## 2020-02-20 RX ORDER — SODIUM CHLORIDE 0.9 % (FLUSH) 0.9 %
10 SYRINGE (ML) INJECTION PRN
Status: CANCELLED | OUTPATIENT
Start: 2020-03-13

## 2020-02-20 RX ORDER — SODIUM CHLORIDE 0.9 % (FLUSH) 0.9 %
5 SYRINGE (ML) INJECTION PRN
Status: CANCELLED | OUTPATIENT
Start: 2020-03-13

## 2020-02-20 RX ORDER — SODIUM CHLORIDE 9 MG/ML
INJECTION, SOLUTION INTRAVENOUS CONTINUOUS
Status: CANCELLED | OUTPATIENT
Start: 2020-02-20

## 2020-02-20 RX ORDER — AMOXICILLIN AND CLAVULANATE POTASSIUM 875; 125 MG/1; MG/1
1 TABLET, FILM COATED ORAL 2 TIMES DAILY
COMMUNITY
End: 2020-03-17

## 2020-02-20 RX ORDER — SODIUM CHLORIDE 9 MG/ML
INJECTION, SOLUTION INTRAVENOUS CONTINUOUS
Status: CANCELLED | OUTPATIENT
Start: 2020-03-13

## 2020-02-20 RX ADMIN — BORTEZOMIB 2.4 MG: 3.5 INJECTION, POWDER, LYOPHILIZED, FOR SOLUTION INTRAVENOUS; SUBCUTANEOUS at 15:42

## 2020-02-20 ASSESSMENT — PAIN DESCRIPTION - PAIN TYPE: TYPE: ACUTE PAIN

## 2020-02-20 ASSESSMENT — PAIN DESCRIPTION - LOCATION: LOCATION: JAW

## 2020-02-20 ASSESSMENT — PAIN DESCRIPTION - DESCRIPTORS: DESCRIPTORS: ACHING

## 2020-02-20 ASSESSMENT — PAIN DESCRIPTION - FREQUENCY: FREQUENCY: INTERMITTENT

## 2020-02-20 ASSESSMENT — PAIN DESCRIPTION - ORIENTATION: ORIENTATION: LEFT

## 2020-02-20 ASSESSMENT — PAIN SCALES - GENERAL: PAINLEVEL_OUTOF10: 1

## 2020-02-20 NOTE — PROGRESS NOTES
Patient assessed for the following post chemotherapy:    Dizziness   No  Lightheadedness  No     Acute nausea/vomiting No  Headache   No  Chest pain/pressure  No  Rash/itching   No  Shortness of breath  No    Patient tolerated chemotherapy treatment velcade subq injection without any complications. Last vital signs:   /70   Pulse 68   Temp 98.8 °F (37.1 °C) (Oral)   Resp 18   Ht 5' 2\" (1.575 m)   Wt 174 lb 9.6 oz (79.2 kg)   LMP 12/01/2004   SpO2 98%   BMI 31.93 kg/m²     Patient instructed if experience any of the above symptoms following today's injection, he/she is to notify MD immediately or go to the emergency department. Discharge instructions given to patient. Verbalizes understanding. Ambulated off unit per self with spouse with belongings.

## 2020-02-20 NOTE — ONCOLOGY
Chemotherapy Administration    Pre-assessment Data: Antineoplastic Agents  Other:   See toxicity flow sheet for assessment [x]     Physician Notification of Concerns Related to Chemotherapy Administration:   Physician Notified Madeleine Bales / Time of Notification      Interventions:   Lab work assessed  [x]   Height / Weight verified for dose [x]   Current MAR reviewed [x]   Emergency drugs available as appropriate [x]   Anaphylaxis assessment completed [x]   Pre-medications administered as ordered [x]   Chemotherapy Administered as SQ injection [x]   Monitor for signs / symptoms of hypersensitivity reaction    [x]   Chemotherapy orders (drug/dose/rate) verified by 2 Chemo certified   RNs    [x]          Document chemotherapy teaching on the Patient Education tab    [x]   Document patient verbalizes understanding of medications being administered  velcade    [x]   Other: []    []    []      []    []

## 2020-03-11 RX ORDER — METOPROLOL SUCCINATE 50 MG/1
TABLET, EXTENDED RELEASE ORAL
Qty: 3 TABLET | Refills: 0 | Status: SHIPPED | OUTPATIENT
Start: 2020-03-11 | End: 2020-05-19 | Stop reason: SDUPTHER

## 2020-03-11 RX ORDER — AMLODIPINE BESYLATE 10 MG/1
10 TABLET ORAL DAILY
Qty: 3 TABLET | Refills: 0 | Status: SHIPPED | OUTPATIENT
Start: 2020-03-11 | End: 2020-05-19 | Stop reason: SDUPTHER

## 2020-03-13 ENCOUNTER — HOSPITAL ENCOUNTER (OUTPATIENT)
Dept: INFUSION THERAPY | Age: 60
End: 2020-03-13
Payer: COMMERCIAL

## 2020-03-16 ENCOUNTER — TELEPHONE (OUTPATIENT)
Dept: ONCOLOGY | Age: 60
End: 2020-03-16

## 2020-03-17 ENCOUNTER — OFFICE VISIT (OUTPATIENT)
Dept: ONCOLOGY | Age: 60
End: 2020-03-17
Payer: COMMERCIAL

## 2020-03-17 ENCOUNTER — HOSPITAL ENCOUNTER (OUTPATIENT)
Dept: INFUSION THERAPY | Age: 60
Discharge: HOME | End: 2020-03-17
Payer: COMMERCIAL

## 2020-03-17 VITALS
DIASTOLIC BLOOD PRESSURE: 86 MMHG | RESPIRATION RATE: 16 BRPM | SYSTOLIC BLOOD PRESSURE: 128 MMHG | HEIGHT: 62 IN | WEIGHT: 177.2 LBS | BODY MASS INDEX: 32.61 KG/M2 | TEMPERATURE: 98.9 F | HEART RATE: 77 BPM | OXYGEN SATURATION: 97 %

## 2020-03-17 DIAGNOSIS — C90.00 MULTIPLE MYELOMA NOT HAVING ACHIEVED REMISSION (HCC): ICD-10-CM

## 2020-03-17 LAB
ALBUMIN SERPL-MCNC: 4.4 G/DL (ref 3.5–5.1)
ALP BLD-CCNC: 70 U/L (ref 38–126)
ALT SERPL-CCNC: 16 U/L (ref 11–66)
AST SERPL-CCNC: 17 U/L (ref 5–40)
BILIRUB SERPL-MCNC: 0.3 MG/DL (ref 0.3–1.2)
BILIRUBIN DIRECT: < 0.2 MG/DL (ref 0–0.3)
BUN BLDV-MCNC: 12 MG/DL (ref 7–22)
CHLORIDE, WHOLE BLOOD: 107 MEQ/L (ref 98–109)
CO2: 24 MEQ/L (ref 23–33)
CREATININE, WHOLE BLOOD: 0.9 MG/DL (ref 0.5–1.2)
GFR, ESTIMATED: 68 ML/MIN/1.73M2
GLUCOSE, WHOLE BLOOD: 95 MG/DL (ref 70–108)
HCT VFR BLD CALC: 39 % (ref 37–47)
HEMOGLOBIN: 13.3 GM/DL (ref 12–16)
IONIZED CALCIUM, WHOLE BLOOD: 1.22 MMOL/L (ref 1.12–1.32)
MCH RBC QN AUTO: 34.5 PG (ref 27–31)
MCHC RBC AUTO-ENTMCNC: 33.9 GM/DL (ref 33–37)
MCV RBC AUTO: 102 FL (ref 81–99)
PDW BLD-RTO: 11.5 % (ref 11.5–14.5)
PLATELET # BLD: 183 THOU/MM3 (ref 130–400)
PMV BLD AUTO: 7.9 FL (ref 7.4–10.4)
POTASSIUM, WHOLE BLOOD: 4.3 MEQ/L (ref 3.5–4.9)
RBC # BLD: 3.84 MILL/MM3 (ref 4.2–5.4)
SEG NEUTROPHILS: 65 % (ref 43–75)
SEGMENTED NEUTROPHILS ABSOLUTE COUNT: 2.7 THOU/MM3 (ref 1.8–7.7)
SODIUM, WHOLE BLOOD: 144 MEQ/L (ref 138–146)
TOTAL PROTEIN: 6.8 G/DL (ref 6.1–8)
WBC # BLD: 4.1 THOU/MM3 (ref 4.8–10.8)

## 2020-03-17 PROCEDURE — 3017F COLORECTAL CA SCREEN DOC REV: CPT | Performed by: INTERNAL MEDICINE

## 2020-03-17 PROCEDURE — 85027 COMPLETE CBC AUTOMATED: CPT

## 2020-03-17 PROCEDURE — 80047 BASIC METABLC PNL IONIZED CA: CPT

## 2020-03-17 PROCEDURE — 36415 COLL VENOUS BLD VENIPUNCTURE: CPT

## 2020-03-17 PROCEDURE — 80076 HEPATIC FUNCTION PANEL: CPT

## 2020-03-17 PROCEDURE — 99211 OFF/OP EST MAY X REQ PHY/QHP: CPT

## 2020-03-17 PROCEDURE — G8484 FLU IMMUNIZE NO ADMIN: HCPCS | Performed by: INTERNAL MEDICINE

## 2020-03-17 PROCEDURE — 1036F TOBACCO NON-USER: CPT | Performed by: INTERNAL MEDICINE

## 2020-03-17 PROCEDURE — G8417 CALC BMI ABV UP PARAM F/U: HCPCS | Performed by: INTERNAL MEDICINE

## 2020-03-17 PROCEDURE — G8427 DOCREV CUR MEDS BY ELIG CLIN: HCPCS | Performed by: INTERNAL MEDICINE

## 2020-03-17 PROCEDURE — 84520 ASSAY OF UREA NITROGEN: CPT

## 2020-03-17 PROCEDURE — 99214 OFFICE O/P EST MOD 30 MIN: CPT | Performed by: INTERNAL MEDICINE

## 2020-03-17 PROCEDURE — 82374 ASSAY BLOOD CARBON DIOXIDE: CPT

## 2020-03-17 RX ORDER — LENALIDOMIDE 25 MG/1
25 CAPSULE ORAL DAILY
Qty: 21 CAPSULE | Refills: 1 | Status: SHIPPED | OUTPATIENT
Start: 2020-03-23 | End: 2020-08-17

## 2020-03-17 ASSESSMENT — ENCOUNTER SYMPTOMS
RECTAL PAIN: 0
FACIAL SWELLING: 0
SHORTNESS OF BREATH: 0
COUGH: 0
WHEEZING: 0
TROUBLE SWALLOWING: 0
DIARRHEA: 0
NAUSEA: 0
SORE THROAT: 0
BACK PAIN: 1
COLOR CHANGE: 0
ABDOMINAL DISTENTION: 0
CONSTIPATION: 0
CHEST TIGHTNESS: 0
BLOOD IN STOOL: 0
VOMITING: 0
EYE DISCHARGE: 0
ABDOMINAL PAIN: 0

## 2020-03-17 NOTE — PROGRESS NOTES
[facility] [address] [place] [address] [address], [address]  [address]  Dept: [phone]  Dept Fax: [phone]: [phone]    Visit Date:3/17/2020     Jazmin Sultana is a 63 y.o. female who presents today for:   Chief Complaint   Patient presents with    Follow-up     multiple Myeloma        HPI:    Tomas Castro is a 63 WF with multiple myeloma. She was admitted to [facility] on 7/16  with  Hypercalcemia 9Ca=11.0), acute kidney injury ( creat. 4.9), both found on outpatient evaluation for several weeks of insidious, progressive fatigue and left shoulder pain. Xray showed multiple lytic lesions in the bones. Bone marrow, right iliac crest, biopsy, aspirate, and peripheral smear performed on 07/15/2019 showed: -          Plasma cell myeloma in a normocellular marrow (50%), see comment  Comment: The patient's myeloma survey dated 7/13/19 showing multiple lytic  lesions is noted. Plasma cells are 30% of cells on the aspirate smear and  occupy 40% of marrow cellularity based on a  immunostain performed on  the biopsy. Flow cytometric analysis shows that plasma cells represent 5.8%  of the total events analyzed, express CD19, CD56, CD38 and  and  analysis of cytoplasmic immunoglobulin staining shows a cKappa:cLambda  ratio of 97:0. These findings are consistent with a kappa light chain  restricted plasma cell myeloma. A Congo red stain for amyloid performed on  the biopsy is negative. SPEP, serum free light chains, serum immunoglobulins (elevated IGA 2,656 and depressed IGG/IGM)  s/p 2 sessions of pheresis given light chains >10,000  - Started chemotherapy (CyBorD) on 7/16    Acute Kidney Injury: due to multiple myeloma. Producing adequate urine. Creatinine improved with aggressive hydration. Hyperkalemia: Mildly elevated K likely due to JAME.    - Hydrate, trend K  Hypercalcemia: Hypercalcemic at OSH prior to transfer   - Hydration, trend calcium   - S/p one unit pRBC 7/16; - Continue abdominal distention, abdominal pain, blood in stool, constipation, diarrhea, nausea, rectal pain and vomiting. Endocrine: Negative for cold intolerance, polydipsia and polyuria. Genitourinary: Negative for decreased urine volume, difficulty urinating, dysuria, flank pain, hematuria and urgency. Musculoskeletal: Positive for arthralgias and back pain. Negative for gait problem, joint swelling, myalgias and neck stiffness. Skin: Negative for color change, rash and wound. Neurological: Positive for headaches. Negative for dizziness, tremors, seizures, speech difficulty, weakness, light-headedness and numbness. Hematological: Negative for adenopathy. Does not bruise/bleed easily. Psychiatric/Behavioral: Negative for confusion and sleep disturbance. The patient is not nervous/anxious. Objective:   Physical Exam  Vitals signs reviewed. Constitutional:       General: She is not in acute distress. Appearance: She is well-developed. HENT:      Head: Normocephalic. Mouth/Throat:      Pharynx: No oropharyngeal exudate. Eyes:      General: No scleral icterus. Right eye: No discharge. Left eye: No discharge. Pupils: Pupils are equal, round, and reactive to light. Neck:      Musculoskeletal: Normal range of motion and neck supple. Thyroid: No thyromegaly. Vascular: No JVD. Trachea: No tracheal deviation. Cardiovascular:      Rate and Rhythm: Normal rate. Heart sounds: Normal heart sounds. No murmur. No friction rub. No gallop. Pulmonary:      Effort: Pulmonary effort is normal. No respiratory distress. Breath sounds: Normal breath sounds. No stridor. No wheezing or rales. Chest:      Chest wall: No tenderness. Abdominal:      General: Bowel sounds are normal. There is no distension. Palpations: Abdomen is soft. There is no mass. Tenderness: There is no abdominal tenderness. There is no rebound.    Musculoskeletal: Normal range Date:   3/17/2021

## 2020-03-23 ENCOUNTER — TELEPHONE (OUTPATIENT)
Dept: ONCOLOGY | Age: 60
End: 2020-03-23

## 2020-04-02 ENCOUNTER — HOSPITAL ENCOUNTER (OUTPATIENT)
Age: 60
Discharge: HOME | End: 2020-04-02
Payer: COMMERCIAL

## 2020-04-02 DIAGNOSIS — C90.00 MULTIPLE MYELOMA NOT HAVING ACHIEVED REMISSION (HCC): ICD-10-CM

## 2020-04-02 LAB
ALBUMIN SERPL-MCNC: 4.2 G/DL (ref 3.5–5.1)
ALP BLD-CCNC: 67 U/L (ref 38–126)
ALT SERPL-CCNC: 19 U/L (ref 11–66)
ANION GAP SERPL CALCULATED.3IONS-SCNC: 12 MEQ/L (ref 8–16)
AST SERPL-CCNC: 16 U/L (ref 5–40)
BILIRUB SERPL-MCNC: 0.3 MG/DL (ref 0.3–1.2)
BILIRUBIN DIRECT: < 0.2 MG/DL (ref 0–0.3)
BUN BLDV-MCNC: 12 MG/DL (ref 7–22)
CALCIUM SERPL-MCNC: 9.4 MG/DL (ref 8.5–10.5)
CHLORIDE BLD-SCNC: 100 MEQ/L (ref 98–111)
CO2: 26 MEQ/L (ref 23–33)
CREAT SERPL-MCNC: 0.6 MG/DL (ref 0.4–1.2)
GFR SERPL CREATININE-BSD FRML MDRD: > 90 ML/MIN/1.73M2
GLUCOSE BLD-MCNC: 101 MG/DL (ref 70–108)
HCT VFR BLD CALC: 39.2 % (ref 37–47)
HEMOGLOBIN: 13.3 GM/DL (ref 12–16)
MCH RBC QN AUTO: 34.4 PG (ref 27–31)
MCHC RBC AUTO-ENTMCNC: 33.9 GM/DL (ref 33–37)
MCV RBC AUTO: 102 FL (ref 81–99)
PDW BLD-RTO: 11.3 % (ref 11.5–14.5)
PLATELET # BLD: 180 THOU/MM3 (ref 130–400)
PMV BLD AUTO: 8.7 FL (ref 7.4–10.4)
POTASSIUM SERPL-SCNC: 4.6 MEQ/L (ref 3.5–5.2)
RBC # BLD: 3.86 MILL/MM3 (ref 4.2–5.4)
SEG NEUTROPHILS: 76 % (ref 43–75)
SEGMENTED NEUTROPHILS ABSOLUTE COUNT: 4.7 THOU/MM3 (ref 1.8–7.7)
SODIUM BLD-SCNC: 138 MEQ/L (ref 135–145)
TOTAL PROTEIN: 6.6 G/DL (ref 6.1–8)
WBC # BLD: 6.2 THOU/MM3 (ref 4.8–10.8)

## 2020-04-02 PROCEDURE — 80053 COMPREHEN METABOLIC PANEL: CPT

## 2020-04-02 PROCEDURE — 36415 COLL VENOUS BLD VENIPUNCTURE: CPT

## 2020-04-02 PROCEDURE — 85027 COMPLETE CBC AUTOMATED: CPT

## 2020-04-02 PROCEDURE — 82248 BILIRUBIN DIRECT: CPT

## 2020-04-20 ENCOUNTER — HOSPITAL ENCOUNTER (OUTPATIENT)
Dept: CT IMAGING | Age: 60
Discharge: HOME | End: 2020-04-20

## 2020-04-20 ENCOUNTER — HOSPITAL ENCOUNTER (OUTPATIENT)
Dept: CT IMAGING | Age: 60
Discharge: HOME | End: 2020-04-20
Payer: COMMERCIAL

## 2020-04-20 ENCOUNTER — OFFICE VISIT (OUTPATIENT)
Dept: ONCOLOGY | Age: 60
End: 2020-04-20
Payer: COMMERCIAL

## 2020-04-20 ENCOUNTER — HOSPITAL ENCOUNTER (OUTPATIENT)
Dept: INFUSION THERAPY | Age: 60
Discharge: HOME | End: 2020-04-20
Payer: COMMERCIAL

## 2020-04-20 VITALS
HEART RATE: 63 BPM | HEIGHT: 62 IN | DIASTOLIC BLOOD PRESSURE: 75 MMHG | BODY MASS INDEX: 32.9 KG/M2 | OXYGEN SATURATION: 99 % | WEIGHT: 178.8 LBS | TEMPERATURE: 98.7 F | SYSTOLIC BLOOD PRESSURE: 133 MMHG | RESPIRATION RATE: 16 BRPM

## 2020-04-20 DIAGNOSIS — Z51.11 ENCOUNTER FOR CHEMOTHERAPY MANAGEMENT: ICD-10-CM

## 2020-04-20 DIAGNOSIS — C90.00 MULTIPLE MYELOMA NOT HAVING ACHIEVED REMISSION (HCC): ICD-10-CM

## 2020-04-20 LAB
ALBUMIN SERPL-MCNC: 3.8 G/DL (ref 3.5–5.1)
ALP BLD-CCNC: 72 U/L (ref 38–126)
ALT SERPL-CCNC: 16 U/L (ref 11–66)
AST SERPL-CCNC: 18 U/L (ref 5–40)
BILIRUB SERPL-MCNC: 0.3 MG/DL (ref 0.3–1.2)
BILIRUBIN DIRECT: < 0.2 MG/DL (ref 0–0.3)
BUN BLDV-MCNC: 15 MG/DL (ref 7–22)
CHLORIDE, WHOLE BLOOD: 106 MEQ/L (ref 98–109)
CO2: 23 MEQ/L (ref 23–33)
CREATININE, WHOLE BLOOD: 0.8 MG/DL (ref 0.5–1.2)
GFR, ESTIMATED: 78 ML/MIN/1.73M2
GLUCOSE, WHOLE BLOOD: 88 MG/DL (ref 70–108)
HCT VFR BLD CALC: 36 % (ref 37–47)
HEMOGLOBIN: 12.5 GM/DL (ref 12–16)
IONIZED CALCIUM, WHOLE BLOOD: 1.23 MMOL/L (ref 1.12–1.32)
MCH RBC QN AUTO: 35.4 PG (ref 27–31)
MCHC RBC AUTO-ENTMCNC: 34.8 GM/DL (ref 33–37)
MCV RBC AUTO: 102 FL (ref 81–99)
PDW BLD-RTO: 12 % (ref 11.5–14.5)
PLATELET # BLD: 223 THOU/MM3 (ref 130–400)
PMV BLD AUTO: 8.2 FL (ref 7.4–10.4)
POTASSIUM, WHOLE BLOOD: 4.5 MEQ/L (ref 3.5–4.9)
RBC # BLD: 3.54 MILL/MM3 (ref 4.2–5.4)
SEG NEUTROPHILS: 66 % (ref 43–75)
SEGMENTED NEUTROPHILS ABSOLUTE COUNT: 3.2 THOU/MM3 (ref 1.8–7.7)
SODIUM, WHOLE BLOOD: 144 MEQ/L (ref 138–146)
TOTAL PROTEIN: 6.4 G/DL (ref 6.1–8)
WBC # BLD: 4.9 THOU/MM3 (ref 4.8–10.8)

## 2020-04-20 PROCEDURE — 85027 COMPLETE CBC AUTOMATED: CPT

## 2020-04-20 PROCEDURE — 82374 ASSAY BLOOD CARBON DIOXIDE: CPT

## 2020-04-20 PROCEDURE — 84520 ASSAY OF UREA NITROGEN: CPT

## 2020-04-20 PROCEDURE — 36415 COLL VENOUS BLD VENIPUNCTURE: CPT

## 2020-04-20 PROCEDURE — 3017F COLORECTAL CA SCREEN DOC REV: CPT | Performed by: INTERNAL MEDICINE

## 2020-04-20 PROCEDURE — 80047 BASIC METABLC PNL IONIZED CA: CPT

## 2020-04-20 PROCEDURE — 3209999900 CT COMPARISON OF OUTSIDE FILMS

## 2020-04-20 PROCEDURE — G8427 DOCREV CUR MEDS BY ELIG CLIN: HCPCS | Performed by: INTERNAL MEDICINE

## 2020-04-20 PROCEDURE — 1036F TOBACCO NON-USER: CPT | Performed by: INTERNAL MEDICINE

## 2020-04-20 PROCEDURE — 80076 HEPATIC FUNCTION PANEL: CPT

## 2020-04-20 PROCEDURE — G8417 CALC BMI ABV UP PARAM F/U: HCPCS | Performed by: INTERNAL MEDICINE

## 2020-04-20 PROCEDURE — 99211 OFF/OP EST MAY X REQ PHY/QHP: CPT

## 2020-04-20 PROCEDURE — 99214 OFFICE O/P EST MOD 30 MIN: CPT | Performed by: INTERNAL MEDICINE

## 2020-04-20 RX ORDER — AMOXICILLIN 875 MG/1
875 TABLET, COATED ORAL 2 TIMES DAILY
COMMUNITY
End: 2020-06-12 | Stop reason: ALTCHOICE

## 2020-04-20 RX ORDER — ACYCLOVIR 400 MG/1
400 TABLET ORAL 2 TIMES DAILY
Qty: 180 TABLET | Refills: 0 | Status: SHIPPED | OUTPATIENT
Start: 2020-04-20 | End: 2020-10-12 | Stop reason: SDUPTHER

## 2020-04-20 ASSESSMENT — ENCOUNTER SYMPTOMS
SORE THROAT: 0
BACK PAIN: 1
CHEST TIGHTNESS: 0
ABDOMINAL PAIN: 0
COLOR CHANGE: 0
VOMITING: 0
BLOOD IN STOOL: 0
COUGH: 0
WHEEZING: 0
TROUBLE SWALLOWING: 0
DIARRHEA: 0
CONSTIPATION: 0
RECTAL PAIN: 0
ABDOMINAL DISTENTION: 0
FACIAL SWELLING: 0
NAUSEA: 0
SHORTNESS OF BREATH: 0
EYE DISCHARGE: 0

## 2020-04-20 NOTE — PROGRESS NOTES
capsule 5    Calcium 600-200 MG-UNIT TABS Take 600 mg by mouth daily 30 tablet 0    econazole nitrate 1 % cream Apply topically Apply topically daily.  VITAMIN D, ERGOCALCIFEROL, PO Take 5,000 Units by mouth daily      lenalidomide (REVLIMID) 20 MG chemo capsule Take 1 capsule by mouth daily for 21 days 21 capsule 2    lenalidomide (REVLIMID) 15 MG chemo capsule Take 1 capsule by mouth daily for 14 days (Patient not taking: Reported on 4/20/2020) 14 capsule 2     No current facility-administered medications for this visit. Allergies   Allergen Reactions    Sulfa Antibiotics Rash      Health Maintenance   Topic Date Due    Hepatitis C screen  1960    Pneumococcal 0-64 years Vaccine (1 of 3 - PCV13) 10/19/1967    HIV screen  10/19/1976    Diabetes screen  10/19/2001    Shingles Vaccine (1 of 2) 10/19/2011    Colon cancer screen colonoscopy  10/03/2019    Flu vaccine (Season Ended) 09/01/2020    Potassium monitoring  04/02/2021    Creatinine monitoring  04/02/2021    Cervical cancer screen  10/22/2021    Breast cancer screen  12/03/2021    DTaP/Tdap/Td vaccine (2 - Td) 06/04/2022    Lipid screen  04/25/2024    Hepatitis A vaccine  Aged Out    Hepatitis B vaccine  Aged Out    Hib vaccine  Aged Out    Meningococcal (ACWY) vaccine  Aged Out        Subjective:   Review of Systems   Constitutional: Positive for fatigue. Negative for activity change, appetite change and fever. HENT: Negative for congestion, dental problem, facial swelling, hearing loss, mouth sores, nosebleeds, sore throat, tinnitus and trouble swallowing. Eyes: Negative for discharge and visual disturbance. Respiratory: Negative for cough, chest tightness, shortness of breath and wheezing. Cardiovascular: Negative for chest pain, palpitations and leg swelling. Gastrointestinal: Negative for abdominal distention, abdominal pain, blood in stool, constipation, diarrhea, nausea, rectal pain and vomiting. Endocrine: Negative for cold intolerance, polydipsia and polyuria. Genitourinary: Negative for decreased urine volume, difficulty urinating, dysuria, flank pain, hematuria and urgency. Musculoskeletal: Positive for arthralgias and back pain. Negative for gait problem, joint swelling, myalgias and neck stiffness. Skin: Negative for color change, rash and wound. Neurological: Positive for headaches. Negative for dizziness, tremors, seizures, speech difficulty, weakness, light-headedness and numbness. Hematological: Negative for adenopathy. Does not bruise/bleed easily. Psychiatric/Behavioral: Negative for confusion and sleep disturbance. The patient is not nervous/anxious. Objective:   Physical Exam  Vitals signs reviewed. Constitutional:       General: She is not in acute distress. Appearance: She is well-developed. HENT:      Head: Normocephalic. Mouth/Throat:      Pharynx: No oropharyngeal exudate. Eyes:      General: No scleral icterus. Right eye: No discharge. Left eye: No discharge. Pupils: Pupils are equal, round, and reactive to light. Neck:      Musculoskeletal: Normal range of motion and neck supple. Thyroid: No thyromegaly. Vascular: No JVD. Trachea: No tracheal deviation. Cardiovascular:      Rate and Rhythm: Normal rate. Heart sounds: Normal heart sounds. No murmur. No friction rub. No gallop. Pulmonary:      Effort: Pulmonary effort is normal. No respiratory distress. Breath sounds: Normal breath sounds. No stridor. No wheezing or rales. Chest:      Chest wall: No tenderness. Abdominal:      General: Bowel sounds are normal. There is no distension. Palpations: Abdomen is soft. There is no mass. Tenderness: There is no abdominal tenderness. There is no rebound. Musculoskeletal: Normal range of motion. Comments: Good range of motion in all four extremities.    Lymphadenopathy:      Cervical: No

## 2020-04-28 ENCOUNTER — HOSPITAL ENCOUNTER (OUTPATIENT)
Dept: NUCLEAR MEDICINE | Age: 60
Discharge: HOME | End: 2020-04-28
Payer: COMMERCIAL

## 2020-04-28 PROCEDURE — A9503 TC99M MEDRONATE: HCPCS | Performed by: INTERNAL MEDICINE

## 2020-04-28 PROCEDURE — 3430000000 HC RX DIAGNOSTIC RADIOPHARMACEUTICAL: Performed by: INTERNAL MEDICINE

## 2020-04-28 PROCEDURE — 78306 BONE IMAGING WHOLE BODY: CPT

## 2020-04-28 PROCEDURE — A9552 F18 FDG: HCPCS | Performed by: INTERNAL MEDICINE

## 2020-04-28 RX ORDER — FLUDEOXYGLUCOSE F 18 200 MCI/ML
27.3 INJECTION, SOLUTION INTRAVENOUS
Status: DISCONTINUED | OUTPATIENT
Start: 2020-04-28 | End: 2020-04-30

## 2020-04-28 RX ADMIN — TC 99M MEDRONATE 27.3 MILLICURIE: 20 INJECTION, POWDER, LYOPHILIZED, FOR SOLUTION INTRAVENOUS at 08:44

## 2020-04-30 RX ORDER — TC 99M MEDRONATE 20 MG/10ML
27.3 INJECTION, POWDER, LYOPHILIZED, FOR SOLUTION INTRAVENOUS
Status: COMPLETED | OUTPATIENT
Start: 2020-04-28 | End: 2020-04-28

## 2020-05-01 ENCOUNTER — TELEPHONE (OUTPATIENT)
Dept: ONCOLOGY | Age: 60
End: 2020-05-01

## 2020-05-19 ENCOUNTER — VIRTUAL VISIT (OUTPATIENT)
Dept: INTERNAL MEDICINE CLINIC | Age: 60
End: 2020-05-19
Payer: COMMERCIAL

## 2020-05-19 ENCOUNTER — OFFICE VISIT (OUTPATIENT)
Dept: ONCOLOGY | Age: 60
End: 2020-05-19
Payer: COMMERCIAL

## 2020-05-19 ENCOUNTER — HOSPITAL ENCOUNTER (OUTPATIENT)
Dept: INFUSION THERAPY | Age: 60
Discharge: HOME | End: 2020-05-19
Payer: COMMERCIAL

## 2020-05-19 VITALS
SYSTOLIC BLOOD PRESSURE: 124 MMHG | DIASTOLIC BLOOD PRESSURE: 66 MMHG | BODY MASS INDEX: 32.55 KG/M2 | WEIGHT: 178 LBS | TEMPERATURE: 97.6 F

## 2020-05-19 VITALS
HEART RATE: 80 BPM | WEIGHT: 178.4 LBS | SYSTOLIC BLOOD PRESSURE: 129 MMHG | DIASTOLIC BLOOD PRESSURE: 66 MMHG | OXYGEN SATURATION: 98 % | TEMPERATURE: 97.6 F | HEIGHT: 62 IN | BODY MASS INDEX: 32.83 KG/M2 | RESPIRATION RATE: 16 BRPM

## 2020-05-19 DIAGNOSIS — C90.00 MULTIPLE MYELOMA NOT HAVING ACHIEVED REMISSION (HCC): ICD-10-CM

## 2020-05-19 LAB
ALBUMIN SERPL-MCNC: 4.4 G/DL (ref 3.5–5.1)
ALP BLD-CCNC: 65 U/L (ref 38–126)
ALT SERPL-CCNC: 20 U/L (ref 11–66)
AST SERPL-CCNC: 18 U/L (ref 5–40)
BILIRUB SERPL-MCNC: 0.4 MG/DL (ref 0.3–1.2)
BILIRUBIN DIRECT: < 0.2 MG/DL (ref 0–0.3)
BUN BLDV-MCNC: 15 MG/DL (ref 7–22)
CHLORIDE, WHOLE BLOOD: 106 MEQ/L (ref 98–109)
CO2: 22 MEQ/L (ref 23–33)
CREATININE, WHOLE BLOOD: 0.8 MG/DL (ref 0.5–1.2)
GFR, ESTIMATED: 78 ML/MIN/1.73M2
GLUCOSE, WHOLE BLOOD: 202 MG/DL (ref 70–108)
HCT VFR BLD CALC: 37.5 % (ref 37–47)
HEMOGLOBIN: 13 GM/DL (ref 12–16)
IONIZED CALCIUM, WHOLE BLOOD: 1.23 MMOL/L (ref 1.12–1.32)
MCH RBC QN AUTO: 34.4 PG (ref 26–33)
MCHC RBC AUTO-ENTMCNC: 34.7 GM/DL (ref 32.2–35.5)
MCV RBC AUTO: 99 FL (ref 81–99)
PDW BLD-RTO: 12.9 % (ref 11.5–14.5)
PLATELET # BLD: 217 THOU/MM3 (ref 130–400)
PMV BLD AUTO: 10.7 FL (ref 9.4–12.4)
POTASSIUM, WHOLE BLOOD: 3.6 MEQ/L (ref 3.5–4.9)
RBC # BLD: 3.78 MILL/MM3 (ref 4.2–5.4)
SEG NEUTROPHILS: 90 % (ref 43–75)
SEGMENTED NEUTROPHILS ABSOLUTE COUNT: 4.5 THOU/MM3 (ref 1.8–7.7)
SODIUM, WHOLE BLOOD: 144 MEQ/L (ref 138–146)
TOTAL PROTEIN: 6.8 G/DL (ref 6.1–8)
WBC # BLD: 5 THOU/MM3 (ref 4.8–10.8)

## 2020-05-19 PROCEDURE — 1036F TOBACCO NON-USER: CPT | Performed by: INTERNAL MEDICINE

## 2020-05-19 PROCEDURE — 82784 ASSAY IGA/IGD/IGG/IGM EACH: CPT

## 2020-05-19 PROCEDURE — 82374 ASSAY BLOOD CARBON DIOXIDE: CPT

## 2020-05-19 PROCEDURE — 84156 ASSAY OF PROTEIN URINE: CPT

## 2020-05-19 PROCEDURE — G8417 CALC BMI ABV UP PARAM F/U: HCPCS | Performed by: INTERNAL MEDICINE

## 2020-05-19 PROCEDURE — 36415 COLL VENOUS BLD VENIPUNCTURE: CPT

## 2020-05-19 PROCEDURE — 3017F COLORECTAL CA SCREEN DOC REV: CPT | Performed by: INTERNAL MEDICINE

## 2020-05-19 PROCEDURE — 99214 OFFICE O/P EST MOD 30 MIN: CPT | Performed by: INTERNAL MEDICINE

## 2020-05-19 PROCEDURE — 80047 BASIC METABLC PNL IONIZED CA: CPT

## 2020-05-19 PROCEDURE — 99211 OFF/OP EST MAY X REQ PHY/QHP: CPT

## 2020-05-19 PROCEDURE — 84155 ASSAY OF PROTEIN SERUM: CPT

## 2020-05-19 PROCEDURE — 86335 IMMUNFIX E-PHORSIS/URINE/CSF: CPT

## 2020-05-19 PROCEDURE — 84165 PROTEIN E-PHORESIS SERUM: CPT

## 2020-05-19 PROCEDURE — 86334 IMMUNOFIX E-PHORESIS SERUM: CPT

## 2020-05-19 PROCEDURE — 84520 ASSAY OF UREA NITROGEN: CPT

## 2020-05-19 PROCEDURE — 85027 COMPLETE CBC AUTOMATED: CPT

## 2020-05-19 PROCEDURE — G8427 DOCREV CUR MEDS BY ELIG CLIN: HCPCS | Performed by: INTERNAL MEDICINE

## 2020-05-19 PROCEDURE — 80076 HEPATIC FUNCTION PANEL: CPT

## 2020-05-19 PROCEDURE — G8428 CUR MEDS NOT DOCUMENT: HCPCS | Performed by: INTERNAL MEDICINE

## 2020-05-19 PROCEDURE — 83883 ASSAY NEPHELOMETRY NOT SPEC: CPT

## 2020-05-19 RX ORDER — LENALIDOMIDE 25 MG/1
25 CAPSULE ORAL DAILY
Qty: 6 CAPSULE | Refills: 0 | Status: SHIPPED | OUTPATIENT
Start: 2020-05-19 | End: 2020-05-28 | Stop reason: SDUPTHER

## 2020-05-19 RX ORDER — OMEPRAZOLE 20 MG/1
CAPSULE, DELAYED RELEASE ORAL
Qty: 90 CAPSULE | Refills: 1 | Status: SHIPPED | OUTPATIENT
Start: 2020-05-19 | End: 2020-06-12 | Stop reason: ALTCHOICE

## 2020-05-19 RX ORDER — METOPROLOL SUCCINATE 50 MG/1
TABLET, EXTENDED RELEASE ORAL
Qty: 90 TABLET | Refills: 1 | Status: SHIPPED | OUTPATIENT
Start: 2020-05-19 | End: 2020-12-12

## 2020-05-19 RX ORDER — AMLODIPINE BESYLATE 10 MG/1
10 TABLET ORAL DAILY
Qty: 90 TABLET | Refills: 1 | Status: SHIPPED | OUTPATIENT
Start: 2020-05-19 | End: 2021-02-16

## 2020-05-19 RX ORDER — VENLAFAXINE HYDROCHLORIDE 75 MG/1
CAPSULE, EXTENDED RELEASE ORAL
Qty: 90 CAPSULE | Refills: 1 | Status: SHIPPED | OUTPATIENT
Start: 2020-05-19 | End: 2020-12-12

## 2020-05-19 RX ORDER — LACTOBACILLUS RHAMNOSUS GG 10B CELL
1 CAPSULE ORAL DAILY
COMMUNITY
End: 2022-08-16

## 2020-05-19 ASSESSMENT — ENCOUNTER SYMPTOMS
BLOOD IN STOOL: 0
CONSTIPATION: 0
SORE THROAT: 0
NAUSEA: 0
SHORTNESS OF BREATH: 0
RECTAL PAIN: 0
VOMITING: 0
FACIAL SWELLING: 0
CHEST TIGHTNESS: 0
COLOR CHANGE: 0
EYE DISCHARGE: 0
ABDOMINAL PAIN: 0
DIARRHEA: 0
BACK PAIN: 1
WHEEZING: 0
COUGH: 0
ABDOMINAL DISTENTION: 0
TROUBLE SWALLOWING: 0

## 2020-05-19 NOTE — PROGRESS NOTES
Vitamin B12 supplementation with Vitamin B12 1000mcg daily PO  - HIV negative  - Haptoglobin, LDH without concern for acute hemolysis  She received day 1 on 7/26. On August 7,2019 she started treatment with Velcade dexamethasone and Revlimid  On September 25, 2019 she was diagnosed with right popliteal and calf veins DVT. She is treated with Xarelto. She received 6 cycles of RVD, afterwards she underwent stem cell collection at [facility] this week. After  Stem cell collectiuon she received 2 additional cycles of RVD. Last given on the February 20, 2020. On March 17, 2020 she started maintenance treatment with Revlimid. Zometa is on hold due to suspicion of jaw osteonecrosis    Interim history on 05/19/2020: The patient presents to the medical oncology clinic to continue maintenance therapy with Revlimid. Tolerates Revlimid very well. She developed again gum swelling and oromaxillary surgeon from [facility] place her on course of Augmentin. Her new complaint during her last visit was left hip pain, however today she reports that this pain is much improved. Past Medical History:   Diagnosis Date    Acute kidney injury ([address] 7/12/2019    Allergic rhinitis     Flonase prn    Cancer (HCC)     Myeloma    Congenital hip dislocation 1961    right leg is longer, better with weight loss    H/O umbilical hernia repair 6/45/6886    Hot flushes, perimenopausal     Hypertension 2011    Seen by MONI Silva CNP diagnosed < 1 year    IBS (irritable bowel syndrome)     ? diagnosis - diarrhea with milk products and meat    Osteoarthritis     right knee pain    S/P laparoscopic appendectomy 7/16/2018    Snoring     denies apnea, mild daytime somnolence, just lost 35# so has more energy, denies waking coughing or choking, BMI 27, neck circ. 13.5 inches    Vitamin D deficiency       Past Surgical History:   Procedure Laterality Date    HIP SURGERY  1978    Congenital Hip multiple surgies since 7 weeks old.  Last surgery

## 2020-05-21 LAB
IMMUNOFIXATION URINE: NORMAL
IMMUNOFIXATION WITH QUANT: NORMAL
KAPPA/LAMBDA FREE LIGHT CHAINS: NORMAL

## 2020-05-22 LAB — PROTEIN ELECTROPHORESIS, SERUM: NORMAL

## 2020-05-26 ENCOUNTER — TELEPHONE (OUTPATIENT)
Dept: ONCOLOGY | Age: 60
End: 2020-05-26

## 2020-05-28 RX ORDER — LENALIDOMIDE 25 MG/1
25 CAPSULE ORAL DAILY
Qty: 6 CAPSULE | Refills: 0 | Status: SHIPPED | OUTPATIENT
Start: 2020-05-28 | End: 2020-08-17

## 2020-06-03 ENCOUNTER — TELEPHONE (OUTPATIENT)
Dept: INTERNAL MEDICINE CLINIC | Age: 60
End: 2020-06-03

## 2020-06-11 ASSESSMENT — ENCOUNTER SYMPTOMS
BACK PAIN: 1
CHEST TIGHTNESS: 0
VOMITING: 0
ABDOMINAL PAIN: 0
NAUSEA: 0
ABDOMINAL DISTENTION: 0
DIARRHEA: 0
WHEEZING: 0
RECTAL PAIN: 0
SORE THROAT: 0
EYE DISCHARGE: 0
COLOR CHANGE: 0
SHORTNESS OF BREATH: 0
COUGH: 0
BLOOD IN STOOL: 0
CONSTIPATION: 0
TROUBLE SWALLOWING: 0
FACIAL SWELLING: 0

## 2020-06-12 ENCOUNTER — OFFICE VISIT (OUTPATIENT)
Dept: ONCOLOGY | Age: 60
End: 2020-06-12
Payer: COMMERCIAL

## 2020-06-12 ENCOUNTER — HOSPITAL ENCOUNTER (OUTPATIENT)
Dept: INFUSION THERAPY | Age: 60
Discharge: HOME | End: 2020-06-12
Payer: COMMERCIAL

## 2020-06-12 VITALS
SYSTOLIC BLOOD PRESSURE: 111 MMHG | TEMPERATURE: 98.7 F | BODY MASS INDEX: 32.72 KG/M2 | RESPIRATION RATE: 16 BRPM | WEIGHT: 177.8 LBS | HEIGHT: 62 IN | OXYGEN SATURATION: 98 % | HEART RATE: 50 BPM | DIASTOLIC BLOOD PRESSURE: 67 MMHG

## 2020-06-12 DIAGNOSIS — C90.00 MULTIPLE MYELOMA NOT HAVING ACHIEVED REMISSION (HCC): ICD-10-CM

## 2020-06-12 LAB
ABSOLUTE IMMATURE GRANULOCYTE: 0 THOU/MM3 (ref 0–0.07)
BASINOPHIL, AUTOMATED: 2 % (ref 0–3)
BASOPHILS ABSOLUTE: 0 THOU/MM3 (ref 0–0.1)
BUN BLDV-MCNC: 15 MG/DL (ref 7–22)
CHLORIDE, WHOLE BLOOD: 110 MEQ/L (ref 98–109)
CO2: 24 MEQ/L (ref 23–33)
CREATININE, WHOLE BLOOD: 0.8 MG/DL (ref 0.5–1.2)
EOSINOPHILS ABSOLUTE: 0.1 THOU/MM3 (ref 0–0.4)
EOSINOPHILS RELATIVE PERCENT: 3 % (ref 0–4)
GFR, ESTIMATED: 78 ML/MIN/1.73M2
GLUCOSE, WHOLE BLOOD: 101 MG/DL (ref 70–108)
HCT VFR BLD CALC: 33.6 % (ref 37–47)
HEMOGLOBIN: 11.5 GM/DL (ref 12–16)
IMMATURE GRANULOCYTES: 0 %
IONIZED CALCIUM, WHOLE BLOOD: 1.2 MMOL/L (ref 1.12–1.32)
LYMPHOCYTES # BLD: 22 % (ref 15–47)
LYMPHOCYTES ABSOLUTE: 0.5 THOU/MM3 (ref 1–4.8)
MCH RBC QN AUTO: 34.7 PG (ref 26–33)
MCHC RBC AUTO-ENTMCNC: 34.2 GM/DL (ref 32.2–35.5)
MCV RBC AUTO: 102 FL (ref 81–99)
MONOCYTES ABSOLUTE: 0.5 THOU/MM3 (ref 0.4–1.3)
MONOCYTES: 22 % (ref 0–12)
PDW BLD-RTO: 13.6 % (ref 11.5–14.5)
PLATELET # BLD: 190 THOU/MM3 (ref 130–400)
PMV BLD AUTO: 11.1 FL (ref 9.4–12.4)
POTASSIUM, WHOLE BLOOD: 3.7 MEQ/L (ref 3.5–4.9)
RBC # BLD: 3.31 MILL/MM3 (ref 4.2–5.4)
SEG NEUTROPHILS: 50 % (ref 43–75)
SEGMENTED NEUTROPHILS ABSOLUTE COUNT: 1.2 THOU/MM3 (ref 1.8–7.7)
SODIUM, WHOLE BLOOD: 144 MEQ/L (ref 138–146)
WBC # BLD: 2.3 THOU/MM3 (ref 4.8–10.8)

## 2020-06-12 PROCEDURE — 84520 ASSAY OF UREA NITROGEN: CPT

## 2020-06-12 PROCEDURE — 85025 COMPLETE CBC W/AUTO DIFF WBC: CPT

## 2020-06-12 PROCEDURE — G8417 CALC BMI ABV UP PARAM F/U: HCPCS | Performed by: INTERNAL MEDICINE

## 2020-06-12 PROCEDURE — 82374 ASSAY BLOOD CARBON DIOXIDE: CPT

## 2020-06-12 PROCEDURE — 80047 BASIC METABLC PNL IONIZED CA: CPT

## 2020-06-12 PROCEDURE — G8427 DOCREV CUR MEDS BY ELIG CLIN: HCPCS | Performed by: INTERNAL MEDICINE

## 2020-06-12 PROCEDURE — 36415 COLL VENOUS BLD VENIPUNCTURE: CPT

## 2020-06-12 PROCEDURE — 99211 OFF/OP EST MAY X REQ PHY/QHP: CPT

## 2020-06-12 PROCEDURE — 3017F COLORECTAL CA SCREEN DOC REV: CPT | Performed by: INTERNAL MEDICINE

## 2020-06-12 PROCEDURE — 1036F TOBACCO NON-USER: CPT | Performed by: INTERNAL MEDICINE

## 2020-06-12 PROCEDURE — 99214 OFFICE O/P EST MOD 30 MIN: CPT | Performed by: INTERNAL MEDICINE

## 2020-06-12 RX ORDER — LENALIDOMIDE 20 MG/1
20 CAPSULE ORAL DAILY
Qty: 21 CAPSULE | Refills: 1 | Status: SHIPPED | OUTPATIENT
Start: 2020-06-12 | End: 2020-08-17

## 2020-06-12 NOTE — PROGRESS NOTES
unit pRBC 7/16; - Continue Vitamin B12 supplementation with Vitamin B12 1000mcg daily PO  - HIV negative  - Haptoglobin, LDH without concern for acute hemolysis  She received day 1 on 7/26. On August 7,2019 she started treatment with Velcade dexamethasone and Revlimid  On September 25, 2019 she was diagnosed with right popliteal and calf veins DVT. She is treated with Xarelto. She received 6 cycles of RVD, afterwards she underwent stem cell collection at [facility] this week. After  Stem cell collectiuon she received 2 additional cycles of RVD. Last given on the February 20, 2020. On March 17, 2020 she started maintenance treatment with Revlimid. Zometa is on hold due to suspicion of jaw osteonecrosis    Interim history on 06/12/2020: The patient presents to the medical oncology clinic to continue maintenance therapy with Revlimid. Tolerates Revlimid very well. She continues having discomfort in her jaw and left gum. the oromaxillary surgeon from [facility] recommends putting Zometa on hold. Today again she denies having left hip pain. She denies having any mouth sores no diarrhea no skin rash no peripheral neuropathy. Past Medical History:   Diagnosis Date    Acute kidney injury ([address] 7/12/2019    Allergic rhinitis     Flonase prn    Cancer (HCC)     Myeloma    Congenital hip dislocation 1961    right leg is longer, better with weight loss    H/O umbilical hernia repair 1/98/5054    Hot flushes, perimenopausal     Hypertension 2011    Seen by MONI Khan CNP diagnosed < 1 year    IBS (irritable bowel syndrome)     ? diagnosis - diarrhea with milk products and meat    Osteoarthritis     right knee pain    S/P laparoscopic appendectomy 7/16/2018    Snoring     denies apnea, mild daytime somnolence, just lost 35# so has more energy, denies waking coughing or choking, BMI 27, neck circ.  13.5 inches    Vitamin D deficiency       Past Surgical History:   Procedure Laterality Date   [address] Congenital Hip multiple surgies since 10weeks old. Last surgery 1978     HYSTERECTOMY  2004    Partial Irregular Periods with migraines.  KNEE SURGERY Right [address]    stapled to help straighten leg(congenital hip issues)    IN LAP,APPENDECTOMY N/A 7/15/2018    APPENDECTOMY LAPAROSCOPIC performed by Te Rocha MD at [facility]      Family History   Problem Relation Age of Onset    High Blood Pressure Mother     High Cholesterol Mother     Cancer Father         skin cancer    Cancer Paternal Grandmother     Cancer Paternal Grandfather     Deep Vein Thrombosis Brother         PE      Social History     Tobacco Use    Smoking status: Never Smoker    Smokeless tobacco: Never Used   Substance Use Topics    Alcohol use: Yes     Comment: Socially      Current Outpatient Medications   Medication Sig Dispense Refill    lenalidomide (REVLIMID) 20 MG chemo capsule Take 1 capsule by mouth daily for 21 days 1 capsule by mouth daily for 21 days then 1 week off.   The cycle of Revlimid is 4 weeks 21 capsule 1    rivaroxaban (XARELTO) 20 MG TABS tablet TAKE 1 TABLET BY MOUTH EVERY DAY WITH BREAKFAST 30 tablet 0    venlafaxine (EFFEXOR XR) 75 MG extended release capsule TAKE 1 CAPSULE BY MOUTH ONE TIME A DAY 90 capsule 1    metoprolol succinate (TOPROL XL) 50 MG extended release tablet TAKE 1 TABLET BY MOUTH ONE TIME A DAY 90 tablet 1    amLODIPine (NORVASC) 10 MG tablet Take 1 tablet by mouth daily 90 tablet 1    acyclovir (ZOVIRAX) 400 MG tablet Take 1 tablet by mouth 2 times daily 180 tablet 0    lenalidomide (REVLIMID) 25 MG chemo capsule Take 1 capsule by mouth daily 21 days on, 7 days off. 21 capsule 1    chlorhexidine (PERIDEX) 0.12 % solution Take 15 mLs by mouth 2 times daily      dexamethasone (DECADRON) 4 MG tablet Take 10 tablets by mouth See Admin Instructions for 3 doses Take 40 mg (10 tabs) on Days 1, 8, 15. 30 tablet 3    Calcium 600-200 days during cycle #2. Her creatinine has improved to normal range. Her anemia is improved as well. Therefore with cycle #4 the dose of Revlimid was increased to 25 mg. She received 6 cycles of RVD and then she underwent stem cell collection at [facility]. Afterwards she received 2 additional cycles of RVD. Last given on the February 20, 2020. Her cycle #7 was delayed by 1 week due to influenza A infection. Patient started Revlimid 25 mg daily for 3 weeks on,  1 week off on March 23, 2020. Overall she tolerates Revlimid well. Her CBC today showed that her WBC is 2.3 with ANC of 1.2. Hemoglobin is 11.5 and platelet count 518,951. The patient was instructed to hold her Revlimid we will recheck her CBC on June 16, 2020 at the dose of Revlimid will be reduced from 25 mg daily for 3 weeks on 1 week off to 20 mg daily for 3 weeks on 1 week off. The patient had a serum immunofixation on May 19, 2020, no M protein was seen. Her kappa lambda free light chains are within normal range and normal ratio. .  2.  Right leg DVT. Known side effect from Revlimid and dexamethasone. The patient is on Xarelto  3. Gum and  jaw pain. The patient was seen by oral maxillofacial surgeon at [facility] who performed CT of the of the jaw, it showed indeterminate findings in the jaw:  osteonecrosis versus multiple myeloma. Zometa is on hold. 4.  New onset of left hip pain. PET scan performed in July 2019 at [facility] did show involvement of her left hip and pelvis. However today the patient reports that her pain is much improved      Diagnosis Orders   1. Multiple myeloma not having achieved remission (HCC)  CBC    Absolute Neutrophil   2. Lytic lesion of bone on x-ray     3. Therapeutic drug monitoring     4. Chemotherapy-induced neutropenia (HCC)          Plan:   Return in about 4 weeks (around 7/10/2020).      Orders Placed:   Orders Placed This Encounter   Procedures    CBC     Standing Status:   Future     Number of Occurrences:   1 Standing Expiration Date:   6/12/2021    Absolute Neutrophil     Standing Status:   Future     Number of Occurrences:   1     Standing Expiration Date:   6/12/2021

## 2020-06-16 ENCOUNTER — HOSPITAL ENCOUNTER (OUTPATIENT)
Age: 60
Discharge: HOME | End: 2020-06-16
Payer: COMMERCIAL

## 2020-06-16 DIAGNOSIS — C90.00 MULTIPLE MYELOMA NOT HAVING ACHIEVED REMISSION (HCC): ICD-10-CM

## 2020-06-16 LAB
HCT VFR BLD CALC: 36.1 % (ref 37–47)
HEMOGLOBIN: 12.2 GM/DL (ref 12–16)
MCH RBC QN AUTO: 34.2 PG (ref 26–33)
MCHC RBC AUTO-ENTMCNC: 33.8 GM/DL (ref 32.2–35.5)
MCV RBC AUTO: 101 FL (ref 81–99)
PDW BLD-RTO: 13.8 % (ref 11.5–14.5)
PLATELET # BLD: 220 THOU/MM3 (ref 130–400)
PMV BLD AUTO: 10.8 FL (ref 9.4–12.4)
RBC # BLD: 3.57 MILL/MM3 (ref 4.2–5.4)
SEG NEUTROPHILS: 52 % (ref 43–75)
SEGMENTED NEUTROPHILS ABSOLUTE COUNT: 1.6 THOU/MM3 (ref 1.8–7.7)
WBC # BLD: 3 THOU/MM3 (ref 4.8–10.8)

## 2020-06-16 PROCEDURE — 36415 COLL VENOUS BLD VENIPUNCTURE: CPT

## 2020-06-16 PROCEDURE — 85027 COMPLETE CBC AUTOMATED: CPT

## 2020-06-23 ENCOUNTER — HOSPITAL ENCOUNTER (OUTPATIENT)
Dept: WOMENS IMAGING | Age: 60
Discharge: HOME | End: 2020-06-23
Payer: COMMERCIAL

## 2020-06-23 PROCEDURE — G0279 TOMOSYNTHESIS, MAMMO: HCPCS

## 2020-07-09 ASSESSMENT — ENCOUNTER SYMPTOMS
WHEEZING: 0
ABDOMINAL PAIN: 0
SORE THROAT: 0
ABDOMINAL DISTENTION: 0
NAUSEA: 0
RECTAL PAIN: 0
BACK PAIN: 1
BLOOD IN STOOL: 0
FACIAL SWELLING: 0
CONSTIPATION: 0
VOMITING: 0
EYE DISCHARGE: 0
DIARRHEA: 0
SHORTNESS OF BREATH: 0
TROUBLE SWALLOWING: 0
COLOR CHANGE: 0
CHEST TIGHTNESS: 0
COUGH: 0

## 2020-07-09 NOTE — PROGRESS NOTES
[facility] [address] [facility] [address] [facility] [address]  [address] [facility]  Dept: [phone]  Dept Fax: [phone]: [phone]    Visit Date:7/10/2020     Kathy Henry is a 63 y.o. female who presents today for:   Chief Complaint   Patient presents with    Follow-up     Multiple myeloma not having achieved remission        HPI:    Romel Diaz is a 63 WF with multiple myeloma. She was admitted to [facility] on 7/16  with  Hypercalcemia 9Ca=11.0), acute kidney injury ( creat. 4.9), both found on outpatient evaluation for several weeks of insidious, progressive fatigue and left shoulder pain. Xray showed multiple lytic lesions in the bones. Bone marrow, right iliac crest, biopsy, aspirate, and peripheral smear performed on 07/15/2019 showed: -          Plasma cell myeloma in a normocellular marrow (50%), see comment  Comment: The patient's myeloma survey dated 7/13/19 showing multiple lytic  lesions is noted. Plasma cells are 30% of cells on the aspirate smear and  occupy 40% of marrow cellularity based on a  immunostain performed on  the biopsy. Flow cytometric analysis shows that plasma cells represent 5.8%  of the total events analyzed, express CD19, CD56, CD38 and  and  analysis of cytoplasmic immunoglobulin staining shows a cKappa:cLambda  ratio of 97:0. These findings are consistent with a kappa light chain  restricted plasma cell myeloma. A Congo red stain for amyloid performed on  the biopsy is negative. SPEP, serum free light chains, serum immunoglobulins (elevated IGA 2,656 and depressed IGG/IGM)  s/p 2 sessions of pheresis given light chains >10,000  - Started chemotherapy (CyBorD) on 7/16    Acute Kidney Injury: due to multiple myeloma. Producing adequate urine. Creatinine improved with aggressive hydration. Hyperkalemia: Mildly elevated K likely due to JAME.    - Hydrate, trend K  Hypercalcemia: Hypercalcemic at OSH prior to transfer   - Hydration, trend calcium   - S/p one unit pRBC 7/16; - Continue Vitamin B12 supplementation with Vitamin B12 1000mcg daily PO  - HIV negative  - Haptoglobin, LDH without concern for acute hemolysis  She received day 1 on 7/26. On August 7,2019 she started treatment with Velcade dexamethasone and Revlimid  On September 25, 2019 she was diagnosed with right popliteal and calf veins DVT. She is treated with Xarelto. She received 6 cycles of RVD, afterwards she underwent stem cell collection at [place] this week. After  Stem cell collectiuon she received 2 additional cycles of RVD. Last given on the February 20, 2020. On March 17, 2020 she started maintenance treatment with Revlimid. Zometa is on hold due to suspicion of jaw osteonecrosis    Interim history on 07/10/2020: The patient presents to the medical oncology clinic to continue maintenance therapy with Revlimid. Tolerates Revlimid very well. She continues having discomfort in her jaw and left gum. She was recently placed by her local dentist on course of antibiotic   She denies having any fevers. Today she denies having left hip pain. She denies having any mouth sores no diarrhea no skin rash no peripheral neuropathy. Past Medical History:   Diagnosis Date    Acute kidney injury ([address] 7/12/2019    Allergic rhinitis     Flonase prn    Cancer (HCC)     Myeloma    Congenital hip dislocation 1961    right leg is longer, better with weight loss    H/O umbilical hernia repair 7/57/5460    Hot flushes, perimenopausal     Hypertension 2011    Seen by MONI Maguire CNP diagnosed < 1 year    IBS (irritable bowel syndrome)     ? diagnosis - diarrhea with milk products and meat    Osteoarthritis     right knee pain    S/P laparoscopic appendectomy 7/16/2018    Snoring     denies apnea, mild daytime somnolence, just lost 35# so has more energy, denies waking coughing or choking, BMI 27, neck circ.  13.5 inches    Vitamin D deficiency       Past Surgical History:   Procedure Laterality Date  HIP SURGERY  1978    Congenital Hip multiple surgies since 7 weeks old. Last surgery 1978     HYSTERECTOMY  2004    Partial Irregular Periods with migraines.  KNEE SURGERY Right [address]    stapled to help straighten leg(congenital hip issues)    ME LAP,APPENDECTOMY N/A 7/15/2018    APPENDECTOMY LAPAROSCOPIC performed by Kyara Fletcher MD at [facility]      Family History   Problem Relation Age of Onset    High Blood Pressure Mother     High Cholesterol Mother     Cancer Father         skin cancer    Cancer Paternal Grandmother     Cancer Paternal Grandfather     Deep Vein Thrombosis Brother         PE      Social History     Tobacco Use    Smoking status: Never Smoker    Smokeless tobacco: Never Used   Substance Use Topics    Alcohol use: Yes     Comment: Socially      Current Outpatient Medications   Medication Sig Dispense Refill    amoxicillin-clavulanate (AUGMENTIN) 875-125 MG per tablet TAKE 1 TABLET BY MOUTH TWO TIMES A DAY WITH MEALS      [START ON 7/27/2020] lenalidomide (REVLIMID) 15 MG chemo capsule Take 1 capsule by mouth daily She will take 1 capsule daily for 3 weeks on 1 week off. The cycle is of 28-day duration 21 capsule 0    lenalidomide (REVLIMID) 20 MG chemo capsule Take 1 capsule by mouth daily for 21 days 1 capsule by mouth daily for 21 days then 1 week off.   The cycle of Revlimid is 4 weeks 21 capsule 1    lenalidomide (REVLIMID) 25 MG chemo capsule Take 1 capsule by mouth daily 6 capsule 0    lactobacillus (CULTURELLE) capsule Take 1 capsule by mouth daily      venlafaxine (EFFEXOR XR) 75 MG extended release capsule TAKE 1 CAPSULE BY MOUTH ONE TIME A DAY 90 capsule 1    metoprolol succinate (TOPROL XL) 50 MG extended release tablet TAKE 1 TABLET BY MOUTH ONE TIME A DAY 90 tablet 1    amLODIPine (NORVASC) 10 MG tablet Take 1 tablet by mouth daily 90 tablet 1    acyclovir (ZOVIRAX) 400 MG tablet Take 1 tablet by mouth 2 times daily 180 tablet 0    lenalidomide (REVLIMID) 25 MG chemo capsule Take 1 capsule by mouth daily 21 days on, 7 days off. 21 capsule 1    chlorhexidine (PERIDEX) 0.12 % solution Take 15 mLs by mouth 2 times daily      dexamethasone (DECADRON) 4 MG tablet Take 10 tablets by mouth See Admin Instructions for 3 doses Take 40 mg (10 tabs) on Days 1, 8, 15. 30 tablet 3    Calcium 600-200 MG-UNIT TABS Take 600 mg by mouth daily 30 tablet 0    econazole nitrate 1 % cream Apply topically Apply topically daily.  VITAMIN D, ERGOCALCIFEROL, PO Take 5,000 Units by mouth daily      rivaroxaban (XARELTO) 20 MG TABS tablet TAKE 1 TABLET BY MOUTH EVERY DAY WITH BREAKFAST 30 tablet 3     No current facility-administered medications for this visit. Allergies   Allergen Reactions    Sulfa Antibiotics Rash      Health Maintenance   Topic Date Due    Hepatitis C screen  1960    Pneumococcal 0-64 years Vaccine (1 of 3 - PCV13) 10/19/1967    HIV screen  10/19/1976    Diabetes screen  10/19/2001    Shingles Vaccine (1 of 2) 10/19/2011    Colon cancer screen colonoscopy  10/03/2019    Flu vaccine (1) 09/01/2020    Potassium monitoring  04/02/2021    Creatinine monitoring  04/02/2021    Cervical cancer screen  10/22/2021    DTaP/Tdap/Td vaccine (2 - Td) 06/04/2022    Breast cancer screen  06/23/2022    Lipid screen  04/25/2024    Hepatitis A vaccine  Aged Out    Hepatitis B vaccine  Aged Out    Hib vaccine  Aged Out    Meningococcal (ACWY) vaccine  Aged Out        Subjective:   Review of Systems   Constitutional: Positive for fatigue. Negative for activity change, appetite change and fever. HENT: Negative for congestion, dental problem, facial swelling, hearing loss, mouth sores, nosebleeds, sore throat, tinnitus and trouble swallowing. Eyes: Negative for discharge and visual disturbance. Respiratory: Negative for cough, chest tightness, shortness of breath and wheezing.     Cardiovascular: Negative for chest pain, palpitations and leg swelling. Gastrointestinal: Negative for abdominal distention, abdominal pain, blood in stool, constipation, diarrhea, nausea, rectal pain and vomiting. Endocrine: Negative for cold intolerance, polydipsia and polyuria. Genitourinary: Negative for decreased urine volume, difficulty urinating, dysuria, flank pain, hematuria and urgency. Musculoskeletal: Positive for arthralgias and back pain. Negative for gait problem, joint swelling, myalgias and neck stiffness. Skin: Negative for color change, rash and wound. Neurological: Positive for headaches. Negative for dizziness, tremors, seizures, speech difficulty, weakness, light-headedness and numbness. Hematological: Negative for adenopathy. Does not bruise/bleed easily. Psychiatric/Behavioral: Negative for confusion and sleep disturbance. The patient is not nervous/anxious. Objective:   Physical Exam  Vitals signs reviewed. Constitutional:       General: She is not in acute distress. Appearance: She is well-developed. HENT:      Head: Normocephalic. Mouth/Throat:      Pharynx: No oropharyngeal exudate. Eyes:      General: No scleral icterus. Right eye: No discharge. Left eye: No discharge. Pupils: Pupils are equal, round, and reactive to light. Neck:      Musculoskeletal: Normal range of motion and neck supple. Thyroid: No thyromegaly. Vascular: No JVD. Trachea: No tracheal deviation. Cardiovascular:      Rate and Rhythm: Normal rate. Heart sounds: Normal heart sounds. No murmur. No friction rub. No gallop. Pulmonary:      Effort: Pulmonary effort is normal. No respiratory distress. Breath sounds: Normal breath sounds. No stridor. No wheezing or rales. Chest:      Chest wall: No tenderness. Abdominal:      General: Bowel sounds are normal. There is no distension. Palpations: Abdomen is soft. There is no mass.       Tenderness: There is no abdominal tenderness. There is no rebound. Musculoskeletal: Normal range of motion. Comments: Good range of motion in all four extremities. Lymphadenopathy:      Cervical: No cervical adenopathy. Skin:     General: Skin is warm. Findings: No erythema or rash. Neurological:      Mental Status: She is alert and oriented to person, place, and time. Cranial Nerves: No cranial nerve deficit. Motor: No abnormal muscle tone. Deep Tendon Reflexes: Reflexes are normal and symmetric. Psychiatric:         Behavior: Behavior normal.         Thought Content: Thought content normal.         Judgment: Judgment normal.         /66 (Site: Left Upper Arm, Position: Sitting, Cuff Size: Medium Adult)   Pulse 53   Temp 99.1 °F (37.3 °C) (Oral)   Resp 16   Ht 5' 2.01\" (1.575 m)   Wt 176 lb 3.2 oz (79.9 kg)   LMP 12/01/2004   SpO2 99%   BMI 32.22 kg/m²      ECOG status is 1    Imaging studies and labs:     XRAY Myeloma survey on 07/13/2019 showed:  IMPRESSION:   Innumerable lytic lesions bilaterally, compatible with multiple myeloma. Lab Results   Component Value Date    WBC 2.6 (L) 07/10/2020    HGB 12.2 07/10/2020    HCT 35.4 (L) 07/10/2020     (H) 07/10/2020     07/10/2020       Chemistry        Component Value Date/Time     07/10/2020 1009     04/02/2020 1244    K 4.0 07/10/2020 1009    K 4.6 04/02/2020 1244     04/02/2020 1244    CO2 26 07/10/2020 1009    BUN 12 07/10/2020 1009    CREATININE 0.9 07/10/2020 1009    CREATININE 0.6 04/02/2020 1244        Component Value Date/Time    CALCIUM 9.4 04/02/2020 1244    ALKPHOS 65 07/10/2020 1009    AST 20 07/10/2020 1009    ALT 22 07/10/2020 1009    BILITOT 0.4 07/10/2020 1009    BILITOT NEGATIVE 08/04/2015 0803            Assessment/Plan:   1. Multiple myeloma: The patient met criteria for diagnosis of multiple myeloma. She has clonal bone marrow plasma cells ? 10%, bone lesions seen on bone survey, hypercalcemia, renal insufficiency and anemia. She received first cycle of chemotherapy with Velcade Cytoxan and dexamethasone at [facility]. She received second cycle of VdR in [facility], due to slight renal impairment the dose of Revlimid was reduced to 15 mg daily for 14 days during cycle #2. Her creatinine has improved to normal range. Her anemia is improved as well. Therefore with cycle #4 the dose of Revlimid was increased to 25 mg. She received 6 cycles of RVD and then she underwent stem cell collection at [facility]. Afterwards she received 2 additional cycles of RVD. Last given on the February 20, 2020. Her cycle #7 was delayed by 1 week due to influenza A infection. Patient started Revlimid 25 mg daily for 3 weeks on,  1 week off on March 23, 2020. Overall she tolerates Revlimid well. However her dose of Revlimid was reduced from 25 mg daily for 3 weeks on and one-week off to 20 mg daily for 3 weeks on 1 week off due to neutropenia. The patient had a serum immunofixation on May 19, 2020, no M protein was seen. Her kappa lambda free light chains are within normal range and normal ratio. Despite the dose reduction the patient is still neutropenic, WBC 2.6, ANC 1.3. Her hemoglobin and platelets are stable. The patient was instructed to hold her Revlimid today. I will reduce the dose further to 15 mg. We will check her CBC again in 1 week if WBC is improved she will start cycle then  2. Right leg DVT. Known side effect from Revlimid and dexamethasone. The patient is on Xarelto  3. Gum and  jaw pain. The patient was seen by oral maxillofacial surgeon at [facility] who performed CT of the of the jaw, it showed indeterminate findings in the jaw:  osteonecrosis versus multiple myeloma. Zometa is on hold. Patient saw dentist recently and he placed her on course of antibiotic        Diagnosis Orders   1. Multiple myeloma not having achieved remission (HCC)  CBC Auto Differential   2.  Lytic lesion of bone on x-ray     3. Therapeutic drug monitoring     4. Chemotherapy-induced neutropenia (HCC)     5. Acute deep vein thrombosis (DVT) of right popliteal vein (HCC)     6. Essential hypertension          Plan:   Return in about 5 weeks (around 8/17/2020).      Orders Placed:   Orders Placed This Encounter   Procedures    CBC Auto Differential     Standing Status:   Future     Standing Expiration Date:   7/10/2021

## 2020-07-10 ENCOUNTER — HOSPITAL ENCOUNTER (OUTPATIENT)
Dept: INFUSION THERAPY | Age: 60
Discharge: HOME | End: 2020-07-10
Payer: COMMERCIAL

## 2020-07-10 ENCOUNTER — OFFICE VISIT (OUTPATIENT)
Dept: ONCOLOGY | Age: 60
End: 2020-07-10
Payer: COMMERCIAL

## 2020-07-10 VITALS
HEIGHT: 62 IN | OXYGEN SATURATION: 99 % | SYSTOLIC BLOOD PRESSURE: 109 MMHG | RESPIRATION RATE: 16 BRPM | BODY MASS INDEX: 32.42 KG/M2 | TEMPERATURE: 99.1 F | HEART RATE: 53 BPM | WEIGHT: 176.2 LBS | DIASTOLIC BLOOD PRESSURE: 66 MMHG

## 2020-07-10 DIAGNOSIS — C90.00 MULTIPLE MYELOMA NOT HAVING ACHIEVED REMISSION (HCC): ICD-10-CM

## 2020-07-10 LAB
ALBUMIN SERPL-MCNC: 3.9 G/DL (ref 3.5–5.1)
ALP BLD-CCNC: 65 U/L (ref 38–126)
ALT SERPL-CCNC: 22 U/L (ref 11–66)
AST SERPL-CCNC: 20 U/L (ref 5–40)
BILIRUB SERPL-MCNC: 0.4 MG/DL (ref 0.3–1.2)
BILIRUBIN DIRECT: < 0.2 MG/DL (ref 0–0.3)
BUN BLDV-MCNC: 12 MG/DL (ref 7–22)
CHLORIDE, WHOLE BLOOD: 106 MEQ/L (ref 98–109)
CO2: 26 MEQ/L (ref 23–33)
CREATININE, WHOLE BLOOD: 0.9 MG/DL (ref 0.5–1.2)
GFR, ESTIMATED: 68 ML/MIN/1.73M2
GLUCOSE, WHOLE BLOOD: 100 MG/DL (ref 70–108)
HCT VFR BLD CALC: 35.4 % (ref 37–47)
HEMOGLOBIN: 12.2 GM/DL (ref 12–16)
IONIZED CALCIUM, WHOLE BLOOD: 1.19 MMOL/L (ref 1.12–1.32)
MCH RBC QN AUTO: 35.5 PG (ref 26–33)
MCHC RBC AUTO-ENTMCNC: 34.5 GM/DL (ref 32.2–35.5)
MCV RBC AUTO: 103 FL (ref 81–99)
PDW BLD-RTO: 13.8 % (ref 11.5–14.5)
PLATELET # BLD: 177 THOU/MM3 (ref 130–400)
PMV BLD AUTO: 11.3 FL (ref 9.4–12.4)
POTASSIUM, WHOLE BLOOD: 4 MEQ/L (ref 3.5–4.9)
RBC # BLD: 3.44 MILL/MM3 (ref 4.2–5.4)
SEG NEUTROPHILS: 52 % (ref 43–75)
SEGMENTED NEUTROPHILS ABSOLUTE COUNT: 1.3 THOU/MM3 (ref 1.8–7.7)
SODIUM, WHOLE BLOOD: 142 MEQ/L (ref 138–146)
TOTAL PROTEIN: 5.9 G/DL (ref 6.1–8)
WBC # BLD: 2.6 THOU/MM3 (ref 4.8–10.8)

## 2020-07-10 PROCEDURE — 80076 HEPATIC FUNCTION PANEL: CPT

## 2020-07-10 PROCEDURE — 80047 BASIC METABLC PNL IONIZED CA: CPT

## 2020-07-10 PROCEDURE — 99214 OFFICE O/P EST MOD 30 MIN: CPT | Performed by: INTERNAL MEDICINE

## 2020-07-10 PROCEDURE — 99211 OFF/OP EST MAY X REQ PHY/QHP: CPT

## 2020-07-10 PROCEDURE — 85027 COMPLETE CBC AUTOMATED: CPT

## 2020-07-10 PROCEDURE — 36415 COLL VENOUS BLD VENIPUNCTURE: CPT

## 2020-07-10 PROCEDURE — 82374 ASSAY BLOOD CARBON DIOXIDE: CPT

## 2020-07-10 PROCEDURE — 3017F COLORECTAL CA SCREEN DOC REV: CPT | Performed by: INTERNAL MEDICINE

## 2020-07-10 PROCEDURE — 84520 ASSAY OF UREA NITROGEN: CPT

## 2020-07-10 PROCEDURE — G8417 CALC BMI ABV UP PARAM F/U: HCPCS | Performed by: INTERNAL MEDICINE

## 2020-07-10 PROCEDURE — 1036F TOBACCO NON-USER: CPT | Performed by: INTERNAL MEDICINE

## 2020-07-10 PROCEDURE — G8427 DOCREV CUR MEDS BY ELIG CLIN: HCPCS | Performed by: INTERNAL MEDICINE

## 2020-07-10 RX ORDER — AMOXICILLIN AND CLAVULANATE POTASSIUM 875; 125 MG/1; MG/1
TABLET, FILM COATED ORAL
COMMUNITY
Start: 2020-07-06 | End: 2020-08-17

## 2020-07-10 RX ORDER — LENALIDOMIDE 15 MG/1
15 CAPSULE ORAL DAILY
Qty: 21 CAPSULE | Refills: 0 | Status: SHIPPED | OUTPATIENT
Start: 2020-07-27 | End: 2020-08-10 | Stop reason: SDUPTHER

## 2020-07-10 NOTE — PATIENT INSTRUCTIONS
1.  CBC on July 17, 2020  2.   RTC to see me for labs: CBC, BMP, LFTs, multiple myeloma panel on August 17, 2020

## 2020-07-13 ENCOUNTER — TELEPHONE (OUTPATIENT)
Dept: ONCOLOGY | Age: 60
End: 2020-07-13

## 2020-07-13 NOTE — TELEPHONE ENCOUNTER
Patient called- states she will receive Revlimid tomorrow.  Instructed to not take until get CBC results first on Friday

## 2020-07-13 NOTE — TELEPHONE ENCOUNTER
The patient should receive supply of Revlimid 15 mg.   I do not want her to start her Revlimid till we will check her CBC on Friday, July 17

## 2020-07-13 NOTE — TELEPHONE ENCOUNTER
Patient states she is to start Revlamid on Wednesday - however having CBC on Friday. Do you want her to wait until CBC results back on Friday before starting Revlamid? Needs refill on Xarelto to [address] - unable to pend medication.

## 2020-07-17 ENCOUNTER — HOSPITAL ENCOUNTER (OUTPATIENT)
Age: 60
Discharge: HOME | End: 2020-07-17
Payer: COMMERCIAL

## 2020-07-17 DIAGNOSIS — C90.00 MULTIPLE MYELOMA NOT HAVING ACHIEVED REMISSION (HCC): ICD-10-CM

## 2020-07-17 DIAGNOSIS — Z51.11 ENCOUNTER FOR CHEMOTHERAPY MANAGEMENT: ICD-10-CM

## 2020-07-17 LAB
ALBUMIN SERPL-MCNC: 3.9 G/DL (ref 3.5–5.1)
ALP BLD-CCNC: 68 U/L (ref 38–126)
ALT SERPL-CCNC: 20 U/L (ref 11–66)
AST SERPL-CCNC: 18 U/L (ref 5–40)
BILIRUB SERPL-MCNC: 0.3 MG/DL (ref 0.3–1.2)
BILIRUBIN DIRECT: < 0.2 MG/DL (ref 0–0.3)
BUN BLDV-MCNC: 14 MG/DL (ref 7–22)
CHLORIDE, WHOLE BLOOD: 107 MEQ/L (ref 98–109)
CO2: 26 MEQ/L (ref 23–33)
CREATININE, WHOLE BLOOD: 0.9 MG/DL (ref 0.5–1.2)
GFR, ESTIMATED: 68 ML/MIN/1.73M2
GLUCOSE, WHOLE BLOOD: 107 MG/DL (ref 70–108)
HCT VFR BLD CALC: 35 % (ref 37–47)
HEMOGLOBIN: 12 GM/DL (ref 12–16)
IONIZED CALCIUM, WHOLE BLOOD: 1.24 MMOL/L (ref 1.12–1.32)
MCH RBC QN AUTO: 35.3 PG (ref 26–33)
MCHC RBC AUTO-ENTMCNC: 34.3 GM/DL (ref 32.2–35.5)
MCV RBC AUTO: 103 FL (ref 81–99)
PDW BLD-RTO: 13.9 % (ref 11.5–14.5)
PLATELET # BLD: 198 THOU/MM3 (ref 130–400)
PMV BLD AUTO: 10.9 FL (ref 9.4–12.4)
POTASSIUM, WHOLE BLOOD: 4.5 MEQ/L (ref 3.5–4.9)
RBC # BLD: 3.4 MILL/MM3 (ref 4.2–5.4)
SEG NEUTROPHILS: 53 % (ref 43–75)
SEGMENTED NEUTROPHILS ABSOLUTE COUNT: 1.6 THOU/MM3 (ref 1.8–7.7)
SODIUM, WHOLE BLOOD: 142 MEQ/L (ref 138–146)
TOTAL PROTEIN: 6 G/DL (ref 6.1–8)
WBC # BLD: 3 THOU/MM3 (ref 4.8–10.8)

## 2020-07-17 PROCEDURE — 85027 COMPLETE CBC AUTOMATED: CPT

## 2020-07-17 PROCEDURE — 82374 ASSAY BLOOD CARBON DIOXIDE: CPT

## 2020-07-17 PROCEDURE — 80047 BASIC METABLC PNL IONIZED CA: CPT

## 2020-07-17 PROCEDURE — 84520 ASSAY OF UREA NITROGEN: CPT

## 2020-07-17 PROCEDURE — 80076 HEPATIC FUNCTION PANEL: CPT

## 2020-07-17 PROCEDURE — 36415 COLL VENOUS BLD VENIPUNCTURE: CPT

## 2020-08-10 RX ORDER — LENALIDOMIDE 15 MG/1
15 CAPSULE ORAL DAILY
Qty: 21 CAPSULE | Refills: 0 | Status: SHIPPED | OUTPATIENT
Start: 2020-08-10 | End: 2020-09-09 | Stop reason: SDUPTHER

## 2020-08-16 ASSESSMENT — ENCOUNTER SYMPTOMS
VOMITING: 0
WHEEZING: 0
BLOOD IN STOOL: 0
SORE THROAT: 0
CHEST TIGHTNESS: 0
FACIAL SWELLING: 0
COLOR CHANGE: 0
RECTAL PAIN: 0
TROUBLE SWALLOWING: 0
ABDOMINAL DISTENTION: 0
DIARRHEA: 0
COUGH: 0
EYE DISCHARGE: 0
CONSTIPATION: 0
SHORTNESS OF BREATH: 0
ABDOMINAL PAIN: 0
BACK PAIN: 1
NAUSEA: 0

## 2020-08-17 ENCOUNTER — OFFICE VISIT (OUTPATIENT)
Dept: ONCOLOGY | Age: 60
End: 2020-08-17
Payer: COMMERCIAL

## 2020-08-17 ENCOUNTER — HOSPITAL ENCOUNTER (OUTPATIENT)
Dept: INFUSION THERAPY | Age: 60
Discharge: HOME | End: 2020-08-17
Payer: COMMERCIAL

## 2020-08-17 VITALS
SYSTOLIC BLOOD PRESSURE: 123 MMHG | BODY MASS INDEX: 30.25 KG/M2 | HEIGHT: 64 IN | DIASTOLIC BLOOD PRESSURE: 71 MMHG | TEMPERATURE: 97.6 F | OXYGEN SATURATION: 99 % | WEIGHT: 177.2 LBS | RESPIRATION RATE: 18 BRPM | HEART RATE: 68 BPM

## 2020-08-17 DIAGNOSIS — C90.00 MULTIPLE MYELOMA NOT HAVING ACHIEVED REMISSION (HCC): ICD-10-CM

## 2020-08-17 LAB
ABSOLUTE IMMATURE GRANULOCYTE: 0 THOU/MM3 (ref 0–0.07)
ALBUMIN SERPL-MCNC: 3.9 G/DL (ref 3.5–5.1)
ALP BLD-CCNC: 75 U/L (ref 38–126)
ALT SERPL-CCNC: 22 U/L (ref 11–66)
AST SERPL-CCNC: 20 U/L (ref 5–40)
BASINOPHIL, AUTOMATED: 1 % (ref 0–3)
BASOPHILS ABSOLUTE: 0.1 THOU/MM3 (ref 0–0.1)
BILIRUB SERPL-MCNC: 0.3 MG/DL (ref 0.3–1.2)
BILIRUBIN DIRECT: < 0.2 MG/DL (ref 0–0.3)
BUN BLDV-MCNC: 14 MG/DL (ref 7–22)
CHLORIDE, WHOLE BLOOD: 107 MEQ/L (ref 98–109)
CO2: 24 MEQ/L (ref 23–33)
CREATININE, WHOLE BLOOD: 0.9 MG/DL (ref 0.5–1.2)
EOSINOPHILS ABSOLUTE: 0.1 THOU/MM3 (ref 0–0.4)
EOSINOPHILS RELATIVE PERCENT: 2 % (ref 0–4)
GFR, ESTIMATED: 68 ML/MIN/1.73M2
GLUCOSE, WHOLE BLOOD: 87 MG/DL (ref 70–108)
HCT VFR BLD CALC: 36.5 % (ref 37–47)
HEMOGLOBIN: 12.8 GM/DL (ref 12–16)
IMMATURE GRANULOCYTES: 0 %
IONIZED CALCIUM, WHOLE BLOOD: 1.22 MMOL/L (ref 1.12–1.32)
LYMPHOCYTES # BLD: 15 % (ref 15–47)
LYMPHOCYTES ABSOLUTE: 0.5 THOU/MM3 (ref 1–4.8)
MCH RBC QN AUTO: 35.7 PG (ref 26–33)
MCHC RBC AUTO-ENTMCNC: 35.1 GM/DL (ref 32.2–35.5)
MCV RBC AUTO: 102 FL (ref 81–99)
MONOCYTES ABSOLUTE: 0.6 THOU/MM3 (ref 0.4–1.3)
MONOCYTES: 18 % (ref 0–12)
PDW BLD-RTO: 13.1 % (ref 11.5–14.5)
PLATELET # BLD: 216 THOU/MM3 (ref 130–400)
PMV BLD AUTO: 10.4 FL (ref 9.4–12.4)
POTASSIUM, WHOLE BLOOD: 4 MEQ/L (ref 3.5–4.9)
RBC # BLD: 3.59 MILL/MM3 (ref 4.2–5.4)
SEG NEUTROPHILS: 64 % (ref 43–75)
SEGMENTED NEUTROPHILS ABSOLUTE COUNT: 2.2 THOU/MM3 (ref 1.8–7.7)
SODIUM, WHOLE BLOOD: 143 MEQ/L (ref 138–146)
TOTAL PROTEIN: 6.3 G/DL (ref 6.1–8)
WBC # BLD: 3.5 THOU/MM3 (ref 4.8–10.8)

## 2020-08-17 PROCEDURE — 85025 COMPLETE CBC W/AUTO DIFF WBC: CPT

## 2020-08-17 PROCEDURE — 80076 HEPATIC FUNCTION PANEL: CPT

## 2020-08-17 PROCEDURE — 86334 IMMUNOFIX E-PHORESIS SERUM: CPT

## 2020-08-17 PROCEDURE — 3017F COLORECTAL CA SCREEN DOC REV: CPT | Performed by: INTERNAL MEDICINE

## 2020-08-17 PROCEDURE — G8427 DOCREV CUR MEDS BY ELIG CLIN: HCPCS | Performed by: INTERNAL MEDICINE

## 2020-08-17 PROCEDURE — 84156 ASSAY OF PROTEIN URINE: CPT

## 2020-08-17 PROCEDURE — 84520 ASSAY OF UREA NITROGEN: CPT

## 2020-08-17 PROCEDURE — 86335 IMMUNFIX E-PHORSIS/URINE/CSF: CPT

## 2020-08-17 PROCEDURE — G8417 CALC BMI ABV UP PARAM F/U: HCPCS | Performed by: INTERNAL MEDICINE

## 2020-08-17 PROCEDURE — 83883 ASSAY NEPHELOMETRY NOT SPEC: CPT

## 2020-08-17 PROCEDURE — 80047 BASIC METABLC PNL IONIZED CA: CPT

## 2020-08-17 PROCEDURE — 82784 ASSAY IGA/IGD/IGG/IGM EACH: CPT

## 2020-08-17 PROCEDURE — 84165 PROTEIN E-PHORESIS SERUM: CPT

## 2020-08-17 PROCEDURE — 99214 OFFICE O/P EST MOD 30 MIN: CPT | Performed by: INTERNAL MEDICINE

## 2020-08-17 PROCEDURE — 1036F TOBACCO NON-USER: CPT | Performed by: INTERNAL MEDICINE

## 2020-08-17 PROCEDURE — 99211 OFF/OP EST MAY X REQ PHY/QHP: CPT

## 2020-08-17 PROCEDURE — 84155 ASSAY OF PROTEIN SERUM: CPT

## 2020-08-17 PROCEDURE — 36415 COLL VENOUS BLD VENIPUNCTURE: CPT

## 2020-08-17 PROCEDURE — 82374 ASSAY BLOOD CARBON DIOXIDE: CPT

## 2020-08-17 NOTE — PROGRESS NOTES
[facility] [facility] [address], [address] [facility] [address]  Gavin [address]  Dept: [phone]  Dept Fax: [phone]: [phone]    Visit Date:8/17/2020     Day Causey is a 63 y.o. female who presents today for:   Chief Complaint   Patient presents with    Follow-up     multiple myeloma        HPI:    Denise Castrejon is a 63 WF with multiple myeloma. She was admitted to [facility] on 7/16  with  Hypercalcemia 9Ca=11.0), acute kidney injury ( creat. 4.9), both found on outpatient evaluation for several weeks of insidious, progressive fatigue and left shoulder pain. Xray showed multiple lytic lesions in the bones. Bone marrow, right iliac crest, biopsy, aspirate, and peripheral smear performed on 07/15/2019 showed: -          Plasma cell myeloma in a normocellular marrow (50%), see comment  Comment: The patient's myeloma survey dated 7/13/19 showing multiple lytic  lesions is noted. Plasma cells are 30% of cells on the aspirate smear and  occupy 40% of marrow cellularity based on a  immunostain performed on  the biopsy. Flow cytometric analysis shows that plasma cells represent 5.8%  of the total events analyzed, express CD19, CD56, CD38 and  and  analysis of cytoplasmic immunoglobulin staining shows a cKappa:cLambda  ratio of 97:0. These findings are consistent with a kappa light chain  restricted plasma cell myeloma. A Congo red stain for amyloid performed on  the biopsy is negative. SPEP, serum free light chains, serum immunoglobulins (elevated IGA 2,656 and depressed IGG/IGM)  s/p 2 sessions of pheresis given light chains >10,000  - Started chemotherapy (CyBorD) on 7/16    Acute Kidney Injury: due to multiple myeloma. Producing adequate urine. Creatinine improved with aggressive hydration. Hyperkalemia: Mildly elevated K likely due to JAME.    - Hydrate, trend K  Hypercalcemia: Hypercalcemic at OSH prior to transfer   - Hydration, trend calcium   - S/p one unit pRBC 7/16; - Continue Hip multiple surgies since 11weeks old. Last surgery 1978     HYSTERECTOMY  2004    Partial Irregular Periods with migraines.  KNEE SURGERY Right [address]    stapled to help straighten leg(congenital hip issues)    AR LAP,APPENDECTOMY N/A 7/15/2018    APPENDECTOMY LAPAROSCOPIC performed by Daniel Lopez MD at [facility]      Family History   Problem Relation Age of Onset    High Blood Pressure Mother     High Cholesterol Mother     Cancer Father         skin cancer    Cancer Paternal Grandmother     Cancer Paternal Grandfather     Deep Vein Thrombosis Brother         PE      Social History     Tobacco Use    Smoking status: Never Smoker    Smokeless tobacco: Never Used   Substance Use Topics    Alcohol use: Yes     Comment: Socially      Current Outpatient Medications   Medication Sig Dispense Refill    lenalidomide (REVLIMID) 15 MG chemo capsule Take 1 capsule by mouth daily She will take 1 capsule daily for 3 weeks on 1 week off. The cycle is of 28-day duration 21 capsule 0    rivaroxaban (XARELTO) 20 MG TABS tablet TAKE 1 TABLET BY MOUTH EVERY DAY WITH BREAKFAST 30 tablet 3    lactobacillus (CULTURELLE) capsule Take 1 capsule by mouth daily      venlafaxine (EFFEXOR XR) 75 MG extended release capsule TAKE 1 CAPSULE BY MOUTH ONE TIME A DAY 90 capsule 1    metoprolol succinate (TOPROL XL) 50 MG extended release tablet TAKE 1 TABLET BY MOUTH ONE TIME A DAY 90 tablet 1    amLODIPine (NORVASC) 10 MG tablet Take 1 tablet by mouth daily 90 tablet 1    chlorhexidine (PERIDEX) 0.12 % solution Take 15 mLs by mouth 2 times daily      dexamethasone (DECADRON) 4 MG tablet Take 10 tablets by mouth See Admin Instructions for 3 doses Take 40 mg (10 tabs) on Days 1, 8, 15. 30 tablet 3    Calcium 600-200 MG-UNIT TABS Take 600 mg by mouth daily 30 tablet 0    econazole nitrate 1 % cream Apply topically Apply topically daily.       VITAMIN D, ERGOCALCIFEROL, PO Take 5,000 Units by mouth daily       No current facility-administered medications for this visit. Allergies   Allergen Reactions    Sulfa Antibiotics Rash      Health Maintenance   Topic Date Due    Hepatitis C screen  1960    Pneumococcal 0-64 years Vaccine (1 of 3 - PCV13) 10/19/1967    HIV screen  10/19/1976    Diabetes screen  10/19/2001    Shingles Vaccine (1 of 2) 10/19/2011    Colon cancer screen colonoscopy  10/03/2019    Flu vaccine (1) 09/01/2020    Potassium monitoring  04/02/2021    Creatinine monitoring  04/02/2021    Cervical cancer screen  10/22/2021    DTaP/Tdap/Td vaccine (2 - Td) 06/04/2022    Breast cancer screen  06/23/2022    Lipid screen  04/25/2024    Hepatitis A vaccine  Aged Out    Hepatitis B vaccine  Aged Out    Hib vaccine  Aged Out    Meningococcal (ACWY) vaccine  Aged Out        Subjective:   Review of Systems   Constitutional: Positive for fatigue. Negative for activity change, appetite change and fever. HENT: Negative for congestion, dental problem, facial swelling, hearing loss, mouth sores, nosebleeds, sore throat, tinnitus and trouble swallowing. Eyes: Negative for discharge and visual disturbance. Respiratory: Negative for cough, chest tightness, shortness of breath and wheezing. Cardiovascular: Negative for chest pain, palpitations and leg swelling. Gastrointestinal: Negative for abdominal distention, abdominal pain, blood in stool, constipation, diarrhea, nausea, rectal pain and vomiting. Endocrine: Negative for cold intolerance, polydipsia and polyuria. Genitourinary: Negative for decreased urine volume, difficulty urinating, dysuria, flank pain, hematuria and urgency. Musculoskeletal: Positive for arthralgias and back pain. Negative for gait problem, joint swelling, myalgias and neck stiffness. Skin: Negative for color change, rash and wound. Neurological: Positive for headaches.  Negative for dizziness, tremors, seizures, speech difficulty, weakness, light-headedness and numbness. Hematological: Negative for adenopathy. Does not bruise/bleed easily. Psychiatric/Behavioral: Negative for confusion and sleep disturbance. The patient is not nervous/anxious. Objective:   Physical Exam  Vitals signs reviewed. Constitutional:       General: She is not in acute distress. Appearance: She is well-developed. HENT:      Head: Normocephalic. Mouth/Throat:      Pharynx: No oropharyngeal exudate. Eyes:      General: No scleral icterus. Right eye: No discharge. Left eye: No discharge. Pupils: Pupils are equal, round, and reactive to light. Neck:      Musculoskeletal: Normal range of motion and neck supple. Thyroid: No thyromegaly. Vascular: No JVD. Trachea: No tracheal deviation. Cardiovascular:      Rate and Rhythm: Normal rate. Heart sounds: Normal heart sounds. No murmur. No friction rub. No gallop. Pulmonary:      Effort: Pulmonary effort is normal. No respiratory distress. Breath sounds: Normal breath sounds. No stridor. No wheezing or rales. Chest:      Chest wall: No tenderness. Abdominal:      General: Bowel sounds are normal. There is no distension. Palpations: Abdomen is soft. There is no mass. Tenderness: There is no abdominal tenderness. There is no rebound. Musculoskeletal: Normal range of motion. Comments: Good range of motion in all four extremities. Lymphadenopathy:      Cervical: No cervical adenopathy. Skin:     General: Skin is warm. Findings: No erythema or rash. Neurological:      Mental Status: She is alert and oriented to person, place, and time. Cranial Nerves: No cranial nerve deficit. Motor: No abnormal muscle tone. Deep Tendon Reflexes: Reflexes are normal and symmetric. Psychiatric:         Behavior: Behavior normal.         Thought Content:  Thought content normal.         Judgment: Judgment due to influenza A infection. Patient started Revlimid maintenance 25 mg daily for 3 weeks on,  1 week off on March 23, 2020. Her dose of Revlimid was reduced from 25 mg daily for 3 weeks on and one-week off to 20 mg daily for 3 weeks on 1 week off due to neutropenia. The patient had a serum immunofixation on May 19, 2020, no M protein was seen. Her kappa lambda free light chains are within normal range and normal ratio. Despite the dose reduction the patient was still neutropenic, so we proceeded with further reduction of Revlimid to 15 mg. WBC today is 3.5, she has ANC of 2.2. Stable hemoglobin 12.8 and normal platelet count 908,162. The patient had today serum immunoelectrophoresis and free light chains, results are pending  2. Right leg DVT. Known side effect from Revlimid and dexamethasone. The patient is on Xarelto  3. Gum and  jaw pain. The patient was seen by oral maxillofacial surgeon at [address] [place] [address] who performed CT of the of the jaw, it showed indeterminate findings in the jaw:  osteonecrosis versus multiple myeloma. Zometa is on hold. Diagnosis Orders   1. Multiple myeloma not having achieved remission (HCC)  CBC Auto Differential    POC PANEL BMP W/IOCA    Hepatic Function Panel    Electrophoresis Protein, Serum without Reflex to Immunofixation    Immunofixation serum profile    Immunofixation, urine    Immunofixation w/ Quant    Kappa/Lambda Quant Free Light Chains Serum        Plan:   Return in about 8 weeks (around 10/12/2020).      Orders Placed:   Orders Placed This Encounter   Procedures    CBC Auto Differential     Standing Status:   Future     Number of Occurrences:   1     Standing Expiration Date:   8/16/2021    POC PANEL BMP W/IOCA     Standing Status:   Future     Number of Occurrences:   1     Standing Expiration Date:   8/16/2021    Hepatic Function Panel     Standing Status:   Future     Number of Occurrences:   1     Standing Expiration Date:   8/16/2021    Electrophoresis Protein, Serum without Reflex to Immunofixation     Standing Status:   Future     Number of Occurrences:   1     Standing Expiration Date:   8/16/2021    Immunofixation serum profile     SOFT IFX1    Collect: Serum separator tube. Specimen Preparation: Separate serum from cells ASAP or within 2 hours of collection. Transfer 1 mL serum to an [facility] Standard Transport Tube. (Min: 0.3 mL)    Storage/Transport Temperature: Refrigerated. Remarks: MICHELLE gel includes a qualitative interpretation of an MICHELLE only. Unacceptable Conditions: Plasma. Stability (collection to initiation of testing): After separation from cells: Ambient: 8 hours;  Refrigerated: 1 week; Frozen: 1 month         Standing Status:   Future     Number of Occurrences:   1     Standing Expiration Date:   8/16/2021    Immunofixation, urine     Standing Status:   Future     Number of Occurrences:   1     Standing Expiration Date:   8/16/2021    Immunofixation w/ Quant     Standing Status:   Future     Number of Occurrences:   1     Standing Expiration Date:   8/16/2021    Kappa/Lambda Quant Free Light Chains Serum     Standing Status:   Future     Number of Occurrences:   1     Standing Expiration Date:   8/16/2021

## 2020-08-17 NOTE — PROGRESS NOTES
Name: Erin Garcia  : 1960  MRN: [id number]    Oncology Navigation Follow-Up Note    Contact Type:  Medical Oncology    Subjective: appt with ONC    Objective:  WBC 3.5  ANC 2.2  HGB 12.8    Crea 0.9   Multiple Myeloma labs drawn today. 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 Uses chlorhexidine rins, which eases the jaw discomfort. Next appt is in 2020. +Pain in back, comes & goes; most notable with bending & stooping. Spouse verbalized he feels pt experiences the pain more often. Pt due to begin Revlimid 15 mg tonight. ONC assessment completed. PLAN:  -Proceed with Revlimid dosing at 15 mg daily.  -Notify office if back pain worsens, & referral to [facility] can occur for XRTconsideration. RETURN to office in 4 weeks for CBC  -ONC will call pt with CBC results & advise her on Revlimid dosing.  -See ONC in 8 weeks      Assistance Needed: denies need    Receptive to Advanced Care Planning / Palliative Care:  deferred    Referrals: N/A today    Education: ONC reinforced use of sunscreen (Revlimid increases risk of skin cancer)  Limit liftng to 5# or less. NO pushing or pulling. Notes: Abbe following pt care to assist & support as needed.     Electronically signed by Eliana Bean RN on 2020 at 3:55 PM

## 2020-08-20 LAB
IMMUNOFIXATION URINE: NORMAL
KAPPA/LAMBDA FREE LIGHT CHAINS: NORMAL

## 2020-08-21 LAB — IMMUNOFIXATION WITH QUANT: NORMAL

## 2020-09-09 ENCOUNTER — HOSPITAL ENCOUNTER (OUTPATIENT)
Age: 60
Discharge: HOME | End: 2020-09-09
Payer: COMMERCIAL

## 2020-09-09 DIAGNOSIS — C90.00 MULTIPLE MYELOMA NOT HAVING ACHIEVED REMISSION (HCC): ICD-10-CM

## 2020-09-09 DIAGNOSIS — Z51.11 ENCOUNTER FOR CHEMOTHERAPY MANAGEMENT: ICD-10-CM

## 2020-09-09 LAB
HCT VFR BLD CALC: 37.4 % (ref 37–47)
HEMOGLOBIN: 12.9 GM/DL (ref 12–16)
MCH RBC QN AUTO: 35.8 PG (ref 26–33)
MCHC RBC AUTO-ENTMCNC: 34.5 GM/DL (ref 32.2–35.5)
MCV RBC AUTO: 104 FL (ref 81–99)
PDW BLD-RTO: 13 % (ref 11.5–14.5)
PLATELET # BLD: 181 THOU/MM3 (ref 130–400)
PMV BLD AUTO: 10.9 FL (ref 9.4–12.4)
RBC # BLD: 3.6 MILL/MM3 (ref 4.2–5.4)
WBC # BLD: 2.8 THOU/MM3 (ref 4.8–10.8)

## 2020-09-09 PROCEDURE — 36415 COLL VENOUS BLD VENIPUNCTURE: CPT

## 2020-09-09 PROCEDURE — 85027 COMPLETE CBC AUTOMATED: CPT

## 2020-09-09 RX ORDER — LENALIDOMIDE 15 MG/1
15 CAPSULE ORAL DAILY
Qty: 21 CAPSULE | Refills: 0 | Status: SHIPPED | OUTPATIENT
Start: 2020-09-09 | End: 2020-10-07

## 2020-09-17 ENCOUNTER — TELEPHONE (OUTPATIENT)
Dept: ONCOLOGY | Age: 60
End: 2020-09-17

## 2020-09-17 NOTE — TELEPHONE ENCOUNTER
Patient is calling and said she has a sore under her tongue she is guessing this is from the Revlimid? She wants to know what she can use?     Please advise

## 2020-09-17 NOTE — TELEPHONE ENCOUNTER
Her magic mouth wash is a year old so she said probably would not hurt to call one in. Plus she uses the Chlorhexidine should she continue to use that and the magic mouth wash?     Please advise

## 2020-10-07 RX ORDER — LENALIDOMIDE 15 MG/1
CAPSULE ORAL
Qty: 21 CAPSULE | Refills: 0 | Status: CANCELLED | OUTPATIENT
Start: 2020-10-07

## 2020-10-07 RX ORDER — LENALIDOMIDE 15 MG/1
CAPSULE ORAL
Qty: 21 CAPSULE | Refills: 0 | Status: SHIPPED | OUTPATIENT
Start: 2020-10-07 | End: 2020-10-07 | Stop reason: SDUPTHER

## 2020-10-07 RX ORDER — LENALIDOMIDE 15 MG/1
CAPSULE ORAL
Qty: 21 CAPSULE | Refills: 3 | Status: SHIPPED | OUTPATIENT
Start: 2020-10-07 | End: 2020-11-11 | Stop reason: SDUPTHER

## 2020-10-11 ASSESSMENT — ENCOUNTER SYMPTOMS
BACK PAIN: 1
ABDOMINAL DISTENTION: 0
VOMITING: 0
FACIAL SWELLING: 0
CONSTIPATION: 0
COUGH: 0
TROUBLE SWALLOWING: 0
SHORTNESS OF BREATH: 0
COLOR CHANGE: 0
DIARRHEA: 0
WHEEZING: 0
EYE DISCHARGE: 0
RECTAL PAIN: 0
NAUSEA: 0
SORE THROAT: 0
CHEST TIGHTNESS: 0
BLOOD IN STOOL: 0
ABDOMINAL PAIN: 0

## 2020-10-12 ENCOUNTER — OFFICE VISIT (OUTPATIENT)
Dept: ONCOLOGY | Age: 60
End: 2020-10-12
Payer: COMMERCIAL

## 2020-10-12 ENCOUNTER — HOSPITAL ENCOUNTER (OUTPATIENT)
Dept: INFUSION THERAPY | Age: 60
Discharge: HOME | End: 2020-10-12
Payer: COMMERCIAL

## 2020-10-12 VITALS
WEIGHT: 179.8 LBS | BODY MASS INDEX: 31.86 KG/M2 | SYSTOLIC BLOOD PRESSURE: 135 MMHG | DIASTOLIC BLOOD PRESSURE: 75 MMHG | HEIGHT: 63 IN | HEART RATE: 63 BPM | TEMPERATURE: 96.6 F | RESPIRATION RATE: 18 BRPM

## 2020-10-12 DIAGNOSIS — C90.00 MULTIPLE MYELOMA NOT HAVING ACHIEVED REMISSION (HCC): ICD-10-CM

## 2020-10-12 DIAGNOSIS — Z51.11 ENCOUNTER FOR CHEMOTHERAPY MANAGEMENT: ICD-10-CM

## 2020-10-12 LAB
ALBUMIN SERPL-MCNC: 3.9 G/DL (ref 3.5–5.1)
ALP BLD-CCNC: 70 U/L (ref 38–126)
ALT SERPL-CCNC: 20 U/L (ref 11–66)
AST SERPL-CCNC: 17 U/L (ref 5–40)
BILIRUB SERPL-MCNC: 0.4 MG/DL (ref 0.3–1.2)
BILIRUBIN DIRECT: < 0.2 MG/DL (ref 0–0.3)
BUN, WHOLE BLOOD: 10 MG/DL (ref 8–26)
CHLORIDE, WHOLE BLOOD: 107 MEQ/L (ref 98–109)
CREATININE, WHOLE BLOOD: 0.9 MG/DL (ref 0.5–1.2)
GFR, ESTIMATED: 68 ML/MIN/1.73M2
GLUCOSE, WHOLE BLOOD: 90 MG/DL (ref 70–108)
HCT VFR BLD CALC: 36.1 % (ref 37–47)
HEMOGLOBIN: 12.3 GM/DL (ref 12–16)
IONIZED CALCIUM, WHOLE BLOOD: 1.22 MMOL/L (ref 1.12–1.32)
MCH RBC QN AUTO: 35.4 PG (ref 26–33)
MCHC RBC AUTO-ENTMCNC: 34.1 GM/DL (ref 32.2–35.5)
MCV RBC AUTO: 104 FL (ref 81–99)
PDW BLD-RTO: 13 % (ref 11.5–14.5)
PLATELET # BLD: 197 THOU/MM3 (ref 130–400)
PMV BLD AUTO: 10.5 FL (ref 9.4–12.4)
POTASSIUM, WHOLE BLOOD: 3.8 MEQ/L (ref 3.5–4.9)
RBC # BLD: 3.47 MILL/MM3 (ref 4.2–5.4)
SODIUM, WHOLE BLOOD: 143 MEQ/L (ref 138–146)
TOTAL CO2, WHOLE BLOOD: 28 MEQ/L (ref 23–33)
TOTAL PROTEIN: 6.4 G/DL (ref 6.1–8)
WBC # BLD: 4 THOU/MM3 (ref 4.8–10.8)

## 2020-10-12 PROCEDURE — 90686 IIV4 VACC NO PRSV 0.5 ML IM: CPT | Performed by: INTERNAL MEDICINE

## 2020-10-12 PROCEDURE — 99214 OFFICE O/P EST MOD 30 MIN: CPT | Performed by: INTERNAL MEDICINE

## 2020-10-12 PROCEDURE — 99211 OFF/OP EST MAY X REQ PHY/QHP: CPT

## 2020-10-12 PROCEDURE — G8482 FLU IMMUNIZE ORDER/ADMIN: HCPCS | Performed by: INTERNAL MEDICINE

## 2020-10-12 PROCEDURE — 36415 COLL VENOUS BLD VENIPUNCTURE: CPT

## 2020-10-12 PROCEDURE — 80076 HEPATIC FUNCTION PANEL: CPT

## 2020-10-12 PROCEDURE — G8417 CALC BMI ABV UP PARAM F/U: HCPCS | Performed by: INTERNAL MEDICINE

## 2020-10-12 PROCEDURE — 1036F TOBACCO NON-USER: CPT | Performed by: INTERNAL MEDICINE

## 2020-10-12 PROCEDURE — 6360000002 HC RX W HCPCS: Performed by: INTERNAL MEDICINE

## 2020-10-12 PROCEDURE — 80047 BASIC METABLC PNL IONIZED CA: CPT

## 2020-10-12 PROCEDURE — 3017F COLORECTAL CA SCREEN DOC REV: CPT | Performed by: INTERNAL MEDICINE

## 2020-10-12 PROCEDURE — G8427 DOCREV CUR MEDS BY ELIG CLIN: HCPCS | Performed by: INTERNAL MEDICINE

## 2020-10-12 PROCEDURE — G0008 ADMIN INFLUENZA VIRUS VAC: HCPCS | Performed by: INTERNAL MEDICINE

## 2020-10-12 PROCEDURE — 85027 COMPLETE CBC AUTOMATED: CPT

## 2020-10-12 RX ORDER — ACYCLOVIR 400 MG/1
400 TABLET ORAL 2 TIMES DAILY
Qty: 180 TABLET | Refills: 0 | Status: SHIPPED | OUTPATIENT
Start: 2020-10-12 | End: 2021-01-10

## 2020-10-12 RX ADMIN — INFLUENZA A VIRUS A/VICTORIA/2454/2019 IVR-207 (H1N1) ANTIGEN (PROPIOLACTONE INACTIVATED), INFLUENZA A VIRUS A/HONG KONG/2671/2019 IVR-208 (H3N2) ANTIGEN (PROPIOLACTONE INACTIVATED), INFLUENZA B VIRUS B/VICTORIA/705/2018 BVR-11 ANTIGEN (PROPIOLACTONE INACTIVATED), INFLUENZA B VIRUS B/PHUKET/3073/2013 BVR-1B ANTIGEN (PROPIOLACTONE INACTIVATED) 0.5 ML: 15; 15; 15; 15 INJECTION, SUSPENSION INTRAMUSCULAR at 13:57

## 2020-10-12 NOTE — PROGRESS NOTES
[facility] [address] [facility] [address], [address] [facility] [address]  [facility]  Dept: [phone]  Dept Fax: [phone]: [phone]    Visit Date:10/12/2020     Sb Garcia is a 62 y.o. female who presents today for:   Chief Complaint   Patient presents with    Follow-up     multiple myeloma        HPI:    Brielle Rincon is a 63 WF with multiple myeloma. She was admitted to [facility] on 7/16  with  Hypercalcemia 9Ca=11.0), acute kidney injury ( creat. 4.9), both found on outpatient evaluation for several weeks of insidious, progressive fatigue and left shoulder pain. Xray showed multiple lytic lesions in the bones. Bone marrow, right iliac crest, biopsy, aspirate, and peripheral smear performed on 07/15/2019 showed: -          Plasma cell myeloma in a normocellular marrow (50%), see comment  Comment: The patient's myeloma survey dated 7/13/19 showing multiple lytic  lesions is noted. Plasma cells are 30% of cells on the aspirate smear and  occupy 40% of marrow cellularity based on a  immunostain performed on  the biopsy. Flow cytometric analysis shows that plasma cells represent 5.8%  of the total events analyzed, express CD19, CD56, CD38 and  and  analysis of cytoplasmic immunoglobulin staining shows a cKappa:cLambda  ratio of 97:0. These findings are consistent with a kappa light chain  restricted plasma cell myeloma. A Congo red stain for amyloid performed on  the biopsy is negative. SPEP, serum free light chains, serum immunoglobulins (elevated IGA 2,656 and depressed IGG/IGM)  s/p 2 sessions of pheresis given light chains >10,000  - Started chemotherapy (CyBorD) on 7/16    Acute Kidney Injury: due to multiple myeloma. Producing adequate urine. Creatinine improved with aggressive hydration. Hyperkalemia: Mildly elevated K likely due to JAME.    - Hydrate, trend K  Hypercalcemia: Hypercalcemic at OSH prior to transfer   - Hydration, trend calcium   - S/p one unit pRBC 7/16; - Continue Vitamin B12 supplementation with Vitamin B12 1000mcg daily PO  - HIV negative  - Haptoglobin, LDH without concern for acute hemolysis  She received day 1 on 7/26. On August 7,2019 she started treatment with Velcade dexamethasone and Revlimid  On September 25, 2019 she was diagnosed with right popliteal and calf veins DVT. She is treated with Xarelto. She received 6 cycles of RVD, afterwards she underwent stem cell collection at [facility] this week. After  Stem cell collectiuon she received 2 additional cycles of RVD. Last given on the February 20, 2020. On March 17, 2020 she started maintenance treatment with Revlimid. Zometa is on hold due to suspicion of jaw osteonecrosis    Interim history on 10/12/2020: The patient presents to the medical oncology clinic to continue maintenance therapy with Revlimid 15 mg daily and dexamethasone. Tolerates Revlimid well. She continues having intermittent discomfort in her jaw and left gum. She denies having any fevers. Today she reports that left hip pain is improved. She denies having any mouth sores no diarrhea no skin rash no peripheral neuropathy. Past Medical History:   Diagnosis Date    Acute kidney injury ([address] 7/12/2019    Allergic rhinitis     Flonase prn    Cancer (HCC)     Myeloma    Congenital hip dislocation 1961    right leg is longer, better with weight loss    H/O umbilical hernia repair 2/63/8456    Hot flushes, perimenopausal     Hypertension 2011    Seen by MONI Ramos CNP diagnosed < 1 year    IBS (irritable bowel syndrome)     ? diagnosis - diarrhea with milk products and meat    Osteoarthritis     right knee pain    S/P laparoscopic appendectomy 7/16/2018    Snoring     denies apnea, mild daytime somnolence, just lost 35# so has more energy, denies waking coughing or choking, BMI 27, neck circ.  13.5 inches    Vitamin D deficiency       Past Surgical History:   Procedure Laterality Date    HIP SURGERY  1978    Congenital Hip multiple daily.      VITAMIN D, ERGOCALCIFEROL, PO Take 5,000 Units by mouth daily       No current facility-administered medications for this visit. Allergies   Allergen Reactions    Sulfa Antibiotics Rash      Health Maintenance   Topic Date Due    Hepatitis C screen  1960    Pneumococcal 0-64 years Vaccine (1 of 3 - PCV13) 10/19/1967    HIV screen  10/19/1976    Diabetes screen  10/19/2001    Shingles Vaccine (1 of 2) 10/19/2011    Colon cancer screen colonoscopy  10/03/2019    Potassium monitoring  04/02/2021    Creatinine monitoring  04/02/2021    Cervical cancer screen  10/22/2021    DTaP/Tdap/Td vaccine (2 - Td) 06/04/2022    Breast cancer screen  06/23/2022    Lipid screen  04/25/2024    Flu vaccine  Completed    Hepatitis A vaccine  Aged Out    Hepatitis B vaccine  Aged Out    Hib vaccine  Aged Out    Meningococcal (ACWY) vaccine  Aged Out        Subjective:   Review of Systems   Constitutional: Positive for fatigue. Negative for activity change, appetite change and fever. HENT: Negative for congestion, dental problem, facial swelling, hearing loss, mouth sores, nosebleeds, sore throat, tinnitus and trouble swallowing. Eyes: Negative for discharge and visual disturbance. Respiratory: Negative for cough, chest tightness, shortness of breath and wheezing. Cardiovascular: Negative for chest pain, palpitations and leg swelling. Gastrointestinal: Negative for abdominal distention, abdominal pain, blood in stool, constipation, diarrhea, nausea, rectal pain and vomiting. Endocrine: Negative for cold intolerance, polydipsia and polyuria. Genitourinary: Negative for decreased urine volume, difficulty urinating, dysuria, flank pain, hematuria and urgency. Musculoskeletal: Positive for arthralgias and back pain. Negative for gait problem, joint swelling, myalgias and neck stiffness. Skin: Negative for color change, rash and wound. Neurological: Positive for headaches. content normal.         Judgment: Judgment normal.         /75 (Site: Left Upper Arm, Position: Sitting, Cuff Size: Medium Adult)   Pulse 63   Temp 96.6 °F (35.9 °C) (Infrared)   Resp 18   Ht 5' 3\" (1.6 m)   Wt 179 lb 12.8 oz (81.6 kg)   LMP 12/01/2004   BMI 31.85 kg/m²      ECOG status is 1    Imaging studies and labs:     XRAY Myeloma survey on 07/13/2019 showed:  IMPRESSION:   Innumerable lytic lesions bilaterally, compatible with multiple myeloma. Lab Results   Component Value Date    WBC 4.0 (L) 10/12/2020    HGB 12.3 10/12/2020    HCT 36.1 (L) 10/12/2020     (H) 10/12/2020     10/12/2020       Chemistry        Component Value Date/Time     10/12/2020 1203     04/02/2020 1244    K 3.8 10/12/2020 1203    K 4.6 04/02/2020 1244     04/02/2020 1244    CO2 24 08/17/2020 1206    BUN 14 08/17/2020 1206    CREATININE 0.9 10/12/2020 1203    CREATININE 0.6 04/02/2020 1244        Component Value Date/Time    CALCIUM 9.4 04/02/2020 1244    ALKPHOS 70 10/12/2020 1203    AST 17 10/12/2020 1203    ALT 20 10/12/2020 1203    BILITOT 0.4 10/12/2020 1203    BILITOT NEGATIVE 08/04/2015 0803        August 17,2020:  SPEP/MICHELLE Interpretation     Hypogammaglobulinemia.  MICHELLE gel shows a normal pattern; no monoclonal proteins seen. Kappa Qnt Free Light Chains                  24.37 H   3.30-19.40    mg/L   Lambda Qnt Free Light Chains               20.97       5.71-26.30    mg/L   Kappa/Lambda Free Light Chain Ratio     1.16       0.26-1.65  Assessment/Plan:   1. Multiple myeloma: The patient met criteria for diagnosis of multiple myeloma. She had clonal bone marrow plasma cells ? 10%, bone lesions seen on bone survey, hypercalcemia, renal insufficiency and anemia. She received first cycle of chemotherapy with Velcade Cytoxan and dexamethasone at [facility].    She received second cycle of VdR in [place], due to slight renal impairment the dose of Revlimid was reduced to 15 mg daily for 14 days during cycle #2. Her creatinine has improved to normal range. Her anemia is improved as well. Therefore with cycle #4 the dose of Revlimid was increased to 25 mg. She received 6 cycles of RVD and then she underwent stem cell collection at [facility]. Afterwards she received 2 additional cycles of RVD. Last given on the February 20, 2020. Her cycle #7 was delayed by 1 week due to influenza A infection. Patient started Revlimid maintenance 25 mg daily for 3 weeks on,  1 week off on March 23, 2020. Her dose of Revlimid was reduced from 25 mg daily for 3 weeks on and one-week off to 20 mg daily for 3 weeks on 1 week off due to neutropenia. The patient had a serum immunofixation on May 19, 2020, no M protein was seen. Her kappa lambda free light chains were within normal range and normal ratio. Despite the dose reduction the patient was still neutropenic, so we proceeded with further reduction of Revlimid to 15 mg. The patient had  serum immunoelectrophoresis and free light chains checked again in August 2020, no M protein and normal kappa lambda free light chain ratio. Today her labs are within normal range. The patient will continue Revlimid 15 mg daily for 3 weeks on 1 week off. However we will eliminate dexamethasone. We will check multiple myeloma panel on November 17, 2020, at that time we will check her IgG IgA and IgM. 2.  Right leg DVT. Known side effect from Revlimid and dexamethasone. The patient is on Xarelto  3. Gum and  jaw pain. The patient was seen by oral maxillofacial surgeon at [facility] who performed CT of the of the jaw, it showed indeterminate findings in the jaw:  osteonecrosis versus multiple myeloma. Zometa is on hold. Diagnosis Orders   1. Multiple myeloma not having achieved remission (HCC)  CBC Auto Differential    POC PANEL BMP W/IOCA    Hepatic Function Panel    IgG    IgM    IgA    acyclovir (ZOVIRAX) 400 MG tablet   2.  Lytic lesion of bone on x-ray  acyclovir (ZOVIRAX) 400 MG tablet   3. Acute deep vein thrombosis (DVT) of right popliteal vein (HCC)     4. Therapeutic drug monitoring          Plan:   Return in about 6 weeks (around 11/24/2020).      Orders Placed:   Orders Placed This Encounter   Procedures    CBC Auto Differential     Standing Status:   Future     Standing Expiration Date:   10/11/2021    POC PANEL BMP W/IOCA     Standing Status:   Future     Standing Expiration Date:   10/11/2021    Hepatic Function Panel     Standing Status:   Future     Standing Expiration Date:   10/11/2021    IgG     Standing Status:   Future     Standing Expiration Date:   10/11/2021    IgM     Standing Status:   Future     Standing Expiration Date:   10/11/2021    IgA     Standing Status:   Future     Standing Expiration Date:   10/11/2021

## 2020-10-12 NOTE — PATIENT INSTRUCTIONS
1.  Flu shots today  2. Patient will have lab work: CBC, BMP, LFTs multiple myeloma panel, IgG, IgA, IgM on November 17  3. RTC to see me on November 24, 2020  4.   Patient was instructed to stop taking dexamethasone

## 2020-10-12 NOTE — PLAN OF CARE
Problem: Musculor/Skeletal Functional Status  Goal: Absence of falls  Outcome: Met This Shift  Note: Patient verbalizes understanding of fall precautions. Patient free from falls this visit. Intervention: Fall precautions  Note: Discussed fall precautions, call light within reach. Problem: Intellectual/Education/Knowledge Deficit  Goal: Teaching initiated upon admission  Outcome: Met This Shift  Note: Patient verbalizes understanding to verbal information given on flu vaccine,action and possible side effects. Aware to call MD if develop complications. Intervention: Verbal/written education provided  Note: Discussed flu vaccine and when to call the doctor. Problem: Discharge Planning  Goal: Knowledge of discharge instructions  Description: Knowledge of discharge instructions  Outcome: Met This Shift  Note: Verbalized understanding of discharge instructions, follow-up appointments, and when to call the physician. Intervention: Discharge to appropriate level of care  Note: Discuss discharge instructions, follow ups, and when to call the doctor. Care plan reviewed with patient and family. Patient and family verbalize understanding of the plan of care and contribute to goal setting.

## 2020-10-12 NOTE — PROGRESS NOTES
Patient tolerated flu vaccine without any complications. Patient verbalizes understanding of discharge instructions, ambulated off unit with family and belongings.

## 2020-11-11 RX ORDER — LENALIDOMIDE 15 MG/1
CAPSULE ORAL
Qty: 21 CAPSULE | Refills: 0 | Status: SHIPPED | OUTPATIENT
Start: 2020-11-11 | End: 2020-12-03 | Stop reason: SDUPTHER

## 2020-11-17 ENCOUNTER — HOSPITAL ENCOUNTER (OUTPATIENT)
Age: 60
Discharge: HOME | End: 2020-11-17
Payer: COMMERCIAL

## 2020-11-17 DIAGNOSIS — C90.00 MULTIPLE MYELOMA NOT HAVING ACHIEVED REMISSION (HCC): ICD-10-CM

## 2020-11-17 LAB
ABSOLUTE IMMATURE GRANULOCYTE: 0.01 THOU/MM3 (ref 0–0.07)
ALBUMIN SERPL-MCNC: 4.3 G/DL (ref 3.5–5.1)
ALP BLD-CCNC: 79 U/L (ref 38–126)
ALT SERPL-CCNC: 19 U/L (ref 11–66)
AST SERPL-CCNC: 18 U/L (ref 5–40)
BASINOPHIL, AUTOMATED: 1 % (ref 0–3)
BASOPHILS ABSOLUTE: 0 THOU/MM3 (ref 0–0.1)
BILIRUB SERPL-MCNC: 0.3 MG/DL (ref 0.3–1.2)
BILIRUBIN DIRECT: < 0.2 MG/DL (ref 0–0.3)
BUN, WHOLE BLOOD: 11 MG/DL (ref 8–26)
CHLORIDE, WHOLE BLOOD: 103 MEQ/L (ref 98–109)
CREATININE, WHOLE BLOOD: 0.8 MG/DL (ref 0.5–1.2)
EOSINOPHILS ABSOLUTE: 0.1 THOU/MM3 (ref 0–0.4)
EOSINOPHILS RELATIVE PERCENT: 3 % (ref 0–4)
GFR, ESTIMATED: 78 ML/MIN/1.73M2
GLUCOSE, WHOLE BLOOD: 105 MG/DL (ref 70–108)
HCT VFR BLD CALC: 38.6 % (ref 37–47)
HEMOGLOBIN: 13.1 GM/DL (ref 12–16)
IMMATURE GRANULOCYTES: 0 %
IONIZED CALCIUM, WHOLE BLOOD: 1.2 MMOL/L (ref 1.12–1.32)
LYMPHOCYTES # BLD: 20 % (ref 15–47)
LYMPHOCYTES ABSOLUTE: 0.6 THOU/MM3 (ref 1–4.8)
MCH RBC QN AUTO: 34.8 PG (ref 26–33)
MCHC RBC AUTO-ENTMCNC: 33.9 GM/DL (ref 32.2–35.5)
MCV RBC AUTO: 103 FL (ref 81–99)
MONOCYTES ABSOLUTE: 0.4 THOU/MM3 (ref 0.4–1.3)
MONOCYTES: 15 % (ref 0–12)
PDW BLD-RTO: 12.7 % (ref 11.5–14.5)
PLATELET # BLD: 196 THOU/MM3 (ref 130–400)
PMV BLD AUTO: 11.4 FL (ref 9.4–12.4)
POTASSIUM, WHOLE BLOOD: 4.2 MEQ/L (ref 3.5–4.9)
RBC # BLD: 3.76 MILL/MM3 (ref 4.2–5.4)
SEG NEUTROPHILS: 60 % (ref 43–75)
SEGMENTED NEUTROPHILS ABSOLUTE COUNT: 1.8 THOU/MM3 (ref 1.8–7.7)
SODIUM, WHOLE BLOOD: 138 MEQ/L (ref 138–146)
TOTAL CO2, WHOLE BLOOD: 29 MEQ/L (ref 23–33)
TOTAL PROTEIN: 6.4 G/DL (ref 6.1–8)
WBC # BLD: 2.9 THOU/MM3 (ref 4.8–10.8)

## 2020-11-17 PROCEDURE — 36415 COLL VENOUS BLD VENIPUNCTURE: CPT

## 2020-11-17 PROCEDURE — 85025 COMPLETE CBC W/AUTO DIFF WBC: CPT

## 2020-11-17 PROCEDURE — 84156 ASSAY OF PROTEIN URINE: CPT

## 2020-11-17 PROCEDURE — 86335 IMMUNFIX E-PHORSIS/URINE/CSF: CPT

## 2020-11-17 PROCEDURE — 80076 HEPATIC FUNCTION PANEL: CPT

## 2020-11-17 PROCEDURE — 80047 BASIC METABLC PNL IONIZED CA: CPT

## 2020-11-17 PROCEDURE — 82784 ASSAY IGA/IGD/IGG/IGM EACH: CPT

## 2020-11-18 LAB
IGA: 86 MG/DL (ref 70–400)
IGG: 695 MG/DL (ref 700–1600)
IGM: 48 MG/DL (ref 40–230)

## 2020-11-21 LAB — IMMUNOFIXATION URINE: NORMAL

## 2020-11-24 ENCOUNTER — VIRTUAL VISIT (OUTPATIENT)
Dept: ONCOLOGY | Age: 60
End: 2020-11-24
Payer: COMMERCIAL

## 2020-11-24 PROCEDURE — 99422 OL DIG E/M SVC 11-20 MIN: CPT | Performed by: INTERNAL MEDICINE

## 2020-11-24 ASSESSMENT — ENCOUNTER SYMPTOMS
SORE THROAT: 0
NAUSEA: 0
ABDOMINAL PAIN: 0
ABDOMINAL DISTENTION: 0
COUGH: 0
VOMITING: 0
CONSTIPATION: 0
RECTAL PAIN: 0
WHEEZING: 0
SHORTNESS OF BREATH: 0
CHEST TIGHTNESS: 0
BACK PAIN: 1
COLOR CHANGE: 0
BLOOD IN STOOL: 0
FACIAL SWELLING: 0
EYE DISCHARGE: 0
DIARRHEA: 0
TROUBLE SWALLOWING: 0

## 2020-11-24 NOTE — PROGRESS NOTES
[facility] [address] [facility] [place] [address]  Sonnyl[address] Salinas1  Dept: [phone]  Dept Fax: [phone]: [phone]    Visit Date:11/24/2020     Evita Lehman is a 62 y.o. female who presents today for:   Chief Complaint   Patient presents with   [address] is a female being evaluated by a Virtual Visit (video visit) encounter to address the 2 months follow-up visit for her multiple myeloma. Due to this being a TeleHealth encounter (During OXEUL-57 public health emergency), evaluation of the following organ systems was limited: Vitals/Constitutional/EENT/Resp/CV/GI//MS/Neuro/Skin/Heme-Lymph-Imm.  Pursuant to the emergency declaration under the [address] [place] [address], [facility] waiver authority and the [employer] Act, this Virtual Visit was conducted with patient's consent, to reduce the patient's risk of exposure to COVID-19 and provide necessary medical care.  The patient has also been advised to contact this office for worsening conditions or problems, and seek emergency medical treatment and/or call 911 if deemed necessary.     Patient identification was verified at the start of the visit: Yes     Total time spent on this encounter: 20 minutes     Services were provided through a video synchronous discussion virtually to substitute for in-person clinic visit. Patient was located at her home, provider located in office. Visit completed on Doxy. me    HPI:    Augusto Pepper is a 63 WF with multiple myeloma. She was admitted to [facility] on 7/16  with  Hypercalcemia 9Ca=11.0), acute kidney injury ( creat. 4.9), both found on outpatient evaluation for several weeks of insidious, progressive fatigue and left shoulder pain. Xray showed multiple lytic lesions in the bones. Bone marrow, right iliac crest, biopsy, aspirate, and peripheral smear performed on 07/15/2019 showed:    -          Plasma cell myeloma in a normocellular marrow (50%), see comment  Comment: The patient's myeloma survey dated 7/13/19 showing multiple lytic  lesions is noted. Plasma cells are 30% of cells on the aspirate smear and  occupy 40% of marrow cellularity based on a  immunostain performed on  the biopsy. Flow cytometric analysis shows that plasma cells represent 5.8%  of the total events analyzed, express CD19, CD56, CD38 and  and  analysis of cytoplasmic immunoglobulin staining shows a cKappa:cLambda  ratio of 97:0. These findings are consistent with a kappa light chain  restricted plasma cell myeloma. A Congo red stain for amyloid performed on  the biopsy is negative. SPEP, serum free light chains, serum immunoglobulins (elevated IGA 2,656 and depressed IGG/IGM)  s/p 2 sessions of pheresis given light chains >10,000  - Started chemotherapy (CyBorD) on 7/16    Acute Kidney Injury: due to multiple myeloma. Producing adequate urine. Creatinine improved with aggressive hydration. Hyperkalemia: Mildly elevated K likely due to JAME. - Hydrate, trend K  Hypercalcemia: Hypercalcemic at OSH prior to transfer   - Hydration, trend calcium   - S/p one unit pRBC 7/16; - Continue Vitamin B12 supplementation with Vitamin B12 1000mcg daily PO  - HIV negative  - Haptoglobin, LDH without concern for acute hemolysis  She received day 1 on 7/26. On August 7,2019 she started treatment with Velcade dexamethasone and Revlimid  On September 25, 2019 she was diagnosed with right popliteal and calf veins DVT. She is treated with Xarelto. She received 6 cycles of RVD, afterwards she underwent stem cell [facility] at [facility] this week. After  Stem cell collectiuon she received 2 additional cycles of RVD. Last given on the February 20, 2020. On March 17, 2020 she started maintenance treatment with Revlimid. Zometa is on hold due to suspicion of jaw osteonecrosis    Interim history on 11/23/2020:   The patient continues maintenance therapy with Revlimid 15 mg daily. During last visit we eliminated dexamethasone. Tolerates Revlimid well. She continues having intermittent discomfort in her jaw and left gum. She denies having any fevers. Today she reports that left hip pain is improved. She denies having any mouth sores no diarrhea no skin rash no peripheral neuropathy. Past Medical History:   Diagnosis Date    Acute kidney injury ([address] 7/12/2019    Allergic rhinitis     Flonase prn    Cancer (HCC)     Myeloma    Congenital hip dislocation 1961    right leg is longer, better with weight loss    H/O umbilical hernia repair 4/38/8682    Hot flushes, perimenopausal     Hypertension 2011    Seen by MONI Cornejo CNP diagnosed < 1 year    IBS (irritable bowel syndrome)     ? diagnosis - diarrhea with milk products and meat    Osteoarthritis     right knee pain    S/P laparoscopic appendectomy 7/16/2018    Snoring     denies apnea, mild daytime somnolence, just lost 35# so has more energy, denies waking coughing or choking, BMI 27, neck circ. 13.5 inches    Vitamin D deficiency       Past Surgical History:   Procedure Laterality Date    HIP SURGERY  1978    Congenital Hip multiple surgies since 7 weeks old. Last surgery 1978     HYSTERECTOMY  2004    Partial Irregular Periods with migraines.  KNEE SURGERY Right [address]    stapled to help straighten leg(congenital hip issues)    VA LAP,APPENDECTOMY N/A 7/15/2018    APPENDECTOMY LAPAROSCOPIC performed [facility] Alfredo Enciso [facility]      Family History   Problem Relation Age of Onset    High Blood Pressure Mother     High Cholesterol Mother     Cancer Father         skin cancer    Cancer Paternal Grandmother     Cancer Paternal Grandfather     Deep Vein Thrombosis Brother         PE      Social History     Tobacco Use    Smoking status: Never Smoker    Smokeless tobacco: Never Used   Substance Use Topics    Alcohol use:  Yes Comment: Socially      Current Outpatient Medications   Medication Sig Dispense Refill    rivaroxaban (XARELTO) 20 MG TABS tablet TAKE 1 TABLET BY MOUTH EVERY DAY WITH BREAKFAST 30 tablet 3    acyclovir (ZOVIRAX) 400 MG tablet Take 1 tablet by mouth 2 times daily 180 tablet 0    Magic Mouthwash (MIRACLE MOUTHWASH) Swish and spit 5 mLs 4 times daily as needed for Irritation Prepare mixture 1:1:1 DIPHENHYDRAMINE HCL,NYSTATIN,LIDOCAINE  mL 1    lactobacillus (CULTURELLE) capsule Take 1 capsule by mouth daily      venlafaxine (EFFEXOR XR) 75 MG extended release capsule TAKE 1 CAPSULE BY MOUTH ONE TIME A DAY 90 capsule 1    metoprolol succinate (TOPROL XL) 50 MG extended release tablet TAKE 1 TABLET BY MOUTH ONE TIME A DAY 90 tablet 1    amLODIPine (NORVASC) 10 MG tablet Take 1 tablet by mouth daily 90 tablet 1    chlorhexidine (PERIDEX) 0.12 % solution Take 15 mLs by mouth 2 times daily      Calcium 600-200 MG-UNIT TABS Take 600 mg by mouth daily 30 tablet 0    econazole nitrate 1 % cream Apply topically Apply topically daily.  VITAMIN D, ERGOCALCIFEROL, PO Take 5,000 Units by mouth daily      lenalidomide (REVLIMID) 15 MG chemo capsule TAKE 1 CAPSULE BY MOUTH  DAILY FOR 21 DAYS, THEN 7  DAYS OFF 21 capsule 0     No current facility-administered medications for this visit.        Allergies   Allergen Reactions    Sulfa Antibiotics Rash      Health Maintenance   Topic Date Due    Hepatitis C screen  1960    Pneumococcal 0-64 years Vaccine (1 of 3 - PCV13) 10/19/1967    HIV screen  10/19/1976    Diabetes screen  10/19/2001    Shingles Vaccine (1 of 2) 10/19/2011    Colon cancer screen colonoscopy  10/03/2019    Potassium monitoring  04/02/2021    Creatinine monitoring  04/02/2021    Cervical cancer screen  10/22/2021    DTaP/Tdap/Td vaccine (2 - Td) 06/04/2022    Breast cancer screen  06/23/2022    Lipid screen  04/25/2024    Flu vaccine  Completed    Hepatitis A vaccine  Aged Out    Hepatitis B vaccine  Aged Out    Hib vaccine  Aged Out    Meningococcal (ACWY) vaccine  Aged Out        Subjective:   Review of Systems   Constitutional: Positive for fatigue. Negative for activity change, appetite change and fever. HENT: Negative for congestion, dental problem, facial swelling, hearing loss, mouth sores, nosebleeds, sore throat, tinnitus and trouble swallowing. Eyes: Negative for discharge and visual disturbance. Respiratory: Negative for cough, chest tightness, shortness of breath and wheezing. Cardiovascular: Negative for chest pain, palpitations and leg swelling. Gastrointestinal: Negative for abdominal distention, abdominal pain, blood in stool, constipation, diarrhea, nausea, rectal pain and vomiting. Endocrine: Negative for cold intolerance, polydipsia and polyuria. Genitourinary: Negative for decreased urine volume, difficulty urinating, dysuria, flank pain, hematuria and urgency. Musculoskeletal: Positive for arthralgias and back pain. Negative for gait problem, joint swelling, myalgias and neck stiffness. Skin: Negative for color change, rash and wound. Neurological: Positive for headaches. Negative for dizziness, tremors, seizures, speech difficulty, weakness, light-headedness and numbness. Hematological: Negative for adenopathy. Does not bruise/bleed easily. Psychiatric/Behavioral: Negative for confusion and sleep disturbance. The patient is not nervous/anxious. Objective:   Physical Exam  Constitutional:       General: She is not in acute distress. Appearance: She is well-developed. HENT:      Head: Normocephalic. Mouth/Throat:      Pharynx: No oropharyngeal exudate. Eyes:      General: No scleral icterus. Right eye: No discharge. Left eye: No discharge. Neck:      Musculoskeletal: Neck supple. Thyroid: No thyromegaly. Vascular: No JVD. Trachea: No tracheal deviation.    Cardiovascular:      Heart sounds: No murmur. No friction rub. No gallop. Pulmonary:      Effort: Pulmonary effort is normal. No respiratory distress. Breath sounds: No wheezing or rales. Chest:      Chest wall: No tenderness. Abdominal:      General: Bowel sounds are normal. There is no distension. Palpations: Abdomen is soft. There is no mass. Tenderness: There is no abdominal tenderness. There is no rebound. Musculoskeletal:      Comments: Good range of motion in all four extremities. Lymphadenopathy:      Cervical: No cervical adenopathy. Skin:     Findings: No erythema or rash. Neurological:      Mental Status: She is alert and oriented to person, place, and time. Cranial Nerves: No cranial nerve deficit. Motor: No abnormal muscle tone. Deep Tendon Reflexes: Reflexes are normal and symmetric. Psychiatric:         Behavior: Behavior normal.         Thought Content: Thought content normal.         Judgment: Judgment normal.         LMP 12/01/2004      ECOG status is 1    Imaging studies and labs:     XRAY Myeloma survey on 07/13/2019 showed:  IMPRESSION:   Innumerable lytic lesions bilaterally, compatible with multiple myeloma. Lab Results   Component Value Date    WBC 2.9 (L) 11/17/2020    HGB 13.1 11/17/2020    HCT 38.6 11/17/2020     (H) 11/17/2020     11/17/2020       Chemistry        Component Value Date/Time     11/17/2020 1110     04/02/2020 1244    K 4.2 11/17/2020 1110    K 4.6 04/02/2020 1244     04/02/2020 1244    CO2 24 08/17/2020 1206    BUN 14 08/17/2020 1206    CREATININE 0.8 11/17/2020 1110    CREATININE 0.6 04/02/2020 1244        Component Value Date/Time    CALCIUM 9.4 04/02/2020 1244    ALKPHOS 79 11/17/2020 1110    AST 18 11/17/2020 1110    ALT 19 11/17/2020 1110    BILITOT 0.3 11/17/2020 1110    BILITOT NEGATIVE 08/04/2015 0803          August 17,2020:  SPEP/MICHELLE Interpretation     Hypogammaglobulinemia.   MICHELLE gel shows a normal pattern; no monoclonal proteins seen. Kappa Qnt Free Light Chains                  24.37 H   3.30-19.40    mg/L   Lambda Qnt Free Light Chains               20.97       5.71-26.30    mg/L   Kappa/Lambda Free Light Chain Ratio     1.16       0.26-1.65    2020: IgA: 86  Total Ig  IgM: 48  Assessment/Plan:   1. Multiple myeloma: The patient met criteria for diagnosis of multiple myeloma. She had clonal bone marrow plasma cells ? 10%, bone lesions seen on bone survey, hypercalcemia, renal insufficiency and anemia. She received first cycle of chemotherapy with Velcade Cytoxan and dexamethasone at [facility]. She received second cycle of VdR in [facility], due to slight renal impairment the dose of Revlimid was reduced to 15 mg daily for 14 days during cycle #2. Her creatinine has improved to normal range. Her anemia is improved as well. Therefore with cycle #4 the dose of Revlimid was increased to 25 mg. She received 6 cycles of RVD and then she underwent stem cell collection at [facility]. Afterwards she received 2 additional cycles of RVD. Last given on the 2020. Her cycle #7 was delayed by 1 week due to influenza A infection. Patient started Revlimid maintenance 25 mg daily for 3 weeks on,  1 week off on 2020. Her dose of Revlimid was reduced from 25 mg daily for 3 weeks on and one-week off to 20 mg daily for 3 weeks on 1 week off due to neutropenia. The patient had a serum immunofixation on May 19, 2020, no M protein was seen. Her kappa lambda free light chains were within normal range and normal ratio. Despite the dose reduction the patient was still neutropenic, so we proceeded with further reduction of Revlimid to 15 mg. The patient had  serum immunoelectrophoresis and free light chains checked again in 2020, no M protein and normal kappa lambda free light chain ratio. Today her WBC is 2.9 with ANC 1.8, she has normal hemoglobin normal platelet count.   The patient will continue Revlimid 15 mg daily for 3 weeks on 1 week off. She is off dexamethasone. Since her total IgG is slower therefore the patient will get IgG infusion. 2.  Right leg DVT. Known side effect from Revlimid and dexamethasone. The patient is on Xarelto  3. Gum and  jaw pain. The patient was seen by oral maxillofacial surgeon at [facility] who performed CT of the of the jaw, it showed indeterminate findings in the jaw:  osteonecrosis versus multiple myeloma. Zometa is on hold. Diagnosis Orders   1. Multiple myeloma not having achieved remission ([facility] [address]     2. Lytic lesion of bone on x-ray     3. Acute deep vein thrombosis (DVT) of right popliteal vein (HCC)     4. Chemotherapy-induced neutropenia (HCC)          Plan:   No follow-ups on file. Orders Placed:   No orders of the defined types were placed in this encounter.

## 2020-12-03 RX ORDER — LENALIDOMIDE 15 MG/1
CAPSULE ORAL
Qty: 21 CAPSULE | Refills: 0 | Status: SHIPPED | OUTPATIENT
Start: 2020-12-03 | End: 2020-12-30

## 2020-12-03 RX ORDER — LENALIDOMIDE 15 MG/1
CAPSULE ORAL
Qty: 21 CAPSULE | Refills: 0 | Status: SHIPPED | OUTPATIENT
Start: 2020-12-03 | End: 2020-12-03 | Stop reason: SDUPTHER

## 2020-12-04 ENCOUNTER — TELEPHONE (OUTPATIENT)
Dept: ONCOLOGY | Age: 60
End: 2020-12-04

## 2020-12-04 NOTE — TELEPHONE ENCOUNTER
Marilu Goddard called and states after her Virtual Visit that you were going to order labs and IVIG for her. Please advise.

## 2020-12-07 PROBLEM — D80.1 HYPOGAMMAGLOBULINEMIA (HCC): Status: ACTIVE | Noted: 2020-12-07

## 2020-12-07 RX ORDER — EPINEPHRINE 1 MG/ML
0.3 INJECTION, SOLUTION, CONCENTRATE INTRAVENOUS PRN
Status: CANCELLED | OUTPATIENT
Start: 2020-12-11

## 2020-12-07 RX ORDER — HEPARIN SODIUM (PORCINE) LOCK FLUSH IV SOLN 100 UNIT/ML 100 UNIT/ML
500 SOLUTION INTRAVENOUS PRN
Status: CANCELLED | OUTPATIENT
Start: 2020-12-11

## 2020-12-07 RX ORDER — SODIUM CHLORIDE 0.9 % (FLUSH) 0.9 %
10 SYRINGE (ML) INJECTION PRN
Status: CANCELLED | OUTPATIENT
Start: 2020-12-11

## 2020-12-07 RX ORDER — SODIUM CHLORIDE 9 MG/ML
INJECTION, SOLUTION INTRAVENOUS CONTINUOUS
Status: CANCELLED | OUTPATIENT
Start: 2020-12-11

## 2020-12-07 RX ORDER — DIPHENHYDRAMINE HYDROCHLORIDE 50 MG/ML
50 INJECTION INTRAMUSCULAR; INTRAVENOUS ONCE
Status: CANCELLED | OUTPATIENT
Start: 2020-12-11

## 2020-12-07 RX ORDER — ACETAMINOPHEN 325 MG/1
650 TABLET ORAL ONCE
Status: CANCELLED | OUTPATIENT
Start: 2020-12-11

## 2020-12-07 RX ORDER — METHYLPREDNISOLONE SODIUM SUCCINATE 125 MG/2ML
125 INJECTION, POWDER, LYOPHILIZED, FOR SOLUTION INTRAMUSCULAR; INTRAVENOUS ONCE
Status: CANCELLED | OUTPATIENT
Start: 2020-12-11

## 2020-12-07 RX ORDER — SODIUM CHLORIDE 0.9 % (FLUSH) 0.9 %
5 SYRINGE (ML) INJECTION PRN
Status: CANCELLED | OUTPATIENT
Start: 2020-12-11

## 2020-12-07 RX ORDER — DIPHENHYDRAMINE HCL 25 MG
25 TABLET ORAL ONCE
Status: CANCELLED | OUTPATIENT
Start: 2020-12-11

## 2020-12-11 RX ORDER — HEPARIN SODIUM (PORCINE) LOCK FLUSH IV SOLN 100 UNIT/ML 100 UNIT/ML
500 SOLUTION INTRAVENOUS PRN
Status: CANCELLED | OUTPATIENT
Start: 2020-12-11

## 2020-12-11 RX ORDER — SODIUM CHLORIDE 0.9 % (FLUSH) 0.9 %
10 SYRINGE (ML) INJECTION PRN
Status: CANCELLED | OUTPATIENT
Start: 2020-12-11

## 2020-12-11 RX ORDER — SODIUM CHLORIDE 0.9 % (FLUSH) 0.9 %
20 SYRINGE (ML) INJECTION PRN
Status: CANCELLED | OUTPATIENT
Start: 2020-12-11

## 2020-12-12 RX ORDER — VENLAFAXINE HYDROCHLORIDE 75 MG/1
CAPSULE, EXTENDED RELEASE ORAL
Qty: 90 CAPSULE | Refills: 0 | Status: SHIPPED | OUTPATIENT
Start: 2020-12-12 | End: 2021-03-17

## 2020-12-12 RX ORDER — METOPROLOL SUCCINATE 50 MG/1
TABLET, EXTENDED RELEASE ORAL
Qty: 90 TABLET | Refills: 0 | Status: SHIPPED | OUTPATIENT
Start: 2020-12-12 | End: 2021-03-17

## 2020-12-14 ENCOUNTER — TELEPHONE (OUTPATIENT)
Dept: INTERNAL MEDICINE CLINIC | Age: 60
End: 2020-12-14

## 2020-12-14 NOTE — TELEPHONE ENCOUNTER
----- Message from Lester Feldman MD sent at 12/12/2020  4:51 PM EST -----  Noticed she didn't have follow up visit in 11/2020 - offer VV.

## 2020-12-15 ENCOUNTER — HOSPITAL ENCOUNTER (OUTPATIENT)
Dept: INFUSION THERAPY | Age: 60
Discharge: HOME | End: 2020-12-15
Payer: COMMERCIAL

## 2020-12-15 VITALS
WEIGHT: 177.4 LBS | HEART RATE: 69 BPM | BODY MASS INDEX: 32.65 KG/M2 | SYSTOLIC BLOOD PRESSURE: 112 MMHG | TEMPERATURE: 98.5 F | RESPIRATION RATE: 16 BRPM | HEIGHT: 62 IN | DIASTOLIC BLOOD PRESSURE: 64 MMHG | OXYGEN SATURATION: 98 %

## 2020-12-15 DIAGNOSIS — C90.00 MULTIPLE MYELOMA NOT HAVING ACHIEVED REMISSION (HCC): ICD-10-CM

## 2020-12-15 DIAGNOSIS — D80.1 HYPOGAMMAGLOBULINEMIA (HCC): Primary | ICD-10-CM

## 2020-12-15 PROCEDURE — 6360000002 HC RX W HCPCS: Performed by: INTERNAL MEDICINE

## 2020-12-15 PROCEDURE — 96365 THER/PROPH/DIAG IV INF INIT: CPT

## 2020-12-15 PROCEDURE — 83883 ASSAY NEPHELOMETRY NOT SPEC: CPT

## 2020-12-15 PROCEDURE — 84165 PROTEIN E-PHORESIS SERUM: CPT

## 2020-12-15 PROCEDURE — 86334 IMMUNOFIX E-PHORESIS SERUM: CPT

## 2020-12-15 PROCEDURE — 36415 COLL VENOUS BLD VENIPUNCTURE: CPT

## 2020-12-15 PROCEDURE — 82784 ASSAY IGA/IGD/IGG/IGM EACH: CPT

## 2020-12-15 PROCEDURE — 84155 ASSAY OF PROTEIN SERUM: CPT

## 2020-12-15 PROCEDURE — 2580000003 HC RX 258: Performed by: INTERNAL MEDICINE

## 2020-12-15 PROCEDURE — 96366 THER/PROPH/DIAG IV INF ADDON: CPT

## 2020-12-15 PROCEDURE — 6370000000 HC RX 637 (ALT 250 FOR IP): Performed by: INTERNAL MEDICINE

## 2020-12-15 RX ORDER — DIPHENHYDRAMINE HCL 25 MG
25 TABLET ORAL ONCE
Status: CANCELLED | OUTPATIENT
Start: 2020-12-15 | End: 2020-12-15

## 2020-12-15 RX ORDER — SODIUM CHLORIDE 9 MG/ML
INJECTION, SOLUTION INTRAVENOUS CONTINUOUS
Status: CANCELLED | OUTPATIENT
Start: 2020-12-15

## 2020-12-15 RX ORDER — ACETAMINOPHEN 325 MG/1
650 TABLET ORAL ONCE
Status: CANCELLED | OUTPATIENT
Start: 2020-12-15 | End: 2020-12-15

## 2020-12-15 RX ORDER — HEPARIN SODIUM (PORCINE) LOCK FLUSH IV SOLN 100 UNIT/ML 100 UNIT/ML
500 SOLUTION INTRAVENOUS PRN
Status: CANCELLED | OUTPATIENT
Start: 2020-12-15

## 2020-12-15 RX ORDER — DIPHENHYDRAMINE HYDROCHLORIDE 50 MG/ML
50 INJECTION INTRAMUSCULAR; INTRAVENOUS ONCE
Status: CANCELLED | OUTPATIENT
Start: 2020-12-15 | End: 2020-12-15

## 2020-12-15 RX ORDER — SODIUM CHLORIDE 0.9 % (FLUSH) 0.9 %
10 SYRINGE (ML) INJECTION PRN
Status: CANCELLED | OUTPATIENT
Start: 2020-12-15

## 2020-12-15 RX ORDER — METHYLPREDNISOLONE SODIUM SUCCINATE 125 MG/2ML
125 INJECTION, POWDER, LYOPHILIZED, FOR SOLUTION INTRAMUSCULAR; INTRAVENOUS ONCE
Status: CANCELLED | OUTPATIENT
Start: 2020-12-15 | End: 2020-12-15

## 2020-12-15 RX ORDER — ACETAMINOPHEN 325 MG/1
650 TABLET ORAL ONCE
Status: COMPLETED | OUTPATIENT
Start: 2020-12-15 | End: 2020-12-15

## 2020-12-15 RX ORDER — DIPHENHYDRAMINE HCL 25 MG
25 TABLET ORAL ONCE
Status: COMPLETED | OUTPATIENT
Start: 2020-12-15 | End: 2020-12-15

## 2020-12-15 RX ORDER — SODIUM CHLORIDE 0.9 % (FLUSH) 0.9 %
5 SYRINGE (ML) INJECTION PRN
Status: CANCELLED | OUTPATIENT
Start: 2020-12-15

## 2020-12-15 RX ORDER — SODIUM CHLORIDE 9 MG/ML
INJECTION, SOLUTION INTRAVENOUS CONTINUOUS
Status: DISCONTINUED | OUTPATIENT
Start: 2020-12-15 | End: 2020-12-16 | Stop reason: HOSPADM

## 2020-12-15 RX ADMIN — IMMUNE GLOBULIN (HUMAN) 20 G: 10 INJECTION INTRAVENOUS; SUBCUTANEOUS at 12:24

## 2020-12-15 RX ADMIN — IMMUNE GLOBULIN (HUMAN) 20 G: 10 INJECTION INTRAVENOUS; SUBCUTANEOUS at 13:56

## 2020-12-15 RX ADMIN — ACETAMINOPHEN 650 MG: 325 TABLET ORAL at 12:13

## 2020-12-15 RX ADMIN — DIPHENHYDRAMINE HCL 25 MG: 25 TABLET ORAL at 12:13

## 2020-12-15 RX ADMIN — SODIUM CHLORIDE: 9 INJECTION, SOLUTION INTRAVENOUS at 12:19

## 2020-12-15 ASSESSMENT — PAIN SCALES - GENERAL: PAINLEVEL_OUTOF10: 0

## 2020-12-15 NOTE — PROGRESS NOTES
Patient assessed for the following post IVIG:    Dizziness   No  Lightheadedness  No      Acute nausea/vomiting No  Headache   No  Chest pain/pressure  No  Rash/itching   No  Shortness of breath  No    Patient kept for 20 minutes observation post infusion IVIG  Patient tolerated IVIG without any complications. Last vital signs:   /64   Pulse 69   Temp 98.5 °F (36.9 °C) (Oral)   Resp 16   Ht 5' 2\" (1.575 m)   Wt 177 lb 6.4 oz (80.5 kg)   LMP 12/01/2004   SpO2 98%   BMI 32.45 kg/m²         Patient instructed if experience any of the above symptoms following today's infusion,he/she is to notify MD immediately or go to the emergency department. Discharge instructions given to patient. Verbalizes understanding. Ambulated off unit per self with belongings.

## 2020-12-15 NOTE — PLAN OF CARE
Problem: Musculor/Skeletal Functional Status  Goal: Absence of falls  Outcome: Met This Shift  Note: No falls occurred with visit today. Intervention: Fall precautions  Note: Verbalized understanding of fall prevention to ask for assistance with ambulation. Call light within reach. Problem: Intellectual/Education/Knowledge Deficit  Goal: Teaching initiated upon admission  Outcome: Met This Shift  Note: Patient verbalizes understanding to verbal information given on IVIG,action and possible side effects. Aware to call MD if develop complications. Intervention: Verbal/written education provided  Note: IVIG reviewed, patient verbalizes understanding of medication being administered and potential side effects. Problem: Discharge Planning  Goal: Knowledge of discharge instructions  Description: Knowledge of discharge instructions  Outcome: Met This Shift  Note: Verbalized understanding of discharge instructions, follow-up appointments, and when to call the physician. Intervention: Interaction with patient/family and care team  Note: Discuss understanding of discharge instructions,follow-up appointments, and when to call the physician. Care plan reviewed with patient . Patient  verbalize understanding of the plan of care and contribute to goal setting.

## 2020-12-17 ENCOUNTER — TELEPHONE (OUTPATIENT)
Dept: ONCOLOGY | Age: 60
End: 2020-12-17

## 2020-12-17 LAB — KAPPA/LAMBDA FREE LIGHT CHAINS: NORMAL

## 2020-12-17 NOTE — TELEPHONE ENCOUNTER
Luke Hearn called saying [facility] is still not feeling well. Temps have been between 100-101F, nausea and headache. After speaking with Dr Brady Park last night, she has been taking Tylenol like suggested, but it has been not alleviating the headache or fever. Hyun Martino is asking if this is normal after IVIG treatment and wants to know how long it will last. Please advise.

## 2020-12-18 ENCOUNTER — HOSPITAL ENCOUNTER (OUTPATIENT)
Age: 60
Setting detail: SPECIMEN
Discharge: HOME | End: 2020-12-18
Payer: COMMERCIAL

## 2020-12-18 ENCOUNTER — HOSPITAL ENCOUNTER (OUTPATIENT)
Dept: INFUSION THERAPY | Age: 60
Discharge: HOME | End: 2020-12-18
Payer: COMMERCIAL

## 2020-12-18 VITALS
SYSTOLIC BLOOD PRESSURE: 123 MMHG | HEART RATE: 78 BPM | DIASTOLIC BLOOD PRESSURE: 68 MMHG | TEMPERATURE: 97.8 F | RESPIRATION RATE: 18 BRPM | OXYGEN SATURATION: 99 %

## 2020-12-18 DIAGNOSIS — C90.00 MULTIPLE MYELOMA NOT HAVING ACHIEVED REMISSION (HCC): Primary | ICD-10-CM

## 2020-12-18 DIAGNOSIS — C90.00 MULTIPLE MYELOMA NOT HAVING ACHIEVED REMISSION (HCC): ICD-10-CM

## 2020-12-18 DIAGNOSIS — R50.9 FEVER, UNSPECIFIED FEVER CAUSE: ICD-10-CM

## 2020-12-18 LAB
ABSOLUTE IMMATURE GRANULOCYTE: 0 THOU/MM3 (ref 0–0.07)
ALBUMIN SERPL-MCNC: 3.8 G/DL (ref 3.5–5.1)
ALP BLD-CCNC: 66 U/L (ref 38–126)
ALT SERPL-CCNC: 18 U/L (ref 11–66)
AST SERPL-CCNC: 24 U/L (ref 5–40)
BASINOPHIL, AUTOMATED: 1 % (ref 0–3)
BASOPHILS ABSOLUTE: 0 THOU/MM3 (ref 0–0.1)
BILIRUB SERPL-MCNC: 0.5 MG/DL (ref 0.3–1.2)
BILIRUBIN DIRECT: < 0.2 MG/DL (ref 0–0.3)
BUN, WHOLE BLOOD: 8 MG/DL (ref 8–26)
CHLORIDE, WHOLE BLOOD: 105 MEQ/L (ref 98–109)
CREATININE, WHOLE BLOOD: 0.9 MG/DL (ref 0.5–1.2)
EOSINOPHILS ABSOLUTE: 0.1 THOU/MM3 (ref 0–0.4)
EOSINOPHILS RELATIVE PERCENT: 3 % (ref 0–4)
GFR, ESTIMATED: 68 ML/MIN/1.73M2
GLUCOSE, WHOLE BLOOD: 91 MG/DL (ref 70–108)
HCT VFR BLD CALC: 38.8 % (ref 37–47)
HEMOGLOBIN: 13.2 GM/DL (ref 12–16)
IMMATURE GRANULOCYTES: 0 %
IONIZED CALCIUM, WHOLE BLOOD: 1.12 MMOL/L (ref 1.12–1.32)
LYMPHOCYTES # BLD: 30 % (ref 15–47)
LYMPHOCYTES ABSOLUTE: 0.6 THOU/MM3 (ref 1–4.8)
MCH RBC QN AUTO: 34.7 PG (ref 26–33)
MCHC RBC AUTO-ENTMCNC: 34 GM/DL (ref 32.2–35.5)
MCV RBC AUTO: 102 FL (ref 81–99)
MONOCYTES ABSOLUTE: 0.3 THOU/MM3 (ref 0.4–1.3)
MONOCYTES: 14 % (ref 0–12)
PDW BLD-RTO: 12.5 % (ref 11.5–14.5)
PLATELET # BLD: 151 THOU/MM3 (ref 130–400)
PMV BLD AUTO: 11.5 FL (ref 9.4–12.4)
POTASSIUM, WHOLE BLOOD: 3.9 MEQ/L (ref 3.5–4.9)
RBC # BLD: 3.8 MILL/MM3 (ref 4.2–5.4)
SEG NEUTROPHILS: 51 % (ref 43–75)
SEGMENTED NEUTROPHILS ABSOLUTE COUNT: 1.1 THOU/MM3 (ref 1.8–7.7)
SODIUM, WHOLE BLOOD: 141 MEQ/L (ref 138–146)
TOTAL CO2, WHOLE BLOOD: 26 MEQ/L (ref 23–33)
TOTAL PROTEIN: 6.7 G/DL (ref 6.1–8)
WBC # BLD: 2.1 THOU/MM3 (ref 4.8–10.8)

## 2020-12-18 PROCEDURE — 6360000002 HC RX W HCPCS: Performed by: INTERNAL MEDICINE

## 2020-12-18 PROCEDURE — 36415 COLL VENOUS BLD VENIPUNCTURE: CPT

## 2020-12-18 PROCEDURE — 80047 BASIC METABLC PNL IONIZED CA: CPT

## 2020-12-18 PROCEDURE — 96361 HYDRATE IV INFUSION ADD-ON: CPT

## 2020-12-18 PROCEDURE — 80076 HEPATIC FUNCTION PANEL: CPT

## 2020-12-18 PROCEDURE — 99211 OFF/OP EST MAY X REQ PHY/QHP: CPT

## 2020-12-18 PROCEDURE — 85025 COMPLETE CBC W/AUTO DIFF WBC: CPT

## 2020-12-18 PROCEDURE — U0003 INFECTIOUS AGENT DETECTION BY NUCLEIC ACID (DNA OR RNA); SEVERE ACUTE RESPIRATORY SYNDROME CORONAVIRUS 2 (SARS-COV-2) (CORONAVIRUS DISEASE [COVID-19]), AMPLIFIED PROBE TECHNIQUE, MAKING USE OF HIGH THROUGHPUT TECHNOLOGIES AS DESCRIBED BY CMS-2020-01-R: HCPCS

## 2020-12-18 PROCEDURE — 2580000003 HC RX 258: Performed by: INTERNAL MEDICINE

## 2020-12-18 PROCEDURE — 96374 THER/PROPH/DIAG INJ IV PUSH: CPT

## 2020-12-18 RX ORDER — 0.9 % SODIUM CHLORIDE 0.9 %
500 INTRAVENOUS SOLUTION INTRAVENOUS ONCE
Status: COMPLETED | OUTPATIENT
Start: 2020-12-18 | End: 2020-12-18

## 2020-12-18 RX ORDER — DEXAMETHASONE SODIUM PHOSPHATE 4 MG/ML
6 INJECTION, SOLUTION INTRA-ARTICULAR; INTRALESIONAL; INTRAMUSCULAR; INTRAVENOUS; SOFT TISSUE ONCE
Status: COMPLETED | OUTPATIENT
Start: 2020-12-18 | End: 2020-12-18

## 2020-12-18 RX ORDER — ACETAMINOPHEN 500 MG
500 TABLET ORAL EVERY 6 HOURS PRN
COMMUNITY

## 2020-12-18 RX ORDER — IBUPROFEN 200 MG
400 TABLET ORAL EVERY 6 HOURS PRN
COMMUNITY
End: 2021-07-06

## 2020-12-18 RX ADMIN — DEXAMETHASONE SODIUM PHOSPHATE 6 MG: 4 INJECTION, SOLUTION INTRAMUSCULAR; INTRAVENOUS at 10:34

## 2020-12-18 RX ADMIN — SODIUM CHLORIDE 500 ML: 9 INJECTION, SOLUTION INTRAVENOUS at 10:34

## 2020-12-18 NOTE — PROGRESS NOTES
Hydration complete. Tolerated well. discharged in satisfactory condition.  ambulated off unit per self

## 2020-12-18 NOTE — PLAN OF CARE
Care plan reviewed with patient. Patient verbalized understanding of the plan of care and contribute to goal setting. Problem: Intellectual/Education/Knowledge Deficit  Goal: Teaching initiated upon admission  Outcome: Met This Shift  Note: Patient verbalizes understanding to verbal information given on hydration and decadron,action and possible side effects. Aware to call MD if develop complications. Intervention: Verbal/written education provided  Note: Discuss hydration and deacdron     Problem: Discharge Planning  Goal: Knowledge of discharge instructions  Description: Knowledge of discharge instructions  Outcome: Met This Shift  Note: Verbalized understanding of discharge instructions, follow ups and when to call doctor   Intervention: Discharge to appropriate level of care  Note: Discuss discharge instructions, follow ups and when to call doctor. Problem: Falls - Risk of:  Goal: Will remain free from falls  Description: Will remain free from falls  Outcome: Met This Shift  Note: Free from falls while in infusion center.  Verbalized understanding of fall prevention and to ask for assistance with ambulation   Intervention: Assess risk factors for falls  Description: Assess risk factors for falls  Note: Assess for fall risk, instruct to ask for assistance with ambulation

## 2020-12-18 NOTE — TELEPHONE ENCOUNTER
talked to the patient and her  today. She continues having fevers despite taking Tylenol. Instructed to take Tylenol every 6 hours and Motrin between.   If she is not better overnight she will call and come to the clinic for hydration

## 2020-12-19 LAB — IMMUNOFIXATION WITH QUANT: NORMAL

## 2020-12-20 LAB — SARS-COV-2: NOT DETECTED

## 2020-12-30 RX ORDER — LENALIDOMIDE 15 MG/1
CAPSULE ORAL
Qty: 21 CAPSULE | Refills: 0 | Status: SHIPPED
Start: 2020-12-30 | End: 2021-02-01 | Stop reason: DRUGHIGH

## 2021-01-04 RX ORDER — LENALIDOMIDE 15 MG/1
CAPSULE ORAL
Qty: 21 CAPSULE | Refills: 0 | Status: CANCELLED | OUTPATIENT
Start: 2021-01-04

## 2021-01-05 ENCOUNTER — VIRTUAL VISIT (OUTPATIENT)
Dept: INTERNAL MEDICINE CLINIC | Age: 61
End: 2021-01-05
Payer: COMMERCIAL

## 2021-01-05 DIAGNOSIS — E55.9 VITAMIN D DEFICIENCY: ICD-10-CM

## 2021-01-05 DIAGNOSIS — I10 ESSENTIAL HYPERTENSION: Primary | ICD-10-CM

## 2021-01-05 DIAGNOSIS — N95.1 MENOPAUSAL HOT FLUSHES: ICD-10-CM

## 2021-01-05 PROCEDURE — 99214 OFFICE O/P EST MOD 30 MIN: CPT | Performed by: INTERNAL MEDICINE

## 2021-01-05 NOTE — PROGRESS NOTES
Maynard Holstein (:  1960) is a 62 y.o. female,Established patient, here for evaluation of the following chief complaint(s): Hypertension and Other (hot flushes, vitamin D deficiency)      ASSESSMENT/PLAN:  1. Essential hypertension - controlled, continue Metoprolol and Norvasc. 2. Menopausal hot flushes - controlled, continue Effexor  3. Vitamin D deficiency - controlled continue supplement and lab due 2021. -     Vitamin D 25 Hydroxy; Future      Return in about 6 months (around 2021). SUBJECTIVE/OBJECTIVE:  HPI     Hypertension - BP controlled, continue metoprolol and Norvasc, taken off lisinopril with renal decline when MM diagnosed.  BMP normal 2020.     Hot flushes - improved on Effexor.  Worse on steroids. Vitamin D deficiency - level normal 2019, 2020 on 5,000 IU daily. Repeat level with oncology labs in 2021. Multiple Myeloma - treated with [facility] and Dr. Guzman Saleh. Started immunotherapy with Oncologist.  Needed fluids and steroid after it due to side effects 2020. To do once a month. 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 Seeing [facility] for this. She is on a mouth wash for this.     Diarrhea - 4-8 stools a day, seems to be after eating x 2 weeks. She stopped magnesium less than a week ago, changing food -avoiding dairy. Could be from Revlimid. Order C. Diff as has been on antibiotic recently. No history of C. Diff. On Imodium prn, worse with immunotherapy.      She is due for colonoscopy - but on Xarelto due to DVT 2020. Will see what results she has on C. Diff test and if negative but still having diarrhea -may need to consider GI consult and colonoscopy.   She is waiting for Dr. Hughes to ok colonoscopy due to Xarelto.      Anemia - due to MM - vitamin B 12 deficiency seen prior to MM dx - started on injections but level high at [facility] 2019 so changed to oral supplement 1000 mcg daily.  Repeat 10/2019 still high.       Carotid doppler 6/2019- mild disease.     Hyperhomocysteinemia - level at 23 - on folic acid 1 mg daily. Dr. Peyton Richter stopped it. Asked her to address with Dr. Peyton Richter at next visit.     ALT 36 - mild elevation with follow up in 3-6 months.  Normal 11/21/19 and most recently 12/2020.     Nausea - on omeprazole and stable - will continue. No longer on omeprazole - doing well.       Review of Systems - General ROS: negative for - chills or fever  Psychological ROS: negative for - anxiety and depression  Hematological and Lymphatic ROS: No history of blood clots or bleeding disorder.              Respiratory ROS: no cough, shortness of breath, or wheezing  Cardiovascular ROS: no chest pain, positive MULLER but some deconditioning with cancer  Gastrointestinal ROS: Positive intermittent diarrhea with occasional cramping pain, no blood in stool  Genito-Urinary ROS: no dysuria, trouble voiding, or hematuria  Musculoskeletal ROS: negative for - muscle pain or muscular weakness, positive left hip - chronic  Neurological ROS: negative for - headaches, numbness/tingling, seizures or weakness  Dermatological ROS: negative for - rash or skin lesion changes       Patient-Reported Vitals 1/5/2021   Patient-Reported Weight 176#   Patient-Reported Height -   Patient-Reported Systolic 648   Patient-Reported Diastolic 75   Patient-Reported Pulse 68   Patient-Reported Temperature 97.1      PHYSICAL EXAMINATION:    Constitutional: [x] Appears well-developed and well-nourished [x] No apparent distress      [] Abnormal-   Mental status  [x] Alert and awake  [] Oriented to person/place/time [x]Able to follow commands      Eyes:  EOM    [x]  Normal  [] Abnormal-  Sclera  [x]  Normal  [] Abnormal -         Discharge [x]  None visible  [] Abnormal -    HENT:   [x] Normocephalic, atraumatic.   [] Abnormal   [x] Mouth/Throat: Mucous membranes are moist.     External Ears [x] Normal  [] Abnormal- Neck: [x] No visualized mass     Pulmonary/Chest: [x] Respiratory effort normal.  [x] No visualized signs of difficulty breathing or respiratory distress        [] Abnormal-      Neurological:        [x] No Facial Asymmetry (Cranial nerve 7 motor function) (limited exam to video visit)          [x] No gaze palsy        [] Abnormal-         Skin:        [x] No significant exanthematous lesions or discoloration noted on facial skin         [] Abnormal-            Psychiatric:       [x] Normal Affect [x] No Hallucinations        [] Abnormal-       Jessica Doherty is a 62 y.o. female being evaluated by a Virtual Visit (video visit) encounter to address concerns as mentioned above. A caregiver was present when appropriate. Due to this being a TeleHealth encounter (During [place] [address] public health emergency), evaluation of the following organ systems was limited: Vitals/Constitutional/EENT/Resp/CV/GI//MS/Neuro/Skin/Heme-Lymph-Imm. Pursuant to the emergency declaration under the [address] [place] [address] and the [employer] and [employer] Act, this Virtual Visit was conducted with patient's (and/or legal guardian's) consent, to reduce the patient's risk of exposure to COVID-19 and provide necessary medical care. The patient (and/or legal guardian) has also been advised to contact this office for worsening conditions or problems, and seek emergency medical treatment and/or call 911 if deemed necessary. Patient identification was verified at the start of the visit: Yes      Services were provided through a video synchronous discussion virtually to substitute for in-person clinic visit. Patient was located at her home. Provider located in office. Visit completed on Doxy. me. An electronic signature was used to authenticate this note.     --Adrian Mendoza MD

## 2021-01-11 ASSESSMENT — ENCOUNTER SYMPTOMS
EYE DISCHARGE: 0
VOMITING: 0
COUGH: 0
CONSTIPATION: 0
COLOR CHANGE: 0
FACIAL SWELLING: 0
ABDOMINAL PAIN: 0
SORE THROAT: 0
BACK PAIN: 1
SHORTNESS OF BREATH: 0
DIARRHEA: 0
TROUBLE SWALLOWING: 0
NAUSEA: 0
ABDOMINAL DISTENTION: 0
RECTAL PAIN: 0
WHEEZING: 0
CHEST TIGHTNESS: 0
BLOOD IN STOOL: 0

## 2021-01-12 ENCOUNTER — CLINICAL DOCUMENTATION (OUTPATIENT)
Dept: CASE MANAGEMENT | Age: 61
End: 2021-01-12

## 2021-01-12 ENCOUNTER — HOSPITAL ENCOUNTER (OUTPATIENT)
Dept: INFUSION THERAPY | Age: 61
End: 2021-01-12

## 2021-01-12 ENCOUNTER — HOSPITAL ENCOUNTER (OUTPATIENT)
Dept: INFUSION THERAPY | Age: 61
Discharge: HOME | End: 2021-01-12
Payer: COMMERCIAL

## 2021-01-12 ENCOUNTER — OFFICE VISIT (OUTPATIENT)
Dept: ONCOLOGY | Age: 61
End: 2021-01-12
Payer: COMMERCIAL

## 2021-01-12 VITALS
HEIGHT: 63 IN | HEART RATE: 76 BPM | DIASTOLIC BLOOD PRESSURE: 64 MMHG | SYSTOLIC BLOOD PRESSURE: 104 MMHG | WEIGHT: 179.2 LBS | RESPIRATION RATE: 16 BRPM | OXYGEN SATURATION: 98 % | TEMPERATURE: 96.8 F | BODY MASS INDEX: 31.75 KG/M2

## 2021-01-12 DIAGNOSIS — T45.1X5A CHEMOTHERAPY-INDUCED NEUTROPENIA (HCC): ICD-10-CM

## 2021-01-12 DIAGNOSIS — D70.1 CHEMOTHERAPY-INDUCED NEUTROPENIA (HCC): ICD-10-CM

## 2021-01-12 DIAGNOSIS — C90.00 MULTIPLE MYELOMA NOT HAVING ACHIEVED REMISSION (HCC): ICD-10-CM

## 2021-01-12 DIAGNOSIS — D70.3 NEUTROPENIA ASSOCIATED WITH INFECTION (HCC): ICD-10-CM

## 2021-01-12 DIAGNOSIS — Z51.11 ENCOUNTER FOR CHEMOTHERAPY MANAGEMENT: ICD-10-CM

## 2021-01-12 DIAGNOSIS — Z51.81 THERAPEUTIC DRUG MONITORING: ICD-10-CM

## 2021-01-12 DIAGNOSIS — C90.00 MULTIPLE MYELOMA NOT HAVING ACHIEVED REMISSION (HCC): Primary | ICD-10-CM

## 2021-01-12 LAB
ABSOLUTE IMMATURE GRANULOCYTE: 0.01 THOU/MM3 (ref 0–0.07)
ALBUMIN SERPL-MCNC: 4.1 G/DL (ref 3.5–5.1)
ALP BLD-CCNC: 69 U/L (ref 38–126)
ALT SERPL-CCNC: 18 U/L (ref 11–66)
AST SERPL-CCNC: 18 U/L (ref 5–40)
BASINOPHIL, AUTOMATED: 1 % (ref 0–3)
BASOPHILS ABSOLUTE: 0 THOU/MM3 (ref 0–0.1)
BILIRUB SERPL-MCNC: 0.3 MG/DL (ref 0.3–1.2)
BILIRUBIN DIRECT: < 0.2 MG/DL (ref 0–0.3)
BUN, WHOLE BLOOD: 13 MG/DL (ref 8–26)
CHLORIDE, WHOLE BLOOD: 107 MEQ/L (ref 98–109)
CREATININE, WHOLE BLOOD: 0.9 MG/DL (ref 0.5–1.2)
EOSINOPHILS ABSOLUTE: 0.1 THOU/MM3 (ref 0–0.4)
EOSINOPHILS RELATIVE PERCENT: 6 % (ref 0–4)
GFR, ESTIMATED: 68 ML/MIN/1.73M2
GLUCOSE, WHOLE BLOOD: 96 MG/DL (ref 70–108)
HCT VFR BLD CALC: 39 % (ref 37–47)
HEMOGLOBIN: 13.4 GM/DL (ref 12–16)
IMMATURE GRANULOCYTES: 1 %
IONIZED CALCIUM, WHOLE BLOOD: 1.19 MMOL/L (ref 1.12–1.32)
LYMPHOCYTES # BLD: 28 % (ref 15–47)
LYMPHOCYTES ABSOLUTE: 0.6 THOU/MM3 (ref 1–4.8)
MCH RBC QN AUTO: 35.2 PG (ref 26–33)
MCHC RBC AUTO-ENTMCNC: 34.4 GM/DL (ref 32.2–35.5)
MCV RBC AUTO: 102 FL (ref 81–99)
MONOCYTES ABSOLUTE: 0.4 THOU/MM3 (ref 0.4–1.3)
MONOCYTES: 19 % (ref 0–12)
PDW BLD-RTO: 12.9 % (ref 11.5–14.5)
PLATELET # BLD: 175 THOU/MM3 (ref 130–400)
PMV BLD AUTO: 10.9 FL (ref 9.4–12.4)
POTASSIUM, WHOLE BLOOD: 4.1 MEQ/L (ref 3.5–4.9)
RBC # BLD: 3.81 MILL/MM3 (ref 4.2–5.4)
SEG NEUTROPHILS: 45 % (ref 43–75)
SEGMENTED NEUTROPHILS ABSOLUTE COUNT: 1 THOU/MM3 (ref 1.8–7.7)
SODIUM, WHOLE BLOOD: 142 MEQ/L (ref 138–146)
TOTAL CO2, WHOLE BLOOD: 28 MEQ/L (ref 23–33)
TOTAL PROTEIN: 6.9 G/DL (ref 6.1–8)
WBC # BLD: 2.2 THOU/MM3 (ref 4.8–10.8)

## 2021-01-12 PROCEDURE — 99211 OFF/OP EST MAY X REQ PHY/QHP: CPT

## 2021-01-12 PROCEDURE — 36415 COLL VENOUS BLD VENIPUNCTURE: CPT

## 2021-01-12 PROCEDURE — 80047 BASIC METABLC PNL IONIZED CA: CPT

## 2021-01-12 PROCEDURE — 80076 HEPATIC FUNCTION PANEL: CPT

## 2021-01-12 PROCEDURE — 85025 COMPLETE CBC W/AUTO DIFF WBC: CPT

## 2021-01-12 PROCEDURE — 99214 OFFICE O/P EST MOD 30 MIN: CPT | Performed by: INTERNAL MEDICINE

## 2021-01-12 NOTE — PATIENT INSTRUCTIONS
1.  CBC on January 22, 2021  2. Multiple myeloma profile on February 18, 2021  3.   RTC to see me on February 26, 2021

## 2021-01-12 NOTE — PROGRESS NOTES
[facility] [address] [facility] [address], [address] [facility] [address]  [place] [address]  Dept: [phone]  Dept Fax: [phone]: [phone]    Visit Date:1/12/2021     Larissa Alab is a 62 y.o. female who presents today for:   Chief Complaint   Patient presents with    Follow-up     multiple myeloma        HPI:    Zian Nuñez is a 63 WF with multiple myeloma. She was admitted to [facility] on 7/16  with  Hypercalcemia 9Ca=11.0), acute kidney injury ( creat. 4.9), both found on outpatient evaluation for several weeks of insidious, progressive fatigue and left shoulder pain. Xray showed multiple lytic lesions in the bones. Bone marrow, right iliac crest, biopsy, aspirate, and peripheral smear performed on 07/15/2019 showed: -          Plasma cell myeloma in a normocellular marrow (50%), see comment  Comment: The patient's myeloma survey dated 7/13/19 showing multiple lytic  lesions is noted. Plasma cells are 30% of cells on the aspirate smear and  occupy 40% of marrow cellularity based on a  immunostain performed on  the biopsy. Flow cytometric analysis shows that plasma cells represent 5.8%  of the total events analyzed, express CD19, CD56, CD38 and  and  analysis of cytoplasmic immunoglobulin staining shows a cKappa:cLambda  ratio of 97:0. These findings are consistent with a kappa light chain  restricted plasma cell myeloma. A Congo red stain for amyloid performed on  the biopsy is negative. SPEP, serum free light chains, serum immunoglobulins (elevated IGA 2,656 and depressed IGG/IGM)  s/p 2 sessions of pheresis given light chains >10,000  - Started chemotherapy (CyBorD) on 7/16    Acute Kidney Injury: due to multiple myeloma. Producing adequate urine. Creatinine improved with aggressive hydration. Hyperkalemia: Mildly elevated K likely due to JAME.    - Hydrate, trend K  Hypercalcemia: Hypercalcemic at OSH prior to transfer   - Hydration, trend calcium  - S/p one unit pRBC 7/16; - Continue Vitamin B12 supplementation with Vitamin B12 1000mcg daily PO  - HIV negative  - Haptoglobin, LDH without concern for acute hemolysis  She received day 1 on 7/26. On August 7,2019 she started treatment with Velcade dexamethasone and Revlimid  On September 25, 2019 she was diagnosed with right popliteal and calf veins DVT. She is treated with Xarelto. She received 6 cycles of RVD, afterwards she underwent stem cell collection at [facility] this week. After  Stem cell collectiuon she received 2 additional cycles of RVD. Last given on the February 20, 2020. On March 17, 2020 she started maintenance treatment with Revlimid. Zometa is on hold due to suspicion of jaw osteonecrosis    Interim history on 01/12/2021:  The patient presents to the medical oncology clinic for follow-up visit while receiving maintenance therapy with Revlimid 15 mg daily 3 weeks on, 1 week off. Tolerates Revlimid well. She continues having intermittent discomfort in her jaw and left gum. She denies having any fevers. Today she reports that left hip pain is improved. She denies having any mouth sores, no diarrhea, no skin rash no peripheral neuropathy. Past Medical History:   Diagnosis Date    Acute kidney injury ([facility] 07/12/2019    Allergic rhinitis     Flonase prn    Cancer (HCC)     Myeloma    Congenital hip dislocation 1960    right leg is longer, better with weight loss    H/O umbilical hernia repair 24/62/7427    Hot flushes, perimenopausal     Hypertension 2011    Seen by MONI Prado CNP diagnosed < 1 year    IBS (irritable bowel syndrome)     ? diagnosis - diarrhea with milk products and meat    Osteoarthritis     right knee pain    Osteonecrosis (HCC)     S/P laparoscopic appendectomy 07/16/2018    Snoring     denies apnea, mild daytime somnolence, just lost 35# so has more energy, denies waking coughing or choking, BMI 27, neck circ.  13.5 inches    Vitamin D deficiency Past Surgical History:   Procedure Laterality Date    HIP SURGERY  1978    Congenital Hip multiple surgies since 7 weeks old. Last surgery 1978     HYSTERECTOMY  2004    Partial Irregular Periods with migraines.     [address] 59    stapled to help straighten leg(congenital hip issues)    [place] LAP,APPENDECTOMY N/A 7/15/2018    APPENDECTOMY LAPAROSCOPIC performed by Evert Ramírez MD at [facility]      Family History   Problem Relation Age of Onset    High Blood Pressure Mother     High Cholesterol Mother     Cancer Father         skin cancer    Cancer Paternal Grandmother     Cancer Paternal Grandfather     Deep Vein Thrombosis Brother         PE      Social History     Tobacco Use    Smoking status: Never Smoker    Smokeless tobacco: Never Used   Substance Use Topics    Alcohol use: Yes     Comment: Socially      Current Outpatient Medications   Medication Sig Dispense Refill    lenalidomide (REVLIMID) 15 MG chemo capsule TAKE 1 CAPSULE BY MOUTH  DAILY FOR 21 DAYS ON, THEN  7 DAYS OFF 21 capsule 0    acetaminophen (TYLENOL) 500 MG tablet Take 500 mg by mouth every 6 hours as needed for Pain      ibuprofen (ADVIL;MOTRIN) 200 MG tablet Take 400 mg by mouth every 6 hours as needed for Pain      metoprolol succinate (TOPROL XL) 50 MG extended release tablet TAKE 1 TABLET BY MOUTH ONE TIME A DAY  90 tablet 0    venlafaxine (EFFEXOR XR) 75 MG extended release capsule TAKE 1 CAPSULE BY MOUTH ONE TIME A DAY  90 capsule 0    rivaroxaban (XARELTO) 20 MG TABS tablet TAKE 1 TABLET BY MOUTH EVERY DAY WITH BREAKFAST 30 tablet 3    Magic Mouthwash (MIRACLE MOUTHWASH) Swish and spit 5 mLs 4 times daily as needed for Irritation Prepare mixture 1:1:1 DIPHENHYDRAMINE HCL,NYSTATIN,LIDOCAINE  mL 1    lactobacillus (CULTURELLE) capsule Take 1 capsule by mouth daily Align      amLODIPine (NORVASC) 10 MG tablet Take 1 tablet by mouth daily 90 tablet 1  chlorhexidine (PERIDEX) 0.12 % solution Take 15 mLs by mouth 2 times daily as needed       Calcium 600-200 MG-UNIT TABS Take 600 mg by mouth daily 30 tablet 0    econazole nitrate 1 % cream Apply topically Apply topically daily.  VITAMIN D, ERGOCALCIFEROL, PO Take 5,000 Units by mouth daily       No current facility-administered medications for this visit. Allergies   Allergen Reactions    Sulfa Antibiotics Rash      Health Maintenance   Topic Date Due    Hepatitis C screen  1960    Pneumococcal 0-64 years Vaccine (1 of 3 - PCV13) 10/19/1967    HIV screen  10/19/1976    Diabetes screen  10/19/2001    Shingles Vaccine (1 of 2) 10/19/2011    Colon cancer screen colonoscopy  10/03/2019    Potassium monitoring  04/02/2021    Creatinine monitoring  04/02/2021    Cervical cancer screen  10/22/2021    DTaP/Tdap/Td vaccine (2 - Td) 06/04/2022    Breast cancer screen  06/23/2022    Lipid screen  04/25/2024    Flu vaccine  Completed    Hepatitis A vaccine  Aged Out    Hepatitis B vaccine  Aged Out    Hib vaccine  Aged Out    Meningococcal (ACWY) vaccine  Aged Out        Subjective:   Review of Systems   Constitutional: Positive for fatigue. Negative for activity change, appetite change and fever. HENT: Negative for congestion, dental problem, facial swelling, hearing loss, mouth sores, nosebleeds, sore throat, tinnitus and trouble swallowing. Eyes: Negative for discharge and visual disturbance. Respiratory: Negative for cough, chest tightness, shortness of breath and wheezing. Cardiovascular: Negative for chest pain, palpitations and leg swelling. Gastrointestinal: Negative for abdominal distention, abdominal pain, blood in stool, constipation, diarrhea, nausea, rectal pain and vomiting. Endocrine: Negative for cold intolerance, polydipsia and polyuria. Genitourinary: Negative for decreased urine volume, difficulty urinating, dysuria, flank pain, hematuria and urgency. Musculoskeletal: Positive for arthralgias and back pain. Negative for gait problem, joint swelling, myalgias and neck stiffness. Skin: Negative for color change, rash and wound. Neurological: Positive for headaches. Negative for dizziness, tremors, seizures, speech difficulty, weakness, light-headedness and numbness. Hematological: Negative for adenopathy. Does not bruise/bleed easily. Psychiatric/Behavioral: Negative for confusion and sleep disturbance. The patient is not nervous/anxious. Objective:   Physical Exam  Vitals signs reviewed. Constitutional:       General: She is not in acute distress. Appearance: She is well-developed. HENT:      Head: Normocephalic. Mouth/Throat:      Pharynx: No oropharyngeal exudate. Eyes:      General: No scleral icterus. Right eye: No discharge. Left eye: No discharge. Pupils: Pupils are equal, round, and reactive to light. Neck:      Musculoskeletal: Normal range of motion and neck supple. Thyroid: No thyromegaly. Vascular: No JVD. Trachea: No tracheal deviation. Cardiovascular:      Rate and Rhythm: Normal rate. Heart sounds: Normal heart sounds. No murmur. No friction rub. No gallop. Pulmonary:      Effort: Pulmonary effort is normal. No respiratory distress. Breath sounds: Normal breath sounds. No stridor. No wheezing or rales. Chest:      Chest wall: No tenderness. Abdominal:      General: Bowel sounds are normal. There is no distension. Palpations: Abdomen is soft. There is no mass. Tenderness: There is no abdominal tenderness. There is no rebound. Musculoskeletal: Normal range of motion. Comments: Good range of motion in all four extremities. Lymphadenopathy:      Cervical: No cervical adenopathy. Skin:     General: Skin is warm. Findings: No erythema or rash. Neurological:      Mental Status: She is alert and oriented to person, place, and time. Lambda Qnt Free Light Chains               22.42     5.71-26.30    mg/L   Kappa/Lambda Free Light Chain Ratio     1.28     0.26-1.65      Assessment/Plan:   1. Multiple myeloma: The patient met criteria for diagnosis of multiple myeloma. She had clonal bone marrow plasma cells ? 10%, bone lesions seen on bone survey, hypercalcemia, renal insufficiency and anemia. She received first cycle of chemotherapy with Velcade Cytoxan and dexamethasone at [facility]. She received second cycle of VdR in [facility], due to slight renal impairment the dose of Revlimid was reduced to 15 mg daily for 14 days during cycle #2. Her creatinine had improved to normal range. Her anemia had improved as well. Therefore with cycle #4 the dose of Revlimid was increased to 25 mg. She received 6 cycles of RVD and then she underwent stem cell collection at [facility]. Afterwards she received 2 additional cycles of RVD. Last given on the February 20, 2020. Her cycle #7 was delayed by 1 week due to influenza A infection. Patient started Revlimid maintenance 25 mg daily for 3 weeks on,  1 week off on March 23, 2020. Her dose of Revlimid was reduced from 25 mg daily for 3 weeks on and one-week off to 20 mg daily for 3 weeks on 1 week off due to neutropenia. The patient had a serum immunofixation on May 19, 2020, no M protein was seen. Her kappa lambda free light chains were within normal range and normal ratio. Despite the dose reduction the patient was still neutropenic, so we proceeded with further reduction of Revlimid to 15 mg. The patient had  serum immunoelectrophoresis and free light chains checked again in August 2020, no M protein and normal kappa lambda free light chain ratio. The patient is continuing Revlimid 15 mg daily for 3 weeks on 1 week off. However we eliminated dexamethasone. Multiple myeloma panel on 12/15/2020 was without evidence of M protein and normal free Kappa/lambda chains ratio. Patient's WBC is 2.5 with ANC 1.4 we will continue monitoring, no dose reduction of Revlimid. Patient's IgG was 695. No recent infections. We were not able to  get approval for IgG infusion. 2.  Right leg DVT. Known side effect from Revlimid and dexamethasone. The patient is on Xarelto  3. Gum and  jaw pain. The patient was seen by oral maxillofacial surgeon at [facility] who performed CT of the of the jaw, it showed indeterminate findings in the jaw:  osteonecrosis versus multiple myeloma. Zometa is on hold. Diagnosis Orders   1. Multiple myeloma not having achieved remission (HCC)  CBC Auto Differential    POC PANEL BMP W/IOCA    Hepatic Function Panel    CBC Auto Differential    Electrophoresis Protein, Serum without Reflex to Immunofixation    Immunofixation serum profile    Immunofixation, urine    Immunofixation w/ Quant    Kappa/Lambda Quant Free Light Chains Serum    IgG    CBC Auto Differential    POC PANEL BMP W/IOCA   2. Chemotherapy-induced neutropenia (HCC)     3. Therapeutic drug monitoring     4. Encounter for chemotherapy management     5. Neutropenia associated with infection ([facility] [address]          Plan:   Return in about 6 weeks (around 2/26/2021).      Orders Placed:   Orders Placed This Encounter   Procedures    CBC Auto Differential     Standing Status:   Future     Number of Occurrences:   1     Standing Expiration Date:   1/11/2022    POC PANEL BMP W/IOCA     Standing Status:   Future     Number of Occurrences:   1     Standing Expiration Date:   1/11/2022    Hepatic Function Panel     Standing Status:   Future     Number of Occurrences:   1     Standing Expiration Date:   1/11/2022    CBC Auto Differential     Standing Status:   Future     Number of Occurrences:   1 Standing Expiration Date:   1/12/2022    Electrophoresis Protein, Serum without Reflex to Immunofixation     Standing Status:   Future     Standing Expiration Date:   1/12/2022    Immunofixation serum profile     SOFT IFX1    Collect: Serum separator tube. Specimen Preparation: Separate serum from cells ASAP or within 2 hours of collection. Transfer 1 mL serum to an [facility] Standard Transport Tube. (Min: 0.3 mL)    Storage/Transport Temperature: Refrigerated. Remarks: MICHELLE gel includes a qualitative interpretation of an MICHELLE only. Unacceptable Conditions: Plasma. Stability (collection to initiation of testing): After separation from cells: Ambient: 8 hours;  Refrigerated: 1 week; Frozen: 1 month         Standing Status:   Future     Standing Expiration Date:   1/12/2022    Immunofixation, urine     Standing Status:   Future     Standing Expiration Date:   1/12/2022    Immunofixation w/ Quant     Standing Status:   Future     Standing Expiration Date:   1/12/2022    [place]/Lambda Quant Free Light Chains Serum     Standing Status:   Future     Standing Expiration Date:   1/12/2022    IgG     Standing Status:   Future     Standing Expiration Date:   1/12/2022    CBC Auto Differential     Standing Status:   Future     Standing Expiration Date:   1/12/2022    POC PANEL BMP W/IOCA     Standing Status:   Future     Standing Expiration Date:   1/12/2022

## 2021-01-12 NOTE — PROGRESS NOTES
Name: Maira Truong  : 1960  MRN: [id number]    Oncology Navigation Follow-Up Note    Contact Type:  Medical Oncology    Subjective: appt with Dr. Demetria Dominguez    Objective: WBC 2.2  HGB 13.4  Plt 175  ANC 1.0  , K+ 4.1 BUN 13 Crea 0.9  Ionized Ca 1.19  Pt has taken 4 doses of Revlimid to day; due for dose #5 today for Maintenance Revlimid (schedule is currently \"on 3 weeks, off 1\"). Pt is off Decadron.  -Continues on Xarelto for previous RLE DVT. -Following with Dental Clinic at [facility] for Osteonecrosis. Pt is currently taking Augmention for a \"fullness in the lower left jaw, suspicious for possible early infection\". -Pt's NEW Insurance for  has denied Immunoglobulin infusion for her low IgG. ONC explained insurance reasoning for the denial at this time. ONC will be monitoring pt's blood counts very closely. -ONC plans recheck of IgG in 2021.  -Check Myeloma panel Mid February. -ONC Desires pt's ANC be 1500 or above. Pt continues on Revlimid 15 mg daily, 3 weeks on, 1 week off currently. -ONC will recheck CBC on Day 14 (2021)of Revlimid, & CALL pt to for direction if maintenance Revlimid dose needs adjusted, or dosing time needs altered. ONC informed she will collaborate with pt's MD at [facility] to determine best course. -SEE ONC End of February. Assistance Needed: denies    Receptive to Advanced Care Planning / Palliative Care:  deferred    Referrals: N/A today    Education: POC reinforced; questions answered    Notes: Abbe cont to follow to assist & support as needed.     Electronically signed by Lit Hayes RN on 2021 at 10:54 AM

## 2021-01-22 ENCOUNTER — HOSPITAL ENCOUNTER (OUTPATIENT)
Age: 61
Discharge: HOME | End: 2021-01-22
Payer: COMMERCIAL

## 2021-01-22 DIAGNOSIS — C90.00 MULTIPLE MYELOMA NOT HAVING ACHIEVED REMISSION (HCC): ICD-10-CM

## 2021-01-22 DIAGNOSIS — E55.9 VITAMIN D DEFICIENCY: ICD-10-CM

## 2021-01-22 LAB
ABSOLUTE IMMATURE GRANULOCYTE: 0 THOU/MM3 (ref 0–0.07)
BASINOPHIL, AUTOMATED: 1 % (ref 0–3)
BASOPHILS ABSOLUTE: 0 THOU/MM3 (ref 0–0.1)
EOSINOPHILS ABSOLUTE: 0.1 THOU/MM3 (ref 0–0.4)
EOSINOPHILS RELATIVE PERCENT: 5 % (ref 0–4)
HCT VFR BLD CALC: 39.6 % (ref 37–47)
HEMOGLOBIN: 13.7 GM/DL (ref 12–16)
IMMATURE GRANULOCYTES: 0 %
LYMPHOCYTES # BLD: 21 % (ref 15–47)
LYMPHOCYTES ABSOLUTE: 0.5 THOU/MM3 (ref 1–4.8)
MCH RBC QN AUTO: 35.1 PG (ref 26–33)
MCHC RBC AUTO-ENTMCNC: 34.6 GM/DL (ref 32.2–35.5)
MCV RBC AUTO: 102 FL (ref 81–99)
MONOCYTES ABSOLUTE: 0.5 THOU/MM3 (ref 0.4–1.3)
MONOCYTES: 18 % (ref 0–12)
PDW BLD-RTO: 12.8 % (ref 11.5–14.5)
PLATELET # BLD: 157 THOU/MM3 (ref 130–400)
PMV BLD AUTO: 11 FL (ref 9.4–12.4)
RBC # BLD: 3.9 MILL/MM3 (ref 4.2–5.4)
SEG NEUTROPHILS: 55 % (ref 43–75)
SEGMENTED NEUTROPHILS ABSOLUTE COUNT: 1.4 THOU/MM3 (ref 1.8–7.7)
VITAMIN D 25-HYDROXY: 43 NG/ML (ref 30–100)
WBC # BLD: 2.5 THOU/MM3 (ref 4.8–10.8)

## 2021-01-22 PROCEDURE — 82306 VITAMIN D 25 HYDROXY: CPT

## 2021-01-22 PROCEDURE — 85025 COMPLETE CBC W/AUTO DIFF WBC: CPT

## 2021-01-22 PROCEDURE — 36415 COLL VENOUS BLD VENIPUNCTURE: CPT

## 2021-01-26 ENCOUNTER — TELEPHONE (OUTPATIENT)
Dept: INTERNAL MEDICINE CLINIC | Age: 61
End: 2021-01-26

## 2021-02-01 ENCOUNTER — TELEPHONE (OUTPATIENT)
Dept: ONCOLOGY | Age: 61
End: 2021-02-01

## 2021-02-01 RX ORDER — LENALIDOMIDE 10 MG/1
10 CAPSULE ORAL DAILY
Qty: 21 CAPSULE | Refills: 2 | Status: SHIPPED | OUTPATIENT
Start: 2021-02-01 | End: 2021-03-29 | Stop reason: SDUPTHER

## 2021-02-01 NOTE — TELEPHONE ENCOUNTER
Patient called and needs Dr to call her with results of labs drawn on 1/22/2021 and possible Revlimid dose change. Please call her at [phone].

## 2021-02-15 DIAGNOSIS — I10 ESSENTIAL HYPERTENSION: ICD-10-CM

## 2021-02-16 RX ORDER — AMLODIPINE BESYLATE 10 MG/1
10 TABLET ORAL DAILY
Qty: 90 TABLET | Refills: 1 | Status: SHIPPED | OUTPATIENT
Start: 2021-02-16 | End: 2021-06-14 | Stop reason: SDUPTHER

## 2021-02-18 ENCOUNTER — HOSPITAL ENCOUNTER (OUTPATIENT)
Age: 61
Discharge: HOME | End: 2021-02-18
Payer: COMMERCIAL

## 2021-02-18 DIAGNOSIS — C90.00 MULTIPLE MYELOMA NOT HAVING ACHIEVED REMISSION (HCC): ICD-10-CM

## 2021-02-18 LAB
ABSOLUTE IMMATURE GRANULOCYTE: 0.01 THOU/MM3 (ref 0–0.07)
BASINOPHIL, AUTOMATED: 1 % (ref 0–3)
BASOPHILS ABSOLUTE: 0 THOU/MM3 (ref 0–0.1)
BUN, WHOLE BLOOD: 10 MG/DL (ref 8–26)
CHLORIDE, WHOLE BLOOD: 104 MEQ/L (ref 98–109)
CREATININE, WHOLE BLOOD: 0.8 MG/DL (ref 0.5–1.2)
EOSINOPHILS ABSOLUTE: 0.1 THOU/MM3 (ref 0–0.4)
EOSINOPHILS RELATIVE PERCENT: 3 % (ref 0–4)
GFR, ESTIMATED: 78 ML/MIN/1.73M2
GLUCOSE, WHOLE BLOOD: 93 MG/DL (ref 70–108)
HCT VFR BLD CALC: 41.1 % (ref 37–47)
HEMOGLOBIN: 13.9 GM/DL (ref 12–16)
IMMATURE GRANULOCYTES: 0 %
IONIZED CALCIUM, WHOLE BLOOD: 1.23 MMOL/L (ref 1.12–1.32)
LYMPHOCYTES # BLD: 18 % (ref 15–47)
LYMPHOCYTES ABSOLUTE: 0.6 THOU/MM3 (ref 1–4.8)
MCH RBC QN AUTO: 34.9 PG (ref 26–33)
MCHC RBC AUTO-ENTMCNC: 33.8 GM/DL (ref 32.2–35.5)
MCV RBC AUTO: 103 FL (ref 81–99)
MONOCYTES ABSOLUTE: 0.3 THOU/MM3 (ref 0.4–1.3)
MONOCYTES: 8 % (ref 0–12)
PDW BLD-RTO: 13.2 % (ref 11.5–14.5)
PLATELET # BLD: 188 THOU/MM3 (ref 130–400)
PMV BLD AUTO: 11.1 FL (ref 9.4–12.4)
POTASSIUM, WHOLE BLOOD: 4 MEQ/L (ref 3.5–4.9)
RBC # BLD: 3.98 MILL/MM3 (ref 4.2–5.4)
SEG NEUTROPHILS: 70 % (ref 43–75)
SEGMENTED NEUTROPHILS ABSOLUTE COUNT: 2.2 THOU/MM3 (ref 1.8–7.7)
SODIUM, WHOLE BLOOD: 140 MEQ/L (ref 138–146)
TOTAL CO2, WHOLE BLOOD: 31 MEQ/L (ref 23–33)
WBC # BLD: 3.2 THOU/MM3 (ref 4.8–10.8)

## 2021-02-18 PROCEDURE — 86335 IMMUNFIX E-PHORSIS/URINE/CSF: CPT

## 2021-02-18 PROCEDURE — 84155 ASSAY OF PROTEIN SERUM: CPT

## 2021-02-18 PROCEDURE — 80047 BASIC METABLC PNL IONIZED CA: CPT

## 2021-02-18 PROCEDURE — 84165 PROTEIN E-PHORESIS SERUM: CPT

## 2021-02-18 PROCEDURE — 86334 IMMUNOFIX E-PHORESIS SERUM: CPT

## 2021-02-18 PROCEDURE — 84156 ASSAY OF PROTEIN URINE: CPT

## 2021-02-18 PROCEDURE — 82784 ASSAY IGA/IGD/IGG/IGM EACH: CPT

## 2021-02-18 PROCEDURE — 85025 COMPLETE CBC W/AUTO DIFF WBC: CPT

## 2021-02-18 PROCEDURE — 83883 ASSAY NEPHELOMETRY NOT SPEC: CPT

## 2021-02-18 PROCEDURE — 36415 COLL VENOUS BLD VENIPUNCTURE: CPT

## 2021-02-19 LAB — IGG: 861 MG/DL (ref 700–1600)

## 2021-02-21 LAB
IMMUNOFIXATION URINE: NORMAL
KAPPA/LAMBDA FREE LIGHT CHAINS: NORMAL

## 2021-02-22 LAB — IMMUNOFIXATION WITH QUANT: NORMAL

## 2021-02-25 ASSESSMENT — ENCOUNTER SYMPTOMS
CONSTIPATION: 0
ABDOMINAL DISTENTION: 0
VOMITING: 0
COLOR CHANGE: 0
COUGH: 0
BACK PAIN: 1
SORE THROAT: 0
FACIAL SWELLING: 0
NAUSEA: 0
ABDOMINAL PAIN: 0
WHEEZING: 0
BLOOD IN STOOL: 0
DIARRHEA: 0
TROUBLE SWALLOWING: 0
RECTAL PAIN: 0
CHEST TIGHTNESS: 0
SHORTNESS OF BREATH: 0
EYE DISCHARGE: 0

## 2021-02-26 ENCOUNTER — HOSPITAL ENCOUNTER (OUTPATIENT)
Dept: INFUSION THERAPY | Age: 61
Discharge: HOME | End: 2021-02-26
Payer: COMMERCIAL

## 2021-02-26 ENCOUNTER — OFFICE VISIT (OUTPATIENT)
Dept: ONCOLOGY | Age: 61
End: 2021-02-26
Payer: COMMERCIAL

## 2021-02-26 VITALS
OXYGEN SATURATION: 96 % | DIASTOLIC BLOOD PRESSURE: 68 MMHG | WEIGHT: 180.6 LBS | HEIGHT: 63 IN | SYSTOLIC BLOOD PRESSURE: 125 MMHG | HEART RATE: 59 BPM | RESPIRATION RATE: 18 BRPM | BODY MASS INDEX: 32 KG/M2 | TEMPERATURE: 96.6 F

## 2021-02-26 DIAGNOSIS — Z51.81 THERAPEUTIC DRUG MONITORING: ICD-10-CM

## 2021-02-26 DIAGNOSIS — M89.9 LYTIC LESION OF BONE ON X-RAY: ICD-10-CM

## 2021-02-26 DIAGNOSIS — T45.1X5A CHEMOTHERAPY-INDUCED NEUTROPENIA (HCC): ICD-10-CM

## 2021-02-26 DIAGNOSIS — D70.1 CHEMOTHERAPY-INDUCED NEUTROPENIA (HCC): ICD-10-CM

## 2021-02-26 DIAGNOSIS — C90.00 MULTIPLE MYELOMA NOT HAVING ACHIEVED REMISSION (HCC): Primary | ICD-10-CM

## 2021-02-26 PROCEDURE — 99214 OFFICE O/P EST MOD 30 MIN: CPT | Performed by: INTERNAL MEDICINE

## 2021-02-26 PROCEDURE — 99211 OFF/OP EST MAY X REQ PHY/QHP: CPT

## 2021-02-26 RX ORDER — LOPERAMIDE HYDROCHLORIDE 2 MG/1
2 CAPSULE ORAL 4 TIMES DAILY PRN
COMMUNITY

## 2021-02-26 RX ORDER — ACYCLOVIR 400 MG/1
TABLET ORAL
COMMUNITY
Start: 2021-01-18 | End: 2021-05-10

## 2021-02-26 NOTE — PROGRESS NOTES
History:   Procedure Laterality Date    HIP SURGERY  1978    Congenital Hip multiple surgies since 7 weeks old. Last surgery 1978     HYSTERECTOMY  2004    Partial Irregular Periods with migraines.     Ravindrabostonlucindanisha Gomez 59    stapled to help straighten leg(congenital hip issues)    MT LAP,APPENDECTOMY N/A 7/15/2018    APPENDECTOMY LAPAROSCOPIC performed by Gage Beltran MD at [facility]      Family History   Problem Relation Age of Onset    High Blood Pressure Mother     High Cholesterol Mother     Cancer Father         skin cancer    Cancer Paternal Grandmother     Cancer Paternal Grandfather     Deep Vein Thrombosis Brother         PE      Social History     Tobacco Use    Smoking status: Never Smoker    Smokeless tobacco: Never Used   Substance Use Topics    Alcohol use: Yes     Comment: Socially      Current Outpatient Medications   Medication Sig Dispense Refill    loperamide (IMODIUM) 2 MG capsule Take 2 mg by mouth 4 times daily as needed for Diarrhea      amLODIPine (NORVASC) 10 MG tablet Take 1 tablet by mouth daily 90 tablet 1    lenalidomide (REVLIMID) 10 MG chemo capsule Take 1 capsule by mouth daily for 21 days 21 capsule 2    metoprolol succinate (TOPROL XL) 50 MG extended release tablet TAKE 1 TABLET BY MOUTH ONE TIME A DAY  90 tablet 0    venlafaxine (EFFEXOR XR) 75 MG extended release capsule TAKE 1 CAPSULE BY MOUTH ONE TIME A DAY  90 capsule 0    rivaroxaban (XARELTO) 20 MG TABS tablet TAKE 1 TABLET BY MOUTH EVERY DAY WITH BREAKFAST 30 tablet 3    Magic Mouthwash (MIRACLE MOUTHWASH) Swish and spit 5 mLs 4 times daily as needed for Irritation Prepare mixture 1:1:1 DIPHENHYDRAMINE HCL,NYSTATIN,LIDOCAINE  mL 1    lactobacillus (CULTURELLE) capsule Take 1 capsule by mouth daily Align      chlorhexidine (PERIDEX) 0.12 % solution Take 15 mLs by mouth 2 times daily as needed       Calcium 600-200 MG-UNIT TABS Take 600 mg by mouth daily 30 tablet 0    econazole nitrate 1 % cream Apply topically Apply topically daily.  VITAMIN D, ERGOCALCIFEROL, PO Take 5,000 Units by mouth daily      acyclovir (ZOVIRAX) 400 MG tablet TAKE 1 TABLET BY MOUTH TWO TIMES A DAY      acetaminophen (TYLENOL) 500 MG tablet Take 500 mg by mouth every 6 hours as needed for Pain      ibuprofen (ADVIL;MOTRIN) 200 MG tablet Take 400 mg by mouth every 6 hours as needed for Pain       No current facility-administered medications for this visit. Allergies   Allergen Reactions    Sulfa Antibiotics Rash      Health Maintenance   Topic Date Due    Hepatitis C screen  1960    Pneumococcal 0-64 years Vaccine (1 of 3 - PCV13) 10/19/1967    HIV screen  10/19/1976    Diabetes screen  10/19/2001    Shingles Vaccine (1 of 2) 10/19/2011    Colon cancer screen colonoscopy  10/03/2019    Potassium monitoring  04/02/2021    Creatinine monitoring  04/02/2021    Cervical cancer screen  10/22/2021    DTaP/Tdap/Td vaccine (2 - Td) 06/04/2022    Breast cancer screen  06/23/2022    Lipid screen  04/25/2024    Flu vaccine  Completed    Hepatitis A vaccine  Aged Out    Hepatitis B vaccine  Aged Out    Hib vaccine  Aged Out    Meningococcal (ACWY) vaccine  Aged Out        Subjective:   Review of Systems   Constitutional: Positive for fatigue. Negative for activity change, appetite change and fever. HENT: Negative for congestion, dental problem, facial swelling, hearing loss, mouth sores, nosebleeds, sore throat, tinnitus and trouble swallowing. Eyes: Negative for discharge and visual disturbance. Respiratory: Negative for cough, chest tightness, shortness of breath and wheezing. Cardiovascular: Negative for chest pain, palpitations and leg swelling. Gastrointestinal: Negative for abdominal distention, abdominal pain, blood in stool, constipation, diarrhea, nausea, rectal pain and vomiting.    Endocrine: Negative for cold intolerance, polydipsia and polyuria. Genitourinary: Negative for decreased urine volume, difficulty urinating, dysuria, flank pain, hematuria and urgency. Musculoskeletal: Positive for arthralgias and back pain. Negative for gait problem, joint swelling, myalgias and neck stiffness. Skin: Negative for color change, rash and wound. Neurological: Positive for headaches. Negative for dizziness, tremors, seizures, speech difficulty, weakness, light-headedness and numbness. Hematological: Negative for adenopathy. Does not bruise/bleed easily. Psychiatric/Behavioral: Negative for confusion and sleep disturbance. The patient is not nervous/anxious. Objective:   Physical Exam  Vitals signs reviewed. Constitutional:       General: She is not in acute distress. Appearance: She is well-developed. HENT:      Head: Normocephalic. Mouth/Throat:      Pharynx: No oropharyngeal exudate. Eyes:      General: No scleral icterus. Right eye: No discharge. Left eye: No discharge. Pupils: Pupils are equal, round, and reactive to light. Neck:      Musculoskeletal: Normal range of motion and neck supple. Thyroid: No thyromegaly. Vascular: No JVD. Trachea: No tracheal deviation. Cardiovascular:      Rate and Rhythm: Normal rate. Heart sounds: Normal heart sounds. No murmur. No friction rub. No gallop. Pulmonary:      Effort: Pulmonary effort is normal. No respiratory distress. Breath sounds: Normal breath sounds. No stridor. No wheezing or rales. Chest:      Chest wall: No tenderness. Abdominal:      General: Bowel sounds are normal. There is no distension. Palpations: Abdomen is soft. There is no mass. Tenderness: There is no abdominal tenderness. There is no rebound. Musculoskeletal: Normal range of motion. Comments: Good range of motion in all four extremities. Lymphadenopathy:      Cervical: No cervical adenopathy.    Skin:     General: Skin is warm. Findings: No erythema or rash. Neurological:      Mental Status: She is alert and oriented to person, place, and time. Cranial Nerves: No cranial nerve deficit. Motor: No abnormal muscle tone. Deep Tendon Reflexes: Reflexes are normal and symmetric. Psychiatric:         Behavior: Behavior normal.         Thought Content: Thought content normal.         Judgment: Judgment normal.         /68 (Site: Left Upper Arm, Position: Sitting, Cuff Size: Medium Adult)   Pulse 59   Temp 96.6 °F (35.9 °C) (Infrared)   Resp 18   Ht 5' 3\" (1.6 m)   Wt 180 lb 9.6 oz (81.9 kg)   LMP 12/01/2004   SpO2 96%   BMI 31.99 kg/m²      ECOG status is 1    Imaging studies and labs:     XRAY Myeloma survey on 07/13/2019 showed:  IMPRESSION:   Innumerable lytic lesions bilaterally, compatible with multiple myeloma. Lab Results   Component Value Date    WBC 3.2 (L) 02/18/2021    HGB 13.9 02/18/2021    HCT 41.1 02/18/2021     (H) 02/18/2021     02/18/2021       Chemistry        Component Value Date/Time     02/18/2021 1343     04/02/2020 1244    K 4.0 02/18/2021 1343    K 4.6 04/02/2020 1244     04/02/2020 1244    CO2 24 08/17/2020 1206    BUN 14 08/17/2020 1206    CREATININE 0.8 02/18/2021 1343    CREATININE 0.6 04/02/2020 1244        Component Value Date/Time    CALCIUM 9.4 04/02/2020 1244    ALKPHOS 69 01/12/2021 0910    AST 18 01/12/2021 0910    ALT 18 01/12/2021 0910    BILITOT 0.3 01/12/2021 0910    BILITOT NEGATIVE 08/04/2015 0803        August 17,2020:  SPEP/MICHELLE Interpretation     Hypogammaglobulinemia.  MICHELLE gel shows a normal pattern; no monoclonal proteins seen. Kappa Qnt Free Light Chains                  24.37 H   3.30-19.40    mg/L   Lambda Qnt Free Light Chains               20.97       5.71-26.30    mg/L   Kappa/Lambda Free Light Chain Ratio     1.16       0.26-1.65    December 15, 2020:  Normal SPEP pattern.   MICHELLE gel shows a normal pattern; no monoclonal proteins seen. Kappa Qnt Free Light Chains                  28.78 H   3.30-19.40    mg/L   Lambda Qnt Free Light Chains               22.42     5.71-26.30    mg/L   Kappa/Lambda Free Light Chain Ratio     1.28     0.26-1.65    February 18, 2021:  SPEP/MICHELLE Interpretation  Normal SPEP pattern.  MICHELLE gel shows a normal pattern; no monoclonal proteins seen. Kappa Qnt Free Light Chains                  28.28 H   3.30-19.40    mg/L   Lambda Qnt Free Light Chains                 23.63     5.71-26.30    mg/L   Kappa/Lambda Free Light Chain Ratio           1.20     0.26-1.65     Assessment/Plan:   1. Multiple myeloma: The patient met criteria for diagnosis of multiple myeloma. She had clonal bone marrow plasma cells ? 10%, bone lesions seen on bone survey, hypercalcemia, renal insufficiency and anemia. She received first cycle of chemotherapy with Velcade Cytoxan and dexamethasone at [facility]. She received second cycle of VdR in [address] [place] [address], due to slight renal impairment the dose of Revlimid was reduced to 15 mg daily for 14 days during cycle #2. Her creatinine had improved to normal range. Her anemia had improved as well. Therefore with cycle #4 the dose of Revlimid was increased to 25 mg. She received 6 cycles of RVD and then she underwent stem cell collection at [facility]. Afterwards she received 2 additional cycles of RVD. Last given on the February 20, 2020. Her cycle #7 was delayed by 1 week due to influenza A infection. 2.  Maintenance therapy. Patient started Revlimid maintenance 25 mg daily for 3 weeks on,  1 week off on March 23, 2020. Her dose of Revlimid was reduced from 25 mg daily for 3 weeks on and one-week off to 20 mg daily for 3 weeks on 1 week off due to neutropenia. The patient had a serum immunofixation on May 19, 2020, no M protein was seen. Her kappa lambda free light chains were within normal range and normal ratio.   Despite the dose reduction the patient was still neutropenic, so we proceeded with further reduction of Revlimid to 15 mg. The patient had  serum immunoelectrophoresis and free light chains checked again in August 2020, no M protein and normal kappa lambda free light chain ratio. We eliminated dexamethasone from a maintenance in fall 2020. In January 2021 we reduce the dose of Revlimid to 10 mg daily for 3 weeks on 1 week off due to neutropenia. Multiple myeloma panel on 02/18/2021 was without evidence of M protein and normal free Kappa/lambda chains ratio. Patient's IgG was 861. No recent infections. 3.  Right leg DVT. Known side effect from Revlimid and dexamethasone. The patient is on Xarelto  4. Gum and  jaw pain. The patient was seen by oral maxillofacial surgeon at [facility] who performed CT of the of the jaw, it showed indeterminate findings in the jaw:  osteonecrosis versus multiple myeloma. Zometa is on hold. Diagnosis Orders   1. Multiple myeloma not having achieved remission (HCC)  CBC Auto Differential    POC PANEL BMP W/IOCA    Hepatic Function Panel    Electrophoresis Protein, Serum without Reflex to Immunofixation    Immunofixation serum profile    Immunofixation w/ Quant    Kappa/Lambda Quant Free Light Chains Serum   2. Chemotherapy-induced neutropenia (HCC)     3. Therapeutic drug monitoring     4. Lytic lesion of bone on x-ray          Plan:   Return in about 10 weeks (around 5/7/2021). Orders Placed:   Orders Placed This Encounter   Procedures    CBC Auto Differential     Standing Status:   Future     Standing Expiration Date:   2/26/2022    POC PANEL BMP W/IOCA     Standing Status:   Future     Standing Expiration Date:   2/26/2022    Hepatic Function Panel     Standing Status:   Future     Standing Expiration Date:   2/26/2022    Electrophoresis Protein, Serum without Reflex to Immunofixation     Standing Status:   Future     Standing Expiration Date:   2/26/2022    Immunofixation serum profile     SOFT IFX1    Collect: Serum separator tube. Specimen Preparation: Separate serum from cells ASAP or within 2 hours of collection. Transfer 1 mL serum to an [facility] Standard Transport Tube. (Min: 0.3 mL)    Storage/Transport Temperature: Refrigerated. Remarks: MICHELLE gel includes a qualitative interpretation of an MICHELLE only. Unacceptable Conditions: Plasma. Stability (collection to initiation of testing): After separation from cells: Ambient: 8 hours;  Refrigerated: 1 week; Frozen: 1 month         Standing Status:   Future     Standing Expiration Date:   2/26/2022    Immunofixation w/ Quant     Standing Status:   Future     Standing Expiration Date:   2/26/2022    [place]/Lambda Quant Free Light Chains Serum     Standing Status:   Future     Standing Expiration Date:   2/26/2022

## 2021-03-16 DIAGNOSIS — C90.00 MULTIPLE MYELOMA NOT HAVING ACHIEVED REMISSION (HCC): ICD-10-CM

## 2021-03-16 DIAGNOSIS — I82.431 ACUTE DEEP VEIN THROMBOSIS (DVT) OF RIGHT POPLITEAL VEIN (HCC): ICD-10-CM

## 2021-03-16 DIAGNOSIS — I10 ESSENTIAL HYPERTENSION: ICD-10-CM

## 2021-03-16 DIAGNOSIS — N95.1 MENOPAUSAL HOT FLUSHES: ICD-10-CM

## 2021-03-17 RX ORDER — METOPROLOL SUCCINATE 50 MG/1
TABLET, EXTENDED RELEASE ORAL
Qty: 90 TABLET | Refills: 0 | Status: SHIPPED | OUTPATIENT
Start: 2021-03-17 | End: 2021-06-14 | Stop reason: SDUPTHER

## 2021-03-17 RX ORDER — VENLAFAXINE HYDROCHLORIDE 75 MG/1
CAPSULE, EXTENDED RELEASE ORAL
Qty: 90 CAPSULE | Refills: 0 | Status: SHIPPED | OUTPATIENT
Start: 2021-03-17 | End: 2021-06-14 | Stop reason: SDUPTHER

## 2021-03-29 RX ORDER — LENALIDOMIDE 10 MG/1
10 CAPSULE ORAL DAILY
Qty: 21 CAPSULE | Refills: 2 | Status: SHIPPED | OUTPATIENT
Start: 2021-03-29 | End: 2021-06-14 | Stop reason: SDUPTHER

## 2021-04-29 ENCOUNTER — HOSPITAL ENCOUNTER (OUTPATIENT)
Age: 61
Discharge: HOME | End: 2021-04-29
Payer: COMMERCIAL

## 2021-04-29 DIAGNOSIS — C90.00 MULTIPLE MYELOMA NOT HAVING ACHIEVED REMISSION (HCC): Primary | ICD-10-CM

## 2021-04-29 DIAGNOSIS — C90.00 MULTIPLE MYELOMA NOT HAVING ACHIEVED REMISSION (HCC): ICD-10-CM

## 2021-04-29 LAB
ABSOLUTE IMMATURE GRANULOCYTE: 0.01 THOU/MM3 (ref 0–0.07)
ALBUMIN SERPL-MCNC: 4.2 G/DL (ref 3.5–5.1)
ALP BLD-CCNC: 78 U/L (ref 38–126)
ALT SERPL-CCNC: 20 U/L (ref 11–66)
AST SERPL-CCNC: 18 U/L (ref 5–40)
BASINOPHIL, AUTOMATED: 1 % (ref 0–3)
BASOPHILS ABSOLUTE: 0 THOU/MM3 (ref 0–0.1)
BILIRUB SERPL-MCNC: 0.4 MG/DL (ref 0.3–1.2)
BILIRUBIN DIRECT: < 0.2 MG/DL (ref 0–0.3)
BUN, WHOLE BLOOD: 10 MG/DL (ref 8–26)
CHLORIDE, WHOLE BLOOD: 106 MEQ/L (ref 98–109)
CREATININE, WHOLE BLOOD: 0.7 MG/DL (ref 0.5–1.2)
EOSINOPHILS ABSOLUTE: 0.2 THOU/MM3 (ref 0–0.4)
EOSINOPHILS RELATIVE PERCENT: 10 % (ref 0–4)
GFR, ESTIMATED: > 90 ML/MIN/1.73M2
GLUCOSE, WHOLE BLOOD: 91 MG/DL (ref 70–108)
HCT VFR BLD CALC: 39.6 % (ref 37–47)
HEMOGLOBIN: 13.4 GM/DL (ref 12–16)
IMMATURE GRANULOCYTES: 0 %
IONIZED CALCIUM, WHOLE BLOOD: 1.23 MMOL/L (ref 1.12–1.32)
LYMPHOCYTES # BLD: 23 % (ref 15–47)
LYMPHOCYTES ABSOLUTE: 0.5 THOU/MM3 (ref 1–4.8)
MCH RBC QN AUTO: 35 PG (ref 26–33)
MCHC RBC AUTO-ENTMCNC: 33.8 GM/DL (ref 32.2–35.5)
MCV RBC AUTO: 103 FL (ref 81–99)
MONOCYTES ABSOLUTE: 0.5 THOU/MM3 (ref 0.4–1.3)
MONOCYTES: 20 % (ref 0–12)
PDW BLD-RTO: 12.4 % (ref 11.5–14.5)
PLATELET # BLD: 160 THOU/MM3 (ref 130–400)
PMV BLD AUTO: 11.2 FL (ref 9.4–12.4)
POTASSIUM, WHOLE BLOOD: 4.3 MEQ/L (ref 3.5–4.9)
RBC # BLD: 3.83 MILL/MM3 (ref 4.2–5.4)
SEG NEUTROPHILS: 45 % (ref 43–75)
SEGMENTED NEUTROPHILS ABSOLUTE COUNT: 1 THOU/MM3 (ref 1.8–7.7)
SODIUM, WHOLE BLOOD: 142 MEQ/L (ref 138–146)
TOTAL CO2, WHOLE BLOOD: 30 MEQ/L (ref 23–33)
TOTAL PROTEIN: 6.5 G/DL (ref 6.1–8)
WBC # BLD: 2.3 THOU/MM3 (ref 4.8–10.8)

## 2021-04-29 PROCEDURE — 84156 ASSAY OF PROTEIN URINE: CPT

## 2021-04-29 PROCEDURE — 80076 HEPATIC FUNCTION PANEL: CPT

## 2021-04-29 PROCEDURE — 83883 ASSAY NEPHELOMETRY NOT SPEC: CPT

## 2021-04-29 PROCEDURE — 82784 ASSAY IGA/IGD/IGG/IGM EACH: CPT

## 2021-04-29 PROCEDURE — 86334 IMMUNOFIX E-PHORESIS SERUM: CPT

## 2021-04-29 PROCEDURE — 84155 ASSAY OF PROTEIN SERUM: CPT

## 2021-04-29 PROCEDURE — 80047 BASIC METABLC PNL IONIZED CA: CPT

## 2021-04-29 PROCEDURE — 36415 COLL VENOUS BLD VENIPUNCTURE: CPT

## 2021-04-29 PROCEDURE — 86335 IMMUNFIX E-PHORSIS/URINE/CSF: CPT

## 2021-04-29 PROCEDURE — 85025 COMPLETE CBC W/AUTO DIFF WBC: CPT

## 2021-04-29 PROCEDURE — 84165 PROTEIN E-PHORESIS SERUM: CPT

## 2021-05-03 LAB
KAPPA/LAMBDA FREE LIGHT CHAINS: NORMAL
PROTEIN ELECTROPHORESIS, URINE: NORMAL

## 2021-05-04 LAB — IMMUNOFIXATION WITH QUANT: NORMAL

## 2021-05-07 ENCOUNTER — HOSPITAL ENCOUNTER (OUTPATIENT)
Dept: INFUSION THERAPY | Age: 61
Discharge: HOME | End: 2021-05-07
Payer: COMMERCIAL

## 2021-05-07 ENCOUNTER — OFFICE VISIT (OUTPATIENT)
Dept: ONCOLOGY | Age: 61
End: 2021-05-07
Payer: COMMERCIAL

## 2021-05-07 VITALS
SYSTOLIC BLOOD PRESSURE: 129 MMHG | OXYGEN SATURATION: 96 % | HEART RATE: 63 BPM | HEIGHT: 63 IN | DIASTOLIC BLOOD PRESSURE: 65 MMHG | BODY MASS INDEX: 31.57 KG/M2 | RESPIRATION RATE: 18 BRPM | WEIGHT: 178.2 LBS

## 2021-05-07 DIAGNOSIS — D70.1 CHEMOTHERAPY-INDUCED NEUTROPENIA (HCC): ICD-10-CM

## 2021-05-07 DIAGNOSIS — Z51.81 THERAPEUTIC DRUG MONITORING: ICD-10-CM

## 2021-05-07 DIAGNOSIS — C90.01 MULTIPLE MYELOMA IN REMISSION (HCC): ICD-10-CM

## 2021-05-07 DIAGNOSIS — I82.431 ACUTE DEEP VEIN THROMBOSIS (DVT) OF RIGHT POPLITEAL VEIN (HCC): ICD-10-CM

## 2021-05-07 DIAGNOSIS — M79.604 RIGHT LEG PAIN: Primary | ICD-10-CM

## 2021-05-07 DIAGNOSIS — T45.1X5A CHEMOTHERAPY-INDUCED NEUTROPENIA (HCC): ICD-10-CM

## 2021-05-07 PROCEDURE — 99214 OFFICE O/P EST MOD 30 MIN: CPT | Performed by: INTERNAL MEDICINE

## 2021-05-07 PROCEDURE — 99211 OFF/OP EST MAY X REQ PHY/QHP: CPT

## 2021-05-07 ASSESSMENT — ENCOUNTER SYMPTOMS
DIARRHEA: 0
BACK PAIN: 1
NAUSEA: 0
ABDOMINAL PAIN: 0
EYE DISCHARGE: 0
ABDOMINAL DISTENTION: 0
BLOOD IN STOOL: 0
FACIAL SWELLING: 0
TROUBLE SWALLOWING: 0
COUGH: 0
CHEST TIGHTNESS: 0
COLOR CHANGE: 0
VOMITING: 0
WHEEZING: 0
RECTAL PAIN: 0
SORE THROAT: 0
CONSTIPATION: 0
SHORTNESS OF BREATH: 0

## 2021-05-07 NOTE — PROGRESS NOTES
follow-up visit while receiving maintenance therapy with Revlimid 10 mg daily 3 weeks on, 1 week off. Tolerates Revlimid well. She continues having intermittent discomfort in her jaw and left gum. She denies having any fevers. She denies having any mouth sores, no diarrhea, no skin rash no peripheral neuropathy. Past Medical History:   Diagnosis Date    Acute kidney injury ([address] 07/12/2019    Allergic rhinitis     Flonase prn    Cancer (HCC)     Myeloma    Congenital hip dislocation 1961    right leg is longer, better with weight loss    H/O umbilical hernia repair 58/04/9680    Hot flushes, perimenopausal     Hypertension 2011    Seen by MONI Matson CNP diagnosed < 1 year    IBS (irritable bowel syndrome)     ? diagnosis - diarrhea with milk products and meat    Osteoarthritis     right knee pain    Osteonecrosis (HCC)     S/P laparoscopic appendectomy 07/16/2018    Snoring     denies apnea, mild daytime somnolence, just lost 35# so has more energy, denies waking coughing or choking, BMI 27, neck circ. 13.5 inches    Vitamin D deficiency       Past Surgical History:   Procedure Laterality Date    HIP SURGERY  1978    Congenital Hip multiple surgies since 7 weeks old. Last surgery 1978     HYSTERECTOMY  2004    Partial Irregular Periods with migraines.  KNEE SURGERY Right [address]    stapled to help straighten leg(congenital hip issues)    MO LAP,APPENDECTOMY N/A 7/15/2018    APPENDECTOMY LAPAROSCOPIC performed by Miriam Alvarenga MD at [facility]      Family History   Problem Relation Age of Onset    High Blood Pressure Mother     High Cholesterol Mother     Cancer Father         skin cancer    Cancer Paternal Grandmother     Cancer Paternal Grandfather     Deep Vein Thrombosis Brother         PE      Social History     Tobacco Use    Smoking status: Never Smoker    Smokeless tobacco: Never Used   Substance Use Topics    Alcohol use:  Yes Comment: Socially      Current Outpatient Medications   Medication Sig Dispense Refill    lenalidomide (REVLIMID) 10 MG chemo capsule Take 1 capsule by mouth daily for 21 days 21 capsule 2    venlafaxine (EFFEXOR XR) 75 MG extended release capsule TAKE 1 CAPSULE BY MOUTH EVERY DAY 90 capsule 0    metoprolol succinate (TOPROL XL) 50 MG extended release tablet TAKE ONE TABLET BY MOUTH DAILY 90 tablet 0    rivaroxaban (XARELTO) 20 MG TABS tablet TAKE 1 TABLET BY MOUTH EVERY DAY WITH BREAKFAST 30 tablet 3    loperamide (IMODIUM) 2 MG capsule Take 2 mg by mouth 4 times daily as needed for Diarrhea      amLODIPine (NORVASC) 10 MG tablet Take 1 tablet by mouth daily 90 tablet 1    acetaminophen (TYLENOL) 500 MG tablet Take 500 mg by mouth every 6 hours as needed for Pain      lactobacillus (CULTURELLE) capsule Take 1 capsule by mouth daily Align      chlorhexidine (PERIDEX) 0.12 % solution Take 15 mLs by mouth 2 times daily as needed       Calcium 600-200 MG-UNIT TABS Take 600 mg by mouth daily 30 tablet 0    econazole nitrate 1 % cream Apply topically Apply topically daily.  VITAMIN D, ERGOCALCIFEROL, PO Take 5,000 Units by mouth daily      acyclovir (ZOVIRAX) 400 MG tablet TAKE 1 TABLET BY MOUTH TWO TIMES A DAY  180 tablet 0    ibuprofen (ADVIL;MOTRIN) 200 MG tablet Take 400 mg by mouth every 6 hours as needed for Pain      Magic Mouthwash (MIRACLE MOUTHWASH) Swish and spit 5 mLs 4 times daily as needed for Irritation Prepare mixture 1:1:1 DIPHENHYDRAMINE HCL,NYSTATIN,LIDOCAINE HCL (Patient not taking: Reported on 5/7/2021) 120 mL 1     No current facility-administered medications for this visit.       Allergies   Allergen Reactions    Sulfa Antibiotics Rash      Health Maintenance   Topic Date Due    Hepatitis C screen  Never done    Pneumococcal 0-64 years Vaccine (1 of 4 - PCV13) Never done    HIV screen  Never done    Diabetes screen  Never done    Shingles Vaccine (1 of 2) Never done   [facility] Colon cancer screen colonoscopy  10/03/2019    Potassium monitoring  04/02/2021    Creatinine monitoring  04/02/2021    Cervical cancer screen  10/22/2021    DTaP/Tdap/Td vaccine (2 - Td) 06/04/2022    Breast cancer screen  06/23/2022    Lipid screen  04/25/2024    Flu vaccine  Completed    COVID-19 Vaccine  Completed    Hepatitis A vaccine  Aged Out    Hepatitis B vaccine  Aged Out    Hib vaccine  Aged Out    Meningococcal (ACWY) vaccine  Aged Out        Subjective:   Review of Systems   Constitutional: Positive for fatigue. Negative for activity change, appetite change and fever. HENT: Negative for congestion, dental problem, facial swelling, hearing loss, mouth sores, nosebleeds, sore throat, tinnitus and trouble swallowing. Eyes: Negative for discharge and visual disturbance. Respiratory: Negative for cough, chest tightness, shortness of breath and wheezing. Cardiovascular: Negative for chest pain, palpitations and leg swelling. Gastrointestinal: Negative for abdominal distention, abdominal pain, blood in stool, constipation, diarrhea, nausea, rectal pain and vomiting. Endocrine: Negative for cold intolerance, polydipsia and polyuria. Genitourinary: Negative for decreased urine volume, difficulty urinating, dysuria, flank pain, hematuria and urgency. Musculoskeletal: Positive for arthralgias and back pain. Negative for gait problem, joint swelling, myalgias and neck stiffness. Skin: Negative for color change, rash and wound. Neurological: Positive for headaches. Negative for dizziness, tremors, seizures, speech difficulty, weakness, light-headedness and numbness. Hematological: Negative for adenopathy. Does not bruise/bleed easily. Psychiatric/Behavioral: Negative for confusion and sleep disturbance. The patient is not nervous/anxious. Objective:   Physical Exam  Vitals signs reviewed. Constitutional:       General: She is not in acute distress.      Appearance: She is well-developed. HENT:      Head: Normocephalic. Mouth/Throat:      Pharynx: No oropharyngeal exudate. Eyes:      General: No scleral icterus. Right eye: No discharge. Left eye: No discharge. Pupils: Pupils are equal, round, and reactive to light. Neck:      Musculoskeletal: Normal range of motion and neck supple. Thyroid: No thyromegaly. Vascular: No JVD. Trachea: No tracheal deviation. Cardiovascular:      Rate and Rhythm: Normal rate. Heart sounds: Normal heart sounds. No murmur. No friction rub. No gallop. Pulmonary:      Effort: Pulmonary effort is normal. No respiratory distress. Breath sounds: Normal breath sounds. No stridor. No wheezing or rales. Chest:      Chest wall: No tenderness. Abdominal:      General: Bowel sounds are normal. There is no distension. Palpations: Abdomen is soft. There is no mass. Tenderness: There is no abdominal tenderness. There is no rebound. Musculoskeletal: Normal range of motion. Comments: Good range of motion in all four extremities. Lymphadenopathy:      Cervical: No cervical adenopathy. Skin:     General: Skin is warm. Findings: No erythema or rash. Neurological:      Mental Status: She is alert and oriented to person, place, and time. Cranial Nerves: No cranial nerve deficit. Motor: No abnormal muscle tone. Deep Tendon Reflexes: Reflexes are normal and symmetric. Psychiatric:         Behavior: Behavior normal.         Thought Content:  Thought content normal.         Judgment: Judgment normal.         /65 (Site: Left Upper Arm, Position: Sitting, Cuff Size: Medium Adult)   Pulse 63   Resp 18   Ht 5' 3\" (1.6 m)   Wt 178 lb 3.2 oz (80.8 kg)   LMP 12/01/2004   SpO2 96%   BMI 31.57 kg/m²      ECOG status is 1    Imaging studies and labs:     XRAY Myeloma survey on 07/13/2019 showed:  IMPRESSION:   Innumerable lytic lesions bilaterally, compatible with multiple myeloma. Lab Results   Component Value Date    WBC 2.3 (L) 04/29/2021    HGB 13.4 04/29/2021    HCT 39.6 04/29/2021     (H) 04/29/2021     04/29/2021       Chemistry        Component Value Date/Time     04/29/2021 1148     04/02/2020 1244    K 4.3 04/29/2021 1148    K 4.6 04/02/2020 1244     04/02/2020 1244    CO2 24 08/17/2020 1206    BUN 14 08/17/2020 1206    CREATININE 0.7 04/29/2021 1148    CREATININE 0.6 04/02/2020 1244        Component Value Date/Time    CALCIUM 9.4 04/02/2020 1244    ALKPHOS 78 04/29/2021 1148    AST 18 04/29/2021 1148    ALT 20 04/29/2021 1148    BILITOT 0.4 04/29/2021 1148    BILITOT NEGATIVE 08/04/2015 0803        August 17,2020:  SPEP/MICHELLE Interpretation     Hypogammaglobulinemia.  MICHELLE gel shows a normal pattern; no monoclonal proteins seen. Kappa Qnt Free Light Chains                  24.37 H   3.30-19.40    mg/L   Lambda Qnt Free Light Chains               20.97       5.71-26.30    mg/L   Kappa/Lambda Free Light Chain Ratio     1.16       0.26-1.65    December 15, 2020:  Normal SPEP pattern.  MICHELLE gel shows a normal pattern; no   monoclonal proteins seen. Kappa Qnt Free Light Chains                  28.78 H   3.30-19.40    mg/L   Lambda Qnt Free Light Chains               22.42     5.71-26.30    mg/L   Kappa/Lambda Free Light Chain Ratio     1.28     0.26-1.65    February 18, 2021:    SPEP/MICHELLE Interpretation  Normal SPEP pattern.  MICHELLE gel shows a normal pattern; no monoclonal proteins seen. Kappa Qnt Free Light Chains                  28.28 H   3.30-19.40    mg/L   Lambda Qnt Free Light Chains                 23.63     5.71-26.30    mg/L   Kappa/Lambda Free Light Chain Ratio           1.20     0.26-1.65    April 29, 2021:  Normal SPEP pattern.  MICHELLE gel shows a normal pattern; no   monoclonal proteins seen.    Kappa Qnt Free Light Chains   31.11 H   3.30-19.40    mg/L   Lambda Qnt Free Light Chains     25.04     5.71-26.30    mg/L

## 2021-05-07 NOTE — PATIENT INSTRUCTIONS
1.  X-ray of the right hip and right femur  2. CBC on Natalee 3, 2021  3. RTC to see me in 3 months.   4.  Few days before RTC labs: CBC, BMP, LFTs, multiple myeloma panel

## 2021-05-10 ENCOUNTER — TELEPHONE (OUTPATIENT)
Dept: ONCOLOGY | Age: 61
End: 2021-05-10

## 2021-05-10 RX ORDER — ACYCLOVIR 400 MG/1
TABLET ORAL
Qty: 180 TABLET | Refills: 0 | Status: SHIPPED | OUTPATIENT
Start: 2021-05-10 | End: 2021-08-06 | Stop reason: SDUPTHER

## 2021-06-03 ENCOUNTER — HOSPITAL ENCOUNTER (OUTPATIENT)
Age: 61
Discharge: HOME | End: 2021-06-03
Payer: COMMERCIAL

## 2021-06-03 ENCOUNTER — NURSE ONLY (OUTPATIENT)
Dept: LAB | Age: 61
End: 2021-06-03

## 2021-06-03 ENCOUNTER — HOSPITAL ENCOUNTER (OUTPATIENT)
Dept: GENERAL RADIOLOGY | Age: 61
Discharge: HOME | End: 2021-06-03
Payer: COMMERCIAL

## 2021-06-03 DIAGNOSIS — C90.01 MULTIPLE MYELOMA IN REMISSION (HCC): ICD-10-CM

## 2021-06-03 DIAGNOSIS — M79.604 RIGHT LEG PAIN: ICD-10-CM

## 2021-06-03 LAB
BASOPHILS # BLD: 1.6 %
BASOPHILS ABSOLUTE: 0.1 THOU/MM3 (ref 0–0.1)
EOSINOPHIL # BLD: 2.2 %
EOSINOPHILS ABSOLUTE: 0.1 THOU/MM3 (ref 0–0.4)
ERYTHROCYTE [DISTWIDTH] IN BLOOD BY AUTOMATED COUNT: 12.3 % (ref 11.5–14.5)
ERYTHROCYTE [DISTWIDTH] IN BLOOD BY AUTOMATED COUNT: 47.4 FL (ref 35–45)
HCT VFR BLD CALC: 39.8 % (ref 37–47)
HEMOGLOBIN: 13.1 GM/DL (ref 12–16)
IMMATURE GRANS (ABS): 0 THOU/MM3 (ref 0–0.07)
IMMATURE GRANULOCYTES: 0 %
LYMPHOCYTES # BLD: 22.6 %
LYMPHOCYTES ABSOLUTE: 0.7 THOU/MM3 (ref 1–4.8)
MCH RBC QN AUTO: 34.5 PG (ref 26–33)
MCHC RBC AUTO-ENTMCNC: 32.9 GM/DL (ref 32.2–35.5)
MCV RBC AUTO: 104.7 FL (ref 81–99)
MONOCYTES # BLD: 19.7 %
MONOCYTES ABSOLUTE: 0.6 THOU/MM3 (ref 0.4–1.3)
NUCLEATED RED BLOOD CELLS: 0 /100 WBC
PLATELET # BLD: 201 THOU/MM3 (ref 130–400)
PMV BLD AUTO: 11.1 FL (ref 9.4–12.4)
RBC # BLD: 3.8 MILL/MM3 (ref 4.2–5.4)
SEG NEUTROPHILS: 53.9 %
SEGMENTED NEUTROPHILS ABSOLUTE COUNT: 1.7 THOU/MM3 (ref 1.8–7.7)
WBC # BLD: 3.2 THOU/MM3 (ref 4.8–10.8)

## 2021-06-03 PROCEDURE — 73552 X-RAY EXAM OF FEMUR 2/>: CPT

## 2021-06-03 PROCEDURE — 72170 X-RAY EXAM OF PELVIS: CPT

## 2021-06-14 RX ORDER — LENALIDOMIDE 10 MG/1
10 CAPSULE ORAL DAILY
Qty: 21 CAPSULE | Refills: 2 | Status: SHIPPED | OUTPATIENT
Start: 2021-06-14 | End: 2021-08-17 | Stop reason: SDUPTHER

## 2021-07-06 ENCOUNTER — OFFICE VISIT (OUTPATIENT)
Dept: INTERNAL MEDICINE CLINIC | Age: 61
End: 2021-07-06
Payer: COMMERCIAL

## 2021-07-06 VITALS
HEIGHT: 63 IN | SYSTOLIC BLOOD PRESSURE: 102 MMHG | BODY MASS INDEX: 30.83 KG/M2 | WEIGHT: 174 LBS | HEART RATE: 60 BPM | TEMPERATURE: 97.2 F | DIASTOLIC BLOOD PRESSURE: 70 MMHG

## 2021-07-06 DIAGNOSIS — I10 ESSENTIAL HYPERTENSION: Primary | ICD-10-CM

## 2021-07-06 DIAGNOSIS — R53.83 FATIGUE, UNSPECIFIED TYPE: ICD-10-CM

## 2021-07-06 DIAGNOSIS — E55.9 VITAMIN D DEFICIENCY: ICD-10-CM

## 2021-07-06 DIAGNOSIS — E53.8 VITAMIN B12 DEFICIENCY: ICD-10-CM

## 2021-07-06 DIAGNOSIS — N95.1 MENOPAUSAL HOT FLUSHES: ICD-10-CM

## 2021-07-06 PROCEDURE — 99214 OFFICE O/P EST MOD 30 MIN: CPT | Performed by: INTERNAL MEDICINE

## 2021-07-06 PROCEDURE — 93000 ELECTROCARDIOGRAM COMPLETE: CPT | Performed by: INTERNAL MEDICINE

## 2021-07-06 SDOH — ECONOMIC STABILITY: FOOD INSECURITY: WITHIN THE PAST 12 MONTHS, YOU WORRIED THAT YOUR FOOD WOULD RUN OUT BEFORE YOU GOT MONEY TO BUY MORE.: NEVER TRUE

## 2021-07-06 SDOH — ECONOMIC STABILITY: FOOD INSECURITY: WITHIN THE PAST 12 MONTHS, THE FOOD YOU BOUGHT JUST DIDN'T LAST AND YOU DIDN'T HAVE MONEY TO GET MORE.: NEVER TRUE

## 2021-07-06 ASSESSMENT — PATIENT HEALTH QUESTIONNAIRE - PHQ9
SUM OF ALL RESPONSES TO PHQ QUESTIONS 1-9: 0
2. FEELING DOWN, DEPRESSED OR HOPELESS: 0
SUM OF ALL RESPONSES TO PHQ QUESTIONS 1-9: 0
1. LITTLE INTEREST OR PLEASURE IN DOING THINGS: 0
SUM OF ALL RESPONSES TO PHQ QUESTIONS 1-9: 0
SUM OF ALL RESPONSES TO PHQ9 QUESTIONS 1 & 2: 0

## 2021-07-06 ASSESSMENT — SOCIAL DETERMINANTS OF HEALTH (SDOH): HOW HARD IS IT FOR YOU TO PAY FOR THE VERY BASICS LIKE FOOD, HOUSING, MEDICAL CARE, AND HEATING?: NOT HARD AT ALL

## 2021-07-06 NOTE — PROGRESS NOTES
Fidencio Kumar (:  1960) is a 62 y.o. female,Established patient, here for evaluation of the following chief complaint(s): Hypertension (6 months ) and Other (vitamin D def. )      ASSESSMENT/PLAN:   Diagnosis Orders   1. Essential hypertension  EKG 12 Lead   2. Vitamin B12 deficiency  Vitamin B12 & Folate   3. Fatigue, unspecified type  Vitamin B12 & Folate   4. Menopausal hot flushes     5. Vitamin D deficiency       Hypertension - BP controlled, low normal today. Continue metoprolol and Norvasc. Told her to call me if lightheaded or if SBP <100. May need to decrease medication. EKG with bradycardia at rate of 50 and old anterior infarct (seen on previous EKG). Vitamin B12 deficiency - low B12 169 2019 - was on B12 1000 mcg daily (stopped in med list 3/17/20). Will repeat level now. Fatigue - check B12 and folate level now. Menopausal hot flushes - controlled, continue Effexor. Vitamin D deficiency - on 5,000 IU daily. Level normal at 43 21. Return in about 6 months (around 2022). Lab due now. 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 Only allowed to lift 3#. Support her disability claim. She was denied initially but hope now will be reviewed in more depth. Hypertension - BP controlled, continue metoprolol and Norvasc, taken off lisinopril with renal decline when MM diagnosed.  BMP normal 2021.     Hot flushes - improved on Effexor.  Worse on steroids. No longer on steroids - still intermittent issues with hot flushes but tolerable - keep on Effexor at current dose. Vitamin D deficiency - on 5,000 IU daily. Normal level 21 at 43. Multiple Myeloma - treated with [facility] and Dr. Daren Qureshi. Considering stem cell transplant when sees [facility] MD in 2022.      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 Seeing [facility] for this. She is on a mouth wash for this.     Diarrhea - 4-8 stools a day, seems to be after eating x 2 weeks. She stopped magnesium less than a week ago, changing food -avoiding dairy. Could be from Revlimid. Order C. Diff as has been on antibiotic recently. No history of C. Diff. On Imodium every day - tolerable on this, worse with immunotherapy.      She is due for colonoscopy - but on Xarelto due to DVT 9/2020. She is waiting for Dr. Cheli Garcia to ok colonoscopy due to Xarelto.      Anemia - due to MM - vitamin B 12 deficiency seen prior to MM dx - started on injections but level high at [facility] 7/2019 so changed to oral supplement 1000 mcg daily.  Repeat 10/2019 still high. Off vitamin B12 - repeat level.       Carotid doppler 6/2019- mild disease.     Hyperhomocysteinemia - level at 23 - on folic acid 1 mg daily. Dr. Felecia Gowers stopped it. Asked her to address with Dr. Felecia Gowers at next visit. Addressed and not needed per patient.      ALT 36 - mild elevation with follow up in 3-6 months.  Normal 11/21/19 and most recently 12/2020.     Nausea - on omeprazole and stable - will continue. No longer on omeprazole - doing well. History of DVT - due to hypercoag with MM - she is on chronic Xarelto.        Review of Systems - General ROS: negative for - chills or fever  Psychological ROS: negative for - anxiety and depression  Hematological and Lymphatic ROS: No history of bleeding disorder, positive DVT.               Respiratory ROS: no cough, shortness of breath, or wheezing  Cardiovascular ROS: no chest pain, positive MULLER but some deconditioning with cancer - unchanged  Gastrointestinal ROS: Positive intermittent diarrhea with occasional cramping pain, no blood in stool - tolerable on Imodium  Genito-Urinary ROS: no dysuria, trouble voiding, or hematuria  Musculoskeletal ROS: negative for - muscle pain or muscular weakness, positive left hip, back, knees-

## 2021-07-12 DIAGNOSIS — I82.431 ACUTE DEEP VEIN THROMBOSIS (DVT) OF RIGHT POPLITEAL VEIN (HCC): ICD-10-CM

## 2021-07-12 DIAGNOSIS — C90.00 MULTIPLE MYELOMA NOT HAVING ACHIEVED REMISSION (HCC): ICD-10-CM

## 2021-07-28 ENCOUNTER — TELEPHONE (OUTPATIENT)
Dept: ONCOLOGY | Age: 61
End: 2021-07-28

## 2021-07-28 DIAGNOSIS — C90.01 MULTIPLE MYELOMA IN REMISSION (HCC): Primary | ICD-10-CM

## 2021-07-28 NOTE — TELEPHONE ENCOUNTER
Patient called and stated that she has sores in her mouth and feels her WBC is low and would like to come in for a blood draw before going on vacation on 7/30/21.  Please advise and thanks

## 2021-07-29 ENCOUNTER — NURSE ONLY (OUTPATIENT)
Dept: LAB | Age: 61
End: 2021-07-29

## 2021-07-29 DIAGNOSIS — R53.83 FATIGUE, UNSPECIFIED TYPE: ICD-10-CM

## 2021-07-29 DIAGNOSIS — C90.01 MULTIPLE MYELOMA IN REMISSION (HCC): ICD-10-CM

## 2021-07-29 DIAGNOSIS — E53.8 VITAMIN B12 DEFICIENCY: ICD-10-CM

## 2021-07-29 LAB
BASOPHILS # BLD: 1.7 %
BASOPHILS ABSOLUTE: 0.1 THOU/MM3 (ref 0–0.1)
EOSINOPHIL # BLD: 3.3 %
EOSINOPHILS ABSOLUTE: 0.1 THOU/MM3 (ref 0–0.4)
ERYTHROCYTE [DISTWIDTH] IN BLOOD BY AUTOMATED COUNT: 13.3 % (ref 11.5–14.5)
ERYTHROCYTE [DISTWIDTH] IN BLOOD BY AUTOMATED COUNT: 51 FL (ref 35–45)
FOLATE: 13.7 NG/ML (ref 4.8–24.2)
HCT VFR BLD CALC: 38.6 % (ref 37–47)
HEMOGLOBIN: 13.1 GM/DL (ref 12–16)
IMMATURE GRANS (ABS): 0.01 THOU/MM3 (ref 0–0.07)
IMMATURE GRANULOCYTES: 0.3 %
LYMPHOCYTES # BLD: 21.6 %
LYMPHOCYTES ABSOLUTE: 0.6 THOU/MM3 (ref 1–4.8)
MCH RBC QN AUTO: 35.4 PG (ref 26–33)
MCHC RBC AUTO-ENTMCNC: 33.9 GM/DL (ref 32.2–35.5)
MCV RBC AUTO: 104.3 FL (ref 81–99)
MONOCYTES # BLD: 18.9 %
MONOCYTES ABSOLUTE: 0.6 THOU/MM3 (ref 0.4–1.3)
NUCLEATED RED BLOOD CELLS: 0 /100 WBC
PLATELET # BLD: 193 THOU/MM3 (ref 130–400)
PMV BLD AUTO: 11 FL (ref 9.4–12.4)
RBC # BLD: 3.7 MILL/MM3 (ref 4.2–5.4)
SEG NEUTROPHILS: 54.2 %
SEGMENTED NEUTROPHILS ABSOLUTE COUNT: 1.6 THOU/MM3 (ref 1.8–7.7)
VITAMIN B-12: 310 PG/ML (ref 211–911)
WBC # BLD: 3 THOU/MM3 (ref 4.8–10.8)

## 2021-08-06 ENCOUNTER — HOSPITAL ENCOUNTER (OUTPATIENT)
Dept: INFUSION THERAPY | Age: 61
Discharge: HOME | End: 2021-08-06
Payer: COMMERCIAL

## 2021-08-06 ENCOUNTER — OFFICE VISIT (OUTPATIENT)
Dept: ONCOLOGY | Age: 61
End: 2021-08-06
Payer: COMMERCIAL

## 2021-08-06 VITALS
SYSTOLIC BLOOD PRESSURE: 119 MMHG | TEMPERATURE: 97 F | RESPIRATION RATE: 18 BRPM | HEART RATE: 64 BPM | DIASTOLIC BLOOD PRESSURE: 63 MMHG | HEIGHT: 63 IN | WEIGHT: 174 LBS | BODY MASS INDEX: 30.83 KG/M2 | OXYGEN SATURATION: 96 %

## 2021-08-06 DIAGNOSIS — C90.01 MULTIPLE MYELOMA IN REMISSION (HCC): ICD-10-CM

## 2021-08-06 DIAGNOSIS — I10 ESSENTIAL HYPERTENSION: ICD-10-CM

## 2021-08-06 DIAGNOSIS — T45.1X5A CHEMOTHERAPY-INDUCED NEUTROPENIA (HCC): ICD-10-CM

## 2021-08-06 DIAGNOSIS — C90.01 MULTIPLE MYELOMA IN REMISSION (HCC): Primary | ICD-10-CM

## 2021-08-06 DIAGNOSIS — D70.1 CHEMOTHERAPY-INDUCED NEUTROPENIA (HCC): ICD-10-CM

## 2021-08-06 DIAGNOSIS — Z51.81 THERAPEUTIC DRUG MONITORING: ICD-10-CM

## 2021-08-06 LAB
ABSOLUTE IMMATURE GRANULOCYTE: 0.01 THOU/MM3 (ref 0–0.07)
ALBUMIN SERPL-MCNC: 4.4 G/DL (ref 3.5–5.1)
ALP BLD-CCNC: 82 U/L (ref 38–126)
ALT SERPL-CCNC: 16 U/L (ref 11–66)
AST SERPL-CCNC: 18 U/L (ref 5–40)
BASINOPHIL, AUTOMATED: 1 % (ref 0–3)
BASOPHILS ABSOLUTE: 0 THOU/MM3 (ref 0–0.1)
BILIRUB SERPL-MCNC: 0.4 MG/DL (ref 0.3–1.2)
BILIRUBIN DIRECT: < 0.2 MG/DL (ref 0–0.3)
BUN, WHOLE BLOOD: 9 MG/DL (ref 8–26)
CHLORIDE, WHOLE BLOOD: 106 MEQ/L (ref 98–109)
CREATININE, WHOLE BLOOD: 0.8 MG/DL (ref 0.5–1.2)
EOSINOPHILS ABSOLUTE: 0.1 THOU/MM3 (ref 0–0.4)
EOSINOPHILS RELATIVE PERCENT: 4 % (ref 0–4)
GFR, ESTIMATED: 78 ML/MIN/1.73M2
GLUCOSE, WHOLE BLOOD: 98 MG/DL (ref 70–108)
HCT VFR BLD CALC: 38.9 % (ref 37–47)
HEMOGLOBIN: 13.4 GM/DL (ref 12–16)
IMMATURE GRANULOCYTES: 0 %
IONIZED CALCIUM, WHOLE BLOOD: 1.21 MMOL/L (ref 1.12–1.32)
LYMPHOCYTES # BLD: 16 % (ref 15–47)
LYMPHOCYTES ABSOLUTE: 0.5 THOU/MM3 (ref 1–4.8)
MCH RBC QN AUTO: 35.2 PG (ref 26–33)
MCHC RBC AUTO-ENTMCNC: 34.4 GM/DL (ref 32.2–35.5)
MCV RBC AUTO: 102 FL (ref 81–99)
MONOCYTES ABSOLUTE: 0.2 THOU/MM3 (ref 0.4–1.3)
MONOCYTES: 8 % (ref 0–12)
PDW BLD-RTO: 13.4 % (ref 11.5–14.5)
PLATELET # BLD: 173 THOU/MM3 (ref 130–400)
PMV BLD AUTO: 11.7 FL (ref 9.4–12.4)
POTASSIUM, WHOLE BLOOD: 3.8 MEQ/L (ref 3.5–4.9)
RBC # BLD: 3.81 MILL/MM3 (ref 4.2–5.4)
SEG NEUTROPHILS: 71 % (ref 43–75)
SEGMENTED NEUTROPHILS ABSOLUTE COUNT: 2.3 THOU/MM3 (ref 1.8–7.7)
SODIUM, WHOLE BLOOD: 144 MEQ/L (ref 138–146)
TOTAL CO2, WHOLE BLOOD: 29 MEQ/L (ref 23–33)
TOTAL PROTEIN: 6.6 G/DL (ref 6.1–8)
WBC # BLD: 3.2 THOU/MM3 (ref 4.8–10.8)

## 2021-08-06 PROCEDURE — 84155 ASSAY OF PROTEIN SERUM: CPT

## 2021-08-06 PROCEDURE — 85025 COMPLETE CBC W/AUTO DIFF WBC: CPT

## 2021-08-06 PROCEDURE — 99211 OFF/OP EST MAY X REQ PHY/QHP: CPT

## 2021-08-06 PROCEDURE — 80076 HEPATIC FUNCTION PANEL: CPT

## 2021-08-06 PROCEDURE — 82784 ASSAY IGA/IGD/IGG/IGM EACH: CPT

## 2021-08-06 PROCEDURE — 36415 COLL VENOUS BLD VENIPUNCTURE: CPT

## 2021-08-06 PROCEDURE — 83883 ASSAY NEPHELOMETRY NOT SPEC: CPT

## 2021-08-06 PROCEDURE — 80047 BASIC METABLC PNL IONIZED CA: CPT

## 2021-08-06 PROCEDURE — 84165 PROTEIN E-PHORESIS SERUM: CPT

## 2021-08-06 PROCEDURE — 99214 OFFICE O/P EST MOD 30 MIN: CPT | Performed by: INTERNAL MEDICINE

## 2021-08-06 PROCEDURE — 86334 IMMUNOFIX E-PHORESIS SERUM: CPT

## 2021-08-06 RX ORDER — RIVAROXABAN 10 MG/1
10 TABLET, FILM COATED ORAL
Qty: 30 TABLET | Refills: 5 | Status: SHIPPED | OUTPATIENT
Start: 2021-08-06 | End: 2022-02-17 | Stop reason: SDUPTHER

## 2021-08-06 RX ORDER — ACYCLOVIR 400 MG/1
TABLET ORAL
Qty: 180 TABLET | Refills: 0 | Status: SHIPPED | OUTPATIENT
Start: 2021-08-06 | End: 2021-12-13 | Stop reason: SDUPTHER

## 2021-08-06 ASSESSMENT — ENCOUNTER SYMPTOMS
VOMITING: 0
BLOOD IN STOOL: 0
CONSTIPATION: 0
COUGH: 0
DIARRHEA: 0
ABDOMINAL DISTENTION: 0
ABDOMINAL PAIN: 0
SORE THROAT: 0
BACK PAIN: 1
COLOR CHANGE: 0
EYE DISCHARGE: 0
SHORTNESS OF BREATH: 0
TROUBLE SWALLOWING: 0
CHEST TIGHTNESS: 0
NAUSEA: 0
RECTAL PAIN: 0
WHEEZING: 0
FACIAL SWELLING: 0

## 2021-08-06 NOTE — PROGRESS NOTES
[facility] [address] [facility] [address]  [place] Salinas  Dept: [phone]  Dept Fax: [phone]: [phone]    Visit Date:8/6/2021     Ria Ortiz is a 62 y.o. female who presents today for:   Chief Complaint   Patient presents with    Follow-up     Multiple myeloma not having achieved remission ([facility] [address]        HPI:    Julienne Ndiaye is a 62 WF with multiple myeloma. She was admitted to [facility] on 7/16  with  Hypercalcemia 9Ca=11.0), acute kidney injury ( creat. 4.9), both found on outpatient evaluation for several weeks of insidious, progressive fatigue and left shoulder pain. Xray showed multiple lytic lesions in the bones. Bone marrow, right iliac crest, biopsy, aspirate, and peripheral smear performed on 07/15/2019 showed: -          Plasma cell myeloma in a normocellular marrow (50%), plasma cells are 30% of cells on the aspirate smear and occupy 40% of marrow cellularity based on a  immunostain performed on  the biopsy. These findings are consistent with a kappa light chain restricted plasma cell myeloma. A Congo red stain for amyloid performed on the biopsy is negative. SPEP, serum free light chains, serum immunoglobulins (elevated IGA 2,656 and depressed IGG/IGM)  s/p 2 sessions of pheresis given light chains >10,000  - Started chemotherapy (CyBorD) on 7/16/2019  On August 7,2019 she started treatment with Velcade dexamethasone and Revlimid  On September 25, 2019 she was diagnosed with right popliteal and calf veins DVT. She is treated with Xarelto. She received 6 cycles of RVD, afterwards she underwent stem cell collection at [facility]. After  Stem cell collectiuon she received 2 additional cycles of RVD. Last given on the February 20, 2020. On March 17, 2020 she started maintenance treatment with Revlimid.   Zometa is on hold due to suspicion of jaw osteonecrosis    Interim history on 08/06/2021:  The patient presents to the medical oncology clinic for 3 months follow-up visit while receiving maintenance therapy with Revlimid 10 mg daily 3 weeks on, 1 week off. Tolerates Revlimid well. She continues having intermittent discomfort in her jaw and left gum. She was placed recently on the course of antibiotic by oral surgeon from [facility], she denies having any fevers. She denies having diarrhea, no skin rash no peripheral neuropathy. Past Medical History:   Diagnosis Date    Acute kidney injury ([facility] [address] 07/12/2019    Allergic rhinitis     Flonase prn    Cancer (HCC)     Myeloma    Congenital hip dislocation 1960    right leg is longer, better with weight loss    H/O umbilical hernia repair 05/89/9789    Hot flushes, perimenopausal     Hypertension 2011    Seen by MONI Lama CNP diagnosed < 1 year    IBS (irritable bowel syndrome)     ? diagnosis - diarrhea with milk products and meat    Osteoarthritis     right knee pain    Osteonecrosis (HCC)     S/P laparoscopic appendectomy 07/16/2018    Snoring     denies apnea, mild daytime somnolence, just lost 35# so has more energy, denies waking coughing or choking, BMI 27, neck circ. 13.5 inches    Vitamin D deficiency       Past Surgical History:   Procedure Laterality Date    HIP SURGERY  1978    Congenital Hip multiple surgies since 7 weeks old. Last surgery 1978     HYSTERECTOMY  2004    Partial Irregular Periods with migraines.      KNEE SURGERY Right [address]    stapled to help straighten leg(congenital hip issues)    TN LAP,APPENDECTOMY N/A 7/15/2018    APPENDECTOMY LAPAROSCOPIC performed by Ilia Mena [facility] at [facility]      Family History   Problem Relation Age of Onset    High Blood Pressure Mother     High Cholesterol Mother     Cancer Father         skin cancer    Cancer Paternal Grandmother     Cancer Paternal Grandfather     Deep Vein Thrombosis Brother         PE      Social History     Tobacco Use    Smoking status: Never Smoker    Smokeless tobacco: Never Used   Substance Use Topics    Alcohol use: Yes     Comment: Socially      Current Outpatient Medications   Medication Sig Dispense Refill    acyclovir (ZOVIRAX) 400 MG tablet TAKE 1 TABLET BY MOUTH TWO TIMES A  tablet 0    rivaroxaban (XARELTO) 10 MG TABS tablet Take 1 tablet by mouth daily (with breakfast) 30 tablet 5    venlafaxine (EFFEXOR XR) 75 MG extended release capsule TAKE 1 CAPSULE BY MOUTH EVERY DAY 90 capsule 2    metoprolol succinate (TOPROL XL) 50 MG extended release tablet TAKE ONE TABLET BY MOUTH DAILY 90 tablet 2    lenalidomide (REVLIMID) 10 MG chemo capsule Take 1 capsule by mouth daily for 21 days 21 capsule 2    loperamide (IMODIUM) 2 MG capsule Take 2 mg by mouth 4 times daily as needed for Diarrhea      acetaminophen (TYLENOL) 500 MG tablet Take 500 mg by mouth every 6 hours as needed for Pain      Magic Mouthwash (MIRACLE MOUTHWASH) Swish and spit 5 mLs 4 times daily as needed for Irritation Prepare mixture 1:1:1 DIPHENHYDRAMINE HCL,NYSTATIN,LIDOCAINE  mL 1    lactobacillus (CULTURELLE) capsule Take 1 capsule by mouth daily Align      chlorhexidine (PERIDEX) 0.12 % solution Take 15 mLs by mouth 2 times daily as needed       Calcium 600-200 MG-UNIT TABS Take 600 mg by mouth daily 30 tablet 0    econazole nitrate 1 % cream Apply topically Apply topically daily.  VITAMIN D, ERGOCALCIFEROL, PO Take 5,000 Units by mouth daily      amLODIPine (NORVASC) 10 MG tablet Take 1 tablet by mouth daily 90 tablet 1     No current facility-administered medications for this visit.       Allergies   Allergen Reactions    Sulfa Antibiotics Rash      Health Maintenance   Topic Date Due    Hepatitis C screen  Never done    Pneumococcal 0-64 years Vaccine (1 of 4 - PCV13) Never done    HIV screen  Never done    Diabetes screen  Never done    Shingles Vaccine (1 of 2) Never done    Colon cancer screen colonoscopy  10/03/2019    Potassium monitoring  04/02/2021    Creatinine monitoring  04/02/2021    Flu vaccine (1) 09/01/2021    Cervical cancer screen  10/22/2021    DTaP/Tdap/Td vaccine (2 - Td or Tdap) 06/04/2022    Breast cancer screen  06/23/2022    Lipid screen  04/25/2024    COVID-19 Vaccine  Completed    Hepatitis A vaccine  Aged Out    Hepatitis B vaccine  Aged Out    Hib vaccine  Aged Out    Meningococcal (ACWY) vaccine  Aged Out        Subjective:   Review of Systems   Constitutional: Positive for fatigue. Negative for activity change, appetite change and fever. HENT: Negative for congestion, dental problem, facial swelling, hearing loss, mouth sores, nosebleeds, sore throat, tinnitus and trouble swallowing. Eyes: Negative for discharge and visual disturbance. Respiratory: Negative for cough, chest tightness, shortness of breath and wheezing. Cardiovascular: Negative for chest pain, palpitations and leg swelling. Gastrointestinal: Negative for abdominal distention, abdominal pain, blood in stool, constipation, diarrhea, nausea, rectal pain and vomiting. Endocrine: Negative for cold intolerance, polydipsia and polyuria. Genitourinary: Negative for decreased urine volume, difficulty urinating, dysuria, flank pain, hematuria and urgency. Musculoskeletal: Positive for arthralgias and back pain. Negative for gait problem, joint swelling, myalgias and neck stiffness. Skin: Negative for color change, rash and wound. Neurological: Positive for headaches. Negative for dizziness, tremors, seizures, speech difficulty, weakness, light-headedness and numbness. Hematological: Negative for adenopathy. Does not bruise/bleed easily. Psychiatric/Behavioral: Negative for confusion and sleep disturbance. The patient is not nervous/anxious. Objective:   Physical Exam  Vitals reviewed. Constitutional:       General: She is not in acute distress. Appearance: She is well-developed. HENT:      Head: Normocephalic.       Mouth/Throat: Pharynx: No oropharyngeal exudate. Eyes:      General: No scleral icterus. Right eye: No discharge. Left eye: No discharge. Pupils: Pupils are equal, round, and reactive to light. Neck:      Thyroid: No thyromegaly. Vascular: No JVD. Trachea: No tracheal deviation. Cardiovascular:      Rate and Rhythm: Normal rate. Heart sounds: Normal heart sounds. No murmur heard. No friction rub. No gallop. Pulmonary:      Effort: Pulmonary effort is normal. No respiratory distress. Breath sounds: Normal breath sounds. No stridor. No wheezing or rales. Chest:      Chest wall: No tenderness. Abdominal:      General: Bowel sounds are normal. There is no distension. Palpations: Abdomen is soft. There is no mass. Tenderness: There is no abdominal tenderness. There is no rebound. Musculoskeletal:         General: Normal range of motion. Cervical back: Normal range of motion and neck supple. Comments: Good range of motion in all four extremities. Lymphadenopathy:      Cervical: No cervical adenopathy. Skin:     General: Skin is warm. Findings: No erythema or rash. Neurological:      Mental Status: She is alert and oriented to person, place, and time. Cranial Nerves: No cranial nerve deficit. Motor: No abnormal muscle tone. Deep Tendon Reflexes: Reflexes are normal and symmetric. Psychiatric:         Behavior: Behavior normal.         Thought Content: Thought content normal.         Judgment: Judgment normal.         /63 (Site: Left Upper Arm, Position: Sitting, Cuff Size: Medium Adult)   Pulse 64   Temp 97 °F (36.1 °C) (Oral)   Resp 18   Ht 5' 3\" (1.6 m)   Wt 174 lb (78.9 kg)   LMP 12/01/2004   SpO2 96%   BMI 30.82 kg/m²      ECOG status is 1    Imaging studies and labs:     XRAY Myeloma survey on 07/13/2019 showed:  IMPRESSION:   Innumerable lytic lesions bilaterally, compatible with multiple myeloma.     Lab Results Component Value Date    WBC 3.2 (L) 08/06/2021    HGB 13.4 08/06/2021    HCT 38.9 08/06/2021     (H) 08/06/2021     08/06/2021       Chemistry        Component Value Date/Time     08/06/2021 1220     04/02/2020 1244    K 3.8 08/06/2021 1220    K 4.6 04/02/2020 1244     04/02/2020 1244    CO2 24 08/17/2020 1206    BUN 14 08/17/2020 1206    CREATININE 0.8 08/06/2021 1220    CREATININE 0.6 04/02/2020 1244        Component Value Date/Time    CALCIUM 9.4 04/02/2020 1244    ALKPHOS 82 08/06/2021 1220    AST 18 08/06/2021 1220    ALT 16 08/06/2021 1220    BILITOT 0.4 08/06/2021 1220    BILITOT NEGATIVE 08/04/2015 0803        August 17,2020:  SPEP/MICHELLE Interpretation     Hypogammaglobulinemia.  MICHELLE gel shows a normal pattern; no monoclonal proteins seen. Kappa Qnt Free Light Chains                  24.37 H   3.30-19.40    mg/L   Lambda Qnt Free Light Chains               20.97       5.71-26.30    mg/L   Kappa/Lambda Free Light Chain Ratio     1.16       0.26-1.65    December 15, 2020:  Normal SPEP pattern.  MICHELLE gel shows a normal pattern; no   monoclonal proteins seen. Kappa Qnt Free Light Chains                  28.78 H   3.30-19.40    mg/L   Lambda Qnt Free Light Chains               22.42     5.71-26.30    mg/L   Kappa/Lambda Free Light Chain Ratio     1.28     0.26-1.65    February 18, 2021:    SPEP/MICHELLE Interpretation  Normal SPEP pattern.  MICHELLE gel shows a normal pattern; no monoclonal proteins seen. Kappa Qnt Free Light Chains                  28.28 H   3.30-19.40    mg/L   Lambda Qnt Free Light Chains                 23.63     5.71-26.30    mg/L   Kappa/Lambda Free Light Chain Ratio           1.20     0.26-1.65    April 29, 2021:  Normal SPEP pattern.  MICHELLE gel shows a normal pattern; no   monoclonal proteins seen.    Kappa Qnt Free Light Chains   31.11 H   3.30-19.40    mg/L   Lambda Qnt Free Light Chains     25.04     5.71-26.30    mg/L   Kappa/Lambda Free Light Chain Ratio      1.24     0.26-1.65     August 6, 2021:  Normal SPEP pattern.  MICHELLE gel shows a normal pattern; no   monoclonal proteins seen. Kappa Qnt Free Light Chains    26.97 H   3.30-19.40    mg/L   Lambda Qnt Free Light Chains   23.37     5.71-26.30    mg/L   Kappa/Lambda Free Light Chain Ratio           1.15         Assessment/Plan:   1. Multiple myeloma: The patient met criteria for diagnosis of multiple myeloma. She had clonal bone marrow plasma cells ? 10%, bone lesions seen on bone survey, hypercalcemia, renal insufficiency and anemia. She received first cycle of chemotherapy with Velcade Cytoxan and dexamethasone at [facility]. She received second cycle of VdR in [facility], due to slight renal impairment the dose of Revlimid was reduced to 15 mg daily for 14 days during cycle #2. Her creatinine had improved to normal range. Her anemia had improved as well. Therefore with cycle #4 the dose of Revlimid was increased to 25 mg. She received 6 cycles of RVD and then she underwent stem cell collection at [facility]. Afterwards she received 2 additional cycles of RVD. Last given on the February 20, 2020. Her cycle #7 was delayed by 1 week due to influenza A infection. 2.  Maintenance therapy. Patient started Revlimid maintenance 25 mg daily for 3 weeks on,  1 week off on March 23, 2020. Her dose of Revlimid was reduced from 25 mg daily for 3 weeks on and one-week off to 20 mg daily for 3 weeks on 1 week off due to neutropenia. Despite the dose reduction the patient was still neutropenic, so we proceeded with further reduction of Revlimid to 15 mg. The patient had  serum immunoelectrophoresis and free light chains checked again in August 2020, no M protein and normal kappa lambda free light chain ratio. We eliminated dexamethasone from a maintenance in fall 2020. In January 2021 we reduce the dose of Revlimid to 10 mg daily for 3 weeks on 1 week off due to neutropenia.    Multiple myeloma panel on 08/06/2021 was without evidence of M protein and normal free Kappa/lambda chains ratio. Patient's IgG was 710. No recent infections. 3.  Right leg DVT. Known side effect from Revlimid and dexamethasone. The patient is on Xarelto, we will change the dose to 10 mg daily  4. Gum and  jaw pain. The patient was seen by oral maxillofacial surgeon at [facility] who performed CT of the of the jaw, it showed indeterminate findings in the jaw:  osteonecrosis versus multiple myeloma. Zometa is on hold. Diagnosis Orders   1. Multiple myeloma in remission (HCC)  CBC Auto Differential    POC PANEL BMP W/IOCA    Hepatic Function Panel    Electrophoresis Protein, Serum without Reflex to Immunofixation    Immunofixation serum profile    Immunofixation w/ Quant    Kappa/Lambda Quant Free Light Chains Serum   2. Essential hypertension     3. Therapeutic drug monitoring     4. Chemotherapy-induced neutropenia (HCC)          Plan:   Return in about 3 months (around 11/6/2021). Orders Placed:   Orders Placed This Encounter   Procedures    CBC Auto Differential     Standing Status:   Future     Number of Occurrences:   1     Standing Expiration Date:   8/6/2022    POC PANEL BMP W/IOCA     Standing Status:   Future     Number of Occurrences:   1     Standing Expiration Date:   8/6/2022    Hepatic Function Panel     Standing Status:   Future     Number of Occurrences:   1     Standing Expiration Date:   8/6/2022    Electrophoresis Protein, Serum without Reflex to Immunofixation     Standing Status:   Future     Number of Occurrences:   1     Standing Expiration Date:   8/6/2022    Immunofixation serum profile     SOFT IFX1    Collect: Serum separator tube. Specimen Preparation: Separate serum from cells ASAP or within 2 hours of collection. Transfer 1 mL serum to an [facility] Standard Transport Tube. (Min: 0.3 mL)    Storage/Transport Temperature: Refrigerated. Remarks: MICHELLE gel includes a qualitative interpretation of an MICHELLE only.     Unacceptable Conditions: Plasma. Stability (collection to initiation of testing): After separation from cells: Ambient: 8 hours;  Refrigerated: 1 week; Frozen: 1 month         Standing Status:   Future     Number of Occurrences:   1     Standing Expiration Date:   8/6/2022    Immunofixation w/ Quant     Standing Status:   Future     Number of Occurrences:   1     Standing Expiration Date:   8/6/2022    Ricardo/Lambda Quant Free Light Chains Serum     Standing Status:   Future     Number of Occurrences:   1     Standing Expiration Date:   8/6/2022

## 2021-08-09 ENCOUNTER — TELEPHONE (OUTPATIENT)
Dept: INTERNAL MEDICINE CLINIC | Age: 61
End: 2021-08-09

## 2021-08-09 LAB
IMMUNOFIXATION WITH QUANT: NORMAL
KAPPA/LAMBDA FREE LIGHT CHAINS: NORMAL

## 2021-08-17 RX ORDER — LENALIDOMIDE 10 MG/1
10 CAPSULE ORAL DAILY
Qty: 21 CAPSULE | Refills: 2 | Status: SHIPPED | OUTPATIENT
Start: 2021-08-17 | End: 2021-11-09 | Stop reason: SDUPTHER

## 2021-09-22 ENCOUNTER — TELEPHONE (OUTPATIENT)
Dept: ONCOLOGY | Age: 61
End: 2021-09-22

## 2021-09-22 NOTE — TELEPHONE ENCOUNTER
Patient called wanting update on Revlamid script as she needs dose for Friday. Discussed with James Gonzalez and script was signed and faxed to pharmacy on 9/21/21. Patient called and updated, will call back if further info is needed.

## 2021-11-07 DIAGNOSIS — C90.01 MULTIPLE MYELOMA IN REMISSION (HCC): Primary | ICD-10-CM

## 2021-11-07 ASSESSMENT — ENCOUNTER SYMPTOMS
EYE DISCHARGE: 0
TROUBLE SWALLOWING: 0
BLOOD IN STOOL: 0
CHEST TIGHTNESS: 0
SORE THROAT: 0
COLOR CHANGE: 0
BACK PAIN: 1
ABDOMINAL PAIN: 0
RECTAL PAIN: 0
COUGH: 0
SHORTNESS OF BREATH: 0
NAUSEA: 0
ABDOMINAL DISTENTION: 0
WHEEZING: 0
CONSTIPATION: 0
VOMITING: 0
DIARRHEA: 0
FACIAL SWELLING: 0

## 2021-11-07 NOTE — PROGRESS NOTES
[facility] [address] [facility] [address], [address] [facility] [address]  [facility]  Dept: [phone]  Dept Fax: [phone]: [phone]    Visit Date:11/8/2021     Arnulfo Rodriguez is a 62 y.o. female who presents today for:   Chief Complaint   Patient presents with    Follow-up     multiple myeloma in remission        HPI:    Dion James is a 61 yo WF with multiple myeloma in remission. She was admitted to [facility] on 7/16  with  Hypercalcemia 9Ca=11.0), acute kidney injury ( creat. 4.9), both found on outpatient evaluation for several weeks of insidious, progressive fatigue and left shoulder pain. Xray showed multiple lytic lesions in the bones. Bone marrow, right iliac crest, biopsy, aspirate, and peripheral smear performed on 07/15/2019 showed: -          Plasma cell myeloma in a normocellular marrow (50%), plasma cells are 30% of cells on the aspirate smear and occupy 40% of marrow cellularity based on a  immunostain performed on  the biopsy. These findings are consistent with a kappa light chain restricted plasma cell myeloma. A Congo red stain for amyloid performed on the biopsy is negative. SPEP, serum free light chains, serum immunoglobulins (elevated IGA 2,656 and depressed IGG/IGM)  s/p 2 sessions of pheresis given light chains >10,000  - Started chemotherapy (CyBorD) on 7/16/2019  On August 7,2019 she started treatment with Velcade dexamethasone and Revlimid  On September 25, 2019 she was diagnosed with right popliteal and calf veins DVT. She is treated with Xarelto. She received 6 cycles of RVD, afterwards she underwent stem cell collection at [facility]. After  Stem cell collectiuon she received 2 additional cycles of RVD. Last given on the February 20, 2020. On March 17, 2020 she started maintenance treatment with Revlimid.   Zometa is on hold due to suspicion of jaw osteonecrosis    Interim history on 11/08/2021:  The patient presents to the medical oncology clinic for 3 months follow-up visit while receiving maintenance therapy with Revlimid 10 mg daily 3 weeks on, 1 week off. Tolerates Revlimid well. She reports that her discomfort in her jaw and left gum is resolved after necrotic bone was sequestrated and spontaneously eliminated. She has chronic intermittent diarrhea, no skin rash no peripheral neuropathy. Past Medical History:   Diagnosis Date    Acute kidney injury ([address] 07/12/2019    Allergic rhinitis     Flonase prn    Cancer (HCC)     Myeloma    Congenital hip dislocation 1960    right leg is longer, better with weight loss    H/O umbilical hernia repair 52/89/6794    Hot flushes, perimenopausal     Hypertension 2011    Seen by MONI Kaur CNP diagnosed < 1 year    IBS (irritable bowel syndrome)     ? diagnosis - diarrhea with milk products and meat    Osteoarthritis     right knee pain    Osteonecrosis (HCC)     S/P laparoscopic appendectomy 07/16/2018    Snoring     denies apnea, mild daytime somnolence, just lost 35# so has more energy, denies waking coughing or choking, BMI 27, neck circ. 13.5 inches    Vitamin D deficiency       Past Surgical History:   Procedure Laterality Date    HIP SURGERY  1978    Congenital Hip multiple surgies since 7 weeks old. Last surgery 1978     HYSTERECTOMY  2004    Partial Irregular Periods with migraines.      KNEE SURGERY Right [address]    stapled to help straighten leg(congenital hip issues)    ME LAP,APPENDECTOMY N/A 7/15/2018    APPENDECTOMY LAPAROSCOPIC performed by Alba Sanders MD at [facility]      Family History   Problem Relation Age of Onset    High Blood Pressure Mother     High Cholesterol Mother     Cancer Father         skin cancer    Cancer Paternal Grandmother     Cancer Paternal Grandfather     Deep Vein Thrombosis Brother         PE      Social History     Tobacco Use    Smoking status: Never Smoker    Smokeless tobacco: Never Used   Substance Use Topics    Alcohol use: Yes     Comment: Socially      Current Outpatient Medications   Medication Sig Dispense Refill    amLODIPine (NORVASC) 10 MG tablet Take 1 tablet by mouth daily 90 tablet 1    acyclovir (ZOVIRAX) 400 MG tablet TAKE 1 TABLET BY MOUTH TWO TIMES A  tablet 0    rivaroxaban (XARELTO) 10 MG TABS tablet Take 1 tablet by mouth daily (with breakfast) 30 tablet 5    venlafaxine (EFFEXOR XR) 75 MG extended release capsule TAKE 1 CAPSULE BY MOUTH EVERY DAY 90 capsule 2    metoprolol succinate (TOPROL XL) 50 MG extended release tablet TAKE ONE TABLET BY MOUTH DAILY 90 tablet 2    loperamide (IMODIUM) 2 MG capsule Take 2 mg by mouth 4 times daily as needed for Diarrhea      acetaminophen (TYLENOL) 500 MG tablet Take 500 mg by mouth every 6 hours as needed for Pain      lactobacillus (CULTURELLE) capsule Take 1 capsule by mouth daily Align      chlorhexidine (PERIDEX) 0.12 % solution Take 15 mLs by mouth 2 times daily as needed       Calcium 600-200 MG-UNIT TABS Take 600 mg by mouth daily 30 tablet 0    econazole nitrate 1 % cream Apply topically Apply topically daily.  VITAMIN D, ERGOCALCIFEROL, PO Take 5,000 Units by mouth daily      lenalidomide (REVLIMID) 10 MG chemo capsule Take 1 capsule by mouth daily for 21 days 21 capsule 0    Magic Mouthwash (MIRACLE MOUTHWASH) Swish and spit 5 mLs 4 times daily as needed for Irritation Prepare mixture 1:1:1 DIPHENHYDRAMINE HCL,NYSTATIN,LIDOCAINE HCL (Patient not taking: Reported on 11/8/2021) 120 mL 1     No current facility-administered medications for this visit.       Allergies   Allergen Reactions    Sulfa Antibiotics Rash      Health Maintenance   Topic Date Due    Hepatitis C screen  Never done    Pneumococcal 0-64 years Vaccine (1 of 4 - PCV13) Never done    HIV screen  Never done    Diabetes screen  Never done    Shingles Vaccine (1 of 2) Never done    Colon cancer screen colonoscopy  10/03/2019    Potassium monitoring 04/02/2021    Creatinine monitoring  04/02/2021    COVID-19 Vaccine (4 - Booster for Pfizer series) 03/13/2022    DTaP/Tdap/Td vaccine (2 - Td or Tdap) 06/04/2022    Breast cancer screen  11/19/2023    Lipid screen  04/25/2024    Flu vaccine  Completed    Hepatitis A vaccine  Aged Out    Hepatitis B vaccine  Aged Out    Hib vaccine  Aged Out    Meningococcal (ACWY) vaccine  Aged Out        Subjective:   Review of Systems   Constitutional: Positive for fatigue. Negative for activity change, appetite change and fever. HENT: Negative for congestion, dental problem, facial swelling, hearing loss, mouth sores, nosebleeds, sore throat, tinnitus and trouble swallowing. Eyes: Negative for discharge and visual disturbance. Respiratory: Negative for cough, chest tightness, shortness of breath and wheezing. Cardiovascular: Negative for chest pain, palpitations and leg swelling. Gastrointestinal: Negative for abdominal distention, abdominal pain, blood in stool, constipation, diarrhea, nausea, rectal pain and vomiting. Endocrine: Negative for cold intolerance, polydipsia and polyuria. Genitourinary: Negative for decreased urine volume, difficulty urinating, dysuria, flank pain, hematuria and urgency. Musculoskeletal: Positive for arthralgias and back pain. Negative for gait problem, joint swelling, myalgias and neck stiffness. Skin: Negative for color change, rash and wound. Neurological: Positive for headaches. Negative for dizziness, tremors, seizures, speech difficulty, weakness, light-headedness and numbness. Hematological: Negative for adenopathy. Does not bruise/bleed easily. Psychiatric/Behavioral: Negative for confusion and sleep disturbance. The patient is not nervous/anxious. Objective:   Physical Exam  Vitals reviewed. Constitutional:       General: She is not in acute distress. Appearance: She is well-developed. HENT:      Head: Normocephalic.       Mouth/Throat: Pharynx: No oropharyngeal exudate. Eyes:      General: No scleral icterus. Right eye: No discharge. Left eye: No discharge. Pupils: Pupils are equal, round, and reactive to light. Neck:      Thyroid: No thyromegaly. Vascular: No JVD. Trachea: No tracheal deviation. Cardiovascular:      Rate and Rhythm: Normal rate. Heart sounds: Normal heart sounds. No murmur heard. No friction rub. No gallop. Pulmonary:      Effort: Pulmonary effort is normal. No respiratory distress. Breath sounds: Normal breath sounds. No stridor. No wheezing or rales. Chest:      Chest wall: No tenderness. Abdominal:      General: Bowel sounds are normal. There is no distension. Palpations: Abdomen is soft. There is no mass. Tenderness: There is no abdominal tenderness. There is no rebound. Musculoskeletal:         General: Normal range of motion. Cervical back: Normal range of motion and neck supple. Comments: Good range of motion in all four extremities. Lymphadenopathy:      Cervical: No cervical adenopathy. Skin:     General: Skin is warm. Findings: No erythema or rash. Neurological:      Mental Status: She is alert and oriented to person, place, and time. Cranial Nerves: No cranial nerve deficit. Motor: No abnormal muscle tone. Deep Tendon Reflexes: Reflexes are normal and symmetric. Psychiatric:         Behavior: Behavior normal.         Thought Content: Thought content normal.         Judgment: Judgment normal.         /67 (Site: Left Upper Arm, Position: Sitting, Cuff Size: Medium Adult)   Pulse 55   Temp 98.6 °F (37 °C) (Oral)   Resp 16   Ht 5' 3\" (1.6 m)   Wt 178 lb 3.2 oz (80.8 kg)   LMP 12/01/2004   SpO2 100%   BMI 31.57 kg/m²      ECOG status is 1    Imaging studies and labs:     XRAY Myeloma survey on 07/13/2019 showed:  IMPRESSION:   Innumerable lytic lesions bilaterally, compatible with multiple myeloma.     Lab Results   Component Value Date    WBC 2.8 (L) 11/18/2021    HGB 13.4 11/18/2021    HCT 39.8 11/18/2021     (H) 11/18/2021     11/18/2021       Chemistry        Component Value Date/Time     11/08/2021 1130     04/02/2020 1244    K 4.5 11/08/2021 1130    K 4.6 04/02/2020 1244     04/02/2020 1244    CO2 24 08/17/2020 1206    BUN 14 08/17/2020 1206    CREATININE 0.8 11/08/2021 1130    CREATININE 0.6 04/02/2020 1244        Component Value Date/Time    CALCIUM 9.4 04/02/2020 1244    ALKPHOS 82 08/06/2021 1220    AST 18 08/06/2021 1220    ALT 16 08/06/2021 1220    BILITOT 0.4 08/06/2021 1220    BILITOT NEGATIVE 08/04/2015 0803        August 17,2020:  SPEP/MICHELLE Interpretation     Hypogammaglobulinemia.  MICHELLE gel shows a normal pattern; no monoclonal proteins seen. Kappa Qnt Free Light Chains                  24.37 H   3.30-19.40    mg/L   Lambda Qnt Free Light Chains               20.97       5.71-26.30    mg/L   Kappa/Lambda Free Light Chain Ratio     1.16       0.26-1.65    December 15, 2020:  Normal SPEP pattern.  MICHELLE gel shows a normal pattern; no   monoclonal proteins seen. Kappa Qnt Free Light Chains                  28.78 H   3.30-19.40    mg/L   Lambda Qnt Free Light Chains               22.42     5.71-26.30    mg/L   Kappa/Lambda Free Light Chain Ratio     1.28     0.26-1.65    February 18, 2021:    SPEP/MICHELLE Interpretation  Normal SPEP pattern.  MICHELLE gel shows a normal pattern; no monoclonal proteins seen. Kappa Qnt Free Light Chains                  28.28 H   3.30-19.40    mg/L   Lambda Qnt Free Light Chains                 23.63     5.71-26.30    mg/L   Kappa/Lambda Free Light Chain Ratio           1.20     0.26-1.65    April 29, 2021:  Normal SPEP pattern.  MICHELLE gel shows a normal pattern; no   monoclonal proteins seen.    Kappa Qnt Free Light Chains   31.11 H   3.30-19.40    mg/L   Lambda Qnt Free Light Chains     25.04     5.71-26.30    mg/L   Kappa/Lambda Free Light Chain daily for 3 weeks on 1 week off due to neutropenia. Multiple myeloma panel on 08/06/2021 was without evidence of M protein and normal free Kappa/lambda chains ratio. Patient's IgG was 710. No recent infections. Patient is just towards the second half of the Revlimid. Her WBC is decreased to 2.6. We will recheck her CBC in 10 days. At that time we will make decision whether Revlimid will be taken only for 2 weeks  3. Right leg DVT. Known side effect from Revlimid and dexamethasone. The patient is on Xarelto 10 mg daily  4. Gum and  jaw pain. The patient was seen by oral maxillofacial surgeon at [facility] who performed CT of the of the jaw, it showed indeterminate findings in the jaw:  osteonecrosis versus multiple myeloma. Patient reports that spontaneously her jaw bone was sequestrated and eliminated. Since then she denies having any jaw pain         Diagnosis Orders   1. Multiple myeloma in remission (HCC)  CBC Auto Differential   2. Therapeutic drug monitoring     3. Encounter for chemotherapy management     4. Neutropenia associated with infection ([facility] [address]          Plan:   Return in about 3 months (around 2/8/2022).      Orders Placed:   Orders Placed This Encounter   Procedures    CBC Auto Differential     Standing Status:   Future     Number of Occurrences:   1     Standing Expiration Date:   11/8/2022

## 2021-11-08 ENCOUNTER — OFFICE VISIT (OUTPATIENT)
Dept: ONCOLOGY | Age: 61
End: 2021-11-08
Payer: COMMERCIAL

## 2021-11-08 ENCOUNTER — HOSPITAL ENCOUNTER (OUTPATIENT)
Dept: INFUSION THERAPY | Age: 61
Discharge: HOME | End: 2021-11-08
Payer: COMMERCIAL

## 2021-11-08 VITALS
OXYGEN SATURATION: 100 % | HEIGHT: 63 IN | BODY MASS INDEX: 31.57 KG/M2 | RESPIRATION RATE: 16 BRPM | HEART RATE: 55 BPM | DIASTOLIC BLOOD PRESSURE: 67 MMHG | WEIGHT: 178.2 LBS | TEMPERATURE: 98.6 F | SYSTOLIC BLOOD PRESSURE: 115 MMHG

## 2021-11-08 DIAGNOSIS — C90.01 MULTIPLE MYELOMA IN REMISSION (HCC): ICD-10-CM

## 2021-11-08 DIAGNOSIS — C90.01 MULTIPLE MYELOMA IN REMISSION (HCC): Primary | ICD-10-CM

## 2021-11-08 DIAGNOSIS — Z51.81 THERAPEUTIC DRUG MONITORING: ICD-10-CM

## 2021-11-08 DIAGNOSIS — Z51.11 ENCOUNTER FOR CHEMOTHERAPY MANAGEMENT: ICD-10-CM

## 2021-11-08 DIAGNOSIS — D70.3 NEUTROPENIA ASSOCIATED WITH INFECTION (HCC): ICD-10-CM

## 2021-11-08 LAB
ABSOLUTE IMMATURE GRANULOCYTE: 0.01 THOU/MM3 (ref 0–0.07)
BASINOPHIL, AUTOMATED: 1 % (ref 0–3)
BASOPHILS ABSOLUTE: 0 THOU/MM3 (ref 0–0.1)
BUN, WHOLE BLOOD: 12 MG/DL (ref 8–26)
CHLORIDE, WHOLE BLOOD: 105 MEQ/L (ref 98–109)
CREATININE, WHOLE BLOOD: 0.8 MG/DL (ref 0.5–1.2)
EOSINOPHILS ABSOLUTE: 0.2 THOU/MM3 (ref 0–0.4)
EOSINOPHILS RELATIVE PERCENT: 7 % (ref 0–4)
GFR, ESTIMATED: 78 ML/MIN/1.73M2
GLUCOSE, WHOLE BLOOD: 91 MG/DL (ref 70–108)
HCT VFR BLD CALC: 39.6 % (ref 37–47)
HEMOGLOBIN: 13.4 GM/DL (ref 12–16)
IMMATURE GRANULOCYTES: 0 %
IONIZED CALCIUM, WHOLE BLOOD: 1.14 MMOL/L (ref 1.12–1.32)
LYMPHOCYTES # BLD: 23 % (ref 15–47)
LYMPHOCYTES ABSOLUTE: 0.6 THOU/MM3 (ref 1–4.8)
MCH RBC QN AUTO: 34.8 PG (ref 26–33)
MCHC RBC AUTO-ENTMCNC: 33.8 GM/DL (ref 32.2–35.5)
MCV RBC AUTO: 103 FL (ref 81–99)
MONOCYTES ABSOLUTE: 0.5 THOU/MM3 (ref 0.4–1.3)
MONOCYTES: 17 % (ref 0–12)
PDW BLD-RTO: 12.7 % (ref 11.5–14.5)
PLATELET # BLD: 133 THOU/MM3 (ref 130–400)
PMV BLD AUTO: 11.2 FL (ref 9.4–12.4)
POTASSIUM, WHOLE BLOOD: 4.5 MEQ/L (ref 3.5–4.9)
RBC # BLD: 3.85 MILL/MM3 (ref 4.2–5.4)
SEG NEUTROPHILS: 51 % (ref 43–75)
SEGMENTED NEUTROPHILS ABSOLUTE COUNT: 1.4 THOU/MM3 (ref 1.8–7.7)
SODIUM, WHOLE BLOOD: 140 MEQ/L (ref 138–146)
TOTAL CO2, WHOLE BLOOD: 26 MEQ/L (ref 23–33)
WBC # BLD: 2.7 THOU/MM3 (ref 4.8–10.8)

## 2021-11-08 PROCEDURE — 86334 IMMUNOFIX E-PHORESIS SERUM: CPT

## 2021-11-08 PROCEDURE — 84165 PROTEIN E-PHORESIS SERUM: CPT

## 2021-11-08 PROCEDURE — 85025 COMPLETE CBC W/AUTO DIFF WBC: CPT

## 2021-11-08 PROCEDURE — 82784 ASSAY IGA/IGD/IGG/IGM EACH: CPT

## 2021-11-08 PROCEDURE — 36415 COLL VENOUS BLD VENIPUNCTURE: CPT

## 2021-11-08 PROCEDURE — 99214 OFFICE O/P EST MOD 30 MIN: CPT | Performed by: INTERNAL MEDICINE

## 2021-11-08 PROCEDURE — 80047 BASIC METABLC PNL IONIZED CA: CPT

## 2021-11-08 PROCEDURE — 99211 OFF/OP EST MAY X REQ PHY/QHP: CPT

## 2021-11-08 PROCEDURE — 84155 ASSAY OF PROTEIN SERUM: CPT

## 2021-11-08 NOTE — PATIENT INSTRUCTIONS
1. CBC on November 18, 2021  2.   RTC to see me for labs: CBC, BMP, LFTs, multiple myeloma panel in 3 months

## 2021-11-09 RX ORDER — LENALIDOMIDE 10 MG/1
10 CAPSULE ORAL DAILY
Qty: 21 CAPSULE | Refills: 0 | Status: SHIPPED | OUTPATIENT
Start: 2021-11-09 | End: 2021-12-07 | Stop reason: SDUPTHER

## 2021-11-12 LAB — IMMUNOFIXATION WITH QUANT: NORMAL

## 2021-11-18 ENCOUNTER — HOSPITAL ENCOUNTER (OUTPATIENT)
Age: 61
Discharge: HOME | End: 2021-11-18
Payer: COMMERCIAL

## 2021-11-18 DIAGNOSIS — C90.01 MULTIPLE MYELOMA IN REMISSION (HCC): ICD-10-CM

## 2021-11-18 LAB
ABSOLUTE IMMATURE GRANULOCYTE: 0 THOU/MM3 (ref 0–0.07)
BASINOPHIL, AUTOMATED: 2 % (ref 0–3)
BASOPHILS ABSOLUTE: 0.1 THOU/MM3 (ref 0–0.1)
EOSINOPHILS ABSOLUTE: 0.1 THOU/MM3 (ref 0–0.4)
EOSINOPHILS RELATIVE PERCENT: 5 % (ref 0–4)
HCT VFR BLD CALC: 39.8 % (ref 37–47)
HEMOGLOBIN: 13.4 GM/DL (ref 12–16)
IMMATURE GRANULOCYTES: 0 %
LYMPHOCYTES # BLD: 32 % (ref 15–47)
LYMPHOCYTES ABSOLUTE: 0.9 THOU/MM3 (ref 1–4.8)
MCH RBC QN AUTO: 35.1 PG (ref 26–33)
MCHC RBC AUTO-ENTMCNC: 33.7 GM/DL (ref 32.2–35.5)
MCV RBC AUTO: 104 FL (ref 81–99)
MONOCYTES ABSOLUTE: 0.6 THOU/MM3 (ref 0.4–1.3)
MONOCYTES: 21 % (ref 0–12)
PDW BLD-RTO: 12.9 % (ref 11.5–14.5)
PLATELET # BLD: 180 THOU/MM3 (ref 130–400)
PMV BLD AUTO: 10.5 FL (ref 9.4–12.4)
RBC # BLD: 3.82 MILL/MM3 (ref 4.2–5.4)
SEG NEUTROPHILS: 41 % (ref 43–75)
SEGMENTED NEUTROPHILS ABSOLUTE COUNT: 1.2 THOU/MM3 (ref 1.8–7.7)
WBC # BLD: 2.8 THOU/MM3 (ref 4.8–10.8)

## 2021-11-18 PROCEDURE — 85025 COMPLETE CBC W/AUTO DIFF WBC: CPT

## 2021-11-18 PROCEDURE — 36415 COLL VENOUS BLD VENIPUNCTURE: CPT

## 2021-11-19 ENCOUNTER — TELEPHONE (OUTPATIENT)
Dept: ONCOLOGY | Age: 61
End: 2021-11-19

## 2021-11-19 ENCOUNTER — HOSPITAL ENCOUNTER (OUTPATIENT)
Dept: WOMENS IMAGING | Age: 61
Discharge: HOME | End: 2021-11-19
Payer: COMMERCIAL

## 2021-11-19 DIAGNOSIS — Z12.31 VISIT FOR SCREENING MAMMOGRAM: ICD-10-CM

## 2021-11-19 DIAGNOSIS — C90.01 MULTIPLE MYELOMA IN REMISSION (HCC): Primary | ICD-10-CM

## 2021-11-19 PROCEDURE — 77063 BREAST TOMOSYNTHESIS BI: CPT

## 2021-11-19 NOTE — TELEPHONE ENCOUNTER
Patient is wanting to know if you have decided how you want her to do the revlimid either continue 3 weeks on and 1 week off or should she do 2 weeks on and 1 week off?     Please advise

## 2021-11-19 NOTE — TELEPHONE ENCOUNTER
Patient was instructed to start taking Revlimid on Monday and in this cycle take it for 2 weeks on 1 week off.   She will return to clinic on December 13, 2021 to recheck her CBC

## 2021-12-08 RX ORDER — LENALIDOMIDE 10 MG/1
10 CAPSULE ORAL DAILY
Qty: 21 CAPSULE | Refills: 0 | Status: SHIPPED | OUTPATIENT
Start: 2021-12-08 | End: 2021-12-27 | Stop reason: SDUPTHER

## 2021-12-13 ENCOUNTER — HOSPITAL ENCOUNTER (OUTPATIENT)
Age: 61
Discharge: HOME | End: 2021-12-13
Payer: COMMERCIAL

## 2021-12-13 DIAGNOSIS — C90.01 MULTIPLE MYELOMA IN REMISSION (HCC): ICD-10-CM

## 2021-12-13 LAB
ABSOLUTE IMMATURE GRANULOCYTE: 0.01 THOU/MM3 (ref 0–0.07)
BASINOPHIL, AUTOMATED: 1 % (ref 0–3)
BASOPHILS ABSOLUTE: 0 THOU/MM3 (ref 0–0.1)
EOSINOPHILS ABSOLUTE: 0.1 THOU/MM3 (ref 0–0.4)
EOSINOPHILS RELATIVE PERCENT: 4 % (ref 0–4)
HCT VFR BLD CALC: 38 % (ref 37–47)
HEMOGLOBIN: 13 GM/DL (ref 12–16)
IMMATURE GRANULOCYTES: 0 %
LYMPHOCYTES # BLD: 26 % (ref 15–47)
LYMPHOCYTES ABSOLUTE: 0.7 THOU/MM3 (ref 1–4.8)
MCH RBC QN AUTO: 35.3 PG (ref 26–33)
MCHC RBC AUTO-ENTMCNC: 34.2 GM/DL (ref 32.2–35.5)
MCV RBC AUTO: 103 FL (ref 81–99)
MONOCYTES ABSOLUTE: 0.7 THOU/MM3 (ref 0.4–1.3)
MONOCYTES: 24 % (ref 0–12)
PDW BLD-RTO: 12.8 % (ref 11.5–14.5)
PLATELET # BLD: 194 THOU/MM3 (ref 130–400)
PMV BLD AUTO: 10.1 FL (ref 9.4–12.4)
RBC # BLD: 3.68 MILL/MM3 (ref 4.2–5.4)
SEG NEUTROPHILS: 44 % (ref 43–75)
SEGMENTED NEUTROPHILS ABSOLUTE COUNT: 1.2 THOU/MM3 (ref 1.8–7.7)
WBC # BLD: 2.7 THOU/MM3 (ref 4.8–10.8)

## 2021-12-13 PROCEDURE — 36415 COLL VENOUS BLD VENIPUNCTURE: CPT

## 2021-12-13 PROCEDURE — 85025 COMPLETE CBC W/AUTO DIFF WBC: CPT

## 2021-12-13 RX ORDER — ACYCLOVIR 400 MG/1
TABLET ORAL
Qty: 180 TABLET | Refills: 0 | Status: SHIPPED | OUTPATIENT
Start: 2021-12-13 | End: 2022-08-16

## 2021-12-20 ENCOUNTER — TELEPHONE (OUTPATIENT)
Dept: CASE MANAGEMENT | Age: 61
End: 2021-12-20

## 2021-12-27 RX ORDER — LENALIDOMIDE 10 MG/1
10 CAPSULE ORAL DAILY
Qty: 21 CAPSULE | Refills: 0 | Status: SHIPPED | OUTPATIENT
Start: 2021-12-27 | End: 2022-02-01 | Stop reason: SDUPTHER

## 2022-01-05 ENCOUNTER — TELEPHONE (OUTPATIENT)
Dept: CASE MANAGEMENT | Age: 62
End: 2022-01-05

## 2022-01-05 NOTE — TELEPHONE ENCOUNTER
Babe received phone call from pt, requesting documentation from Onc in reference to 3# lift restriction & risk for infection related to low WBC. Pt reports she is taking maintenance Revlimid 2 weeks on & 2 weeks off.    12/13 WBC 2.7  ANC 1.2     11/18 WBC 2.8  ANC 1.2  Pt has a disability hearing with a  next week, & the  is requesting the medical documentation to support the disability request.   Pt's  has requested to have the documentation by 1-7-22 for next week's hearing.     Message routed to triage team & Onc.

## 2022-01-05 NOTE — TELEPHONE ENCOUNTER
Abbe obtained documentation from triage team, reflecting letter completion yesterday. Most recent progress note printed, along with labwork results, & the letter documentation specific to lifting restriction & placed in envelope, available for pt . Abbe phoned pt to update her the information requested is available for . Pt voiced understanding.

## 2022-01-11 ENCOUNTER — OFFICE VISIT (OUTPATIENT)
Dept: INTERNAL MEDICINE CLINIC | Age: 62
End: 2022-01-11
Payer: COMMERCIAL

## 2022-01-11 VITALS
DIASTOLIC BLOOD PRESSURE: 80 MMHG | BODY MASS INDEX: 31.79 KG/M2 | HEIGHT: 63 IN | SYSTOLIC BLOOD PRESSURE: 110 MMHG | TEMPERATURE: 97.2 F | WEIGHT: 179.4 LBS | HEART RATE: 64 BPM

## 2022-01-11 DIAGNOSIS — E55.9 VITAMIN D DEFICIENCY: ICD-10-CM

## 2022-01-11 DIAGNOSIS — I10 ESSENTIAL HYPERTENSION: Primary | ICD-10-CM

## 2022-01-11 DIAGNOSIS — N95.1 MENOPAUSAL HOT FLUSHES: ICD-10-CM

## 2022-01-11 PROCEDURE — 99214 OFFICE O/P EST MOD 30 MIN: CPT | Performed by: INTERNAL MEDICINE

## 2022-01-11 RX ORDER — METOPROLOL SUCCINATE 50 MG/1
TABLET, EXTENDED RELEASE ORAL
Qty: 90 TABLET | Refills: 1 | Status: SHIPPED | OUTPATIENT
Start: 2022-01-11 | End: 2022-08-16 | Stop reason: SDUPTHER

## 2022-01-11 RX ORDER — VENLAFAXINE HYDROCHLORIDE 75 MG/1
CAPSULE, EXTENDED RELEASE ORAL
Qty: 90 CAPSULE | Refills: 1 | Status: SHIPPED | OUTPATIENT
Start: 2022-01-11 | End: 2022-02-17 | Stop reason: SDUPTHER

## 2022-01-11 RX ORDER — AMLODIPINE BESYLATE 10 MG/1
10 TABLET ORAL DAILY
Qty: 90 TABLET | Refills: 1 | Status: SHIPPED | OUTPATIENT
Start: 2022-01-11 | End: 2022-08-16 | Stop reason: SDUPTHER

## 2022-01-11 NOTE — PROGRESS NOTES
Harvey Munoz (:  1960) is a 62 y.o. female,Established patient, here for evaluation of the following chief complaint(s): Hypertension (6 months ) and Other (vitamin B12 , vitamin D def. )      ASSESSMENT/PLAN:   Diagnosis Orders   1. Essential hypertension  amLODIPine (NORVASC) 10 MG tablet    metoprolol succinate (TOPROL XL) 50 MG extended release tablet   2. Vitamin D deficiency  Vitamin D 25 Hydroxy   3. Menopausal hot flushes  venlafaxine (EFFEXOR XR) 75 MG extended release capsule     Hypertension - BP controlled, low normal today. Continue metoprolol and Norvasc. Told her to call me if lightheaded or if SBP <100. May need to decrease medication. 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 Vitamin D deficiency - on 5,000 IU daily. Level normal at 43 21. Repeat level now. Menopausal hot flushes - controlled, continue Effexor. Vitamin B12 deficiency - low B12 169 2019 - was on B12 1000 mcg daily (stopped in med list 3/17/20). Repeat level - 310 2021 normal.    Fatigue - checked B12 and folate - now normal     HM - did discuss pneumonia vaccine and shingles vaccine but as she is going to see [facility] 2022 for consideration of stem cell transplant - will hold off for now. Need her to check with Onc prior to giving. Return in about 6 months (around 2022). Lab due now. 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 Only allowed to lift 3#. Support her disability claim. She was denied initially but hope now will be reviewed in more depth. Hypertension - BP controlled, continue metoprolol and Norvasc, taken off lisinopril with renal decline when MM diagnosed.  BMP normal 2021.     Hot flushes - improved on Effexor.  Worse on steroids.   No longer on steroids - still intermittent issues with hot flushes but tolerable - keep on Effexor at current dose. Vitamin D deficiency - on 5,000 IU daily. Normal level 1/22/21 at 43. Multiple Myeloma - treated with [facility] and Dr. Nadia Clifford. Considering stem cell transplant when sees Ge Clark MD in 2/2022. 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 Seeing [facility] for this. She is on a mouth wash for this.     Diarrhea - 4-8 stools a day, seems to be after eating x 2 weeks. She stopped magnesium less than a week ago, changing food -avoiding dairy. Could be from Revlimid. Order C. Diff as has been on antibiotic recently. No history of C. Diff. On Imodium every day - tolerable on this, worse with immunotherapy.      She is due for colonoscopy - but on Xarelto due to DVT 9/2020. She is waiting for Dr. Nevin Soliz to ok colonoscopy due to Xarelto.      Anemia - due to MM - vitamin B 12 deficiency seen prior to MM dx - started on injections but level high at [facility] 7/2019 so changed to oral supplement 1000 mcg daily.  Repeat 10/2019 still high. Off vitamin B12 - repeat level normal 7/2021.       Carotid doppler 6/2019- mild disease.     Hyperhomocysteinemia - level at 23 - on folic acid 1 mg daily. Dr. Nadia Clifford stopped it. Asked her to address with Dr. Nadia Clifford at next visit. Addressed and not needed per patient.      ALT 36 - mild elevation with follow up in 3-6 months.  Normal 11/21/19 and most recently 12/2020, 8/2021.     Nausea -  No longer on omeprazole - doing well. History of DVT - due to hypercoag with MM - she is on chronic Xarelto.        Review of Systems - General ROS: negative for - chills or fever  Psychological ROS: negative for - anxiety and depression  Hematological and Lymphatic ROS: No history of bleeding disorder, positive DVT.               Respiratory ROS: no cough, shortness of breath, or wheezing  Cardiovascular ROS: no chest pain, positive MULLER but some deconditioning with cancer - unchanged  Gastrointestinal ROS: Positive intermittent diarrhea with occasional cramping pain - helps to take Imodium daily, no blood in stool  Genito-Urinary ROS: no dysuria, trouble voiding, or hematuria  Musculoskeletal ROS: negative for - muscle pain or muscular weakness, positive left hip, back, knees- chronic  Neurological ROS: negative for - headaches, numbness/tingling, seizures or weakness  Dermatological ROS: negative for - rash or skin lesion changes     Vitals:    01/11/22 1010   BP: 110/80   Site: Left Upper Arm   Position: Sitting   Cuff Size: Medium Adult   Pulse: 64   Temp: 97.2 °F (36.2 °C)   Weight: 179 lb 6.4 oz (81.4 kg)   Height: 5' 3\" (1.6 m)       Physical Examination: General appearance -alert, well appearing, and in no distress  Mental status - alert, oriented to person, place, and time  Head - atraumatic, normocephalic  Neck - supple, no significant adenopathy  Chest - clear to auscultation, no wheezes, rales or rhonchi, symmetric air entry  Heart - normal rate, regular rhythm, normal S1, S2, no murmurs, rubs, clicks or gallops  Abdomen -soft, nontender, nondistended  Neurological - alert, normal speech   Extremities - peripheral pulses normal, no pedal edema, no clubbing or cyanosis  Skin - warm and dry    Diagnostic data - Reviewed labs 7/29/21-12/13/21.       --Eduardo Brandon MD

## 2022-02-01 RX ORDER — LENALIDOMIDE 10 MG/1
10 CAPSULE ORAL DAILY
Qty: 21 CAPSULE | Refills: 0 | Status: SHIPPED | OUTPATIENT
Start: 2022-02-01 | End: 2022-02-08 | Stop reason: SDUPTHER

## 2022-02-07 ASSESSMENT — ENCOUNTER SYMPTOMS
FACIAL SWELLING: 0
RECTAL PAIN: 0
TROUBLE SWALLOWING: 0
VOMITING: 0
WHEEZING: 0
EYE DISCHARGE: 0
BACK PAIN: 1
COUGH: 0
DIARRHEA: 0
ABDOMINAL PAIN: 0
COLOR CHANGE: 0
SORE THROAT: 0
CONSTIPATION: 0
NAUSEA: 0
ABDOMINAL DISTENTION: 0
SHORTNESS OF BREATH: 0
BLOOD IN STOOL: 0
CHEST TIGHTNESS: 0

## 2022-02-08 ENCOUNTER — HOSPITAL ENCOUNTER (OUTPATIENT)
Dept: INFUSION THERAPY | Age: 62
Discharge: HOME | End: 2022-02-08
Payer: COMMERCIAL

## 2022-02-08 ENCOUNTER — OFFICE VISIT (OUTPATIENT)
Dept: ONCOLOGY | Age: 62
End: 2022-02-08
Payer: COMMERCIAL

## 2022-02-08 VITALS
WEIGHT: 181 LBS | SYSTOLIC BLOOD PRESSURE: 130 MMHG | HEART RATE: 68 BPM | OXYGEN SATURATION: 96 % | TEMPERATURE: 98.3 F | BODY MASS INDEX: 32.07 KG/M2 | DIASTOLIC BLOOD PRESSURE: 72 MMHG | RESPIRATION RATE: 16 BRPM | HEIGHT: 63 IN

## 2022-02-08 DIAGNOSIS — Z51.81 THERAPEUTIC DRUG MONITORING: ICD-10-CM

## 2022-02-08 DIAGNOSIS — D70.3 NEUTROPENIA ASSOCIATED WITH INFECTION (HCC): ICD-10-CM

## 2022-02-08 DIAGNOSIS — C90.01 MULTIPLE MYELOMA IN REMISSION (HCC): Primary | ICD-10-CM

## 2022-02-08 PROCEDURE — 99214 OFFICE O/P EST MOD 30 MIN: CPT | Performed by: INTERNAL MEDICINE

## 2022-02-08 PROCEDURE — 99211 OFF/OP EST MAY X REQ PHY/QHP: CPT

## 2022-02-08 RX ORDER — LENALIDOMIDE 10 MG/1
10 CAPSULE ORAL DAILY
Qty: 14 CAPSULE | Refills: 3 | Status: SHIPPED | OUTPATIENT
Start: 2022-02-08 | End: 2022-03-15 | Stop reason: SDUPTHER

## 2022-02-08 NOTE — PROGRESS NOTES
[facility] [address] [facility] [address], [address] [facility] [address]  Sonnyl[place] Salinas0  Dept: [phone]  Dept Fax: [phone]: [phone]    Visit Date:2/8/2022     Maty Newman is a 62 y.o. female who presents today for:   Chief Complaint   Patient presents with    Follow-up     Multiple myeloma in remission [facility])        HPI:    Adam Avila is a 61 yo WF with multiple myeloma in remission. She was admitted to [facility] on 7/16  with  Hypercalcemia 9Ca=11.0), acute kidney injury ( creat. 4.9), both found on outpatient evaluation for several weeks of insidious, progressive fatigue and left shoulder pain. Xray showed multiple lytic lesions in the bones. Bone marrow, right iliac crest, biopsy, aspirate, and peripheral smear performed on 07/15/2019 showed: -          Plasma cell myeloma in a normocellular marrow (50%), plasma cells are 30% of cells on the aspirate smear and occupy 40% of marrow cellularity based on a  immunostain performed on  the biopsy. These findings are consistent with a kappa light chain restricted plasma cell myeloma. A Congo red stain for amyloid performed on the biopsy is negative. SPEP, serum free light chains, serum immunoglobulins (elevated IGA 2,656 and depressed IGG/IGM)  s/p 2 sessions of pheresis given light chains >10,000  - Started chemotherapy (CyBorD) on 7/16/2019  On August 7,2019 she started treatment with Velcade dexamethasone and Revlimid  On September 25, 2019 she was diagnosed with right popliteal and calf veins DVT. She is treated with Xarelto. She received 6 cycles of RVD, afterwards she underwent stem cell collection at [facility]. After  Stem cell collectiuon she received 2 additional cycles of RVD. Last given on the February 20, 2020. On March 17, 2020 she started maintenance treatment with Revlimid.   Zometa is on hold due to suspicion of jaw osteonecrosis    Interim history on 02/08/2022:  The patient presents to the medical oncology clinic for 3 months follow-up visit while receiving maintenance therapy with Revlimid 10 mg daily 3 weeks on, 1 week off. Tolerates Revlimid well. She was seen by [facility] multiple myeloma bone marrow transplant her. Her lab work showed that she remains in the remission. She reports that her discomfort in her jaw and left gum is resolved after necrotic bone was sequestrated and spontaneously eliminated. She has chronic intermittent diarrhea, no skin rash no peripheral neuropathy. Past Medical History:   Diagnosis Date    Acute kidney injury ([facility] [address] 07/12/2019    Allergic rhinitis     Flonase prn    Cancer (HCC)     Myeloma    Cancer ([facility] [address]     skin cancer from cheek january 2022    Congenital hip dislocation 1961    right leg is longer, better with weight loss    H/O umbilical hernia repair 53/81/1417    Hot flushes, perimenopausal     Hypertension 2011    Seen by MONI Quevedo CNP diagnosed < 1 year    IBS (irritable bowel syndrome)     ? diagnosis - diarrhea with milk products and meat    Osteoarthritis     right knee pain    Osteonecrosis (HCC)     S/P laparoscopic appendectomy 07/16/2018    Snoring     denies apnea, mild daytime somnolence, just lost 35# so has more energy, denies waking coughing or choking, BMI 27, neck circ. 13.5 inches    Vitamin D deficiency       Past Surgical History:   Procedure Laterality Date    HIP SURGERY  1978    Congenital Hip multiple surgies since 7 weeks old. Last surgery 1978     HYSTERECTOMY  2004    Partial Irregular Periods with migraines.      KNEE SURGERY Right [address]    stapled to help straighten leg(congenital hip issues)    [place] LAP,APPENDECTOMY N/A 7/15/2018    APPENDECTOMY LAPAROSCOPIC performed by Clovis Jose MD at [facility]      Family History   Problem Relation Age of Onset    High Blood Pressure Mother     High Cholesterol Mother     Cancer Father         skin cancer    Cancer Paternal Grandmother    Janes Maldonado Creatinine monitoring  04/02/2021    COVID-19 Vaccine (4 - Booster for Pfizer series) 02/13/2022    DTaP/Tdap/Td vaccine (2 - Td or Tdap) 06/04/2022    Depression Screen  07/06/2022    Breast cancer screen  11/19/2023    Lipid screen  04/25/2024    Colorectal Cancer Screen  12/17/2026    Flu vaccine  Completed    Hepatitis A vaccine  Aged Out    Hepatitis B vaccine  Aged Out    Hib vaccine  Aged Out    Meningococcal (ACWY) vaccine  Aged Out    Hepatitis C screen  Discontinued    HIV screen  Discontinued        Subjective:   Review of Systems   Constitutional: Positive for fatigue. Negative for activity change, appetite change and fever. HENT: Negative for congestion, dental problem, facial swelling, hearing loss, mouth sores, nosebleeds, sore throat, tinnitus and trouble swallowing. Eyes: Negative for discharge and visual disturbance. Respiratory: Negative for cough, chest tightness, shortness of breath and wheezing. Cardiovascular: Negative for chest pain, palpitations and leg swelling. Gastrointestinal: Negative for abdominal distention, abdominal pain, blood in stool, constipation, diarrhea, nausea, rectal pain and vomiting. Endocrine: Negative for cold intolerance, polydipsia and polyuria. Genitourinary: Negative for decreased urine volume, difficulty urinating, dysuria, flank pain, hematuria and urgency. Musculoskeletal: Positive for arthralgias and back pain. Negative for gait problem, joint swelling, myalgias and neck stiffness. Skin: Negative for color change, rash and wound. Neurological: Positive for headaches. Negative for dizziness, tremors, seizures, speech difficulty, weakness, light-headedness and numbness. Hematological: Negative for adenopathy. Does not bruise/bleed easily. Psychiatric/Behavioral: Negative for confusion and sleep disturbance. The patient is not nervous/anxious. Objective:   Physical Exam  Vitals reviewed.    Constitutional: General: She is not in acute distress. Appearance: She is well-developed. HENT:      Head: Normocephalic. Mouth/Throat:      Pharynx: No oropharyngeal exudate. Eyes:      General: No scleral icterus. Right eye: No discharge. Left eye: No discharge. Pupils: Pupils are equal, round, and reactive to light. Neck:      Thyroid: No thyromegaly. Vascular: No JVD. Trachea: No tracheal deviation. Cardiovascular:      Rate and Rhythm: Normal rate. Heart sounds: Normal heart sounds. No murmur heard. No friction rub. No gallop. Pulmonary:      Effort: Pulmonary effort is normal. No respiratory distress. Breath sounds: Normal breath sounds. No stridor. No wheezing or rales. Chest:      Chest wall: No tenderness. Abdominal:      General: Bowel sounds are normal. There is no distension. Palpations: Abdomen is soft. There is no mass. Tenderness: There is no abdominal tenderness. There is no rebound. Musculoskeletal:         General: Normal range of motion. Cervical back: Normal range of motion and neck supple. Comments: Good range of motion in all four extremities. Lymphadenopathy:      Cervical: No cervical adenopathy. Skin:     General: Skin is warm. Findings: No erythema or rash. Neurological:      Mental Status: She is alert and oriented to person, place, and time. Cranial Nerves: No cranial nerve deficit. Motor: No abnormal muscle tone. Deep Tendon Reflexes: Reflexes are normal and symmetric. Psychiatric:         Behavior: Behavior normal.         Thought Content:  Thought content normal.         Judgment: Judgment normal.         /72 (Site: Right Upper Arm, Position: Sitting, Cuff Size: Medium Adult)   Pulse 68   Temp 98.3 °F (36.8 °C) (Oral)   Resp 16   Ht 5' 3\" (1.6 m)   Wt 181 lb (82.1 kg)   LMP 12/01/2004   SpO2 96%   BMI 32.06 kg/m²      ECOG status is 1    Imaging studies and labs:     XRAY Myeloma survey on 07/13/2019 showed:  IMPRESSION:   Innumerable lytic lesions bilaterally, compatible with multiple myeloma. Lab Results   Component Value Date    WBC 2.7 (L) 12/13/2021    HGB 13.0 12/13/2021    HCT 38.0 12/13/2021     (H) 12/13/2021     12/13/2021       Chemistry        Component Value Date/Time     11/08/2021 1130     04/02/2020 1244    K 4.5 11/08/2021 1130    K 4.6 04/02/2020 1244     04/02/2020 1244    CO2 24 08/17/2020 1206    BUN 14 08/17/2020 1206    CREATININE 0.8 11/08/2021 1130    CREATININE 0.6 04/02/2020 1244        Component Value Date/Time    CALCIUM 9.4 04/02/2020 1244    ALKPHOS 82 08/06/2021 1220    AST 18 08/06/2021 1220    ALT 16 08/06/2021 1220    BILITOT 0.4 08/06/2021 1220    BILITOT NEGATIVE 08/04/2015 0803        August 17,2020:  SPEP/MICHELLE Interpretation     Hypogammaglobulinemia.  MICHELLE gel shows a normal pattern; no monoclonal proteins seen. Kappa Qnt Free Light Chains                  24.37 H   3.30-19.40    mg/L   Lambda Qnt Free Light Chains               20.97       5.71-26.30    mg/L   Kappa/Lambda Free Light Chain Ratio     1.16       0.26-1.65    December 15, 2020:  Normal SPEP pattern.  MICHELLE gel shows a normal pattern; no   monoclonal proteins seen. Kappa Qnt Free Light Chains                  28.78 H   3.30-19.40    mg/L   Lambda Qnt Free Light Chains               22.42     5.71-26.30    mg/L   Kappa/Lambda Free Light Chain Ratio     1.28     0.26-1.65    February 18, 2021:    SPEP/MICHELLE Interpretation  Normal SPEP pattern.  MICHELLE gel shows a normal pattern; no monoclonal proteins seen. Kappa Qnt Free Light Chains                  28.28 H   3.30-19.40    mg/L   Lambda Qnt Free Light Chains                 23.63     5.71-26.30    mg/L   Kappa/Lambda Free Light Chain Ratio           1.20     0.26-1.65    April 29, 2021:  Normal SPEP pattern.  MICHELLE gel shows a normal pattern; no   monoclonal proteins seen.    Kappa Qnt Free Light Chains   31.11 H   3.30-19.40    mg/L   Lambda Qnt Free Light Chains     25.04     5.71-26.30    mg/L   Kappa/Lambda Free Light Chain Ratio      1.24     0.26-1.65     August 6, 2021:  Normal SPEP pattern.  MICHELLE gel shows a normal pattern; no   monoclonal proteins seen. Kappa Qnt Free Light Chains    26.97 H   3.30-19.40    mg/L   Lambda Qnt Free Light Chains   23.37     5.71-26.30    mg/L   Kappa/Lambda Free Light Chain Ratio           1.15         11/8/2021:  Normal SPEP pattern.  MICHELLE gel shows a normal pattern; no   monoclonal proteins seen. 02/07/2022:    Kelley Free Light Chains 3.9 - 26.0 mg/L 26.5 High     Lambda Free Light Chains 6.4 - 22.1 mg/L 24.8 High     Kappa/Lambda Ratio 0.51 - 1.72 1.07        Assessment/Plan:   1. Multiple myeloma: The patient met criteria for diagnosis of multiple myeloma. She had clonal bone marrow plasma cells ? 10%, bone lesions seen on bone survey, hypercalcemia, renal insufficiency and anemia. She received first cycle of chemotherapy with Velcade Cytoxan and dexamethasone at [facility]. She received second cycle of VdR in [facility], due to slight renal impairment the dose of Revlimid was reduced to 15 mg daily for 14 days during cycle #2. Her creatinine had improved to normal range. Her anemia had improved as well. Therefore with cycle #4 the dose of Revlimid was increased to 25 mg. She received 6 cycles of RVD and then she underwent stem cell collection at [facility]. Afterwards she received 2 additional cycles of RVD. Last given on the February 20, 2020. Her cycle #7 was delayed by 1 week due to influenza A infection. 2.  Maintenance therapy. Patient started Revlimid maintenance 25 mg daily for 3 weeks on,  1 week off on March 23, 2020. Her dose of Revlimid was reduced from 25 mg daily for 3 weeks on and one-week off to 20 mg daily for 3 weeks on 1 week off due to neutropenia.  Despite the dose reduction the patient was still neutropenic, so we proceeded with further reduction of Revlimid to 15 mg. The patient had  serum immunoelectrophoresis and free light chains checked again in August 2020, no M protein and normal kappa lambda free light chain ratio. We eliminated dexamethasone from a maintenance in fall 2020. In January 2021 the dose of Revlimid was reduced to 10 mg daily for 3 weeks on 1 week off due to neutropenia. Multiple myeloma panel on 08/06/2021 was without evidence of M protein and normal free Kappa/lambda chains ratio. Patient's IgG was 710. No recent infections. Most recent multiple myeloma panel on February 7, 2022 performed at the [facility] showed that she remains in the remission we will continue current schedule of Revlimid which is 2 weeks on 1 week off   3. Right leg DVT. Known side effect from Revlimid and dexamethasone. The patient is on Xarelto 10 mg daily  4. Gum and  jaw pain. The patient was seen by oral maxillofacial surgeon at [facility] who performed CT of the of the jaw, it showed indeterminate findings in the jaw:  osteonecrosis versus multiple myeloma. Patient reports that spontaneously her jaw bone was sequestrated and eliminated. Since then she denies having any jaw pain         Diagnosis Orders   1. Multiple myeloma in remission ([facility] [address]     2. Therapeutic drug monitoring     3. Neutropenia associated with infection ([facility] [address]          Plan:   Return in about 3 months (around 5/6/2022). Orders Placed:   No orders of the defined types were placed in this encounter.

## 2022-02-09 RX ORDER — SODIUM CHLORIDE 9 MG/ML
INJECTION, SOLUTION INTRAVENOUS CONTINUOUS
Status: CANCELLED | OUTPATIENT
Start: 2022-02-10

## 2022-02-09 RX ORDER — ALBUTEROL SULFATE 90 UG/1
4 AEROSOL, METERED RESPIRATORY (INHALATION) PRN
Status: CANCELLED | OUTPATIENT
Start: 2022-02-10

## 2022-02-09 RX ORDER — ONDANSETRON 2 MG/ML
8 INJECTION INTRAMUSCULAR; INTRAVENOUS
Status: CANCELLED | OUTPATIENT
Start: 2022-02-10

## 2022-02-09 RX ORDER — EPINEPHRINE 1 MG/ML
0.3 INJECTION, SOLUTION, CONCENTRATE INTRAVENOUS PRN
Status: CANCELLED | OUTPATIENT
Start: 2022-02-10

## 2022-02-09 RX ORDER — ACETAMINOPHEN 325 MG/1
650 TABLET ORAL
Status: CANCELLED | OUTPATIENT
Start: 2022-02-10

## 2022-02-09 RX ORDER — DIPHENHYDRAMINE HYDROCHLORIDE 50 MG/ML
50 INJECTION INTRAMUSCULAR; INTRAVENOUS
Status: CANCELLED | OUTPATIENT
Start: 2022-02-10

## 2022-02-11 ENCOUNTER — HOSPITAL ENCOUNTER (OUTPATIENT)
Dept: INFUSION THERAPY | Age: 62
Discharge: HOME | End: 2022-02-11
Payer: COMMERCIAL

## 2022-02-11 VITALS
RESPIRATION RATE: 16 BRPM | HEART RATE: 54 BPM | TEMPERATURE: 98.3 F | DIASTOLIC BLOOD PRESSURE: 64 MMHG | SYSTOLIC BLOOD PRESSURE: 113 MMHG | OXYGEN SATURATION: 96 %

## 2022-02-11 DIAGNOSIS — C90.00 MULTIPLE MYELOMA NOT HAVING ACHIEVED REMISSION (HCC): Primary | ICD-10-CM

## 2022-02-11 PROCEDURE — 6360000002 HC RX W HCPCS: Performed by: INTERNAL MEDICINE

## 2022-02-11 PROCEDURE — 96372 THER/PROPH/DIAG INJ SC/IM: CPT

## 2022-02-11 RX ORDER — ONDANSETRON 2 MG/ML
8 INJECTION INTRAMUSCULAR; INTRAVENOUS
OUTPATIENT
Start: 2022-02-11

## 2022-02-11 RX ORDER — SODIUM CHLORIDE 9 MG/ML
INJECTION, SOLUTION INTRAVENOUS CONTINUOUS
OUTPATIENT
Start: 2022-02-11

## 2022-02-11 RX ORDER — ACETAMINOPHEN 325 MG/1
650 TABLET ORAL
OUTPATIENT
Start: 2022-02-11

## 2022-02-11 RX ORDER — ALBUTEROL SULFATE 90 UG/1
4 AEROSOL, METERED RESPIRATORY (INHALATION) PRN
OUTPATIENT
Start: 2022-02-11

## 2022-02-11 RX ORDER — DIPHENHYDRAMINE HYDROCHLORIDE 50 MG/ML
50 INJECTION INTRAMUSCULAR; INTRAVENOUS
OUTPATIENT
Start: 2022-02-11

## 2022-02-11 RX ADMIN — Medication 150 MG: at 13:56

## 2022-02-11 NOTE — PLAN OF CARE
Problem: Musculor/Skeletal Functional Status  Goal: Absence of falls  Outcome: Met This Shift  Note: Patient free from falls while in O.P. Oncology. Intervention: Fall precautions  Note: Patient assessed for fall risk on admission to [facility]. Fall band placed on patient. Discussed the need to use the call light for assistance prior to getting up out of chair/bed. Problem: Intellectual/Education/Knowledge Deficit  Goal: Teaching initiated upon admission  Outcome: Met This Shift  Note: Patient verbalizes understanding to verbal information given on cilgavimab and tixagevimab,action and possible side effects. Aware to call MD if develop complications. Intervention: Verbal/written education provided  Note: Cilgavimab and tixagevimab reviewed, patient verbalizes understanding of medication being administered and potential side effects. Problem: Discharge Planning:  Goal: Discharged to appropriate level of care  Description: Discharged to appropriate level of care  Outcome: Met This Shift  Note: Verbalized understanding of discharge instructions, follow-up appointments, and when to call the physician. Intervention: Assess readiness for discharge  Description: Assess readiness for discharge  Note: Discuss understanding of discharge instructions,follow-up appointments, and when to call the physician. Care plan reviewed with patient. Patient verbalize understanding of the plan of care and contribute to goal setting.

## 2022-02-11 NOTE — PROGRESS NOTES
Cilgavimab and Tixagevimab reviewed, patient verbalizes understanding of medication being administered and potential side effects.

## 2022-02-17 DIAGNOSIS — N95.1 MENOPAUSAL HOT FLUSHES: ICD-10-CM

## 2022-02-17 RX ORDER — RIVAROXABAN 10 MG/1
10 TABLET, FILM COATED ORAL
Qty: 30 TABLET | Refills: 5 | Status: SHIPPED | OUTPATIENT
Start: 2022-02-17 | End: 2022-08-22 | Stop reason: SDUPTHER

## 2022-02-18 RX ORDER — VENLAFAXINE HYDROCHLORIDE 75 MG/1
CAPSULE, EXTENDED RELEASE ORAL
Qty: 90 CAPSULE | Refills: 1 | Status: SHIPPED | OUTPATIENT
Start: 2022-02-18 | End: 2022-08-16 | Stop reason: SDUPTHER

## 2022-03-16 RX ORDER — LENALIDOMIDE 10 MG/1
10 CAPSULE ORAL DAILY
Qty: 14 CAPSULE | Refills: 3 | Status: SHIPPED | OUTPATIENT
Start: 2022-03-16 | End: 2022-04-12 | Stop reason: SDUPTHER

## 2022-04-13 RX ORDER — LENALIDOMIDE 10 MG/1
10 CAPSULE ORAL DAILY
Qty: 14 CAPSULE | Refills: 3 | Status: ACTIVE | OUTPATIENT
Start: 2022-04-13 | End: 2022-05-03 | Stop reason: SDUPTHER

## 2022-04-13 NOTE — PROGRESS NOTES
[address] [facility] Update    Date: 04/13/22    Patient's prescription benefits are being verified for coverage. Status update to follow as soon as possible. Please call us with any questions at [phone] opt.  6.

## 2022-04-13 NOTE — PROGRESS NOTES
55 A. [facility] Update    Date: 04/13/22    Medication is currently being filled at [facility] and has been routed there. Please call us with any questions at [phone] opt.  6.

## 2022-05-04 RX ORDER — LENALIDOMIDE 10 MG/1
10 CAPSULE ORAL DAILY
Qty: 14 CAPSULE | Refills: 3 | Status: ACTIVE | OUTPATIENT
Start: 2022-05-04 | End: 2022-05-24 | Stop reason: SDUPTHER

## 2022-05-04 NOTE — PROGRESS NOTES
[facility]    Date: 05/04/22    Medication is currently being filled at [facility] and has been routed there. Prior authorization effective through 1/11/23. Please call us with any questions at [phone] opt.  2.

## 2022-05-11 ENCOUNTER — OFFICE VISIT (OUTPATIENT)
Dept: ONCOLOGY | Age: 62
End: 2022-05-11
Payer: COMMERCIAL

## 2022-05-11 ENCOUNTER — HOSPITAL ENCOUNTER (OUTPATIENT)
Dept: INFUSION THERAPY | Age: 62
Discharge: HOME | End: 2022-05-11
Payer: COMMERCIAL

## 2022-05-11 VITALS
RESPIRATION RATE: 16 BRPM | SYSTOLIC BLOOD PRESSURE: 129 MMHG | TEMPERATURE: 98.2 F | HEART RATE: 89 BPM | DIASTOLIC BLOOD PRESSURE: 70 MMHG | OXYGEN SATURATION: 96 %

## 2022-05-11 VITALS
DIASTOLIC BLOOD PRESSURE: 70 MMHG | WEIGHT: 182.6 LBS | TEMPERATURE: 98.2 F | BODY MASS INDEX: 32.36 KG/M2 | SYSTOLIC BLOOD PRESSURE: 129 MMHG | HEIGHT: 63 IN | RESPIRATION RATE: 16 BRPM | HEART RATE: 89 BPM | OXYGEN SATURATION: 96 %

## 2022-05-11 DIAGNOSIS — D70.1 CHEMOTHERAPY-INDUCED NEUTROPENIA (HCC): ICD-10-CM

## 2022-05-11 DIAGNOSIS — Z51.81 THERAPEUTIC DRUG MONITORING: ICD-10-CM

## 2022-05-11 DIAGNOSIS — C90.01 MULTIPLE MYELOMA IN REMISSION (HCC): Primary | ICD-10-CM

## 2022-05-11 DIAGNOSIS — T45.1X5A CHEMOTHERAPY-INDUCED NEUTROPENIA (HCC): ICD-10-CM

## 2022-05-11 DIAGNOSIS — C90.01 MULTIPLE MYELOMA IN REMISSION (HCC): ICD-10-CM

## 2022-05-11 LAB
ABSOLUTE IMMATURE GRANULOCYTE: 0.01 THOU/MM3 (ref 0–0.07)
ALBUMIN SERPL-MCNC: 4.2 G/DL (ref 3.5–5.1)
ALP BLD-CCNC: 107 U/L (ref 38–126)
ALT SERPL-CCNC: 51 U/L (ref 11–66)
AST SERPL-CCNC: 33 U/L (ref 5–40)
BASINOPHIL, AUTOMATED: 0 % (ref 0–3)
BASOPHILS ABSOLUTE: 0 THOU/MM3 (ref 0–0.1)
BILIRUB SERPL-MCNC: 0.6 MG/DL (ref 0.3–1.2)
BILIRUBIN DIRECT: < 0.2 MG/DL (ref 0–0.3)
BUN, WHOLE BLOOD: 11 MG/DL (ref 8–26)
CHLORIDE, WHOLE BLOOD: 107 MEQ/L (ref 98–109)
CREATININE, WHOLE BLOOD: 0.8 MG/DL (ref 0.5–1.2)
EOSINOPHILS ABSOLUTE: 0.1 THOU/MM3 (ref 0–0.4)
EOSINOPHILS RELATIVE PERCENT: 2 % (ref 0–4)
GFR, ESTIMATED: 78 ML/MIN/1.73M2
GLUCOSE, WHOLE BLOOD: 102 MG/DL (ref 70–108)
HCT VFR BLD CALC: 40.1 % (ref 37–47)
HEMOGLOBIN: 13.5 GM/DL (ref 12–16)
IMMATURE GRANULOCYTES: 0 %
IONIZED CALCIUM, WHOLE BLOOD: 1.18 MMOL/L (ref 1.12–1.32)
LYMPHOCYTES # BLD: 9 % (ref 15–47)
LYMPHOCYTES ABSOLUTE: 0.5 THOU/MM3 (ref 1–4.8)
MCH RBC QN AUTO: 34 PG (ref 26–33)
MCHC RBC AUTO-ENTMCNC: 33.7 GM/DL (ref 32.2–35.5)
MCV RBC AUTO: 101 FL (ref 81–99)
MONOCYTES ABSOLUTE: 1.2 THOU/MM3 (ref 0.4–1.3)
MONOCYTES: 23 % (ref 0–12)
PDW BLD-RTO: 12.7 % (ref 11.5–14.5)
PLATELET # BLD: 142 THOU/MM3 (ref 130–400)
PMV BLD AUTO: 11.1 FL (ref 9.4–12.4)
POTASSIUM, WHOLE BLOOD: 3.8 MEQ/L (ref 3.5–4.9)
RBC # BLD: 3.97 MILL/MM3 (ref 4.2–5.4)
SEG NEUTROPHILS: 65 % (ref 43–75)
SEGMENTED NEUTROPHILS ABSOLUTE COUNT: 3.3 THOU/MM3 (ref 1.8–7.7)
SODIUM, WHOLE BLOOD: 142 MEQ/L (ref 138–146)
TOTAL CO2, WHOLE BLOOD: 24 MEQ/L (ref 23–33)
TOTAL PROTEIN: 6.4 G/DL (ref 6.1–8)
WBC # BLD: 5 THOU/MM3 (ref 4.8–10.8)

## 2022-05-11 PROCEDURE — 84155 ASSAY OF PROTEIN SERUM: CPT

## 2022-05-11 PROCEDURE — 83883 ASSAY NEPHELOMETRY NOT SPEC: CPT

## 2022-05-11 PROCEDURE — 80047 BASIC METABLC PNL IONIZED CA: CPT

## 2022-05-11 PROCEDURE — 80076 HEPATIC FUNCTION PANEL: CPT

## 2022-05-11 PROCEDURE — 82784 ASSAY IGA/IGD/IGG/IGM EACH: CPT

## 2022-05-11 PROCEDURE — 99214 OFFICE O/P EST MOD 30 MIN: CPT | Performed by: INTERNAL MEDICINE

## 2022-05-11 PROCEDURE — 99211 OFF/OP EST MAY X REQ PHY/QHP: CPT

## 2022-05-11 PROCEDURE — 36415 COLL VENOUS BLD VENIPUNCTURE: CPT

## 2022-05-11 PROCEDURE — 85025 COMPLETE CBC W/AUTO DIFF WBC: CPT

## 2022-05-11 PROCEDURE — 86334 IMMUNOFIX E-PHORESIS SERUM: CPT

## 2022-05-11 PROCEDURE — 84165 PROTEIN E-PHORESIS SERUM: CPT

## 2022-05-11 RX ORDER — LEVOCETIRIZINE DIHYDROCHLORIDE 5 MG/1
5 TABLET, FILM COATED ORAL NIGHTLY
COMMUNITY

## 2022-05-11 NOTE — PATIENT INSTRUCTIONS
1.  See in 3 months to see Dr. Shaista Levine.   On RTC labs: CBC, BMP, LFTs, multiple myeloma panel

## 2022-05-13 LAB — KAPPA/LAMBDA FREE LIGHT CHAINS: NORMAL

## 2022-05-17 LAB — IMMUNOFIXATION WITH QUANT: NORMAL

## 2022-05-24 RX ORDER — LENALIDOMIDE 10 MG/1
10 CAPSULE ORAL DAILY
Qty: 14 CAPSULE | Refills: 3 | Status: ACTIVE | OUTPATIENT
Start: 2022-05-24 | End: 2022-06-16 | Stop reason: SDUPTHER

## 2022-05-24 NOTE — PROGRESS NOTES
55 [facility]    Date: 05/24/22    Medication is currently being filled at [facility] and has been routed there. Prior authorization effective through 1/11/23. Please call us with any questions at [phone] opt.  2.

## 2022-05-28 ASSESSMENT — ENCOUNTER SYMPTOMS
FACIAL SWELLING: 0
CONSTIPATION: 0
SORE THROAT: 0
DIARRHEA: 0
CHEST TIGHTNESS: 0
COUGH: 0
EYE DISCHARGE: 0
BACK PAIN: 1
VOMITING: 0
BLOOD IN STOOL: 0
WHEEZING: 0
ABDOMINAL PAIN: 0
SHORTNESS OF BREATH: 0
NAUSEA: 0
COLOR CHANGE: 0
RECTAL PAIN: 0
TROUBLE SWALLOWING: 0
ABDOMINAL DISTENTION: 0

## 2022-05-28 NOTE — PROGRESS NOTES
[facility] [address] [facility] [address], [address] [facility] [address]  [place] [facility]9  Dept: [phone]  Dept Fax: [phone]: [phone]    Visit Date:5/11/2022     Cyndi Camacho is a 62 y.o. female who presents today for:   Chief Complaint   Patient presents with    Follow-up     Multiple myeloma in remission (        HPI:    Aisha Eden is a 63 yo WF with multiple myeloma in remission. She was admitted to [facility] on 7/16  with  Hypercalcemia 9Ca=11.0), acute kidney injury ( creat. 4.9), both found on outpatient evaluation for several weeks of insidious, progressive fatigue and left shoulder pain. Xray showed multiple lytic lesions in the bones. Bone marrow, right iliac crest, biopsy, aspirate, and peripheral smear performed on 07/15/2019 showed: -          Plasma cell myeloma in a normocellular marrow (50%), plasma cells are 30% of cells on the aspirate smear and occupy 40% of marrow cellularity based on a  immunostain performed on  the biopsy. These findings are consistent with a kappa light chain restricted plasma cell myeloma. A Congo red stain for amyloid performed on the biopsy is negative. SPEP, serum free light chains, serum immunoglobulins (elevated IGA 2,656 and depressed IGG/IGM)  s/p 2 sessions of pheresis given light chains >10,000  - Started chemotherapy (CyBorD) on 7/16/2019  On August 7,2019 she started treatment with Velcade dexamethasone and Revlimid  On September 25, 2019 she was diagnosed with right popliteal and calf veins DVT. She is treated with Xarelto. She received 6 cycles of RVD, afterwards she underwent stem cell collection at [facility]. After  Stem cell collectiuon she received 2 additional cycles of RVD. Last given on the February 20, 2020. On March 17, 2020 she started maintenance treatment with Revlimid.   Zometa is on hold due to suspicion of jaw osteonecrosis    Interim history on 05/11/2022:  The patient presents to the medical oncology clinic for 3 months follow-up visit while receiving maintenance therapy with Revlimid 10 mg daily 2 weeks on, 1 week off. Tolerates Revlimid well. She was seen by [facility] multiple myeloma bone marrow transplant in February 2022. Her lab work showed that she remains in the remission. She reports that her discomfort in her jaw and left gum is resolved after necrotic bone was sequestrated and spontaneously eliminated. She has chronic intermittent diarrhea, no skin rash no peripheral neuropathy. Past Medical History:   Diagnosis Date    Acute kidney injury ([facility] [address] 07/12/2019    Allergic rhinitis     Flonase prn    Cancer (HCC)     Myeloma    Cancer ([facility] [address]     skin cancer from cheek january 2022    Congenital hip dislocation 1960    right leg is longer, better with weight loss    H/O umbilical hernia repair 91/97/5068    Hot flushes, perimenopausal     Hypertension 2011    Seen by MONI Obrien CNP diagnosed < 1 year    IBS (irritable bowel syndrome)     ? diagnosis - diarrhea with milk products and meat    Osteoarthritis     right knee pain    Osteonecrosis (HCC)     S/P laparoscopic appendectomy 07/16/2018    Snoring     denies apnea, mild daytime somnolence, just lost 35# so has more energy, denies waking coughing or choking, BMI 27, neck circ. 13.5 inches    Vitamin D deficiency       Past Surgical History:   Procedure Laterality Date    HIP SURGERY  1978    Congenital Hip multiple surgies since 7 weeks old. Last surgery 1978     HYSTERECTOMY  2004    Partial Irregular Periods with migraines.      KNEE SURGERY Right [address]    stapled to help straighten leg(congenital hip issues)    MT LAP,APPENDECTOMY N/A 7/15/2018    APPENDECTOMY LAPAROSCOPIC [facility]      Family History   Problem Relation Age of Onset    High Blood Pressure Mother     High Cholesterol Mother     Cancer Father         skin cancer    Cancer Paternal Grandmother     Cancer Paternal Grandfather     Deep Vein Thrombosis Brother         PE      Social History     Tobacco Use    Smoking status: Never Smoker    Smokeless tobacco: Never Used   Substance Use Topics    Alcohol use: Yes     Comment: Socially      Current Outpatient Medications   Medication Sig Dispense Refill    levocetirizine (XYZAL) 5 MG tablet Take 5 mg by mouth nightly      venlafaxine (EFFEXOR XR) 75 MG extended release capsule TAKE 1 CAPSULE BY MOUTH EVERY DAY 90 capsule 1    rivaroxaban (XARELTO) 10 MG TABS tablet Take 1 tablet by mouth daily (with breakfast) 30 tablet 5    amLODIPine (NORVASC) 10 MG tablet Take 1 tablet by mouth daily 90 tablet 1    metoprolol succinate (TOPROL XL) 50 MG extended release tablet TAKE ONE TABLET BY MOUTH DAILY 90 tablet 1    loperamide (IMODIUM) 2 MG capsule Take 2 mg by mouth 4 times daily as needed for Diarrhea      acetaminophen (TYLENOL) 500 MG tablet Take 500 mg by mouth every 6 hours as needed for Pain      lactobacillus (CULTURELLE) capsule Take 1 capsule by mouth daily Align      Calcium 600-200 MG-UNIT TABS Take 600 mg by mouth daily 30 tablet 0    econazole nitrate 1 % cream Apply topically Apply topically daily.  VITAMIN D, ERGOCALCIFEROL, PO Take 5,000 Units by mouth daily      lenalidomide (REVLIMID) 10 MG chemo capsule Take 1 capsule by mouth daily for 14 days The schedule is 14 days on 1 week off 14 capsule 3    acyclovir (ZOVIRAX) 400 MG tablet TAKE 1 TABLET BY MOUTH TWO TIMES A DAY (Patient not taking: Reported on 5/11/2022) 180 tablet 0    Magic Mouthwash (MIRACLE MOUTHWASH) Swish and spit 5 mLs 4 times daily as needed for Irritation Prepare mixture 1:1:1 DIPHENHYDRAMINE HCL,NYSTATIN,LIDOCAINE HCL (Patient not taking: Reported on 5/11/2022) 120 mL 1     No current facility-administered medications for this visit.       Allergies   Allergen Reactions    Sulfa Antibiotics Rash      Health Maintenance   Topic Date Due    Pneumococcal 0-64 years Vaccine (1 - PCV) Never done    Diabetes screen  Never done    Shingles vaccine (1 of 2) Never done    COVID-19 Vaccine (4 - Booster for Pfizer series) 12/13/2021    DTaP/Tdap/Td vaccine (2 - Td or Tdap) 06/04/2022    Depression Screen  07/06/2022    Breast cancer screen  11/19/2023    Lipids  04/25/2024    Colorectal Cancer Screen  12/17/2026    Flu vaccine  Completed    Hepatitis A vaccine  Aged Out    Hepatitis B vaccine  Aged Out    Hib vaccine  Aged Out    Meningococcal (ACWY) vaccine  Aged Out    Hepatitis C screen  Discontinued    HIV screen  Discontinued        Subjective:   Review of Systems   Constitutional: Positive for fatigue. Negative for activity change, appetite change and fever. HENT: Negative for congestion, dental problem, facial swelling, hearing loss, mouth sores, nosebleeds, sore throat, tinnitus and trouble swallowing. Eyes: Negative for discharge and visual disturbance. Respiratory: Negative for cough, chest tightness, shortness of breath and wheezing. Cardiovascular: Negative for chest pain, palpitations and leg swelling. Gastrointestinal: Negative for abdominal distention, abdominal pain, blood in stool, constipation, diarrhea, nausea, rectal pain and vomiting. Endocrine: Negative for cold intolerance, polydipsia and polyuria. Genitourinary: Negative for decreased urine volume, difficulty urinating, dysuria, flank pain, hematuria and urgency. Musculoskeletal: Positive for arthralgias and back pain. Negative for gait problem, joint swelling, myalgias and neck stiffness. Skin: Negative for color change, rash and wound. Neurological: Positive for headaches. Negative for dizziness, tremors, seizures, speech difficulty, weakness, light-headedness and numbness. Hematological: Negative for adenopathy. Does not bruise/bleed easily. Psychiatric/Behavioral: Negative for confusion and sleep disturbance.  The patient is not nervous/anxious. Objective:   Physical Exam  Vitals reviewed. Constitutional:       General: She is not in acute distress. Appearance: She is well-developed. HENT:      Head: Normocephalic. Mouth/Throat:      Pharynx: No oropharyngeal exudate. Eyes:      General: No scleral icterus. Right eye: No discharge. Left eye: No discharge. Pupils: Pupils are equal, round, and reactive to light. Neck:      Thyroid: No thyromegaly. Vascular: No JVD. Trachea: No tracheal deviation. Cardiovascular:      Rate and Rhythm: Normal rate. Heart sounds: Normal heart sounds. No murmur heard. No friction rub. No gallop. Pulmonary:      Effort: Pulmonary effort is normal. No respiratory distress. Breath sounds: Normal breath sounds. No stridor. No wheezing or rales. Chest:      Chest wall: No tenderness. Abdominal:      General: Bowel sounds are normal. There is no distension. Palpations: Abdomen is soft. There is no mass. Tenderness: There is no abdominal tenderness. There is no rebound. Musculoskeletal:         General: Normal range of motion. Cervical back: Normal range of motion and neck supple. Comments: Good range of motion in all four extremities. Lymphadenopathy:      Cervical: No cervical adenopathy. Skin:     General: Skin is warm. Findings: No erythema or rash. Neurological:      Mental Status: She is alert and oriented to person, place, and time. Cranial Nerves: No cranial nerve deficit. Motor: No abnormal muscle tone. Deep Tendon Reflexes: Reflexes are normal and symmetric. Psychiatric:         Behavior: Behavior normal.         Thought Content:  Thought content normal.         Judgment: Judgment normal.         /70 (Site: Left Upper Arm, Position: Sitting, Cuff Size: Medium Adult)   Pulse 89   Temp 98.2 °F (36.8 °C) (Oral)   Resp 16   Ht 5' 3\" (1.6 m)   Wt 182 lb 9.6 oz (82.8 kg)   LMP 12/01/2004   SpO2 96%   BMI 32.35 kg/m²      ECOG status is 1    Imaging studies and labs:     XRAY Myeloma survey on 07/13/2019 showed:  IMPRESSION:   Innumerable lytic lesions bilaterally, compatible with multiple myeloma. Lab Results   Component Value Date    WBC 5.0 05/11/2022    HGB 13.5 05/11/2022    HCT 40.1 05/11/2022     (H) 05/11/2022     05/11/2022       Chemistry        Component Value Date/Time     05/11/2022 1101     04/02/2020 1244    K 3.8 05/11/2022 1101    K 4.6 04/02/2020 1244     04/02/2020 1244    CO2 24 08/17/2020 1206    BUN 14 08/17/2020 1206    CREATININE 0.8 05/11/2022 1101    CREATININE 0.6 04/02/2020 1244        Component Value Date/Time    CALCIUM 9.4 04/02/2020 1244    ALKPHOS 107 05/11/2022 1101    AST 33 05/11/2022 1101    ALT 51 05/11/2022 1101    BILITOT 0.6 05/11/2022 1101    BILITOT NEGATIVE 08/04/2015 0803        August 17,2020:  SPEP/MICHELLE Interpretation     Hypogammaglobulinemia.  MICHELLE gel shows a normal pattern; no monoclonal proteins seen. Kappa Qnt Free Light Chains                  24.37 H   3.30-19.40    mg/L   Lambda Qnt Free Light Chains               20.97       5.71-26.30    mg/L   Kappa/Lambda Free Light Chain Ratio     1.16       0.26-1.65    December 15, 2020:  Normal SPEP pattern.  MICHELLE gel shows a normal pattern; no   monoclonal proteins seen. Kappa Qnt Free Light Chains                  28.78 H   3.30-19.40    mg/L   Lambda Qnt Free Light Chains               22.42     5.71-26.30    mg/L   Kappa/Lambda Free Light Chain Ratio     1.28     0.26-1.65    February 18, 2021:    SPEP/MICHELLE Interpretation  Normal SPEP pattern.  MICHELLE gel shows a normal pattern; no monoclonal proteins seen.    Kappa Qnt Free Light Chains                  28.28 H   3.30-19.40    mg/L   Lambda Qnt Free Light Chains                 23.63     5.71-26.30    mg/L   Kappa/Lambda Free Light Chain Ratio           1.20     0.26-1.65    April 29, 2021:  Normal SPEP pattern.  MICHELLE gel shows a normal pattern; no   monoclonal proteins seen. Kappa Qnt Free Light Chains   31.11 H   3.30-19.40    mg/L   Lambda Qnt Free Light Chains     25.04     5.71-26.30    mg/L   Kappa/Lambda Free Light Chain Ratio      1.24     0.26-1.65     August 6, 2021:  Normal SPEP pattern.  MICHELLE gel shows a normal pattern; no   monoclonal proteins seen. Kappa Qnt Free Light Chains    26.97 H   3.30-19.40    mg/L   Lambda Qnt Free Light Chains   23.37     5.71-26.30    mg/L   Kappa/Lambda Free Light Chain Ratio           1.15         11/8/2021:  Normal SPEP pattern.  MICHELLE gel shows a normal pattern; no   monoclonal proteins seen. 02/07/2022:    [place] Free Light Chains 3.9 - 26.0 mg/L 26.5 High     Lambda Free Light Chains 6.4 - 22.1 mg/L 24.8 High     Kappa/Lambda Ratio 0.51 - 1.72 1.07        May 11, 2022:    MICHELLE gel shows a normal pattern; no monoclonal proteins seen   Kappa Qnt Free Light Chains                  24.88 H   3.30-19.40    mg/L   Lambda Qnt Free Light Chains                 20.89     5.71-26.30    mg/L   Kappa/Lambda Free Light Chain Ratio           1.19     0.26-1.65     Assessment/Plan:   1. Multiple myeloma: The patient met criteria for diagnosis of multiple myeloma. She had clonal bone marrow plasma cells ? 10%, bone lesions seen on bone survey, hypercalcemia, renal insufficiency and anemia. She received first cycle of chemotherapy with Velcade Cytoxan and dexamethasone at [facility]. She received second cycle of VdR in [facility], due to slight renal impairment the dose of Revlimid was reduced to 15 mg daily for 14 days during cycle #2. Her creatinine had improved to normal range. Her anemia had improved as well. Therefore with cycle #4 the dose of Revlimid was increased to 25 mg. She received 6 cycles of RVD and then she underwent stem cell collection at [facility]. Afterwards she received 2 additional cycles of RVD. Last given on the February 20, 2020.  Her cycle #7 was delayed by 1 week due to influenza A infection. 2.  Maintenance therapy. Patient started Revlimid maintenance 25 mg daily for 3 weeks on,  1 week off on March 23, 2020. Her dose of Revlimid was reduced from 25 mg daily for 3 weeks on and one-week off to 20 mg daily for 3 weeks on 1 week off due to neutropenia. Despite the dose reduction the patient was still neutropenic, so we proceeded with further reduction of Revlimid to 15 mg. In January 2021 the dose of Revlimid was further reduced to 10 mg daily for 2 weeks on 1 week off due to chronic neutropenia. Multiple myeloma panel on 08/06/2021 was without evidence of M protein and normal free Kappa/lambda chains ratio. Patient's IgG was 710. No recent infections. Today's multiple myeloma panel showed that she remains in the remission. CBC is within normal range today,  we will continue current schedule of Revlimid which is 2 weeks on 1 week off   3. History of right leg DVT. Known side effect from Revlimid and dexamethasone. The patient is on Xarelto 10 mg daily  4. Gum and  jaw pain. The patient was seen by oral maxillofacial surgeon at [facility] who performed CT of the of the jaw, it showed indeterminate findings in the jaw:  osteonecrosis versus multiple myeloma. Patient reports that spontaneously her jaw bone was sequestrated and eliminated. Since then she denies having any jaw pain         Diagnosis Orders   1. Multiple myeloma in remission (HCC)  CBC with Auto Differential    POC PANEL BMP W/IOCA    Hepatic Function Panel    Electrophoresis Protein, Serum    Immunofixation serum profile    Immunofixation w/ Quant    Kappa/Lambda Quantitative Free Light Chains, Serum   2. Therapeutic drug monitoring     3. Chemotherapy-induced neutropenia (HCC)          Plan:   No follow-ups on file.      Orders Placed:   Orders Placed This Encounter   Procedures    CBC with Auto Differential     Standing Status:   Future     Number of Occurrences:   1     Standing Expiration Date:   5/10/2023  POC PANEL BMP W/IOCA     Standing Status:   Future     Number of Occurrences:   1     Standing Expiration Date:   5/10/2023    Hepatic Function Panel     Standing Status:   Future     Number of Occurrences:   1     Standing Expiration Date:   5/10/2023    Electrophoresis Protein, Serum     Standing Status:   Future     Number of Occurrences:   1     Standing Expiration Date:   5/10/2023    Immunofixation serum profile     SOFT IFX1    Collect: Serum separator tube. Specimen Preparation: Separate serum from cells ASAP or within 2 hours of collection. Transfer 1 mL serum to an [place] Standard Transport Tube. (Min: 0.3 mL)    Storage/Transport Temperature: Refrigerated. Remarks: MICHELLE gel includes a qualitative interpretation of an MICHELLE only. Unacceptable Conditions: Plasma. Stability (collection to initiation of testing): After separation from cells: Ambient: 8 hours;  Refrigerated: 1 week; Frozen: 1 month         Standing Status:   Future     Number of Occurrences:   1     Standing Expiration Date:   5/10/2023    Immunofixation w/ Quant     Standing Status:   Future     Number of Occurrences:   1     Standing Expiration Date:   5/10/2023    Kappa/Lambda Quantitative Free Light Chains, Serum     Standing Status:   Future     Number of Occurrences:   1     Standing Expiration Date:   5/10/2023

## 2022-06-17 RX ORDER — LENALIDOMIDE 10 MG/1
10 CAPSULE ORAL DAILY
Qty: 14 CAPSULE | Refills: 3 | Status: ACTIVE | OUTPATIENT
Start: 2022-06-17 | End: 2022-07-05

## 2022-06-17 NOTE — PROGRESS NOTES
[facility]    Date: 06/17/22    Medication is currently being filled at [facility] and has been routed there. Prior authorization effective 1/11/2023. Please call us with any questions at [phone] opt.  2.

## 2022-07-06 RX ORDER — LENALIDOMIDE 10 MG/1
CAPSULE ORAL
Qty: 14 CAPSULE | Refills: 3 | Status: ACTIVE | OUTPATIENT
Start: 2022-07-06 | End: 2022-08-04

## 2022-07-07 NOTE — PROGRESS NOTES
[facility]    Date: 07/07/22    Medication is currently being filled at [facility] and has been routed there. Prior authorization effective through 1/11/23. Please call us with any questions at [phone] opt.  2.

## 2022-08-04 RX ORDER — LENALIDOMIDE 10 MG/1
CAPSULE ORAL
Qty: 14 CAPSULE | Refills: 3 | Status: ACTIVE | OUTPATIENT
Start: 2022-08-04 | End: 2022-08-23 | Stop reason: SDUPTHER

## 2022-08-16 ENCOUNTER — OFFICE VISIT (OUTPATIENT)
Dept: INTERNAL MEDICINE CLINIC | Age: 62
End: 2022-08-16
Payer: COMMERCIAL

## 2022-08-16 VITALS
HEART RATE: 60 BPM | TEMPERATURE: 98.2 F | SYSTOLIC BLOOD PRESSURE: 118 MMHG | WEIGHT: 179.2 LBS | BODY MASS INDEX: 31.74 KG/M2 | DIASTOLIC BLOOD PRESSURE: 84 MMHG

## 2022-08-16 DIAGNOSIS — I10 ESSENTIAL HYPERTENSION: Primary | ICD-10-CM

## 2022-08-16 DIAGNOSIS — J30.89 NON-SEASONAL ALLERGIC RHINITIS, UNSPECIFIED TRIGGER: ICD-10-CM

## 2022-08-16 DIAGNOSIS — N95.1 MENOPAUSAL HOT FLUSHES: ICD-10-CM

## 2022-08-16 DIAGNOSIS — E55.9 VITAMIN D DEFICIENCY: ICD-10-CM

## 2022-08-16 PROCEDURE — 93000 ELECTROCARDIOGRAM COMPLETE: CPT | Performed by: INTERNAL MEDICINE

## 2022-08-16 PROCEDURE — 99214 OFFICE O/P EST MOD 30 MIN: CPT | Performed by: INTERNAL MEDICINE

## 2022-08-16 RX ORDER — VENLAFAXINE HYDROCHLORIDE 75 MG/1
CAPSULE, EXTENDED RELEASE ORAL
Qty: 90 CAPSULE | Refills: 1 | Status: SHIPPED | OUTPATIENT
Start: 2022-08-16

## 2022-08-16 RX ORDER — METOPROLOL SUCCINATE 50 MG/1
TABLET, EXTENDED RELEASE ORAL
Qty: 90 TABLET | Refills: 1 | Status: SHIPPED | OUTPATIENT
Start: 2022-08-16

## 2022-08-16 RX ORDER — AMLODIPINE BESYLATE 10 MG/1
10 TABLET ORAL DAILY
Qty: 90 TABLET | Refills: 1 | Status: SHIPPED | OUTPATIENT
Start: 2022-08-16

## 2022-08-16 SDOH — ECONOMIC STABILITY: FOOD INSECURITY: WITHIN THE PAST 12 MONTHS, YOU WORRIED THAT YOUR FOOD WOULD RUN OUT BEFORE YOU GOT MONEY TO BUY MORE.: NEVER TRUE

## 2022-08-16 SDOH — ECONOMIC STABILITY: FOOD INSECURITY: WITHIN THE PAST 12 MONTHS, THE FOOD YOU BOUGHT JUST DIDN'T LAST AND YOU DIDN'T HAVE MONEY TO GET MORE.: NEVER TRUE

## 2022-08-16 ASSESSMENT — PATIENT HEALTH QUESTIONNAIRE - PHQ9
SUM OF ALL RESPONSES TO PHQ QUESTIONS 1-9: 0
SUM OF ALL RESPONSES TO PHQ QUESTIONS 1-9: 0
1. LITTLE INTEREST OR PLEASURE IN DOING THINGS: 0
SUM OF ALL RESPONSES TO PHQ QUESTIONS 1-9: 0
SUM OF ALL RESPONSES TO PHQ QUESTIONS 1-9: 0
2. FEELING DOWN, DEPRESSED OR HOPELESS: 0
SUM OF ALL RESPONSES TO PHQ9 QUESTIONS 1 & 2: 0

## 2022-08-16 ASSESSMENT — SOCIAL DETERMINANTS OF HEALTH (SDOH): HOW HARD IS IT FOR YOU TO PAY FOR THE VERY BASICS LIKE FOOD, HOUSING, MEDICAL CARE, AND HEATING?: NOT HARD AT ALL

## 2022-08-16 NOTE — PATIENT INSTRUCTIONS
Schedule mammogram after 11/19/22. Try Flonase nasal spray OTC 1-2 sprays per nostril daily for a month to see if runny nose improves.

## 2022-08-16 NOTE — PROGRESS NOTES
William Momin (:  1960) is a 62 y.o. female,Established patient, here for evaluation of the following chief complaint(s): Hypertension and Other (Menopausal hot flashes)      ASSESSMENT/PLAN:   Diagnosis Orders   1. Essential hypertension  EKG 12 Lead - Clinic Performed    metoprolol succinate (TOPROL XL) 50 MG extended release tablet    amLODIPine (NORVASC) 10 MG tablet      2. Vitamin D deficiency        3. Menopausal hot flushes  venlafaxine (EFFEXOR XR) 75 MG extended release capsule      4. Non-seasonal allergic rhinitis, unspecified trigger          Hypertension - BP controlled,on metoprolol and Norvasc - will continue. EKG with bradycardia at rate of 50 (seen on previous EKG). Vitamin D deficiency - level at goal on 5,000 IU daily - will continue. Level normal at 37 22. Menopausal hot flushes - controlled, on Effexor -will continue. Allergies - nose still runny on Xyzal - continue this and add Flonase.  - did discuss COVID, pneumonia, Tdap and shingles vaccine. Set up mammogram after 22. Return in about 6 months (around 2023). 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 Only allowed to lift 3#. Support her disability claim. Hypertension - BP controlled, continue metoprolol and Norvasc, taken off lisinopril with renal decline when MM diagnosed. BMP normal 2021, 2022. No lightheadedness or headaches. Hot flushes - improved on Effexor. Worse on steroids. No longer on steroids - still intermittent issues with hot flushes but tolerable - keep on Effexor at current dose. Vitamin D deficiency - on 5,000 IU daily. Normal level 21 at 43, 37 2022. Multiple Myeloma - treated with [facility] and Dr. Leopold Coats as Francoise left. Considering stem cell transplant when sees [facility] MD in 2022 - did not need it.      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 Seeing [facility] for this. She is on a Clorhexidine mouth wash for this prn. Finished with dentist at [facility] - small spot to heal yet. Diarrhea - 4-8 stools a day, seems to be after eating x 2 weeks. She stopped magnesium less than a week ago, changing food -avoiding dairy. Could be from Revlimid. Order C. Diff as has been on antibiotic recently. No history of C. Diff. On Imodium every day - tolerable on this, worse with immunotherapy. She had colonoscopy with Dr. Zach Gimenez. She is due for colonoscopy - but on Xarelto due to DVT 9/2020. She is waiting for Dr. Marciano Starks to ok colonoscopy due to Xarelto. H/0 Anemia - due to MM - vitamin B 12 deficiency seen prior to MM dx - started on injections but level high at [facility] 7/2019 so changed to oral supplement 1000 mcg daily. Repeat 10/2019 still high. Off vitamin B12 - repeat level normal 7/2021. Normal Hg for at least last one year - Hg 13.5 5/2022. Carotid doppler 6/2019- mild disease. Hyperhomocysteinemia - level at 23 - on folic acid 1 mg daily. Dr. Grayce Babinski stopped it. Asked her to address with Dr. Grayce Babinski at next visit. Addressed and not needed per patient. ALT 36 - mild elevation with follow up in 3-6 months. Normal 11/21/19 and most recently 12/2020, 8/2021, 5/2022. Nausea -  No longer on omeprazole - doing well. History of DVT - due to hypercoag with MM - she is on chronic Xarelto. No bleeding or CVA symptoms - normal Hg. Allergies - on Xyzal - nose still runny - could try Flonase. Review of Systems - General ROS: negative for - chills or fever  Psychological ROS: negative for - anxiety and depression  Hematological and Lymphatic ROS: No history of bleeding disorder, positive DVT.               Respiratory ROS: no cough, shortness of breath, or wheezing  Cardiovascular ROS: no chest pain, positive MULLER but some deconditioning with cancer - better - able to walk a mile now  Gastrointestinal ROS: Positive intermittent diarrhea with occasional cramping pain - helps to take Imodium daily, no blood in stool  Genito-Urinary ROS: no dysuria, trouble voiding, or hematuria  Musculoskeletal ROS: negative for - muscle pain or muscular weakness, positive left hip, back, knees- chronic  Neurological ROS: negative for - headaches, numbness/tingling, seizures or weakness  Dermatological ROS: negative for - rash or skin lesion changes     Vitals:    08/16/22 1002   BP: 118/84   Site: Left Upper Arm   Pulse: 60   Temp: 98.2 °F (36.8 °C)   Weight: 179 lb 3.2 oz (81.3 kg)       Physical Examination: General appearance -alert, well appearing, and in no distress  Mental status - alert, oriented to person, place, and time  Head - atraumatic, normocephalic  Neck - supple, no significant adenopathy  Chest - clear to auscultation, no wheezes, rales or rhonchi, symmetric air entry  Heart - normal rate, regular rhythm, normal S1, S2, no murmurs, rubs, clicks or gallops  Abdomen -soft, nontender, nondistended  Neurological - alert, normal speech   Extremities - peripheral pulses normal, no pedal edema, no clubbing or cyanosis  Skin - warm and dry    Diagnostic data - Reviewed labs      --Brooklyn Rodriguez MD none

## 2022-08-22 PROBLEM — R76.8 INCREASED IMMUNOGLOBULIN: Status: ACTIVE | Noted: 2017-05-30

## 2022-08-22 PROBLEM — M87.08 ASEPTIC NECROSIS OF JAW (HCC): Status: ACTIVE | Noted: 2022-08-22

## 2022-08-22 RX ORDER — RIVAROXABAN 10 MG/1
10 TABLET, FILM COATED ORAL
Qty: 30 TABLET | Refills: 5 | Status: SHIPPED | OUTPATIENT
Start: 2022-08-22

## 2022-08-23 RX ORDER — LENALIDOMIDE 10 MG/1
CAPSULE ORAL
Qty: 14 CAPSULE | Refills: 3 | Status: ACTIVE | OUTPATIENT
Start: 2022-08-23 | End: 2022-09-13

## 2022-08-24 ENCOUNTER — HOSPITAL ENCOUNTER (OUTPATIENT)
Dept: INFUSION THERAPY | Age: 62
Discharge: HOME | End: 2022-08-24

## 2022-08-24 RX ORDER — RIVAROXABAN 10 MG/1
10 TABLET, FILM COATED ORAL
Qty: 30 TABLET | Refills: 5 | OUTPATIENT
Start: 2022-08-24

## 2022-08-24 NOTE — PROGRESS NOTES
55 A. [email or web address] Update    Date: 08/24/22      Medication is currently being filled at [facility] and has been routed there. Prior authorization effective through 1/11/23. Please call us with any questions at [phone] opt.  2.

## 2022-08-31 ENCOUNTER — HOSPITAL ENCOUNTER (OUTPATIENT)
Dept: INFUSION THERAPY | Age: 62
Discharge: HOME | End: 2022-08-31
Payer: COMMERCIAL

## 2022-08-31 ENCOUNTER — OFFICE VISIT (OUTPATIENT)
Dept: ONCOLOGY | Age: 62
End: 2022-08-31
Payer: COMMERCIAL

## 2022-08-31 VITALS
SYSTOLIC BLOOD PRESSURE: 116 MMHG | TEMPERATURE: 98.4 F | HEART RATE: 64 BPM | RESPIRATION RATE: 16 BRPM | DIASTOLIC BLOOD PRESSURE: 70 MMHG

## 2022-08-31 VITALS
TEMPERATURE: 98.4 F | WEIGHT: 178 LBS | DIASTOLIC BLOOD PRESSURE: 70 MMHG | BODY MASS INDEX: 31.54 KG/M2 | RESPIRATION RATE: 16 BRPM | SYSTOLIC BLOOD PRESSURE: 116 MMHG | OXYGEN SATURATION: 95 % | HEART RATE: 64 BPM | HEIGHT: 63 IN

## 2022-08-31 DIAGNOSIS — C90.01 MULTIPLE MYELOMA IN REMISSION (HCC): Primary | ICD-10-CM

## 2022-08-31 DIAGNOSIS — C90.01 MULTIPLE MYELOMA IN REMISSION (HCC): ICD-10-CM

## 2022-08-31 LAB
ABSOLUTE IMMATURE GRANULOCYTE: 0.01 THOU/MM3 (ref 0–0.07)
ALBUMIN SERPL-MCNC: 4.8 G/DL (ref 3.5–5.1)
ALP BLD-CCNC: 76 U/L (ref 38–126)
ALT SERPL-CCNC: 27 U/L (ref 11–66)
ANION GAP SERPL CALCULATED.3IONS-SCNC: 14 MEQ/L (ref 8–16)
AST SERPL-CCNC: 26 U/L (ref 5–40)
BASINOPHIL, AUTOMATED: 1 % (ref 0–3)
BASOPHILS ABSOLUTE: 0 THOU/MM3 (ref 0–0.1)
BILIRUB SERPL-MCNC: 0.4 MG/DL (ref 0.3–1.2)
BUN BLDV-MCNC: 17 MG/DL (ref 7–22)
CALCIUM SERPL-MCNC: 10.1 MG/DL (ref 8.5–10.5)
CHLORIDE BLD-SCNC: 105 MEQ/L (ref 98–111)
CO2: 24 MEQ/L (ref 23–33)
CREAT SERPL-MCNC: 0.8 MG/DL (ref 0.4–1.2)
EOSINOPHILS ABSOLUTE: 0.2 THOU/MM3 (ref 0–0.4)
EOSINOPHILS RELATIVE PERCENT: 4 % (ref 0–4)
GFR SERPL CREATININE-BSD FRML MDRD: 73 ML/MIN/1.73M2
GLUCOSE BLD-MCNC: 99 MG/DL (ref 70–108)
HCT VFR BLD CALC: 40.4 % (ref 37–47)
HEMOGLOBIN: 13.7 GM/DL (ref 12–16)
IMMATURE GRANULOCYTES: 0 %
LYMPHOCYTES # BLD: 26 % (ref 15–47)
LYMPHOCYTES ABSOLUTE: 1 THOU/MM3 (ref 1–4.8)
MCH RBC QN AUTO: 33.8 PG (ref 26–33)
MCHC RBC AUTO-ENTMCNC: 33.9 GM/DL (ref 32.2–35.5)
MCV RBC AUTO: 100 FL (ref 81–99)
MONOCYTES ABSOLUTE: 0.6 THOU/MM3 (ref 0.4–1.3)
MONOCYTES: 15 % (ref 0–12)
PDW BLD-RTO: 13.1 % (ref 11.5–14.5)
PLATELET # BLD: 147 THOU/MM3 (ref 130–400)
PMV BLD AUTO: 11.2 FL (ref 9.4–12.4)
POTASSIUM SERPL-SCNC: 4.3 MEQ/L (ref 3.5–5.2)
RBC # BLD: 4.05 MILL/MM3 (ref 4.2–5.4)
SEG NEUTROPHILS: 54 % (ref 43–75)
SEGMENTED NEUTROPHILS ABSOLUTE COUNT: 2 THOU/MM3 (ref 1.8–7.7)
SODIUM BLD-SCNC: 143 MEQ/L (ref 135–145)
TOTAL PROTEIN: 7.1 G/DL (ref 6.1–8)
WBC # BLD: 3.8 THOU/MM3 (ref 4.8–10.8)

## 2022-08-31 PROCEDURE — 99211 OFF/OP EST MAY X REQ PHY/QHP: CPT

## 2022-08-31 PROCEDURE — 85025 COMPLETE CBC W/AUTO DIFF WBC: CPT

## 2022-08-31 PROCEDURE — 84165 PROTEIN E-PHORESIS SERUM: CPT

## 2022-08-31 PROCEDURE — 84155 ASSAY OF PROTEIN SERUM: CPT

## 2022-08-31 PROCEDURE — 80053 COMPREHEN METABOLIC PANEL: CPT

## 2022-08-31 PROCEDURE — 82784 ASSAY IGA/IGD/IGG/IGM EACH: CPT

## 2022-08-31 PROCEDURE — 86334 IMMUNOFIX E-PHORESIS SERUM: CPT

## 2022-08-31 PROCEDURE — 82232 ASSAY OF BETA-2 PROTEIN: CPT

## 2022-08-31 PROCEDURE — 36415 COLL VENOUS BLD VENIPUNCTURE: CPT

## 2022-08-31 PROCEDURE — 99215 OFFICE O/P EST HI 40 MIN: CPT | Performed by: INTERNAL MEDICINE

## 2022-08-31 PROCEDURE — 83883 ASSAY NEPHELOMETRY NOT SPEC: CPT

## 2022-08-31 NOTE — PROGRESS NOTES
[address] CANCER [address] [place] [address]  Dept: [phone]  Loc: [phone]   Hematology/Oncology Progress Note (Clinic)        Isaura Monahan  1960    8/31/2022     No ref. provider found   Luis Antonio Domingo MD   Followed by Bridger Hampton MD, [facility] myeloma clinic    Diagnosis:   -Multiple myeloma in remission. Dx: July 2019  Presented with hypercalcemia, calcium 11, renal insufficiency with creatinine of 4.9 and multiple lytic lesions on skeletal survey. Bone marrow biopsy 7/15/2019: Plasma cells 30% of the cells on the aspirate and occupy 40% of the marrow cellularity consistent with kappa light chain restricted plasma cell myeloma. IgA equal to 656    -5/6/2019 right calf DVT; on Xarelto    Treatment:   -Plasmapheresis x2 because of light chains greater than 10,000 in July 2019 at [facility] (7/16/2019 began)  -Velcade Decadron and Revlimid (RVD) began 8/7/2019. RVD x8.  -Autologous  stem cell collection 2/20/2020; No Stemcell transplant  NOT done thus far. -Maintenance Revlimid (began 3/17/2020)  Currently on : Revlimid 10 mg taken 2 weeks on and 1 week off  -Zometa Previously held because of suspicion of jaw osteonecrosis  -Xarelto      Followable Disease:   -IgA, serum light chains . CBC CMP    5/11/2022  Serum fixation shows normal pattern with no monoclonal proteins. Kappa free light chains 24.88, lambda 20.89, ratio 1.19  Creatinine 0.8, calcium 9.4, hemoglobin 13.5    Comorbidities:  See below      Subjective: Transferred from Dr. Adrian Joshi last seen by her 5/11/2022. On maintenance Revlimid 10 mg given 2 weeks on 1 week off. Is asymptomatic. No bone or back pain. No trouble with frequent or severe infections. Next follow-up at [facility] for annual visit is around February 2023. ROS:  Review of Systems 14 point negative except as above.     PMH:   Past Medical History:   Diagnosis Date    Acute kidney injury ([facility] 07/12/2019    Allergic rhinitis     Flonase prn    Cancer ([facility]     Myeloma    Cancer ([facility]     skin cancer from cheek january 2022    Congenital hip dislocation 1961    right leg is longer, better with weight loss    H/O umbilical hernia repair 60/39/9236    Hot flushes, perimenopausal     Hypertension 2011    Seen by MONI Atwood CNP diagnosed < 1 year    IBS (irritable bowel syndrome)     ? diagnosis - diarrhea with milk products and meat    Osteoarthritis     right knee pain    Osteonecrosis (HCC)     S/P laparoscopic appendectomy 07/16/2018    Snoring     denies apnea, mild daytime somnolence, just lost 35# so has more energy, denies waking coughing or choking, BMI 27, neck circ.  13.5 inches    Vitamin D deficiency         Social HX:   Social History     Socioeconomic History    Marital status:      Spouse name: Not on file    Number of children: Not on file    Years of education: Not on file    Highest education level: Not on file   Occupational History    Not on file   Tobacco Use    Smoking status: Never    Smokeless tobacco: Never   Vaping Use    Vaping Use: Never used   Substance and Sexual Activity    Alcohol use: Yes     Comment: Socially    Drug use: No    Sexual activity: Yes     Partners: Male   Other Topics Concern    Not on file   Social History Narrative    Not on file     Social Determinants of Health     Financial Resource Strain: Low Risk     Difficulty of Paying Living Expenses: Not hard at all   Food Insecurity: No Food Insecurity    Worried About Running Out of Food in the Last Year: Never true    Ran Out of Food in the Last Year: Never true   Transportation Needs: Not on file   Physical Activity: Not on file   Stress: Not on file   Social Connections: Not on file   Intimate Partner Violence: Not on file   Housing Stability: Not on file        Julissa Mora    Phone: [phone] [facility] [address]     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[employer]    Immunizations:  Immunization History   Administered Date(s) Administered    COVID-19, PFIZER PURPLE top, DILUTE for use, (age 15 y+), 30mcg/0.3mL 03/03/2021, 03/26/2021, 09/13/2021    Influenza, FLUARIX, FLULAVAL, (age 10 mo+) AND AFLURIA, FLUZONE (age 1 y+), PF 10/12/2020    Tdap (Boostrix, Adacel) 06/04/2012        Health Screenings:  Mammogram:  Results for orders placed during the hospital encounter of 11/19/21    [facility] SARMAD DIGITAL SCREEN SELF REFERRAL W OR WO CAD BILATERAL    Narrative  [facility]    PROCEDURE: PEYMAN SARMAD DIGITAL SCREEN SELF REFERRAL W OR WO CAD BILATERAL    CLINICAL INFORMATION: Visit for screening mammogram. Tomosynthesis. PATIENT MEDICAL HISTORY: No relevant medical history has been documented for this patient. FAMILY HISTORY: No relevant family history has been documented for this patient. RISK VALUES: Tyrer-Cuzick 10yr.: 3.7%, Tyrer-Cuzick life: 9.2%    COMPARISON: 6/23/2020, 12/3/2019, 11/27/2019, 11/7/2018, 11/1/2017, 10/11/2016    TECHNIQUE: Bilateral CC and MLO views of the breasts were obtained. 3D tomosynthesis was utilized. CAD was utilized. BREAST COMPOSITION: The tissue of the breast(s) is heterogeneously dense. This may lower the sensitivity of mammography. FINDINGS:    No significant masses, calcifications, or other findings are seen in either breast.    There has been no significant interval change. Impression  1. No mammographic evidence of malignancy. A 1 year screening mammogram is recommended. A result letter will be sent to the patient. She will also receive a reminder 1 month prior to her next mammogram.      BI-RADS CATEGORY 1: NEGATIVE. Management Recommendation: Routine annual screening mammography. **This report has been created using voice recognition software. It may contain minor errors which are inherent in voice recognition technology. **    Final report electronically signed by Dr. Tang Weinstein on 11/20/2021 7:16 PM   No valid procedures specified. Pap / Pelvic: done  C-Scope: up to date  Prostate: No results found for: PSA, PSADIA     Gyn HX:   GPA:  ANAHI/BSO: ovaries intact  LMP: Patient's last menstrual period was 2004. Health Maintenance Due   Topic Date Due    Pneumococcal 0-64 years Vaccine (1 - PCV) Never done    Shingles vaccine (1 of 2) Never done    Diabetes screen  Never done    COVID-19 Vaccine (4 - Booster for Pfizer series) 2021    DTaP/Tdap/Td vaccine (2 - Td or Tdap) 2022        Interests: Yard work. Fam HX:   Family History   Problem Relation Age of Onset    High Blood Pressure Mother     High Cholesterol Mother     Cancer Father         skin cancer    Cancer Paternal Grandmother     Cancer Paternal Grandfather     Deep Vein Thrombosis Brother         PE        Hospitalizations:   None recent    Allergies: Allergies   Allergen Reactions    Sulfa Antibiotics Rash        Adult Illness:  Patient Active Problem List   Diagnosis    Hypertension    Excessive tanning    Swelling of left lower extremity    Allergic rhinitis    Vitamin D deficiency    Elevated serum immunoglobulin free light chains    Acute appendicitis    S/P laparoscopic appendectomy    H/O umbilical hernia repair    Acute kidney injury ([facility]    Acute renal failure (HCC)    Hypercalcemia    Lytic lesion of bone on x-ray    Obesity, morbid, BMI 40.0-49.9 (HCC)    Multiple myeloma not having achieved remission (HCC)    Anemia due to stage 3 chronic kidney disease (HCC)    Hypogammaglobulinemia (HCC)    Aseptic necrosis of jaw (HCC)    Increased immunoglobulin        Surgery:  Past Surgical History:   Procedure Laterality Date    HIP SURGERY      Congenital Hip multiple surgies since 7 weeks old. Last surgery      HYSTERECTOMY (CERVIX STATUS UNKNOWN)      Partial Irregular Periods with migraines.      KNEE SURGERY Right [address]    stapled to help straighten leg(congenital hip issues)    RI LAP,APPENDECTOMY nontender  : Not Examined  Extremities: no cyanosis,clubbing or edema. Skin: unremarkable  Neuro: A and O x 4, CN exam nonfocal, Motor- no deficits, Sensory- no deficits, gait-nl, speech- fluent, no ataxia.   Devices: none    DATA:  LAB:     CBC with Differential:      Lab Results   Component Value Date/Time    WBC 5.0 05/11/2022 11:01 AM    RBC 3.97 05/11/2022 11:01 AM    RBC 2.42 07/11/2019 08:05 AM    HGB 13.5 05/11/2022 11:01 AM    HCT 40.1 05/11/2022 11:01 AM     05/11/2022 11:01 AM     05/11/2022 11:01 AM    MCH 34.0 05/11/2022 11:01 AM    MCHC 33.7 05/11/2022 11:01 AM    RDW 12.7 05/11/2022 11:01 AM    NRBC 0 07/29/2021 10:58 AM    SEGSPCT 54.2 07/29/2021 10:58 AM    LYMPHOPCT 16.5 07/11/2019 08:05 AM    MONOPCT 18.9 07/29/2021 10:58 AM    MONOPCT 4.4 11/11/2013 12:00 AM    EOSPCT 1.8 07/11/2019 08:05 AM    BASOPCT 0.3 07/11/2019 08:05 AM    MONOSABS 1.2 05/11/2022 11:01 AM    LYMPHSABS 0.5 05/11/2022 11:01 AM    EOSABS 0.1 05/11/2022 11:01 AM    BASOSABS 0.0 05/11/2022 11:01 AM    DIFFTYPE see below 12/19/2019 04:12 AM      Lab Results   Component Value Date/Time    SEGSABS 3.3 05/11/2022 11:01 AM       CMP:    Lab Results   Component Value Date/Time     05/11/2022 11:01 AM     04/02/2020 12:44 PM    K 3.8 05/11/2022 11:01 AM    K 4.6 04/02/2020 12:44 PM     04/02/2020 12:44 PM    CO2 24 08/17/2020 12:06 PM    BUN 14 08/17/2020 12:06 PM    CREATININE 0.8 05/11/2022 11:01 AM    CREATININE 0.6 04/02/2020 12:44 PM    LABGLOM >90 04/02/2020 12:44 PM    GLUCOSE 101 04/02/2020 12:44 PM    GLUCOSE 92 07/11/2019 08:05 AM    PROT 6.4 05/11/2022 11:01 AM    LABALBU 4.2 05/11/2022 11:01 AM    CALCIUM 9.4 04/02/2020 12:44 PM    BILITOT 0.6 05/11/2022 11:01 AM    BILITOT NEGATIVE 08/04/2015 08:03 AM    ALKPHOS 107 05/11/2022 11:01 AM    AST 33 05/11/2022 11:01 AM    ALT 51 05/11/2022 11:01 AM       BMP:    Lab Results   Component Value Date/Time     05/11/2022 11:01 AM     04/02/2020 12:44 PM    K 3.8 05/11/2022 11:01 AM    K 4.6 04/02/2020 12:44 PM     04/02/2020 12:44 PM    CO2 24 08/17/2020 12:06 PM    BUN 14 08/17/2020 12:06 PM    LABALBU 4.2 05/11/2022 11:01 AM    CREATININE 0.8 05/11/2022 11:01 AM    CREATININE 0.6 04/02/2020 12:44 PM    CALCIUM 9.4 04/02/2020 12:44 PM    LABGLOM >90 04/02/2020 12:44 PM    GLUCOSE 101 04/02/2020 12:44 PM    GLUCOSE 92 07/11/2019 08:05 AM       Magnesium:    Lab Results   Component Value Date/Time    MG 1.6 12/19/2019 04:12 AM     PT/INR:  No results found for: PROTIME, INR  TSH:    Lab Results   Component Value Date/Time    TSH 2.500 08/04/2016 08:06 AM     VITAMIN B12: No components found for: B12  FOLATE:    Lab Results   Component Value Date/Time    FOLATE 13.7 07/29/2021 10:58 AM     IRON:  No results found for: IRON  Iron Saturation:  No components found for: PERCENTFE  TIBC:    Lab Results   Component Value Date/Time    TIBC 241 06/18/2019 07:15 AM     FERRITIN:    Lab Results   Component Value Date/Time    FERRITIN 452 06/18/2019 07:15 AM     8/31/2021  Myeloma parameters drawn and pending    5/11/2022  Serum fixation shows normal pattern with no monoclonal proteins. Kappa free light chains 24.88, lambda 20.89, ratio 1.19  Creatinine 0.8, calcium 9.4, hemoglobin 13.5    8/17/2020  SPEP immunofixation shows hypogammaglobulinemia but normal pattern with no monoclonal proteins. Kappa free light chains 24.37, lambda 20.97, ratio 1. 16. IMAGING:  None recent. PROCEDURES:  -Stem cell collection but no transplant yet at [facility]. PATHOLOGY:   See above    GENETICS:  None    MOLECULAR:  None      ASSESSMENT/PLAN:    1: Diagnosis: Very pleasant 62-year-old female with diagnosis of multiple myeloma as detailed above. Treatment reviewed above as well. She has had stem cell collections done; no transplant yet. Followed closely by Dr. Akilah Singh. Alverda transplant for first sign of relapse.     2) Prognosis / Disease Status: Multiple myeloma currently in remission. 3) Work-up:    Labs: 8/30 myeloma parameters drawn and pending   Imaging: None needed    Procedures: None   Consults: None new   Other:    4) Symptom Management: She is asymptomatic      5) Supportive care provided. Level of care is appropriate. Teaching done today. 6) Treatment goal:      Treatment plan:      Continue maintenance Revlimid 10 mg 2 weeks on 1 week off      7) Medications reviewed. Prescriptions today:            No orders of the defined types were placed in this encounter. OARRS:  Controlled Substance Monitoring:    Acute and Chronic Pain Monitoring:   No flowsheet data found. 8) Research Options:       None      9) Other:        Keep follow-up at [facility]    10) Follow Up: Follow-up 3 months and repeat myeloma labs 2 weeks before.         Joslyn Friedman MD

## 2022-08-31 NOTE — PATIENT INSTRUCTIONS
Myeloma labs to be drawn today  Follow-up in clinic here in 3 months and get lab 1 week before. 11/21 lab  Follow-up with me on November 30.

## 2022-09-01 LAB
BETA-2 MICROGLOBULIN: 2 MG/L (ref 0.6–2.4)
IGA: 144 MG/DL (ref 70–400)
IGG: 891 MG/DL (ref 700–1600)
IGM: 47 MG/DL (ref 40–230)

## 2022-09-02 LAB — KAPPA/LAMBDA FREE LIGHT CHAINS: NORMAL

## 2022-09-04 LAB — IMMUNOFIXATION WITH QUANT: NORMAL

## 2022-09-13 RX ORDER — LENALIDOMIDE 10 MG/1
CAPSULE ORAL
Qty: 14 CAPSULE | Refills: 3 | Status: ACTIVE | OUTPATIENT
Start: 2022-09-13 | End: 2022-10-10

## 2022-09-13 NOTE — PROGRESS NOTES
[facility]    Date: 09/13/22    Medication is currently being filled at [facility] and has been routed there. Prior authorization effective through 1/11/23. Please call us with any questions at [phone] opt.  2.

## 2022-10-10 RX ORDER — LENALIDOMIDE 10 MG/1
CAPSULE ORAL
Qty: 14 CAPSULE | Refills: 3 | Status: ACTIVE | OUTPATIENT
Start: 2022-10-10 | End: 2022-10-26

## 2022-10-10 NOTE — PROGRESS NOTES
[facility]    Date: 10/10/22    Medication is currently being filled at "[employer] and has been routed there. Prior authorization effective through 1/11/23. Please call us with any questions at [phone] opt.  2.

## 2022-10-26 RX ORDER — LENALIDOMIDE 10 MG/1
CAPSULE ORAL
Qty: 14 CAPSULE | Refills: 3 | Status: ACTIVE | OUTPATIENT
Start: 2022-10-26 | End: 2022-11-16

## 2022-10-27 NOTE — PROGRESS NOTES
[facility]    Date: 10/27/22    Medication is currently being filled at [facility] [employer] and has been routed there. Prior authorization effective through 1/11/23. Please call us with any questions at [phone] opt.  2.

## 2022-11-16 RX ORDER — LENALIDOMIDE 10 MG/1
CAPSULE ORAL
Qty: 14 CAPSULE | Refills: 3 | Status: ACTIVE | OUTPATIENT
Start: 2022-11-16

## 2022-11-17 NOTE — PROGRESS NOTES
[address] [facility] Update    Date: 11/17/22    Medication is currently being filled at [facility] and has been routed there. Prior authorization effective to 1/11/23. Please call us with any questions at [phone] opt.  2.

## 2022-11-29 ENCOUNTER — HOSPITAL ENCOUNTER (OUTPATIENT)
Dept: WOMENS IMAGING | Age: 62
Discharge: HOME | End: 2022-11-29
Payer: COMMERCIAL

## 2022-11-29 DIAGNOSIS — Z12.31 VISIT FOR SCREENING MAMMOGRAM: ICD-10-CM

## 2022-11-29 PROCEDURE — 77067 SCR MAMMO BI INCL CAD: CPT

## 2022-12-08 ENCOUNTER — HOSPITAL ENCOUNTER (OUTPATIENT)
Age: 62
Discharge: HOME | End: 2022-12-08
Payer: COMMERCIAL

## 2022-12-08 DIAGNOSIS — C90.01 MULTIPLE MYELOMA IN REMISSION (HCC): ICD-10-CM

## 2022-12-08 LAB
ABSOLUTE IMMATURE GRANULOCYTE: 0 THOU/MM3 (ref 0–0.07)
ALBUMIN SERPL-MCNC: 4.1 G/DL (ref 3.5–5.1)
ALP BLD-CCNC: 77 U/L (ref 38–126)
ALT SERPL-CCNC: 23 U/L (ref 11–66)
ANION GAP SERPL CALCULATED.3IONS-SCNC: 13 MEQ/L (ref 8–16)
AST SERPL-CCNC: 18 U/L (ref 5–40)
BASINOPHIL, AUTOMATED: 1 % (ref 0–3)
BASOPHILS ABSOLUTE: 0 THOU/MM3 (ref 0–0.1)
BILIRUB SERPL-MCNC: 0.3 MG/DL (ref 0.3–1.2)
BUN BLDV-MCNC: 16 MG/DL (ref 7–22)
CALCIUM SERPL-MCNC: 9.9 MG/DL (ref 8.5–10.5)
CHLORIDE BLD-SCNC: 104 MEQ/L (ref 98–111)
CO2: 25 MEQ/L (ref 23–33)
CREAT SERPL-MCNC: 0.8 MG/DL (ref 0.4–1.2)
EOSINOPHILS ABSOLUTE: 0.2 THOU/MM3 (ref 0–0.4)
EOSINOPHILS RELATIVE PERCENT: 5 % (ref 0–4)
GFR SERPL CREATININE-BSD FRML MDRD: > 60 ML/MIN/1.73M2
GLUCOSE BLD-MCNC: 92 MG/DL (ref 70–108)
HCT VFR BLD CALC: 40.9 % (ref 37–47)
HEMOGLOBIN: 13.7 GM/DL (ref 12–16)
IMMATURE GRANULOCYTES: 0 %
LYMPHOCYTES # BLD: 22 % (ref 15–47)
LYMPHOCYTES ABSOLUTE: 0.8 THOU/MM3 (ref 1–4.8)
MCH RBC QN AUTO: 33.5 PG (ref 26–33)
MCHC RBC AUTO-ENTMCNC: 33.5 GM/DL (ref 32.2–35.5)
MCV RBC AUTO: 100 FL (ref 81–99)
MONOCYTES ABSOLUTE: 0.3 THOU/MM3 (ref 0.4–1.3)
MONOCYTES: 9 % (ref 0–12)
PDW BLD-RTO: 13.1 % (ref 11.5–14.5)
PLATELET # BLD: 221 THOU/MM3 (ref 130–400)
PMV BLD AUTO: 11.1 FL (ref 9.4–12.4)
POTASSIUM SERPL-SCNC: 3.9 MEQ/L (ref 3.5–5.2)
RBC # BLD: 4.09 MILL/MM3 (ref 4.2–5.4)
SEG NEUTROPHILS: 63 % (ref 43–75)
SEGMENTED NEUTROPHILS ABSOLUTE COUNT: 2.2 THOU/MM3 (ref 1.8–7.7)
SODIUM BLD-SCNC: 142 MEQ/L (ref 135–145)
TOTAL PROTEIN: 7 G/DL (ref 6.1–8)
WBC # BLD: 3.4 THOU/MM3 (ref 4.8–10.8)

## 2022-12-08 PROCEDURE — 84165 PROTEIN E-PHORESIS SERUM: CPT

## 2022-12-08 PROCEDURE — 84155 ASSAY OF PROTEIN SERUM: CPT

## 2022-12-08 PROCEDURE — 85025 COMPLETE CBC W/AUTO DIFF WBC: CPT

## 2022-12-08 PROCEDURE — 80053 COMPREHEN METABOLIC PANEL: CPT

## 2022-12-08 PROCEDURE — 86334 IMMUNOFIX E-PHORESIS SERUM: CPT

## 2022-12-08 PROCEDURE — 84156 ASSAY OF PROTEIN URINE: CPT

## 2022-12-08 PROCEDURE — 36415 COLL VENOUS BLD VENIPUNCTURE: CPT

## 2022-12-08 PROCEDURE — 83883 ASSAY NEPHELOMETRY NOT SPEC: CPT

## 2022-12-08 PROCEDURE — 82784 ASSAY IGA/IGD/IGG/IGM EACH: CPT

## 2022-12-08 PROCEDURE — 86335 IMMUNFIX E-PHORSIS/URINE/CSF: CPT

## 2022-12-09 LAB
IGA: 140 MG/DL (ref 70–400)
IGG: 858 MG/DL (ref 700–1600)
IGM: 73 MG/DL (ref 40–230)

## 2022-12-11 LAB — KAPPA/LAMBDA FREE LIGHT CHAINS: NORMAL

## 2022-12-12 ENCOUNTER — NURSE ONLY (OUTPATIENT)
Dept: LAB | Age: 62
End: 2022-12-12

## 2022-12-12 LAB
IMMUNOFIXATION WITH QUANT: NORMAL
PROTEIN ELECTROPHORESIS, URINE: NORMAL

## 2022-12-14 RX ORDER — LENALIDOMIDE 10 MG/1
CAPSULE ORAL
Qty: 14 CAPSULE | Refills: 3 | Status: ACTIVE | OUTPATIENT
Start: 2022-12-14

## 2022-12-15 ENCOUNTER — HOSPITAL ENCOUNTER (OUTPATIENT)
Dept: INFUSION THERAPY | Age: 62
Discharge: HOME | End: 2022-12-15
Payer: COMMERCIAL

## 2022-12-15 ENCOUNTER — OFFICE VISIT (OUTPATIENT)
Dept: ONCOLOGY | Age: 62
End: 2022-12-15
Payer: COMMERCIAL

## 2022-12-15 VITALS
RESPIRATION RATE: 16 BRPM | DIASTOLIC BLOOD PRESSURE: 80 MMHG | TEMPERATURE: 98.4 F | HEART RATE: 59 BPM | SYSTOLIC BLOOD PRESSURE: 141 MMHG | WEIGHT: 179 LBS | BODY MASS INDEX: 31.71 KG/M2 | OXYGEN SATURATION: 96 % | HEIGHT: 63 IN

## 2022-12-15 VITALS
SYSTOLIC BLOOD PRESSURE: 141 MMHG | RESPIRATION RATE: 16 BRPM | HEART RATE: 59 BPM | TEMPERATURE: 98.4 F | DIASTOLIC BLOOD PRESSURE: 80 MMHG

## 2022-12-15 DIAGNOSIS — C90.01 MULTIPLE MYELOMA IN REMISSION (HCC): Primary | ICD-10-CM

## 2022-12-15 PROCEDURE — 3078F DIAST BP <80 MM HG: CPT | Performed by: INTERNAL MEDICINE

## 2022-12-15 PROCEDURE — 99214 OFFICE O/P EST MOD 30 MIN: CPT | Performed by: INTERNAL MEDICINE

## 2022-12-15 PROCEDURE — 3074F SYST BP LT 130 MM HG: CPT | Performed by: INTERNAL MEDICINE

## 2022-12-15 PROCEDURE — 99211 OFF/OP EST MAY X REQ PHY/QHP: CPT

## 2022-12-15 RX ORDER — CETIRIZINE HYDROCHLORIDE 10 MG/1
10 TABLET ORAL DAILY
COMMUNITY

## 2022-12-15 NOTE — PROGRESS NOTES
[address] CANCER [address] [place] [address]  Dept: [phone]  Loc: [phone]   Hematology/Oncology Progress Note (Clinic)        Erick Owens  1960    12/15/22    No ref. provider found   Yaritza Jc MD   Followed by Andres Mahajan MD, [address] [place] [address] myeloma clinic    Diagnosis:   -Multiple myeloma in remission. Dx: July 2019  Presented with hypercalcemia, calcium 11, renal insufficiency with creatinine of 4.9 and multiple lytic lesions on skeletal survey. Bone marrow biopsy 7/15/2019: Plasma cells 30% of the cells on the aspirate and occupy 40% of the marrow cellularity consistent with kappa light chain restricted plasma cell myeloma. IgA equal to 656    -5/6/2019 right calf DVT; on Xarelto    Treatment:   -Plasmapheresis x2 because of light chains greater than 10,000 in July 2019 at [facility] (7/16/2019 began)  -Velcade Decadron and Revlimid (RVD) began 8/7/2019. RVD x8.  -Autologous  stem cell collection 2/20/2020; No Stemcell transplant  NOT done thus far. -Maintenance Revlimid (began 3/17/2020)  Currently on : Revlimid 10 mg taken 2 weeks on and 1 week off  -Zometa Previously held because of suspicion of jaw osteonecrosis  -Xarelto      Followable Disease:   -IgA, serum light chains . CBC CMP    12/8/2022  See below under lab section-no worsening i.e. no myeloma protein detected. .    5/11/2022  Serum fixation shows normal pattern with no monoclonal proteins. Kappa free light chains 24.88, lambda 20.89, ratio 1.19  Creatinine 0.8, calcium 9.4, hemoglobin 13.5    Comorbidities:  See below      Subjective: Transferred from Dr. Vince Tello last seen by her 5/11/2022. On maintenance Revlimid 10 mg given 2 weeks on 1 week off. Is asymptomatic. No bone or back pain. No trouble with frequent or severe infections. Next follow-up at [facility] for annual visit is around February 2023.   Meanwhile she understands she will continue the Revlimid maintenance. In February we will await Dr. Madhu Garcia input regarding duration of maintenance Revlimid which would be approaching 3 years next spring      ROS:  Review of Systems 14 point negative except as above. PMH:   Past Medical History:   Diagnosis Date    Acute kidney injury ([facility] 07/12/2019    Allergic rhinitis     Flonase prn    Cancer ([facility] [address]     Myeloma    Cancer ([facility] [address]     skin cancer from cheek january 2022    Congenital hip dislocation 1960    right leg is longer, better with weight loss    H/O umbilical hernia repair 13/44/7169    Hot flushes, perimenopausal     Hypertension 2011    Seen by MONI Yang CNP diagnosed < 1 year    IBS (irritable bowel syndrome)     ? diagnosis - diarrhea with milk products and meat    Osteoarthritis     right knee pain    Osteonecrosis (HCC)     S/P laparoscopic appendectomy 07/16/2018    Snoring     denies apnea, mild daytime somnolence, just lost 35# so has more energy, denies waking coughing or choking, BMI 27, neck circ.  13.5 inches    Vitamin D deficiency         Social HX:   Social History     Socioeconomic History    Marital status:      Spouse name: Not on file    Number of children: Not on file    Years of education: Not on file    Highest education level: Not on file   Occupational History    Not on file   Tobacco Use    Smoking status: Never    Smokeless tobacco: Never   Vaping Use    Vaping Use: Never used   Substance and Sexual Activity    Alcohol use: Yes     Comment: Socially    Drug use: No    Sexual activity: Yes     Partners: Male   Other Topics Concern    Not on file   Social History Narrative    Not on file     Social Determinants of Health     Financial Resource Strain: Low Risk     Difficulty of Paying Living Expenses: Not hard at all   Food Insecurity: No Food Insecurity    Worried About Running Out of Food in the Last Year: Never true    Ran Out of Food in the Last Year: Never true   Transportation Needs: Not on file Physical Activity: Not on file   Stress: Not on file   Social Connections: Not on file   Intimate Partner Violence: Not on file   Housing Stability: Not on file        SpouseHerb Other    Phone: [phone] [address] [facility] [address]     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[employer] [place]    Immunizations:  Immunization History   Administered Date(s) Administered    COVID-19, PFIZER PURPLE top, DILUTE for use, (age 15 y+), 30mcg/0.3mL 03/03/2021, 03/26/2021, 09/13/2021    Influenza, FLUARIX, FLULAVAL, FLUZONE (age 10 mo+) AND AFLURIA, (age 1 y+), PF, 0.5mL 10/12/2020    Tdap (Boostrix, Adacel) 06/04/2012        Health Screenings:  Mammogram:  Results for orders placed during the hospital encounter of 11/19/21    [facility] SARMAD DIGITAL SCREEN SELF REFERRAL W OR WO CAD BILATERAL    Narrative  LOCATION: [place]    PROCEDURE: [facility] SARMAD DIGITAL SCREEN SELF REFERRAL W OR WO CAD BILATERAL    CLINICAL INFORMATION: Visit for screening mammogram. Tomosynthesis. PATIENT MEDICAL HISTORY: No relevant medical history has been documented for this patient. FAMILY HISTORY: No relevant family history has been documented for this patient. RISK VALUES: Tyrer-Cuzick 10yr.: 3.7%, Tyrer-Cuzick life: 9.2%    COMPARISON: 6/23/2020, 12/3/2019, 11/27/2019, 11/7/2018, 11/1/2017, 10/11/2016    TECHNIQUE: Bilateral CC and MLO views of the breasts were obtained. 3D tomosynthesis was utilized. CAD was utilized. BREAST COMPOSITION: The tissue of the breast(s) is heterogeneously dense. This may lower the sensitivity of mammography. FINDINGS:    No significant masses, calcifications, or other findings are seen in either breast.    There has been no significant interval change. Impression  1. No mammographic evidence of malignancy. A 1 year screening mammogram is recommended. A result letter will be sent to the patient. She will also receive a reminder 1 month prior to her next mammogram.      BI-RADS CATEGORY 1: NEGATIVE.     Management Recommendation: Routine annual screening mammography. **This report has been created using voice recognition software. It may contain minor errors which are inherent in voice recognition technology. **    Final report electronically signed by Dr. Janeth Padilla on 2021 7:16 PM   No valid procedures specified. Pap / Pelvic: done  C-Scope: up to date  Prostate: No results found for: PSA, PSADIA     Gyn HX:   GPA:  ANAHI/BSO: ovaries intact  LMP: Patient's last menstrual period was 2004. Health Maintenance Due   Topic Date Due    Pneumococcal 0-64 years Vaccine (1 - PCV) Never done    Shingles vaccine (1 of 2) Never done    COVID-19 Vaccine (4 - Booster for Pfizer series) 2021    DTaP/Tdap/Td vaccine (2 - Td or Tdap) 2022        Interests: Yard work. Fam HX:   Family History   Problem Relation Age of Onset    High Blood Pressure Mother     High Cholesterol Mother     Cancer Father         skin cancer    Cancer Paternal Grandmother     Cancer Paternal Grandfather     Deep Vein Thrombosis Brother         PE        Hospitalizations:   None recent    Allergies:   Allergies   Allergen Reactions    Sulfa Antibiotics Rash        Adult Illness:  Patient Active Problem List   Diagnosis    Hypertension    Excessive tanning    Swelling of left lower extremity    Allergic rhinitis    Vitamin D deficiency    Elevated serum immunoglobulin free light chains    Acute appendicitis    S/P laparoscopic appendectomy    H/O umbilical hernia repair    Acute kidney injury ([address]    Acute renal failure (HCC)    Hypercalcemia    Lytic lesion of bone on x-ray    Obesity, morbid, BMI 40.0-49.9 (HCC)    Multiple myeloma not having achieved remission (HCC)    Anemia due to stage 3 chronic kidney disease (HCC)    Hypogammaglobulinemia (HCC)    Aseptic necrosis of jaw (HCC)    Increased immunoglobulin        Surgery:  Past Surgical History:   Procedure Laterality Date    HIP SURGERY      Congenital Hip multiple surgies since 7 weeks old. Last surgery      HYSTERECTOMY (CERVIX STATUS UNKNOWN)      Partial Irregular Periods with migraines. KNEE SURGERY Right [address]    stapled to help straighten leg(congenital hip issues)    VT LAP,APPENDECTOMY N/A 7/15/2018    APPENDECTOMY LAPAROSCOPIC performed by Fran Webb MD at [facility]        Medications:  Current Outpatient Medications   Medication Sig Dispense Refill    lenalidomide (REVLIMID) 10 MG chemo capsule TAKE 1 CAPSULE BY MOUTH ONCE DAILY FOR 14 DAYS ON AND 7 DAYS OFF 14 capsule 3    rivaroxaban (XARELTO) 10 MG TABS tablet Take 1 tablet by mouth daily (with breakfast) 30 tablet 5    metoprolol succinate (TOPROL XL) 50 MG extended release tablet TAKE ONE TABLET BY MOUTH DAILY 90 tablet 1    amLODIPine (NORVASC) 10 MG tablet Take 1 tablet by mouth in the morning. 90 tablet 1    venlafaxine (EFFEXOR XR) 75 MG extended release capsule TAKE 1 CAPSULE BY MOUTH EVERY DAY 90 capsule 1    levocetirizine (XYZAL) 5 MG tablet Take 5 mg by mouth nightly      loperamide (IMODIUM) 2 MG capsule Take 2 mg by mouth 4 times daily as needed for Diarrhea      acetaminophen (TYLENOL) 500 MG tablet Take 500 mg by mouth every 6 hours as needed for Pain      Calcium 600-200 MG-UNIT TABS Take 600 mg by mouth daily 30 tablet 0    econazole nitrate 1 % cream Apply topically Apply topically daily. VITAMIN D, ERGOCALCIFEROL, PO Take 5,000 Units by mouth daily       No current facility-administered medications for this visit. EXAM:   vitals were not taken for this visit. Estimated body surface area is 1.89 meters squared as calculated from the following:    Height as of 22: 5' 3\" (1.6 m). Weight as of 22: 178 lb (80.7 kg). ECO    General: Non-ill appearing. Very pleasant appears younger than her stated age.   HEENT: NC/AT,nonicteric,   Neck: normal thyroid, no masses  Nodes: No adenopathy  Lungs/chest: clear, no rales,rhonchi or wheezing, lung bases clear  CV: rrr, no rubs ,gallops or murmurs  Breasts: Not examined  Abd/Rectal: soft, non-tender,bowel sounds normal , no HSM,no masses  Back: normal curvature, No midline tenderness. flanks nontender  : Not Examined  Extremities: no cyanosis,clubbing or edema. Skin: unremarkable  Neuro: A and O x 4, CN exam nonfocal, Motor- no deficits, Sensory- no deficits, gait-nl, speech- fluent, no ataxia.   Devices: none    DATA:  LAB:     CBC with Differential:      Lab Results   Component Value Date/Time    WBC 3.4 12/08/2022 02:25 PM    RBC 4.09 12/08/2022 02:25 PM    RBC 2.42 07/11/2019 08:05 AM    HGB 13.7 12/08/2022 02:25 PM    HCT 40.9 12/08/2022 02:25 PM     12/08/2022 02:25 PM     12/08/2022 02:25 PM    MCH 33.5 12/08/2022 02:25 PM    MCHC 33.5 12/08/2022 02:25 PM    RDW 13.1 12/08/2022 02:25 PM    NRBC 0 07/29/2021 10:58 AM    SEGSPCT 54.2 07/29/2021 10:58 AM    LYMPHOPCT 16.5 07/11/2019 08:05 AM    MONOPCT 18.9 07/29/2021 10:58 AM    MONOPCT 4.4 11/11/2013 12:00 AM    EOSPCT 1.8 07/11/2019 08:05 AM    BASOPCT 0.3 07/11/2019 08:05 AM    MONOSABS 0.3 12/08/2022 02:25 PM    LYMPHSABS 0.8 12/08/2022 02:25 PM    EOSABS 0.2 12/08/2022 02:25 PM    BASOSABS 0.0 12/08/2022 02:25 PM    DIFFTYPE see below 12/19/2019 04:12 AM      Lab Results   Component Value Date/Time    SEGSABS 2.2 12/08/2022 02:25 PM       CMP:    Lab Results   Component Value Date/Time     12/08/2022 02:25 PM    K 3.9 12/08/2022 02:25 PM     12/08/2022 02:25 PM    CO2 25 12/08/2022 02:25 PM    BUN 16 12/08/2022 02:25 PM    CREATININE 0.8 12/08/2022 02:25 PM    LABGLOM >60 12/08/2022 02:25 PM    GLUCOSE 92 12/08/2022 02:25 PM    GLUCOSE 92 07/11/2019 08:05 AM    PROT 7.0 12/08/2022 02:25 PM    LABALBU 4.1 12/08/2022 02:25 PM    CALCIUM 9.9 12/08/2022 02:25 PM    BILITOT 0.3 12/08/2022 02:25 PM    BILITOT NEGATIVE 08/04/2015 08:03 AM    ALKPHOS 77 12/08/2022 02:25 PM    AST 18 12/08/2022 02:25 PM    ALT 23 12/08/2022 02:25 PM       BMP:    Lab Results   Component Value Date/Time     12/08/2022 02:25 PM    K 3.9 12/08/2022 02:25 PM     12/08/2022 02:25 PM    CO2 25 12/08/2022 02:25 PM    BUN 16 12/08/2022 02:25 PM    LABALBU 4.1 12/08/2022 02:25 PM    CREATININE 0.8 12/08/2022 02:25 PM    CALCIUM 9.9 12/08/2022 02:25 PM    LABGLOM >60 12/08/2022 02:25 PM    GLUCOSE 92 12/08/2022 02:25 PM    GLUCOSE 92 07/11/2019 08:05 AM       Magnesium:    Lab Results   Component Value Date/Time    MG 1.6 12/19/2019 04:12 AM     PT/INR:  No results found for: PROTIME, INR  TSH:    Lab Results   Component Value Date/Time    TSH 2.500 08/04/2016 08:06 AM     VITAMIN B12: No components found for: B12  FOLATE:    Lab Results   Component Value Date/Time    FOLATE 13.7 07/29/2021 10:58 AM     IRON:  No results found for: IRON  Iron Saturation:  No components found for: PERCENTFE  TIBC:    Lab Results   Component Value Date/Time    TIBC 241 06/18/2019 07:15 AM     FERRITIN:    Lab Results   Component Value Date/Time    FERRITIN 452 06/18/2019 07:15 AM     12/8/2022  Serum protein electrophoresis and immune fixation showed a normal pattern with no monoclonal protein seen. White count 3.4, hemoglobin 13.7, , platelets 996  CMP includes creatinine 0.8, calcium 9.9  IgA-140/normal, IgG-858/normal, IgM-73/normal  Serum kappa light chains 30.5, lambda 21.6, ratio normal at 1.41. UPEP- no MCP.    5/11/2022  Serum fixation shows normal pattern with no monoclonal proteins. Kappa free light chains 24.88, lambda 20.89, ratio 1.19  Creatinine 0.8, calcium 9.4, hemoglobin 13.5    8/17/2020  SPEP immunofixation shows hypogammaglobulinemia but normal pattern with no monoclonal proteins. Kappa free light chains 24.37, lambda 20.97, ratio 1. 16. IMAGING:  None recent. PROCEDURES:  -Stem cell collection but no transplant yet at [address] [place] [address].     PATHOLOGY:   See above    GENETICS:  None    MOLECULAR:  None      ASSESSMENT/PLAN:    1: Diagnosis: Very pleasant 59-year-old female with diagnosis of multiple myeloma as detailed above. Treatment reviewed above as well. She has had stem cell collections done; no transplant yet. Followed closely by Dr. Gisselle Herrera. [employer] transplant for first sign of relapse. 2) Prognosis / Disease Status: Multiple myeloma currently in remission. 3) Work-up:    Labs: 12/8 myeloma parameters drawn and reviewed above and are stable   Imaging: None needed    Procedures: None   Consults: None new   Other:    4) Symptom Management: She is asymptomatic      5) Supportive care provided. Level of care is appropriate. Teaching done today. 6) Treatment goal:      Treatment plan:      Continue maintenance Revlimid 10 mg 2 weeks on 1 week off      7) Medications reviewed. Prescriptions today:            No orders of the defined types were placed in this encounter. [place]ARRS:  Controlled Substance Monitoring:    Acute and Chronic Pain Monitoring:   No flowsheet data found. 8) Research Options:       None      9) Other:        Keep follow-up at [facility]    10) Follow Up: Follow-up with [facility] myeloma clinic in February and see me in March.         Dawayne Eisenmenger, MD

## 2022-12-15 NOTE — PATIENT INSTRUCTIONS
Continue Revlimid 10 mg 14 days on followed by 7 days off  Keep follow-up at [facility] in February  Follow-up with me in March

## 2022-12-19 LAB — CYTOLOGY THIN PREP PAP: NORMAL

## 2022-12-23 DIAGNOSIS — I10 ESSENTIAL HYPERTENSION: ICD-10-CM

## 2022-12-27 RX ORDER — AMLODIPINE BESYLATE 10 MG/1
10 TABLET ORAL DAILY
Qty: 90 TABLET | Refills: 0 | Status: SHIPPED | OUTPATIENT
Start: 2022-12-27

## 2022-12-29 RX ORDER — LENALIDOMIDE 10 MG/1
CAPSULE ORAL
Qty: 14 CAPSULE | Refills: 3 | Status: ACTIVE | OUTPATIENT
Start: 2022-12-29

## 2023-01-09 RX ORDER — LENALIDOMIDE 10 MG/1
CAPSULE ORAL
Qty: 14 CAPSULE | Refills: 3 | Status: CANCELLED | OUTPATIENT
Start: 2023-01-09

## 2023-01-09 RX ORDER — LENALIDOMIDE 10 MG/1
CAPSULE ORAL
Qty: 14 CAPSULE | Refills: 3 | Status: ACTIVE | OUTPATIENT
Start: 2023-01-09

## 2023-01-26 RX ORDER — LENALIDOMIDE 10 MG/1
CAPSULE ORAL
Qty: 14 CAPSULE | Refills: 3 | Status: ACTIVE | OUTPATIENT
Start: 2023-01-26

## 2023-02-13 RX ORDER — RIVAROXABAN 10 MG/1
10 TABLET, FILM COATED ORAL
Qty: 30 TABLET | Refills: 5 | Status: SHIPPED | OUTPATIENT
Start: 2023-02-13

## 2023-02-14 ENCOUNTER — OFFICE VISIT (OUTPATIENT)
Dept: INTERNAL MEDICINE CLINIC | Age: 63
End: 2023-02-14
Payer: COMMERCIAL

## 2023-02-14 VITALS
HEIGHT: 63 IN | TEMPERATURE: 97.1 F | DIASTOLIC BLOOD PRESSURE: 80 MMHG | SYSTOLIC BLOOD PRESSURE: 128 MMHG | BODY MASS INDEX: 32.57 KG/M2 | HEART RATE: 68 BPM | WEIGHT: 183.8 LBS

## 2023-02-14 DIAGNOSIS — N95.1 MENOPAUSAL HOT FLUSHES: ICD-10-CM

## 2023-02-14 DIAGNOSIS — Z23 NEED FOR PNEUMOCOCCAL VACCINE: ICD-10-CM

## 2023-02-14 DIAGNOSIS — J30.89 NON-SEASONAL ALLERGIC RHINITIS, UNSPECIFIED TRIGGER: ICD-10-CM

## 2023-02-14 DIAGNOSIS — I10 ESSENTIAL HYPERTENSION: Primary | ICD-10-CM

## 2023-02-14 DIAGNOSIS — E55.9 VITAMIN D DEFICIENCY: ICD-10-CM

## 2023-02-14 PROCEDURE — 90677 PCV20 VACCINE IM: CPT | Performed by: INTERNAL MEDICINE

## 2023-02-14 PROCEDURE — 3074F SYST BP LT 130 MM HG: CPT | Performed by: INTERNAL MEDICINE

## 2023-02-14 PROCEDURE — 3078F DIAST BP <80 MM HG: CPT | Performed by: INTERNAL MEDICINE

## 2023-02-14 PROCEDURE — 90471 IMMUNIZATION ADMIN: CPT | Performed by: INTERNAL MEDICINE

## 2023-02-14 PROCEDURE — 99214 OFFICE O/P EST MOD 30 MIN: CPT | Performed by: INTERNAL MEDICINE

## 2023-02-14 RX ORDER — AMLODIPINE BESYLATE 10 MG/1
10 TABLET ORAL DAILY
Qty: 90 TABLET | Refills: 0 | Status: SHIPPED | OUTPATIENT
Start: 2023-02-14

## 2023-02-14 RX ORDER — METOPROLOL SUCCINATE 50 MG/1
TABLET, EXTENDED RELEASE ORAL
Qty: 90 TABLET | Refills: 1 | Status: SHIPPED | OUTPATIENT
Start: 2023-02-14

## 2023-02-14 RX ORDER — VENLAFAXINE HYDROCHLORIDE 75 MG/1
CAPSULE, EXTENDED RELEASE ORAL
Qty: 90 CAPSULE | Refills: 1 | Status: SHIPPED | OUTPATIENT
Start: 2023-02-14

## 2023-02-14 SDOH — ECONOMIC STABILITY: HOUSING INSECURITY
IN THE LAST 12 MONTHS, WAS THERE A TIME WHEN YOU DID NOT HAVE A STEADY PLACE TO SLEEP OR SLEPT IN A SHELTER (INCLUDING NOW)?: NO

## 2023-02-14 SDOH — ECONOMIC STABILITY: FOOD INSECURITY: WITHIN THE PAST 12 MONTHS, THE FOOD YOU BOUGHT JUST DIDN'T LAST AND YOU DIDN'T HAVE MONEY TO GET MORE.: NEVER TRUE

## 2023-02-14 SDOH — ECONOMIC STABILITY: FOOD INSECURITY: WITHIN THE PAST 12 MONTHS, YOU WORRIED THAT YOUR FOOD WOULD RUN OUT BEFORE YOU GOT MONEY TO BUY MORE.: NEVER TRUE

## 2023-02-14 SDOH — ECONOMIC STABILITY: INCOME INSECURITY: HOW HARD IS IT FOR YOU TO PAY FOR THE VERY BASICS LIKE FOOD, HOUSING, MEDICAL CARE, AND HEATING?: NOT HARD AT ALL

## 2023-02-14 ASSESSMENT — PATIENT HEALTH QUESTIONNAIRE - PHQ9
2. FEELING DOWN, DEPRESSED OR HOPELESS: 0
SUM OF ALL RESPONSES TO PHQ QUESTIONS 1-9: 0
SUM OF ALL RESPONSES TO PHQ9 QUESTIONS 1 & 2: 0
SUM OF ALL RESPONSES TO PHQ QUESTIONS 1-9: 0
1. LITTLE INTEREST OR PLEASURE IN DOING THINGS: 0

## 2023-02-14 NOTE — PROGRESS NOTES
Claudia Hatfield (:  1960) is a 65 y.o. female,Established patient, here for evaluation of the following chief complaint(s): Hypertension (6 months ), Allergic Rhinitis , and Other (Vitamin D def. )      ASSESSMENT/PLAN:   Diagnosis Orders   1. Essential hypertension  metoprolol succinate (TOPROL XL) 50 MG extended release tablet    amLODIPine (NORVASC) 10 MG tablet      2. Vitamin D deficiency  Vitamin D 25 Hydroxy      3. Menopausal hot flushes  venlafaxine (EFFEXOR XR) 75 MG extended release capsule      4. Non-seasonal allergic rhinitis, unspecified trigger        5. Need for pneumococcal vaccine  Pneumococcal, PCV20, PREVNAR 20, (age 25 yrs+), IM, PF    WI ADMIN PNEUMOCOCCAL VACCINE        Hypertension - BP controlled,on metoprolol and Norvasc - will continue. History of bradycardia - HR 68 today. CMP normal 23. Vitamin D deficiency - level at goal on 5,000 IU daily - will continue. Level normal at 37 22. Will follow level yearly - due with next set of labs. Menopausal hot flushes - controlled, on Effexor -will continue. Allergies - stable on Xyzal and encouraged Flonase to be used daily with flares. Multiple myeloma in remission - following with Oncology at [facility] - on Revlimid. On chronic Xarelto due to history of DVT right calf , and cancer.  - did discuss Tdap and shingles vaccine - consider getting at pharmacy. Will get Prevnar 20 in office today. Mammogram was done and benign 22. Return in about 6 months (around 2023). 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 Only allowed to lift 3#. Now watching granddaughter - in office today with  and granddaughter (baby). Hypertension - BP controlled, continue metoprolol and Norvasc, taken off lisinopril with renal decline when MM diagnosed.   BMP normal 2021, 5/2022, 2/2023. No lightheadedness or headaches. Hot flushes - improved on Effexor. Worse on steroids. No longer on steroids - still intermittent issues with hot flushes but tolerable - keep on Effexor at current dose. Vitamin D deficiency - on 5,000 IU daily. Normal level 1/22/21 at 43, 37 2/2022. Level due now. Multiple Myeloma - in remission per Dr. Faizan Maldonado note 12/2022- treated with [facility] and Dr. Faizan Maldonado as Mrpat left. Considering stem cell transplant when sees [facility] MD in 2/2022 - did not need it. She is currently still on Revlimid. 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 Seeing [facility] for this. She is on a Clorhexidine mouth wash for this prn. Finished with dentist at [facility] - small spot still an issue. Following with  dental in  [place] and they are following locally. Diarrhea - 4-8 stools a day, seems to be after eating x 2 weeks. She stopped magnesium, changing food -avoiding dairy. Could be from Revlimid. No history of C. Diff. On Imodium 1-2 a day - tolerable on this, worse with immunotherapy. She had colonoscopy with Dr. Freddy Key 12/2021 - due in 5 years. H/0 Anemia - due to MM - vitamin B 12 deficiency seen prior to MM dx - started on injections but level high at [facility] 7/2019 so changed to oral supplement 1000 mcg daily. Repeat 10/2019 still high. Off vitamin B12 - repeat level normal 7/2021. Normal Hg for at least last one year - Hg 13.5 5/2022, Hg 13.9 2/2023. Carotid doppler 6/2019- mild disease. Hyperhomocysteinemia - level at 23 - on folic acid 1 mg daily. Dr. Yonathan Almaguer stopped it. Asked her to address with Dr. Yonathan Almaguer at next visit. Addressed and not needed per patient. ALT 36 - mild elevation with follow up in 3-6 months. Normal 11/21/19 and most recently 12/2020, 8/2021, 5/2022, 2/2023. Nausea -  No longer on omeprazole - doing well.     History of DVT - due to hypercoag with MM - she is on chronic Xarelto. No bleeding or CVA symptoms - normal Hg. Allergies - on Zyrtec and Flonase daily. Review of Systems - General ROS: negative for - chills or fever  Psychological ROS: negative for - anxiety and depression  Hematological and Lymphatic ROS: No history of bleeding disorder, positive DVT. Respiratory ROS: no cough, shortness of breath, or wheezing  Cardiovascular ROS: no chest pain, positive MULLER but some deconditioning with cancer - better - able to walk a mile now - now MULLER now  Gastrointestinal ROS: Positive intermittent diarrhea with occasional cramping pain - helps to take Imodium daily, no blood in stool  Genito-Urinary ROS: no dysuria, trouble voiding, or hematuria  Musculoskeletal ROS: negative for - muscle pain or muscular weakness, positive left hip, back, knees- chronic  Neurological ROS: negative for - headaches, numbness/tingling, seizures or weakness  Dermatological ROS: negative for - rash or skin lesion changes     Vitals:    02/14/23 0937   BP: 128/80   Site: Left Upper Arm   Position: Sitting   Cuff Size: Medium Adult   Pulse: 68   Temp: 97.1 °F (36.2 °C)   Weight: 183 lb 12.8 oz (83.4 kg)   Height: 5' 3\" (1.6 m)       Physical Examination: General appearance -alert, well appearing, and in no distress  Mental status - alert, oriented to person, place, and time  Head - atraumatic, normocephalic  Neck - supple, no significant adenopathy  Chest - clear to auscultation, no wheezes, rales or rhonchi, symmetric air entry  Heart - normal rate, regular rhythm, normal S1, S2, no murmurs, rubs, clicks or gallops  Abdomen -soft, nontender, nondistended  Neurological - alert, normal speech   Extremities - peripheral pulses normal, no pedal edema, no clubbing or cyanosis  Skin - warm and dry    Diagnostic data - Reviewed labs 2/6/23, mammogram 11/30/22 with patient.       --Ashley Kuhn MD

## 2023-02-14 NOTE — PROGRESS NOTES
After obtaining consent, and per orders of Dr. Lurdes Purvis, injection of Prevnar 20 given in Left deltoid by Cesar Hassan LPN. Patient instructed to remain in clinic for 20 minutes afterwards, and to report any adverse reaction to me immediately. Patient tolerated well.

## 2023-02-21 RX ORDER — LENALIDOMIDE 10 MG/1
CAPSULE ORAL
Qty: 14 CAPSULE | Refills: 3 | Status: ACTIVE | OUTPATIENT
Start: 2023-02-21 | End: 2023-02-23 | Stop reason: SDUPTHER

## 2023-02-22 RX ORDER — LENALIDOMIDE 10 MG/1
CAPSULE ORAL
Qty: 14 CAPSULE | Refills: 3 | OUTPATIENT
Start: 2023-02-22

## 2023-02-23 RX ORDER — LENALIDOMIDE 10 MG/1
CAPSULE ORAL
Qty: 14 CAPSULE | Refills: 3 | Status: ACTIVE | OUTPATIENT
Start: 2023-02-23

## 2023-02-23 RX ORDER — LENALIDOMIDE 10 MG/1
CAPSULE ORAL
Qty: 14 CAPSULE | Refills: 3 | OUTPATIENT
Start: 2023-02-23

## 2023-03-14 RX ORDER — LENALIDOMIDE 10 MG/1
CAPSULE ORAL
Qty: 14 CAPSULE | Refills: 3 | Status: ACTIVE | OUTPATIENT
Start: 2023-03-14

## 2023-03-14 NOTE — PROGRESS NOTES
[address] [place] [address]  Dept: [phone]  Loc: [phone]   Hematology/Oncology Progress Note (Clinic)        Garry Jesús  1960    3/15/23    No ref. provider found   Milagro Wesley MD   Followed by Yasmin Lloyd MD, [address] [place] [address] myeloma clinic    Diagnosis:   -Multiple myeloma in remission. Dx: 2019  Presented with hypercalcemia, calcium 11, renal insufficiency with creatinine of 4.9 and multiple lytic lesions on skeletal survey. Bone marrow biopsy 7/15/2019: Plasma cells 30% of the cells on the aspirate and occupy 40% of the marrow cellularity consistent with kappa light chain restricted plasma cell myeloma. IgA equal to 656    -2019 right calf DVT; on Xarelto    Treatment:   -Plasmapheresis x2 because of light chains greater than 10,000 in 2019 at [facility] (2019 began)  -Velcade Decadron and Revlimid (RVD) began 2019. RVD x8.  -Autologous  stem cell collection 2020; No Stemcell transplant  NOT done thus far. -Maintenance Revlimid (began 3/17/2020)  Currently on : Revlimid 10 mg taken 2 weeks on and 1 week off  -Zometa Previously; held because of suspicion of jaw osteonecrosis  -Xarelto      Followable Disease:   -IgA, serum light chains . CBC CMP    2022  See below under lab section-no worsening i.e. no myeloma protein detected. .    2022  Serum fixation shows normal pattern with no monoclonal proteins. Kappa free light chains 24.88, lambda 20.89, ratio 1.19  Creatinine 0.8, calcium 9.4, hemoglobin 13.5    Comorbidities:  See below      Subjective: Transferred from Dr. Ghislaine Mistry last seen by her 2022. On maintenance Revlimid 10 mg given 2 weeks on 1 week off. Is asymptomatic. No bone or back pain. No trouble with frequent or severe infections. Next follow-up at [facility] for annual visit is around 2023.   Meanwhile she understands she will continue the Revlimid maintenance. In February seen by  Dr. Wali Martínez in [facility]; cont maintenance Revlimid . Reservr transplant for relapse. 2/6/23 myeloma parameters unremarkable. 2/6/23  S fklc- 27.6, ratio- 1.37/normal  Spep- nl  S fix- no mcp  Ig's- normal  Wbc- 3.39, hgb 13.9  Creat- .76, ca 9.9.    ROS:  Review of Systems 14 point negative except as above. PMH:   Past Medical History:   Diagnosis Date    Acute kidney injury ([facility] [address] 07/12/2019    Allergic rhinitis     Cancer ([facility] [facility] 2019    Myeloma with lytic lesions    Cancer ([facility] [address]     skin cancer from cheek january 2022    Congenital hip dislocation 1960    right leg is longer, better with weight loss    H/O umbilical hernia repair 46/23/6057    History of DVT of lower extremity 05/2019    right calf - on Xarelto chronically in setting of cancer    Hot flushes, perimenopausal     Hypertension 2011    IBS (irritable bowel syndrome)     ? 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  13.5 inches    Vitamin D deficiency         Social HX:   Social History     Socioeconomic History    Marital status:      Spouse name: Not on file    Number of children: Not on file    Years of education: Not on file    Highest education level: Not on file   Occupational History    Not on file   Tobacco Use    Smoking status: Never    Smokeless tobacco: Never   Vaping Use    Vaping Use: Never used   Substance and Sexual Activity    Alcohol use: Yes     Comment: Socially    Drug use: No    Sexual activity: Yes     Partners: Male   Other Topics Concern    Not on file   Social History Narrative    Not on file     Social Determinants of Health     Financial Resource Strain: Low Risk Difficulty of Paying Living Expenses: Not hard at all   Food Insecurity: No Food Insecurity    Worried About [address] in the Last Year: Never true    Ran Out of Food in the Last Year: Never true   Transportation Needs: Unknown    Lack of Transportation (Medical): Not on file    Lack of Transportation (Non-Medical): No   Physical Activity: Not on file   Stress: Not on file   Social Connections: Not on file   Intimate Partner Violence: Not on file   Housing Stability: Unknown    Unable to Pay for Housing in the Last Year: Not on file    Number of Places Lived in the Last Year: Not on file    Unstable Housing in the Last Year: No        Spouse: Luan Marroquin    Phone: [phone] [address]     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[employer]    Immunizations:  Immunization History   Administered Date(s) Administered    COVID-19, PFIZER Bivalent BOOSTER, DO NOT Dilute, (age 12y+), IM, 30 mcg/0.3 mL 12/30/2022    COVID-19, PFIZER PURPLE top, DILUTE for use, (age 15 y+), 30mcg/0.3mL 03/03/2021, 03/26/2021, 09/13/2021    Influenza, FLUARIX, FLULAVAL, FLUZONE (age 10 mo+) AND AFLURIA, (age 1 y+), PF, 0.5mL 10/12/2020    Pneumococcal conjugate PCV20, PF (Prevnar 20) 02/14/2023    Tdap (Boostrix, Adacel) 06/04/2012        Health Screenings:  Mammogram:  Results for orders placed during the hospital encounter of 11/19/21    [facility] DIGITAL SCREEN SELF REFERRAL W OR WO CAD BILATERAL    Narrative  LOCATION: [place]    PROCEDURE: [facility] DIGITAL SCREEN SELF REFERRAL W OR WO CAD BILATERAL    CLINICAL INFORMATION: Visit for screening mammogram. Tomosynthesis. PATIENT MEDICAL HISTORY: No relevant medical history has been documented for this patient. FAMILY HISTORY: No relevant family history has been documented for this patient.     RISK VALUES: Tyrer-Mary 10yr.: 3.7%, Tyrer-Mary life: 9.2%    COMPARISON: 6/23/2020, 12/3/2019, 11/27/2019, 11/7/2018, 11/1/2017, 10/11/2016    TECHNIQUE: Bilateral CC and MLO views of the breasts were obtained. 3D tomosynthesis was utilized. CAD was utilized. BREAST COMPOSITION: The tissue of the breast(s) is heterogeneously dense. This may lower the sensitivity of mammography. FINDINGS:    No significant masses, calcifications, or other findings are seen in either breast.    There has been no significant interval change. Impression  1. No mammographic evidence of malignancy. A 1 year screening mammogram is recommended. A result letter will be sent to the patient. She will also receive a reminder 1 month prior to her next mammogram.      BI-RADS CATEGORY 1: NEGATIVE. Management Recommendation: Routine annual screening mammography. **This report has been created using voice recognition software. It may contain minor errors which are inherent in voice recognition technology. **    Final report electronically signed by Dr. Irena Hyman on 2021 7:16 PM   No valid procedures specified. Pap / Pelvic: done  C-Scope: up to date  Prostate: No results found for: PSA, PSADIA     Gyn HX:   GPA:  ANAHI/BSO: ovaries intact  LMP: Patient's last menstrual period was 2004. Health Maintenance Due   Topic Date Due    Shingles vaccine (1 of 2) Never done    DTaP/Tdap/Td vaccine (2 - Td or Tdap) 2022        Interests: Yard work. Fam HX:   Family History   Problem Relation Age of Onset    High Blood Pressure Mother     High Cholesterol Mother     Cancer Father         skin cancer    Cancer Paternal Grandmother     Cancer Paternal Grandfather     Deep Vein Thrombosis Brother         PE        Hospitalizations:   None recent    Allergies:   Allergies   Allergen Reactions    Sulfa Antibiotics Rash        Adult Illness:  Patient Active Problem List   Diagnosis    Hypertension    Excessive tanning    Swelling of left lower extremity    Allergic rhinitis    Vitamin D deficiency    Elevated serum immunoglobulin free light chains    Acute appendicitis    S/P laparoscopic appendectomy    H/O umbilical hernia repair    Acute kidney injury ([facility] [address] [address]    Acute renal failure (HCC)    Hypercalcemia    Lytic lesion of bone on x-ray    Obesity, morbid, BMI 40.0-49.9 (HCC)    Multiple myeloma not having achieved remission (HCC)    Anemia due to stage 3 chronic kidney disease (HCC)    Hypogammaglobulinemia (HCC)    Aseptic necrosis of jaw (HCC)    Increased immunoglobulin        Surgery:  Past Surgical History:   Procedure Laterality Date    HIP SURGERY  1978    Congenital Hip multiple surgies since 7 weeks old. Last surgery 1978     HYSTERECTOMY (CERVIX STATUS UNKNOWN)  2004    Partial Irregular Periods with migraines. KNEE SURGERY Right [address]    stapled to help straighten leg(congenital hip issues)    NV LAPAROSCOPIC APPENDECTOMY N/A 7/15/2018    APPENDECTOMY LAPAROSCOPIC performed by Darius Mahan MD at [address] [place] [address]        Medications:  Current Outpatient Medications   Medication Sig Dispense Refill    lenalidomide (REVLIMID) 10 MG chemo capsule TAKE 1 CAPSULE BY MOUTH ONCE DAILY FOR 14 DAYS ON AND 7 DAYS OFF 14 capsule 3    metoprolol succinate (TOPROL XL) 50 MG extended release tablet TAKE ONE TABLET BY MOUTH DAILY 90 tablet 1    venlafaxine (EFFEXOR XR) 75 MG extended release capsule TAKE 1 CAPSULE BY MOUTH EVERY DAY 90 capsule 1    amLODIPine (NORVASC) 10 MG tablet Take 1 tablet by mouth daily 90 tablet 0    rivaroxaban (XARELTO) 10 MG TABS tablet Take 1 tablet by mouth daily (with breakfast) 30 tablet 5    cetirizine (ZYRTEC) 10 MG tablet Take 10 mg by mouth daily      loperamide (IMODIUM) 2 MG capsule Take 2 mg by mouth 4 times daily as needed for Diarrhea      acetaminophen (TYLENOL) 500 MG tablet Take 500 mg by mouth every 6 hours as needed for Pain      Calcium 600-200 MG-UNIT TABS Take 600 mg by mouth daily 30 tablet 0    econazole nitrate 1 % cream Apply topically Apply topically daily.       VITAMIN D, ERGOCALCIFEROL, PO Take 5,000 Units by mouth daily       No current facility-administered medications for this visit. EXAM:   vitals were not taken for this visit. Estimated body surface area is 1.93 meters squared as calculated from the following:    Height as of 23: 5' 3\" (1.6 m). Weight as of 23: 183 lb 12.8 oz (83.4 kg). ECO    General: Non-ill appearing. Very pleasant appears younger than her stated age. HEENT: NC/AT,nonicteric,   Neck: normal thyroid, no masses  Nodes: No adenopathy  Lungs/chest: clear, no rales,rhonchi or wheezing, lung bases clear  CV: rrr, no rubs ,gallops or murmurs  Breasts: Not examined  Abd/Rectal: soft, non-tender,bowel sounds normal , no HSM,no masses  Back: normal curvature, No midline tenderness. flanks nontender  : Not Examined  Extremities: no cyanosis,clubbing or edema. Skin: unremarkable  Neuro: A and O x 4, CN exam nonfocal, Motor- no deficits, Sensory- no deficits, gait-nl, speech- fluent, no ataxia.   Devices: none    DATA:  LAB:     CBC with Differential:      Lab Results   Component Value Date/Time    WBC 3.4 2022 02:25 PM    RBC 4.09 2022 02:25 PM    RBC 2.42 2019 08:05 AM    HGB 13.7 2022 02:25 PM    HCT 40.9 2022 02:25 PM     2022 02:25 PM     2022 02:25 PM    MCH 33.5 2022 02:25 PM    MCHC 33.5 2022 02:25 PM    RDW 13.1 2022 02:25 PM    NRBC 0 2021 10:58 AM    SEGSPCT 54.2 2021 10:58 AM    LYMPHOPCT 16.5 2019 08:05 AM    MONOPCT 18.9 2021 10:58 AM    MONOPCT 4.4 2013 12:00 AM    EOSPCT 1.8 2019 08:05 AM    BASOPCT 0.3 2019 08:05 AM    MONOSABS 0.3 2022 02:25 PM    LYMPHSABS 0.8 2022 02:25 PM    EOSABS 0.2 2022 02:25 PM    BASOSABS 0.0 2022 02:25 PM    DIFFTYPE see below 2019 04:12 AM      Lab Results   Component Value Date/Time    SEGSABS 2.2 2022 02:25 PM       CMP:    Lab Results   Component Value Date/Time     12/08/2022 02:25 PM    K 3.9 12/08/2022 02:25 PM     12/08/2022 02:25 PM    CO2 25 12/08/2022 02:25 PM    BUN 16 12/08/2022 02:25 PM    CREATININE 0.8 12/08/2022 02:25 PM    LABGLOM >60 12/08/2022 02:25 PM    GLUCOSE 92 12/08/2022 02:25 PM    GLUCOSE 92 07/11/2019 08:05 AM    PROT 7.0 12/08/2022 02:25 PM    LABALBU 4.1 12/08/2022 02:25 PM    CALCIUM 9.9 12/08/2022 02:25 PM    BILITOT 0.3 12/08/2022 02:25 PM    BILITOT NEGATIVE 08/04/2015 08:03 AM    ALKPHOS 77 12/08/2022 02:25 PM    AST 18 12/08/2022 02:25 PM    ALT 23 12/08/2022 02:25 PM       BMP:    Lab Results   Component Value Date/Time     12/08/2022 02:25 PM    K 3.9 12/08/2022 02:25 PM     12/08/2022 02:25 PM    CO2 25 12/08/2022 02:25 PM    BUN 16 12/08/2022 02:25 PM    LABALBU 4.1 12/08/2022 02:25 PM    CREATININE 0.8 12/08/2022 02:25 PM    CALCIUM 9.9 12/08/2022 02:25 PM    LABGLOM >60 12/08/2022 02:25 PM    GLUCOSE 92 12/08/2022 02:25 PM    GLUCOSE 92 07/11/2019 08:05 AM       Magnesium:    Lab Results   Component Value Date/Time    MG 1.6 12/19/2019 04:12 AM     PT/INR:  No results found for: PROTIME, INR  TSH:    Lab Results   Component Value Date/Time    TSH 2.500 08/04/2016 08:06 AM     VITAMIN B12: No components found for: B12  FOLATE:    Lab Results   Component Value Date/Time    FOLATE 13.7 07/29/2021 10:58 AM     IRON:  No results found for: IRON  Iron Saturation:  No components found for: PERCENTFE  TIBC:    Lab Results   Component Value Date/Time    TIBC 241 06/18/2019 07:15 AM     FERRITIN:    Lab Results   Component Value Date/Time    FERRITIN 452 06/18/2019 07:15 AM   2/6/23  S fklc- 27.6, ratio- 1.37/normal  Spep- nl  S fix- no mcp  Ig's- normal  Wbc- 3.39, hgb 13.9  Creat- .76, ca 9.9.     12/8/2022  Serum protein electrophoresis and immune fixation showed a normal pattern with no monoclonal protein seen.   White count 3.4, hemoglobin 13.7, , platelets 796  CMP includes creatinine 0.8, calcium 9.9  IgA-140/normal, IgG-858/normal, IgM-73/normal  Serum kappa light chains 30.5, lambda 21.6, ratio normal at 1.41. UPEP- no MCP.    5/11/2022  Serum fixation shows normal pattern with no monoclonal proteins. Kappa free light chains 24.88, lambda 20.89, ratio 1.19  Creatinine 0.8, calcium 9.4, hemoglobin 13.5    8/17/2020  SPEP immunofixation shows hypogammaglobulinemia but normal pattern with no monoclonal proteins. Kappa free light chains 24.37, lambda 20.97, ratio 1. 16. IMAGING:  None recent. PROCEDURES:  -Stem cell collection but no transplant yet at [facility] PATHOLOGY:   See above    GENETICS:  None    MOLECULAR:  None      ASSESSMENT/PLAN:    1: Diagnosis: Very pleasant 61-year-old female with diagnosis of multiple myeloma as detailed above. Treatment reviewed above as well. She has had stem cell collections done; no transplant yet. Followed closely by Dr. Oliverio Nation. [place] transplant for first sign of relapse. 2) Prognosis / Disease Status: Multiple myeloma currently in remission. 3) Work-up:    Labs: 2/6/23  myeloma parameters drawn and reviewed above and are stable. Next labs 6/1/23. Imaging: None needed    Procedures: None   Consults: None new   Other:    4) Symptom Management: She is asymptomatic      5) Supportive care provided. Level of care is appropriate. Teaching done today. 6) Treatment goal:      Treatment plan:      Continue maintenance Revlimid 10 mg 2 weeks on 1 week off      7) Medications reviewed. Prescriptions today:            No orders of the defined types were placed in this encounter. OARRS:  Controlled Substance Monitoring:    Acute and Chronic Pain Monitoring:   No flowsheet data found. 8) Research Options:       None      9) Other:        Keep follow-up at [facility] annually ; next feb 2024    10) Follow Up: Follow-up with me in 3 months; labs 2 wk before.         Benjie Howard MD

## 2023-03-15 ENCOUNTER — HOSPITAL ENCOUNTER (OUTPATIENT)
Dept: INFUSION THERAPY | Age: 63
Discharge: HOME | End: 2023-03-15
Payer: COMMERCIAL

## 2023-03-15 ENCOUNTER — OFFICE VISIT (OUTPATIENT)
Dept: ONCOLOGY | Age: 63
End: 2023-03-15
Payer: COMMERCIAL

## 2023-03-15 VITALS
RESPIRATION RATE: 18 BRPM | TEMPERATURE: 98.3 F | DIASTOLIC BLOOD PRESSURE: 72 MMHG | SYSTOLIC BLOOD PRESSURE: 110 MMHG | HEART RATE: 68 BPM

## 2023-03-15 VITALS
OXYGEN SATURATION: 95 % | HEIGHT: 63 IN | WEIGHT: 184 LBS | SYSTOLIC BLOOD PRESSURE: 110 MMHG | BODY MASS INDEX: 32.6 KG/M2 | DIASTOLIC BLOOD PRESSURE: 72 MMHG | HEART RATE: 68 BPM | TEMPERATURE: 98.3 F | RESPIRATION RATE: 16 BRPM

## 2023-03-15 DIAGNOSIS — C90.01 MULTIPLE MYELOMA IN REMISSION (HCC): Primary | ICD-10-CM

## 2023-03-15 PROCEDURE — 99213 OFFICE O/P EST LOW 20 MIN: CPT | Performed by: INTERNAL MEDICINE

## 2023-03-15 PROCEDURE — 3074F SYST BP LT 130 MM HG: CPT | Performed by: INTERNAL MEDICINE

## 2023-03-15 PROCEDURE — 99211 OFF/OP EST MAY X REQ PHY/QHP: CPT

## 2023-03-15 PROCEDURE — 3078F DIAST BP <80 MM HG: CPT | Performed by: INTERNAL MEDICINE

## 2023-04-03 ENCOUNTER — TELEPHONE (OUTPATIENT)
Dept: ONCOLOGY | Age: 63
End: 2023-04-03

## 2023-04-03 RX ORDER — LENALIDOMIDE 10 MG/1
CAPSULE ORAL
Qty: 14 CAPSULE | Refills: 3 | Status: ACTIVE | OUTPATIENT
Start: 2023-04-03

## 2023-04-03 NOTE — TELEPHONE ENCOUNTER
[facility] called requesting lenalidomide be sent to them. It appears Dr. Guerita Hoover e-prescribed it today but to [facility] and [facility] is requesting it be changed and be sent to them. I advised a message would be sent.

## 2023-04-04 RX ORDER — LENALIDOMIDE 10 MG/1
CAPSULE ORAL
Qty: 14 CAPSULE | Refills: 3 | Status: ACTIVE | OUTPATIENT
Start: 2023-04-04

## 2023-04-17 ENCOUNTER — TELEPHONE (OUTPATIENT)
Dept: INFUSION THERAPY | Age: 63
End: 2023-04-17

## 2023-04-19 RX ORDER — LENALIDOMIDE 10 MG/1
CAPSULE ORAL
Qty: 14 CAPSULE | Refills: 0 | Status: SHIPPED | OUTPATIENT
Start: 2023-04-19

## 2023-04-27 RX ORDER — LENALIDOMIDE 10 MG/1
CAPSULE ORAL
Qty: 14 CAPSULE | OUTPATIENT
Start: 2023-04-27

## 2023-05-16 DIAGNOSIS — I10 ESSENTIAL HYPERTENSION: ICD-10-CM

## 2023-05-18 ENCOUNTER — TELEPHONE (OUTPATIENT)
Dept: INFUSION THERAPY | Age: 63
End: 2023-05-18

## 2023-05-18 RX ORDER — LENALIDOMIDE 10 MG/1
CAPSULE ORAL
Qty: 14 CAPSULE | Refills: 3 | Status: ACTIVE | OUTPATIENT
Start: 2023-05-18

## 2023-05-19 DIAGNOSIS — I10 ESSENTIAL HYPERTENSION: ICD-10-CM

## 2023-05-19 DIAGNOSIS — N95.1 MENOPAUSAL HOT FLUSHES: ICD-10-CM

## 2023-05-19 RX ORDER — AMLODIPINE BESYLATE 10 MG/1
TABLET ORAL
Qty: 90 TABLET | Refills: 1 | Status: SHIPPED | OUTPATIENT
Start: 2023-05-19

## 2023-05-19 RX ORDER — METOPROLOL SUCCINATE 50 MG/1
TABLET, EXTENDED RELEASE ORAL
Qty: 90 TABLET | Refills: 1 | Status: SHIPPED | OUTPATIENT
Start: 2023-05-19

## 2023-05-19 RX ORDER — VENLAFAXINE HYDROCHLORIDE 75 MG/1
CAPSULE, EXTENDED RELEASE ORAL
Qty: 90 CAPSULE | Refills: 1 | Status: SHIPPED | OUTPATIENT
Start: 2023-05-19

## 2023-05-19 RX ORDER — AMLODIPINE BESYLATE 10 MG/1
10 TABLET ORAL DAILY
Qty: 90 TABLET | Refills: 0 | OUTPATIENT
Start: 2023-05-19

## 2023-05-26 ENCOUNTER — TELEPHONE (OUTPATIENT)
Dept: INFUSION THERAPY | Age: 63
End: 2023-05-26

## 2023-05-26 NOTE — TELEPHONE ENCOUNTER
The patient left me a voicemail and I called her back. She's concerned about her insurance switching to Medicare on June 1 st. and what this means for her Revlimid and Xarelto charges. Once she receives her new ins. card, [facility] can process a test claim.

## 2023-05-31 ENCOUNTER — HOSPITAL ENCOUNTER (OUTPATIENT)
Age: 63
Discharge: HOME | End: 2023-05-31
Payer: COMMERCIAL

## 2023-05-31 DIAGNOSIS — C90.01 MULTIPLE MYELOMA IN REMISSION (HCC): ICD-10-CM

## 2023-05-31 LAB
ABSOLUTE IMMATURE GRANULOCYTE: 0.01 THOU/MM3 (ref 0–0.07)
ALBUMIN SERPL BCG-MCNC: 4.4 G/DL (ref 3.5–5.1)
ALP SERPL-CCNC: 84 U/L (ref 38–126)
ALT SERPL W/O P-5'-P-CCNC: 19 U/L (ref 11–66)
ANION GAP SERPL CALC-SCNC: 9 MEQ/L (ref 8–16)
AST SERPL-CCNC: 16 U/L (ref 5–40)
BASOPHILS ABSOLUTE: 0 THOU/MM3 (ref 0–0.1)
BASOPHILS NFR BLD AUTO: 1 % (ref 0–3)
BILIRUB SERPL-MCNC: 0.3 MG/DL (ref 0.3–1.2)
BUN SERPL-MCNC: 15 MG/DL (ref 7–22)
CALCIUM SERPL-MCNC: 9.4 MG/DL (ref 8.5–10.5)
CHLORIDE SERPL-SCNC: 102 MEQ/L (ref 98–111)
CO2 SERPL-SCNC: 27 MEQ/L (ref 23–33)
CREAT SERPL-MCNC: 0.7 MG/DL (ref 0.4–1.2)
EOSINOPHIL NFR BLD AUTO: 3 % (ref 0–4)
EOSINOPHILS ABSOLUTE: 0.1 THOU/MM3 (ref 0–0.4)
ERYTHROCYTE [DISTWIDTH] IN BLOOD BY AUTOMATED COUNT: 13.2 % (ref 11.5–14.5)
GFR SERPL CREATININE-BSD FRML MDRD: > 60 ML/MIN/1.73M2
GLUCOSE SERPL-MCNC: 94 MG/DL (ref 70–108)
HCT VFR BLD AUTO: 39.5 % (ref 37–47)
HGB BLD-MCNC: 13.1 GM/DL (ref 12–16)
IMMATURE GRANULOCYTES: 0 %
LYMPHOCYTES ABSOLUTE: 0.7 THOU/MM3 (ref 1–4.8)
LYMPHOCYTES NFR BLD AUTO: 16 % (ref 15–47)
MCH RBC QN AUTO: 33.2 PG (ref 26–33)
MCHC RBC AUTO-ENTMCNC: 33.2 GM/DL (ref 32.2–35.5)
MCV RBC AUTO: 100 FL (ref 81–99)
MONOCYTES ABSOLUTE: 0.4 THOU/MM3 (ref 0.4–1.3)
MONOCYTES NFR BLD AUTO: 10 % (ref 0–12)
NEUTROPHILS NFR BLD AUTO: 71 % (ref 43–75)
PLATELET # BLD AUTO: 202 THOU/MM3 (ref 130–400)
PMV BLD AUTO: 10.9 FL (ref 9.4–12.4)
POTASSIUM SERPL-SCNC: 3.7 MEQ/L (ref 3.5–5.2)
PROT SERPL-MCNC: 6.7 G/DL (ref 6.1–8)
RBC # BLD AUTO: 3.94 MILL/MM3 (ref 4.2–5.4)
SEGMENTED NEUTROPHILS ABSOLUTE COUNT: 3.2 THOU/MM3 (ref 1.8–7.7)
SODIUM SERPL-SCNC: 138 MEQ/L (ref 135–145)
WBC # BLD AUTO: 4.5 THOU/MM3 (ref 4.8–10.8)

## 2023-05-31 PROCEDURE — 84156 ASSAY OF PROTEIN URINE: CPT

## 2023-05-31 PROCEDURE — 82232 ASSAY OF BETA-2 PROTEIN: CPT

## 2023-05-31 PROCEDURE — 85025 COMPLETE CBC W/AUTO DIFF WBC: CPT

## 2023-05-31 PROCEDURE — 82784 ASSAY IGA/IGD/IGG/IGM EACH: CPT

## 2023-05-31 PROCEDURE — 86334 IMMUNOFIX E-PHORESIS SERUM: CPT

## 2023-05-31 PROCEDURE — 80053 COMPREHEN METABOLIC PANEL: CPT

## 2023-05-31 PROCEDURE — 83883 ASSAY NEPHELOMETRY NOT SPEC: CPT

## 2023-05-31 PROCEDURE — 36415 COLL VENOUS BLD VENIPUNCTURE: CPT

## 2023-05-31 PROCEDURE — 86335 IMMUNFIX E-PHORSIS/URINE/CSF: CPT

## 2023-05-31 PROCEDURE — 84165 PROTEIN E-PHORESIS SERUM: CPT

## 2023-05-31 PROCEDURE — 84155 ASSAY OF PROTEIN SERUM: CPT

## 2023-06-01 LAB
IGA SERPL-MCNC: 139 MG/DL (ref 70–400)
IGG SERPL-MCNC: 861 MG/DL (ref 700–1600)
IGM SERPL-MCNC: 59 MG/DL (ref 40–230)

## 2023-06-02 LAB
B2 MICROGLOB SERPL-MCNC: 1.8 MG/L (ref 0.6–2.4)
KAPPA/LAMBDA FREE LIGHT CHAINS: NORMAL

## 2023-06-03 LAB — PROTEIN ELECTROPHORESIS, URINE: NORMAL

## 2023-06-04 LAB — IMMUNOFIXATION WITH QUANT: NORMAL

## 2023-06-07 ENCOUNTER — TELEPHONE (OUTPATIENT)
Dept: INFUSION THERAPY | Age: 63
End: 2023-06-07

## 2023-06-07 NOTE — TELEPHONE ENCOUNTER
The patient phoned in her new Medicare plan info. so that [facility] can check her copays. I emailed [address]

## 2023-06-15 ENCOUNTER — HOSPITAL ENCOUNTER (OUTPATIENT)
Dept: INFUSION THERAPY | Age: 63
Discharge: HOME | End: 2023-06-15
Payer: MEDICARE

## 2023-06-15 VITALS
HEART RATE: 75 BPM | SYSTOLIC BLOOD PRESSURE: 144 MMHG | DIASTOLIC BLOOD PRESSURE: 76 MMHG | OXYGEN SATURATION: 95 % | RESPIRATION RATE: 14 BRPM | TEMPERATURE: 97.9 F

## 2023-06-15 PROBLEM — F33.9 MAJOR DEPRESSIVE DISORDER, RECURRENT, UNSPECIFIED (HCC): Status: ACTIVE | Noted: 2023-06-15

## 2023-06-15 PROBLEM — F33.1 MAJOR DEPRESSIVE DISORDER, RECURRENT, MODERATE (HCC): Status: ACTIVE | Noted: 2023-06-15

## 2023-06-15 PROBLEM — F33.0 MAJOR DEPRESSIVE DISORDER, RECURRENT, MILD (HCC): Status: ACTIVE | Noted: 2023-06-15

## 2023-06-15 PROCEDURE — 99211 OFF/OP EST MAY X REQ PHY/QHP: CPT

## 2023-07-06 RX ORDER — LENALIDOMIDE 10 MG/1
CAPSULE ORAL
Qty: 14 CAPSULE | Refills: 3 | Status: ACTIVE | OUTPATIENT
Start: 2023-07-06

## 2023-07-24 ENCOUNTER — TELEPHONE (OUTPATIENT)
Dept: INFUSION THERAPY | Age: 63
End: 2023-07-24

## 2023-07-24 RX ORDER — LENALIDOMIDE 10 MG/1
CAPSULE ORAL
Qty: 14 CAPSULE | Refills: 0 | Status: SHIPPED | OUTPATIENT
Start: 2023-07-24 | End: 2023-07-26 | Stop reason: SDUPTHER

## 2023-07-24 NOTE — TELEPHONE ENCOUNTER
I called the patient back to inform her that I requested an auth# to be obtained and a new script to be added for her Revlimid .

## 2023-07-26 ENCOUNTER — TELEPHONE (OUTPATIENT)
Dept: INFUSION THERAPY | Age: 63
End: 2023-07-26

## 2023-07-26 RX ORDER — LENALIDOMIDE 10 MG/1
CAPSULE ORAL
Qty: 14 CAPSULE | Refills: 0 | Status: ACTIVE | OUTPATIENT
Start: 2023-07-26

## 2023-07-26 NOTE — TELEPHONE ENCOUNTER
I informed the patient that Epic had a glitch yesterday and her Revlimid script bypassed [facility]'s procedure of obtaining a P.A.  and that I notified Monica Side so that this can be resolved.

## 2023-07-27 ENCOUNTER — TELEPHONE (OUTPATIENT)
Dept: INFUSION THERAPY | Age: 63
End: 2023-07-27

## 2023-07-27 NOTE — TELEPHONE ENCOUNTER
I returned a call from the patient asking the status of her Revlimid. I explained that [facility] is finishing up on a P.A. and then it will be forwarded to [facility]. She doesn't have doses for Saturday and beyond. I emailed [facility].

## 2023-08-14 RX ORDER — LENALIDOMIDE 10 MG/1
CAPSULE ORAL
Qty: 14 CAPSULE | Refills: 0 | Status: ACTIVE | OUTPATIENT
Start: 2023-08-14

## 2023-08-15 ENCOUNTER — OFFICE VISIT (OUTPATIENT)
Dept: INTERNAL MEDICINE CLINIC | Age: 63
End: 2023-08-15
Payer: MEDICARE

## 2023-08-15 VITALS
DIASTOLIC BLOOD PRESSURE: 76 MMHG | SYSTOLIC BLOOD PRESSURE: 118 MMHG | TEMPERATURE: 98 F | HEIGHT: 63 IN | HEART RATE: 64 BPM | BODY MASS INDEX: 32.82 KG/M2 | WEIGHT: 185.2 LBS

## 2023-08-15 DIAGNOSIS — D70.1 CHEMOTHERAPY-INDUCED NEUTROPENIA (HCC): ICD-10-CM

## 2023-08-15 DIAGNOSIS — I82.431 ACUTE DEEP VEIN THROMBOSIS (DVT) OF RIGHT POPLITEAL VEIN (HCC): ICD-10-CM

## 2023-08-15 DIAGNOSIS — T45.1X5A CHEMOTHERAPY-INDUCED NEUTROPENIA (HCC): ICD-10-CM

## 2023-08-15 DIAGNOSIS — I10 PRIMARY HYPERTENSION: Primary | ICD-10-CM

## 2023-08-15 DIAGNOSIS — N95.1 MENOPAUSAL HOT FLUSHES: ICD-10-CM

## 2023-08-15 DIAGNOSIS — E55.9 VITAMIN D DEFICIENCY: ICD-10-CM

## 2023-08-15 DIAGNOSIS — Z12.31 ENCOUNTER FOR SCREENING MAMMOGRAM FOR MALIGNANT NEOPLASM OF BREAST: ICD-10-CM

## 2023-08-15 PROBLEM — D63.1 ANEMIA DUE TO STAGE 3 CHRONIC KIDNEY DISEASE (HCC): Status: RESOLVED | Noted: 2019-10-14 | Resolved: 2023-08-15

## 2023-08-15 PROBLEM — N18.30 ANEMIA DUE TO STAGE 3 CHRONIC KIDNEY DISEASE (HCC): Status: RESOLVED | Noted: 2019-10-14 | Resolved: 2023-08-15

## 2023-08-15 PROCEDURE — 99214 OFFICE O/P EST MOD 30 MIN: CPT | Performed by: INTERNAL MEDICINE

## 2023-08-15 PROCEDURE — 3078F DIAST BP <80 MM HG: CPT | Performed by: INTERNAL MEDICINE

## 2023-08-15 PROCEDURE — 3074F SYST BP LT 130 MM HG: CPT | Performed by: INTERNAL MEDICINE

## 2023-08-15 PROCEDURE — 93000 ELECTROCARDIOGRAM COMPLETE: CPT | Performed by: INTERNAL MEDICINE

## 2023-08-15 RX ORDER — METOPROLOL SUCCINATE 50 MG/1
TABLET, EXTENDED RELEASE ORAL
Qty: 90 TABLET | Refills: 1 | Status: SHIPPED | OUTPATIENT
Start: 2023-08-15

## 2023-08-15 RX ORDER — AMLODIPINE BESYLATE 10 MG/1
10 TABLET ORAL DAILY
Qty: 90 TABLET | Refills: 1 | Status: SHIPPED | OUTPATIENT
Start: 2023-08-15

## 2023-08-15 RX ORDER — VENLAFAXINE HYDROCHLORIDE 75 MG/1
CAPSULE, EXTENDED RELEASE ORAL
Qty: 90 CAPSULE | Refills: 1 | Status: SHIPPED | OUTPATIENT
Start: 2023-08-15

## 2023-08-15 RX ORDER — LENALIDOMIDE 10 MG/1
CAPSULE ORAL
Qty: 14 CAPSULE | Refills: 0 | OUTPATIENT
Start: 2023-08-15

## 2023-08-15 NOTE — PROGRESS NOTES
due to history of DVT right calf 2019, and cancer. HM - did discuss flu, Tdap and shingles vaccine - consider getting at pharmacy. Given Prevnar 20 3/2023. Mammogram was done and benign 11/30/22. [address] dx - DVT right popliteal vein - no longer acute - stable on anticoagulation. Chemotherapy induced neutropenia-  stable WBC 4.5 - defer to hem/onc - on lower dose Revlimid. Medicare wellness 9/28/23.   Follow up every 6 months -2/2024.    --Julio César Borges MD

## 2023-08-15 NOTE — PATIENT INSTRUCTIONS
Finish shingles vaccine today. Due for Flu vaccine in the fall. You are also due for Td booster - it has been 10 year. Please look at end of life paperwork and check to see if you have a durable power of  for medical decisions. Call if back pain not better in a week - or if any shortness of breath.

## 2023-08-28 RX ORDER — RIVAROXABAN 10 MG/1
TABLET, FILM COATED ORAL
Qty: 30 TABLET | Refills: 0 | OUTPATIENT
Start: 2023-08-28

## 2023-08-28 RX ORDER — RIVAROXABAN 10 MG/1
10 TABLET, FILM COATED ORAL
Qty: 30 TABLET | Refills: 5 | Status: SHIPPED | OUTPATIENT
Start: 2023-08-28

## 2023-08-30 ENCOUNTER — HOSPITAL ENCOUNTER (OUTPATIENT)
Age: 63
Discharge: HOME | End: 2023-08-30
Payer: MEDICARE

## 2023-08-30 DIAGNOSIS — C90.01 MULTIPLE MYELOMA IN REMISSION (HCC): ICD-10-CM

## 2023-08-30 DIAGNOSIS — E55.9 VITAMIN D DEFICIENCY: ICD-10-CM

## 2023-08-30 LAB
25(OH)D3 SERPL-MCNC: 37 NG/ML (ref 30–100)
ABSOLUTE IMMATURE GRANULOCYTE: 0.02 THOU/MM3 (ref 0–0.07)
ALBUMIN SERPL BCG-MCNC: 4.3 G/DL (ref 3.5–5.1)
ALP SERPL-CCNC: 79 U/L (ref 38–126)
ALT SERPL W/O P-5'-P-CCNC: 23 U/L (ref 11–66)
ANION GAP SERPL CALC-SCNC: 12 MEQ/L (ref 8–16)
AST SERPL-CCNC: 18 U/L (ref 5–40)
BASOPHILS ABSOLUTE: 0 THOU/MM3 (ref 0–0.1)
BASOPHILS NFR BLD AUTO: 1 % (ref 0–3)
BILIRUB SERPL-MCNC: 0.2 MG/DL (ref 0.3–1.2)
BUN SERPL-MCNC: 16 MG/DL (ref 7–22)
CALCIUM SERPL-MCNC: 9.2 MG/DL (ref 8.5–10.5)
CHLORIDE SERPL-SCNC: 106 MEQ/L (ref 98–111)
CO2 SERPL-SCNC: 26 MEQ/L (ref 23–33)
CREAT SERPL-MCNC: 1 MG/DL (ref 0.4–1.2)
EOSINOPHIL NFR BLD AUTO: 4 % (ref 0–4)
EOSINOPHILS ABSOLUTE: 0.2 THOU/MM3 (ref 0–0.4)
ERYTHROCYTE [DISTWIDTH] IN BLOOD BY AUTOMATED COUNT: 13.1 % (ref 11.5–14.5)
GFR SERPL CREATININE-BSD FRML MDRD: > 60 ML/MIN/1.73M2
GLUCOSE SERPL-MCNC: 94 MG/DL (ref 70–108)
HCT VFR BLD AUTO: 39.9 % (ref 37–47)
HGB BLD-MCNC: 13.3 GM/DL (ref 12–16)
IMMATURE GRANULOCYTES: 1 %
LDH SERPL L TO P-CCNC: 153 U/L (ref 100–190)
LYMPHOCYTES ABSOLUTE: 0.8 THOU/MM3 (ref 1–4.8)
LYMPHOCYTES NFR BLD AUTO: 21 % (ref 15–47)
MCH RBC QN AUTO: 33.4 PG (ref 26–33)
MCHC RBC AUTO-ENTMCNC: 33.3 GM/DL (ref 32.2–35.5)
MCV RBC AUTO: 100 FL (ref 81–99)
MONOCYTES ABSOLUTE: 0.3 THOU/MM3 (ref 0.4–1.3)
MONOCYTES NFR BLD AUTO: 8 % (ref 0–12)
NEUTROPHILS NFR BLD AUTO: 65 % (ref 43–75)
PLATELET # BLD AUTO: 217 THOU/MM3 (ref 130–400)
PMV BLD AUTO: 11.3 FL (ref 9.4–12.4)
POTASSIUM SERPL-SCNC: 3.8 MEQ/L (ref 3.5–5.2)
PROT SERPL-MCNC: 6.6 G/DL (ref 6.1–8)
RBC # BLD AUTO: 3.98 MILL/MM3 (ref 4.2–5.4)
SEGMENTED NEUTROPHILS ABSOLUTE COUNT: 2.4 THOU/MM3 (ref 1.8–7.7)
SODIUM SERPL-SCNC: 144 MEQ/L (ref 135–145)
WBC # BLD AUTO: 3.7 THOU/MM3 (ref 4.8–10.8)

## 2023-08-30 PROCEDURE — 84165 PROTEIN E-PHORESIS SERUM: CPT

## 2023-08-30 PROCEDURE — 82784 ASSAY IGA/IGD/IGG/IGM EACH: CPT

## 2023-08-30 PROCEDURE — 82306 VITAMIN D 25 HYDROXY: CPT

## 2023-08-30 PROCEDURE — 80053 COMPREHEN METABOLIC PANEL: CPT

## 2023-08-30 PROCEDURE — 86334 IMMUNOFIX E-PHORESIS SERUM: CPT

## 2023-08-30 PROCEDURE — 84155 ASSAY OF PROTEIN SERUM: CPT

## 2023-08-30 PROCEDURE — 86335 IMMUNFIX E-PHORSIS/URINE/CSF: CPT

## 2023-08-30 PROCEDURE — 83883 ASSAY NEPHELOMETRY NOT SPEC: CPT

## 2023-08-30 PROCEDURE — 84156 ASSAY OF PROTEIN URINE: CPT

## 2023-08-30 PROCEDURE — 83615 LACTATE (LD) (LDH) ENZYME: CPT

## 2023-08-30 PROCEDURE — 85025 COMPLETE CBC W/AUTO DIFF WBC: CPT

## 2023-08-30 PROCEDURE — 36415 COLL VENOUS BLD VENIPUNCTURE: CPT

## 2023-08-30 RX ORDER — LENALIDOMIDE 10 MG/1
CAPSULE ORAL
Qty: 14 CAPSULE | Refills: 0 | Status: ACTIVE | OUTPATIENT
Start: 2023-08-30

## 2023-08-31 LAB
IGA SERPL-MCNC: 135 MG/DL (ref 70–400)
IGG SERPL-MCNC: 846 MG/DL (ref 700–1600)
IGM SERPL-MCNC: 44 MG/DL (ref 40–230)

## 2023-09-01 LAB — KAPPA/LAMBDA FREE LIGHT CHAINS: NORMAL

## 2023-09-02 LAB — PROTEIN ELECTROPHORESIS, URINE: NORMAL

## 2023-09-13 ENCOUNTER — OFFICE VISIT (OUTPATIENT)
Dept: ONCOLOGY | Age: 63
End: 2023-09-13
Payer: MEDICARE

## 2023-09-13 ENCOUNTER — HOSPITAL ENCOUNTER (OUTPATIENT)
Dept: INFUSION THERAPY | Age: 63
Discharge: HOME | End: 2023-09-13
Payer: MEDICARE

## 2023-09-13 VITALS
SYSTOLIC BLOOD PRESSURE: 121 MMHG | OXYGEN SATURATION: 98 % | DIASTOLIC BLOOD PRESSURE: 82 MMHG | HEART RATE: 68 BPM | RESPIRATION RATE: 16 BRPM | TEMPERATURE: 97.8 F

## 2023-09-13 VITALS
RESPIRATION RATE: 16 BRPM | HEART RATE: 68 BPM | WEIGHT: 184.2 LBS | TEMPERATURE: 97.8 F | DIASTOLIC BLOOD PRESSURE: 82 MMHG | BODY MASS INDEX: 32.64 KG/M2 | OXYGEN SATURATION: 98 % | SYSTOLIC BLOOD PRESSURE: 121 MMHG | HEIGHT: 63 IN

## 2023-09-13 DIAGNOSIS — C90.01 MULTIPLE MYELOMA IN REMISSION (HCC): Primary | ICD-10-CM

## 2023-09-13 PROCEDURE — 99211 OFF/OP EST MAY X REQ PHY/QHP: CPT

## 2023-09-13 PROCEDURE — 3074F SYST BP LT 130 MM HG: CPT | Performed by: INTERNAL MEDICINE

## 2023-09-13 PROCEDURE — 3079F DIAST BP 80-89 MM HG: CPT | Performed by: INTERNAL MEDICINE

## 2023-09-13 PROCEDURE — 99213 OFFICE O/P EST LOW 20 MIN: CPT | Performed by: INTERNAL MEDICINE

## 2023-09-13 NOTE — PROGRESS NOTES
4.3 08/30/2023 03:50 PM    CREATININE 1.0 08/30/2023 03:50 PM    CALCIUM 9.2 08/30/2023 03:50 PM    LABGLOM >60 08/30/2023 03:50 PM    GLUCOSE 94 08/30/2023 03:50 PM    GLUCOSE 92 07/11/2019 08:05 AM       Magnesium:    Lab Results   Component Value Date/Time    MG 1.6 12/19/2019 04:12 AM     PT/INR:  No results found for: \"PROTIME\", \"INR\"  TSH:    Lab Results   Component Value Date/Time    TSH 2.500 08/04/2016 08:06 AM     VITAMIN B12: No components found for: \"B12\"  FOLATE:    Lab Results   Component Value Date/Time    FOLATE 13.7 07/29/2021 10:58 AM     IRON:  No results found for: \"IRON\"  Iron Saturation:  No components found for: \"PERCENTFE\"  TIBC:    Lab Results   Component Value Date/Time    TIBC 241 06/18/2019 07:15 AM     FERRITIN:    Lab Results   Component Value Date/Time    FERRITIN 452 06/18/2019 07:15 AM     8/30/2023 labs stable-see above. 2/6/23  S fklc- 27.6, ratio- 1.37/normal  Spep- nl  S fix- no mcp  Ig's- normal  Wbc- 3.39, hgb 13.9  Creat- .76, ca 9.9.     12/8/2022  Serum protein electrophoresis and immune fixation showed a normal pattern with no monoclonal protein seen. White count 3.4, hemoglobin 13.7, , platelets 381  CMP includes creatinine 0.8, calcium 9.9  IgA-140/normal, IgG-858/normal, IgM-73/normal  Serum kappa light chains 30.5, lambda 21.6, ratio normal at 1.41. UPEP- no MCP.    5/11/2022  Serum fixation shows normal pattern with no monoclonal proteins. Kappa free light chains 24.88, lambda 20.89, ratio 1.19  Creatinine 0.8, calcium 9.4, hemoglobin 13.5    8/17/2020  SPEP immunofixation shows hypogammaglobulinemia but normal pattern with no monoclonal proteins. Kappa free light chains 24.37, lambda 20.97, ratio 1. 16. IMAGING:  None recent. PROCEDURES:  -Stem cell collection but no transplant yet at [facility].     PATHOLOGY:   See above    GENETICS:  None    MOLECULAR:  None      ASSESSMENT/PLAN:    1: Diagnosis: Very pleasant 59-year-old female with diagnosis of

## 2023-09-13 NOTE — PATIENT INSTRUCTIONS
Patient will return here on December 5 to have myeloma labs drawn  Follow-up with me 2 weeks later on December 19 to see me and review labs  Meanwhile continue Revlimid 10 mg days 1 through 14 every 21 days

## 2023-09-20 RX ORDER — LENALIDOMIDE 10 MG/1
CAPSULE ORAL
Qty: 14 CAPSULE | Refills: 0 | Status: ACTIVE | OUTPATIENT
Start: 2023-09-20

## 2023-09-28 ENCOUNTER — OFFICE VISIT (OUTPATIENT)
Dept: INTERNAL MEDICINE CLINIC | Age: 63
End: 2023-09-28
Payer: MEDICARE

## 2023-09-28 ENCOUNTER — TELEPHONE (OUTPATIENT)
Dept: INTERNAL MEDICINE CLINIC | Age: 63
End: 2023-09-28

## 2023-09-28 VITALS
WEIGHT: 182.6 LBS | HEART RATE: 64 BPM | SYSTOLIC BLOOD PRESSURE: 124 MMHG | DIASTOLIC BLOOD PRESSURE: 74 MMHG | BODY MASS INDEX: 32.36 KG/M2 | HEIGHT: 63 IN | TEMPERATURE: 98.3 F

## 2023-09-28 DIAGNOSIS — Z23 NEED FOR INFLUENZA VACCINATION: ICD-10-CM

## 2023-09-28 DIAGNOSIS — Z00.00 WELCOME TO MEDICARE PREVENTIVE VISIT: Primary | ICD-10-CM

## 2023-09-28 DIAGNOSIS — Z71.89 COUNSELING REGARDING END OF LIFE DECISION MAKING: ICD-10-CM

## 2023-09-28 PROCEDURE — G0402 INITIAL PREVENTIVE EXAM: HCPCS | Performed by: INTERNAL MEDICINE

## 2023-09-28 PROCEDURE — 3078F DIAST BP <80 MM HG: CPT | Performed by: INTERNAL MEDICINE

## 2023-09-28 PROCEDURE — 90674 CCIIV4 VAC NO PRSV 0.5 ML IM: CPT | Performed by: INTERNAL MEDICINE

## 2023-09-28 PROCEDURE — G0008 ADMIN INFLUENZA VIRUS VAC: HCPCS | Performed by: INTERNAL MEDICINE

## 2023-09-28 PROCEDURE — 3074F SYST BP LT 130 MM HG: CPT | Performed by: INTERNAL MEDICINE

## 2023-09-28 ASSESSMENT — PATIENT HEALTH QUESTIONNAIRE - PHQ9
5. POOR APPETITE OR OVEREATING: 0
7. TROUBLE CONCENTRATING ON THINGS, SUCH AS READING THE NEWSPAPER OR WATCHING TELEVISION: 0
9. THOUGHTS THAT YOU WOULD BE BETTER OFF DEAD, OR OF HURTING YOURSELF: 0
4. FEELING TIRED OR HAVING LITTLE ENERGY: 2
3. TROUBLE FALLING OR STAYING ASLEEP: 1
SUM OF ALL RESPONSES TO PHQ9 QUESTIONS 1 & 2: 0
8. MOVING OR SPEAKING SO SLOWLY THAT OTHER PEOPLE COULD HAVE NOTICED. OR THE OPPOSITE, BEING SO FIGETY OR RESTLESS THAT YOU HAVE BEEN MOVING AROUND A LOT MORE THAN USUAL: 0
SUM OF ALL RESPONSES TO PHQ QUESTIONS 1-9: 3
SUM OF ALL RESPONSES TO PHQ QUESTIONS 1-9: 3
10. IF YOU CHECKED OFF ANY PROBLEMS, HOW DIFFICULT HAVE THESE PROBLEMS MADE IT FOR YOU TO DO YOUR WORK, TAKE CARE OF THINGS AT HOME, OR GET ALONG WITH OTHER PEOPLE: 0
2. FEELING DOWN, DEPRESSED OR HOPELESS: 0
SUM OF ALL RESPONSES TO PHQ QUESTIONS 1-9: 3
6. FEELING BAD ABOUT YOURSELF - OR THAT YOU ARE A FAILURE OR HAVE LET YOURSELF OR YOUR FAMILY DOWN: 0
1. LITTLE INTEREST OR PLEASURE IN DOING THINGS: 0
SUM OF ALL RESPONSES TO PHQ QUESTIONS 1-9: 3

## 2023-09-28 ASSESSMENT — LIFESTYLE VARIABLES
HOW MANY STANDARD DRINKS CONTAINING ALCOHOL DO YOU HAVE ON A TYPICAL DAY: 1 OR 2
HOW OFTEN DO YOU HAVE A DRINK CONTAINING ALCOHOL: MONTHLY OR LESS

## 2023-09-28 NOTE — PATIENT INSTRUCTIONS
Eliminate cookies and cakes to help lose weight.  loose rugs and toys to prevent falls. Suggest Tdap and COVID vaccine if ok with Oncology. Advance Directives: Care Instructions  Overview  An advance directive is a legal way to state your wishes at the end of your life. It tells your family and your doctor what to do if you can't say what you want. There are two main types of advance directives. You can change them any time your wishes change. Living will. This form tells your family and your doctor your wishes about life support and other treatment. The form is also called a declaration. Medical power of . This form lets you name a person to make treatment decisions for you when you can't speak for yourself. This person is called a health care agent (health care proxy, health care surrogate). The form is also called a durable power of  for health care. If you do not have an advance directive, decisions about your medical care may be made by a family member, or by a doctor or a  who doesn't know you. It may help to think of an advance directive as a gift to the people who care for you. If you have one, they won't have to make tough decisions by themselves. For more information, including forms for your state, see the [address] [place] [address] website (www.caringinfo.org/planning/advance-directives/). Follow-up care is a key part of your treatment and safety. Be sure to make and go to all appointments, and call your doctor if you are having problems. It's also a good idea to know your test results and keep a list of the medicines you take. What should you include in an advance directive? Many states have a unique advance directive form. (It may ask you to address specific issues.) Or you might use a universal form that's approved by many states. If your form doesn't tell you what to address, it may be hard to know what to include in your advance directive.  Use the questions below to

## 2023-09-28 NOTE — TELEPHONE ENCOUNTER
Patient aware of her [employer] and [facility] negotiations and if an agreement is not met [facility] will be out of network. The patient states she is working on a continuity of care so that she can continue to see her cancer doctors in the [facility]. She states she is not sure if she will change her insurance carrier going forward. I encouraged the patient to let us know if she does. The patient understands.

## 2023-09-29 ENCOUNTER — CLINICAL DOCUMENTATION (OUTPATIENT)
Dept: SPIRITUAL SERVICES | Facility: CLINIC | Age: 63
End: 2023-09-29

## 2023-09-29 NOTE — ACP (ADVANCE CARE PLANNING)
Advance Care Planning   Ambulatory ACP Specialist Patient Outreach    Date:  9/29/2023    ACP Specialist:  Evan Champagne    Outreach call to patient in follow-up to ACP Specialist referral from:Dinora Saleh MD    [x] PCP  [] Provider   [] Ambulatory Care Management [] Other     For:                  [x] Advance Directive Assistance              [] Complete Portable DNR order              [] Complete POST/POLST/MOST              [] Code Status Discussion             [x] Discuss Goals of Care             [] Early ACP Decision-Making              [] Other (Specify)    Date Referral Received: 9/28/2023    Next Step:   [x] ACP scheduled conversation  [] Outreach again in one week               [] Email / Mail [address]  [] Email / Mail Advance Directive   [] Closing referral.  Routing closure to referring provider/staff and to ACP Specialist . [] Closure letter mailed to patient with invitation to contact ACP Specialist if / when ready. [] Other (Specify here):         [x] At this time, Healthcare Decision Maker Is:        [] Primary agent named in scanned advance directive. [x] Legal Next of Kin. [] Unable to determine legal decision maker at this time. Outreaches:       [x] 1st -  Date:   9/29/2023               Intervention:  [x] Spoke with Patient   [] Left Voice mail [] Email / Mail    [] [employer]  [] Other [phone] : Outcomes:  made an initial attempt to contact Rashawn Will via telephone call to offer support, if desired, for the completion of Advance Directives documents as part of an [address] [place] [address] conversation. Rashawn Will expressed interest in the completion of documents for self, as well as for her  Shefali Burkett.  briefly explained documents for clarity and greater understading, as well as information needed for completion. An appointment is scheduled for both of them on 10/3 at [facility].         Thank you for this referral.

## 2023-10-03 ENCOUNTER — CLINICAL DOCUMENTATION (OUTPATIENT)
Dept: SPIRITUAL SERVICES | Facility: CLINIC | Age: 63
End: 2023-10-03

## 2023-10-03 NOTE — ACP (ADVANCE CARE PLANNING)
Advance Care Planning   Advance Care Planning Note  Ambulatory Spiritual Care Services    Date:  10/3/2023    Received request from [facility] Provider. Consultation conversation participants:   Patient who understands ACP conversation  Spouse     Goals of ACP Conversation:  Discuss advance care planning documents  Facilitate a discussion related to patient's goals of care as they align with the patient's values and beliefs. Health Care Decision Makers:      Primary Decision Maker: Sheree Villeda - [phone]    Secondary Decision Maker: Celyiza Szymanski - Child - [phone]    Supplemental (Other) Decision Maker: Retta Bloch Child - [phone]   Summary:  Completed [address] [facility]    Advance Care Planning Documents (Patient Wishes)  Currently on file:   Healthcare Power of /Advance Directive Appointment of Kaycee [facility] [place]    Assessment:    met with Summer Miller to provide support for the completion of Advance Directives documents as part of an [facility] conversation. She was alert and oriented with decision-making capacity to express her wishes at this time. Interventions:  Provided education on documents for clarity and greater understanding  Assisted in the completion of documents according to patient's wishes at this time  Encouraged ongoing ACP conversation with future decision makers and loved ones    Care Preferences Communicated:  Ventilation:   If the patient, in their present state of health, suddenly became very ill and unable to breathe on their own,     the patient would desire the use of a ventilator (breathing machine). If their health worsens and it becomes clear that the change of recovery is unlikely,     the patient would desire the use of a ventilator (breathing machine).     Resuscitation:  In the event the patient's heart stopped as a result of an underlying serious health condition, the patient communicates a

## 2023-10-12 RX ORDER — LENALIDOMIDE 10 MG/1
CAPSULE ORAL
Qty: 14 CAPSULE | Refills: 0 | Status: ACTIVE | OUTPATIENT
Start: 2023-10-12

## 2023-10-31 RX ORDER — LENALIDOMIDE 10 MG/1
CAPSULE ORAL
Qty: 14 CAPSULE | Refills: 0 | Status: ACTIVE | OUTPATIENT
Start: 2023-10-31

## 2023-11-22 RX ORDER — LENALIDOMIDE 10 MG/1
CAPSULE ORAL
Qty: 14 CAPSULE | Refills: 0 | Status: ACTIVE | OUTPATIENT
Start: 2023-11-22

## 2023-12-05 ENCOUNTER — NURSE ONLY (OUTPATIENT)
Dept: LAB | Age: 63
End: 2023-12-05

## 2023-12-05 DIAGNOSIS — C90.01 MULTIPLE MYELOMA IN REMISSION (HCC): ICD-10-CM

## 2023-12-05 LAB
ALBUMIN SERPL BCG-MCNC: 4.2 G/DL (ref 3.5–5.1)
ALP SERPL-CCNC: 83 U/L (ref 38–126)
ALT SERPL W/O P-5'-P-CCNC: 22 U/L (ref 11–66)
ANION GAP SERPL CALC-SCNC: 9 MEQ/L (ref 8–16)
AST SERPL-CCNC: 18 U/L (ref 5–40)
BASOPHILS ABSOLUTE: 0 THOU/MM3 (ref 0–0.1)
BASOPHILS NFR BLD AUTO: 1 %
BILIRUB SERPL-MCNC: 0.3 MG/DL (ref 0.3–1.2)
BUN SERPL-MCNC: 12 MG/DL (ref 7–22)
CALCIUM SERPL-MCNC: 9.6 MG/DL (ref 8.5–10.5)
CHLORIDE SERPL-SCNC: 106 MEQ/L (ref 98–111)
CO2 SERPL-SCNC: 26 MEQ/L (ref 23–33)
CREAT SERPL-MCNC: 0.8 MG/DL (ref 0.4–1.2)
DEPRECATED RDW RBC AUTO: 49.4 FL (ref 35–45)
EOSINOPHIL NFR BLD AUTO: 10.4 %
EOSINOPHILS ABSOLUTE: 0.3 THOU/MM3 (ref 0–0.4)
ERYTHROCYTE [DISTWIDTH] IN BLOOD BY AUTOMATED COUNT: 13.2 % (ref 11.5–14.5)
GFR SERPL CREATININE-BSD FRML MDRD: > 60 ML/MIN/1.73M2
GLUCOSE SERPL-MCNC: 91 MG/DL (ref 70–108)
HCT VFR BLD AUTO: 42.7 % (ref 37–47)
HGB BLD-MCNC: 14.2 GM/DL (ref 12–16)
IMM GRANULOCYTES # BLD AUTO: 0.02 THOU/MM3 (ref 0–0.07)
IMM GRANULOCYTES NFR BLD AUTO: 0.6 %
LYMPHOCYTES ABSOLUTE: 0.8 THOU/MM3 (ref 1–4.8)
LYMPHOCYTES NFR BLD AUTO: 26.3 %
MCH RBC QN AUTO: 33.7 PG (ref 26–33)
MCHC RBC AUTO-ENTMCNC: 33.3 GM/DL (ref 32.2–35.5)
MCV RBC AUTO: 101.4 FL (ref 81–99)
MONOCYTES ABSOLUTE: 0.8 THOU/MM3 (ref 0.4–1.3)
MONOCYTES NFR BLD AUTO: 26.3 %
NEUTROPHILS NFR BLD AUTO: 35.4 %
NRBC BLD AUTO-RTO: 0 /100 WBC
PLATELET # BLD AUTO: 207 THOU/MM3 (ref 130–400)
PMV BLD AUTO: 11.7 FL (ref 9.4–12.4)
POTASSIUM SERPL-SCNC: 3.6 MEQ/L (ref 3.5–5.2)
PROT SERPL-MCNC: 7.1 G/DL (ref 6.1–8)
RBC # BLD AUTO: 4.21 MILL/MM3 (ref 4.2–5.4)
SEGMENTED NEUTROPHILS ABSOLUTE COUNT: 1.1 THOU/MM3 (ref 1.8–7.7)
SODIUM SERPL-SCNC: 141 MEQ/L (ref 135–145)
WBC # BLD AUTO: 3.1 THOU/MM3 (ref 4.8–10.8)

## 2023-12-06 LAB
IGA SERPL-MCNC: 140 MG/DL (ref 70–400)
IGG SERPL-MCNC: 875 MG/DL (ref 700–1600)
IGM SERPL-MCNC: 46 MG/DL (ref 40–230)
REASON FOR REJECTION: NORMAL
REJECTED TEST: NORMAL

## 2023-12-07 LAB — KAPPA/LAMBDA FREE LIGHT CHAINS: NORMAL

## 2023-12-09 LAB
ALBUMIN SERPL ELPH-MCNC: 4.22 G/DL (ref 3.75–5.01)
ALPHA1 GLOB SERPL ELPH-MCNC: 0.31 G/DL (ref 0.19–0.46)
ALPHA2 GLOB SERPL ELPH-MCNC: 0.63 G/DL (ref 0.48–1.05)
B-GLOBULIN SERPL ELPH-MCNC: 0.82 G/DL (ref 0.48–1.1)
CA-I SERPL ISE-SCNC: NORMAL MMOL/L
GAMMA GLOB SERPL ELPH-MCNC: 0.82 G/DL (ref 0.62–1.51)
INTERPRETATION SERPL IFE-IMP: NORMAL
M PROTEIN SERPL ELPH-MCNC: NORMAL G/DL
MONOCLON BAND OBS SERPL: NORMAL
PATHOLOGY STUDY: NORMAL
PROT SERPL-MCNC: 6.8 G/DL (ref 6.3–8.2)

## 2023-12-11 LAB — PROTEIN ELECTROPHORESIS, URINE: NORMAL

## 2023-12-11 RX ORDER — LENALIDOMIDE 10 MG/1
CAPSULE ORAL
Qty: 14 CAPSULE | Refills: 0 | Status: ACTIVE | OUTPATIENT
Start: 2023-12-11

## 2023-12-12 ENCOUNTER — HOSPITAL ENCOUNTER (OUTPATIENT)
Dept: WOMENS IMAGING | Age: 63
Discharge: HOME | End: 2023-12-12
Attending: INTERNAL MEDICINE
Payer: MEDICARE

## 2023-12-12 VITALS — BODY MASS INDEX: 30.73 KG/M2 | WEIGHT: 180 LBS | HEIGHT: 64 IN

## 2023-12-12 DIAGNOSIS — Z12.31 ENCOUNTER FOR SCREENING MAMMOGRAM FOR MALIGNANT NEOPLASM OF BREAST: ICD-10-CM

## 2023-12-12 PROCEDURE — 77063 BREAST TOMOSYNTHESIS BI: CPT

## 2023-12-12 RX ORDER — LENALIDOMIDE 10 MG/1
CAPSULE ORAL
Qty: 14 CAPSULE | Refills: 0 | OUTPATIENT
Start: 2023-12-12

## 2023-12-19 ENCOUNTER — HOSPITAL ENCOUNTER (OUTPATIENT)
Dept: INFUSION THERAPY | Age: 63
Discharge: HOME | End: 2023-12-19
Payer: MEDICARE

## 2023-12-19 VITALS
DIASTOLIC BLOOD PRESSURE: 76 MMHG | SYSTOLIC BLOOD PRESSURE: 150 MMHG | OXYGEN SATURATION: 95 % | RESPIRATION RATE: 16 BRPM | TEMPERATURE: 98.3 F | HEART RATE: 67 BPM

## 2023-12-19 PROCEDURE — 99211 OFF/OP EST MAY X REQ PHY/QHP: CPT

## 2024-01-03 RX ORDER — LENALIDOMIDE 10 MG/1
CAPSULE ORAL
Qty: 14 CAPSULE | Refills: 0 | Status: ACTIVE | OUTPATIENT
Start: 2024-01-03

## 2024-01-16 NOTE — TELEPHONE ENCOUNTER
Spoke with patient to inform her that Romelia Rodríguez can't fully process her application until June when her Medicare becomes active. [employer] will keep her application pending until they are notified that Medicare is active. Revlimid @no cost w/ [facility].
is mild-to-moderate multilevel spondylosis and facet arthropathy. SOFT TISSUES: There is no prevertebral soft tissue swelling.  There is mild interstitial edema.     1. No acute fracture. 2. Unchanged grade 1 multilevel listhesis. 3. Mild interstitial edema.     CT HEAD WO CONTRAST    Result Date: 1/16/2024  EXAMINATION: CT OF THE HEAD WITHOUT CONTRAST  1/16/2024 7:38 am TECHNIQUE: CT of the head was performed without the administration of intravenous contrast. Automated exposure control, iterative reconstruction, and/or weight based adjustment of the mA/kV was utilized to reduce the radiation dose to as low as reasonably achievable. COMPARISON: 20 December 2021 HISTORY: ORDERING SYSTEM PROVIDED HISTORY: fall with head trauma TECHNOLOGIST PROVIDED HISTORY: Reason for exam:->fall with head trauma Has a \"code stroke\" or \"stroke alert\" been called?->No Decision Support Exception - unselect if not a suspected or confirmed emergency medical condition->Emergency Medical Condition (MA) Reason for Exam: slipped out of recliner this am, denies hitting head, c/o back pain FINDINGS: BRAIN/VENTRICLES: There is no acute intracranial hemorrhage, mass effect or midline shift.  No abnormal extra-axial fluid collection.  The gray-white differentiation is maintained without evidence of an acute infarct.  There is no evidence of hydrocephalus. Decreased attenuation in the deep right and left cerebral white matter.  Prominent but symmetrical lateral ventricles and prominent sulci. ORBITS: The visualized portion of the orbits demonstrate no acute abnormality. SINUSES: The visualized paranasal sinuses and mastoid air cells demonstrate no acute abnormality. SOFT TISSUES/SKULL:  No acute abnormality of the visualized skull or soft tissues.  Hyperostosis frontalis.     No acute intracranial abnormality. No change, compared to the prior study.     XR HIP BILATERAL W AP PELVIS (2 VIEWS)    Result Date: 1/16/2024  EXAMINATION: ONE XRAY VIEW OF

## 2024-01-23 RX ORDER — LENALIDOMIDE 10 MG/1
CAPSULE ORAL
Qty: 14 CAPSULE | Refills: 0 | Status: ACTIVE | OUTPATIENT
Start: 2024-01-23

## 2024-01-25 ENCOUNTER — TELEPHONE (OUTPATIENT)
Dept: ONCOLOGY | Age: 64
End: 2024-01-25

## 2024-01-25 NOTE — TELEPHONE ENCOUNTER
Patient is scheduled for Moh's surgery on 02/08, she is wondering if and when she should hold the Xarelto.

## 2024-01-25 NOTE — TELEPHONE ENCOUNTER
Typically, we hold Xarelto for 2 days prior to procedure. Recommend she checks with Dr. Kearns' office as well for his recommendations prior to Mohs surgery.

## 2024-02-02 RX ORDER — ASPIRIN 81 MG/1
81 TABLET ORAL DAILY
COMMUNITY

## 2024-02-02 NOTE — PROGRESS NOTES
NPO after midnight  Bring insurance info and drivers license  Wear comfortable clean clothing  Do not bring jewelry  Shower night before and morning of surgery with a liquid antibacterial soap  Bring list of medications with dosage and how often taken  Follow all instructions given by your physician   needed at discharge  Please limit to 2 visitors for surgery  You must have a responsible adult with you day of surgery and for 24 hours after surgery  Call [phone] for any questions

## 2024-02-02 NOTE — PROGRESS NOTES
In preparation for their surgical procedure above patient was screened for Obstructive Sleep Apnea (CLARK) using the STOP-Bang Questionnaire by the Pre-Admission Testing department.  This is a pre-surgical screening tool for patient safety and serves as a recommendation, this WILL NOT cause cancellation of surgery.    STOP-Bang Questionnaire  * Do you currently see a pulmonologist?  No     If yes STOP, do not complete.  Patient follows with DrSera     1.  Do you snore loudly (able to be heard in the next room)?      No    2.  Do you often feel tired or sleepy during the daytime?          No       3.  Has anyone ever told you that you stop breathing during your sleep?       No    4.  Do you have or are you being treated for high blood pressure?          Yes      5.  BMI more than 35?  BMI (Calculated): 32        No    6.  Age over 50 years? 63 y.o.      Yes    7.  Neck Circumference greater than 17 inches for male or 16 inches for female?  Measured           (visits only)            Not Applicable    8.  Gender Male?                 No      TOTAL SCORE: 2    CLARK - Low Risk : Yes to 0 - 2 questions  CLARK - Intermediate Risk : Yes to 3 - 4 questions  CLARK - High Risk : Yes to 5 - 8 questions    Adapted from:   STOP Questionnaire: A Tool to Screen Patients for Obstructive Sleep Apnea   JERED Philip.TUSHAR.C.P.C., Quinton Valle M.B.B.S., Nic De M.D., Leidy Sanchez, Ph.D., SOFÍA Broussard.B.B.S., SOFÍA Howell.Sc., Alaina Shah M.D., Robb Richardson F.R.C.P.C.   Anesthesiology 2008; 108:812-21 Copyright 2008, the American Society of Anesthesiologists, Inc. [employer]ya Manohar [employer] Inc[employer]   ----------------------------------------------------------------------------------------------------------------

## 2024-02-02 NOTE — PROGRESS NOTES
EKG 8/15/23 given to anesthesia for review. Per Dr. Ankita londono to proceed at the surgery center 2/9 without further intervention

## 2024-02-09 ENCOUNTER — ANESTHESIA EVENT (OUTPATIENT)
Dept: OPERATING ROOM | Age: 64
End: 2024-02-09
Payer: MEDICARE

## 2024-02-09 ENCOUNTER — ANESTHESIA (OUTPATIENT)
Dept: OPERATING ROOM | Age: 64
End: 2024-02-09
Payer: MEDICARE

## 2024-02-09 ENCOUNTER — HOSPITAL ENCOUNTER (OUTPATIENT)
Age: 64
Setting detail: OUTPATIENT SURGERY
Discharge: HOME | End: 2024-02-09
Attending: SPECIALIST | Admitting: SPECIALIST
Payer: MEDICARE

## 2024-02-09 VITALS
OXYGEN SATURATION: 96 % | DIASTOLIC BLOOD PRESSURE: 65 MMHG | HEIGHT: 63 IN | SYSTOLIC BLOOD PRESSURE: 111 MMHG | WEIGHT: 183.6 LBS | HEART RATE: 60 BPM | BODY MASS INDEX: 32.53 KG/M2 | TEMPERATURE: 98.6 F | RESPIRATION RATE: 16 BRPM

## 2024-02-09 DIAGNOSIS — C44.311 BASAL CELL CARCINOMA OF NOSE: Primary | ICD-10-CM

## 2024-02-09 PROCEDURE — 2500000003 HC RX 250 WO HCPCS: Performed by: SPECIALIST

## 2024-02-09 PROCEDURE — 7100000010 HC PHASE II RECOVERY - FIRST 15 MIN: Performed by: SPECIALIST

## 2024-02-09 PROCEDURE — 6360000002 HC RX W HCPCS: Performed by: SPECIALIST

## 2024-02-09 PROCEDURE — 7100000011 HC PHASE II RECOVERY - ADDTL 15 MIN: Performed by: SPECIALIST

## 2024-02-09 PROCEDURE — 6360000002 HC RX W HCPCS: Performed by: ANESTHESIOLOGY

## 2024-02-09 PROCEDURE — 3600000012 HC SURGERY LEVEL 2 ADDTL 15MIN: Performed by: SPECIALIST

## 2024-02-09 PROCEDURE — 3600000002 HC SURGERY LEVEL 2 BASE: Performed by: SPECIALIST

## 2024-02-09 PROCEDURE — 2709999900 HC NON-CHARGEABLE SUPPLY: Performed by: SPECIALIST

## 2024-02-09 PROCEDURE — 3700000000 HC ANESTHESIA ATTENDED CARE: Performed by: SPECIALIST

## 2024-02-09 PROCEDURE — 3700000001 HC ADD 15 MINUTES (ANESTHESIA): Performed by: SPECIALIST

## 2024-02-09 PROCEDURE — 2580000003 HC RX 258: Performed by: ANESTHESIOLOGY

## 2024-02-09 RX ORDER — HYDROCODONE BITARTRATE AND ACETAMINOPHEN 5; 325 MG/1; MG/1
1 TABLET ORAL EVERY 6 HOURS PRN
Qty: 12 TABLET | Refills: 0 | Status: SHIPPED | OUTPATIENT
Start: 2024-02-09 | End: 2024-02-12

## 2024-02-09 RX ORDER — SODIUM CHLORIDE 9 MG/ML
INJECTION, SOLUTION INTRAVENOUS CONTINUOUS PRN
Status: DISCONTINUED | OUTPATIENT
Start: 2024-02-09 | End: 2024-02-09 | Stop reason: SDUPTHER

## 2024-02-09 RX ORDER — LIDOCAINE HYDROCHLORIDE AND EPINEPHRINE 10; 10 MG/ML; UG/ML
INJECTION, SOLUTION INFILTRATION; PERINEURAL PRN
Status: DISCONTINUED | OUTPATIENT
Start: 2024-02-09 | End: 2024-02-09 | Stop reason: ALTCHOICE

## 2024-02-09 RX ORDER — PROPOFOL 10 MG/ML
INJECTION, EMULSION INTRAVENOUS CONTINUOUS PRN
Status: DISCONTINUED | OUTPATIENT
Start: 2024-02-09 | End: 2024-02-09 | Stop reason: SDUPTHER

## 2024-02-09 RX ORDER — FENTANYL CITRATE 50 UG/ML
INJECTION, SOLUTION INTRAMUSCULAR; INTRAVENOUS PRN
Status: DISCONTINUED | OUTPATIENT
Start: 2024-02-09 | End: 2024-02-09 | Stop reason: SDUPTHER

## 2024-02-09 RX ADMIN — SODIUM CHLORIDE: 9 INJECTION, SOLUTION INTRAVENOUS at 08:54

## 2024-02-09 RX ADMIN — FENTANYL CITRATE 100 MCG: 50 INJECTION, SOLUTION INTRAMUSCULAR; INTRAVENOUS at 08:57

## 2024-02-09 RX ADMIN — PROPOFOL 80 MCG/KG/MIN: 10 INJECTION, EMULSION INTRAVENOUS at 08:57

## 2024-02-09 RX ADMIN — Medication 2000 MG: at 09:00

## 2024-02-09 ASSESSMENT — PAIN - FUNCTIONAL ASSESSMENT: PAIN_FUNCTIONAL_ASSESSMENT: 0-10

## 2024-02-09 NOTE — DISCHARGE INSTRUCTIONS
POST OPERATIVE INSTRUCTION SHEET  SKIN TUMOR/LESION REMOVAL          Activity:    No strenuous activity for 48 hours  No activity that stresses the suture closure/incision  Regular diet; Unless operation of lip- then clear liquids for 48 hours (Sip from a cup: do not use a straw)  ABSOLUTELY NO NICOTINE OF ANY TYPE    Wound Care:  Steri-strips are in place, you should leave them over incision until they fall off.  If they fall off prior to your follow up appointment, cover incision with a band-aid  Apply cold compress over next 36 hours. Do not place directly on skin. Do not leave on greater than 20 minutes at a time.  Recommend sleeping in a recliner or with head elevated for next 2-3 nights.  Keep all incisions clean  You may shower 36 hours after the operation. Use fingertips only when washing around incision, no washcloth. Pat dry.     Limitations:  No swimming, hot tub, sauna or soaking in a bathtub    Prescriptions:  Take exactly as prescribed    Follow-Up:  Call office for an appointment in 3-4 weeks, unless otherwise instructed by Dr. Kearns  February 22 @ 9:15 am       Notify our office if you experience any of the following:   Develop a fever (temperature is greater than 100.5F)   Develop redness greater than 1 cm around incision or red streaks up extremity   Have any excess bleeding/ increased drainage or swelling at the incision site    *You may resume aspirin and Xarelto on Sunday, if held pre operatively and if medically able to hold that long  *A prescription for [place] has been sent to your pharmacy. Do not use Norco and Tylenol together; use one or the other for pain      How Do you Prevent Surgical Site Infections?       *HAND WASHING     *STOP SHAVING   Wash your hands multiple times daily  Do not shave incisional area 48 hours before   with soap for 20 seconds.    or until 2 weeks after surgery.  This can   Before eating and wound care.   cause tiny cuts in the skin which can lead to

## 2024-02-09 NOTE — OP NOTE
Operative Note    Patient name: Luciana Dang             Medical Record Number: [id number]    Primary Care Physician: Dinora Traylor MD     1960    Date of Procedure: 2024    Pre-operative Diagnosis: 2cm2 defect of left nasal bridge s/p MOHS for basal cell carcinoma    Post-operative Diagnosis: Same    Procedure Performed: Repair of left nasal bridge defect with an adjacent tissue transfer (6 cm2).    Surgeons/Assistants: Dr. Stas Kearns MD /Chloe Samson PA-C    Estimated Blood Loss: 5ml     Complications: none immediately appreciated    Procedure:    With the patient lying in the supine position and under adequate anesthesia per the anesthesia team, the area was anesthetized with a total of 7 ml of 1% Lidocaine 1:100,000 with epinephrine solution.  The area was then prepped and draped in the standard surgical fashion.  There was a 2cm2 defect, which could not be closed primarily due to distortion of nose/brow.  Therefore, a 6cm2 (2cm x 2cm + 2cm2) sum of defect/adjacent tissue transfer proximally based rotation flap was then designed, back cut 2cm along the right nasal sidewall, elevated 2cm and inset with 4-0 Monocryl suture placed in interrupted buried fashion. The Burow's triangles were resected with final closure being 4-0 chromic placed in simple interrupted fashion followed by benzoin/steristrips.  The patient tolerated the procedure quite well and remained hemodynamically stable throughout the procedure and was quite comfortable throughout the operative course.    Clinical staging for cancer cases:  Ct  Cn  Cm    Stas Kearns MD  Electronically signed by me on 2024 at 9:55 AMOperative Note      Patient: Luciana Dang  YOB: 1960  MRN: [id number]    Date of Procedure: 2024    Pre-Op Diagnosis Codes:     * Basal cell carcinoma (BCC) of skin of nose [C44.311]    Post-Op Diagnosis: Same       Procedure(s):  Mohs Defect Repair BCC Left Nasal Bridge with skin flap

## 2024-02-09 NOTE — PROGRESS NOTES
0943: patient to room via chair, report received from OR staff, patient denies needs, steri strips in place, no bleeding noted. Snack and drink provided, call light within reach,  at bedside.      1000: IV removed, patient getting dressed.    1015: Discharge instructions reviewed with patient and family member.  All questions were addressed and answered.  Patient and family member verbalized understanding of discharge plan.     1030: Dr. Kearns in room talking to patient and .     1050: patient ambulated to discharge lobby in stable condition. Patient discharged home with .

## 2024-02-09 NOTE — ANESTHESIA POSTPROCEDURE EVALUATION
Department of Anesthesiology  Postprocedure Note    Patient: Luciana Dang  MRN: [id number]  YOB: 1960  Date of evaluation: 2/9/2024    Procedure Summary       Date: 02/09/24 Room / Location: [address]    Anesthesia Start: 0854 Anesthesia Stop: 0941    Procedure: Mohs Defect Repair BCC Left Nasal Bridge with skin flap closure (Left: Face) Diagnosis:       Basal cell carcinoma (BCC) of skin of nose      (Basal cell carcinoma (BCC) of skin of nose [C44.311])    Surgeons: Stas Kearns MD Responsible Provider: Brando Bowie DO    Anesthesia Type: general ASA Status: 3            Anesthesia Type: No value filed.    Ramírez Phase I: Ramírez Score: 10    Ramírez Phase II: Ramírez Score: 10    Anesthesia Post Evaluation    Patient location during evaluation: bedside  Patient participation: complete - patient participated  Level of consciousness: awake and alert  Pain score: 0  Airway patency: patent  Nausea & Vomiting: no nausea and no vomiting  Cardiovascular status: hemodynamically stable and blood pressure returned to baseline  Respiratory status: spontaneous ventilation, room air and acceptable  Hydration status: stable  Pain management: adequate and satisfactory to patient    No notable events documented.

## 2024-02-09 NOTE — ANESTHESIA PRE PROCEDURE
Provider Last Rate Last Admin    lidocaine-EPINEPHrine 1 %-1:220464 injection    PRN Stas Kearns MD   7 mL at 02/09/24 0900       Allergies:    Allergies   Allergen Reactions    Sulfa Antibiotics Rash       Problem List:    Patient Active Problem List   Diagnosis Code    Hypertension I10    Excessive tanning Z91.89    Swelling of left lower extremity M79.89    Allergic rhinitis J30.9    Vitamin D deficiency E55.9    Elevated serum immunoglobulin free light chains R76.8    Acute appendicitis K35.80    S/P laparoscopic appendectomy Z90.49    H/O umbilical hernia repair Z98.890, Z87.19    Acute kidney injury (HCC) N17.9    Acute renal failure (HCC) N17.9    Hypercalcemia E83.52    Lytic lesion of bone on x-ray M89.9    Multiple myeloma not having achieved remission (HCC) C90.00    Hypogammaglobulinemia (HCC) D80.1    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    Increased immunoglobulin R76.8    Major depressive disorder, recurrent, mild F33.0    Major depressive disorder, recurrent, moderate F33.1    Major depressive disorder, recurrent, unspecified F33.9    Acute deep vein thrombosis (DVT) of right popliteal vein ([facility]) I82.431       Past Medical History:        Diagnosis Date    Acute kidney injury (HCC) 07/12/2019    Allergic rhinitis     Cancer (HCC) 2019    Myeloma with lytic lesions    Cancer (HCC)     skin cancer from cheek january 2022    Congenital hip dislocation 1960    right leg is longer, better with weight loss    H/O umbilical hernia repair 07/16/2018    History of DVT of lower extremity 05/2019    right calf - on Xarelto chronically in setting of cancer    Hot flushes, perimenopausal     Hypertension 2011    Osteoarthritis     right knee pain    Osteonecrosis (HCC)     jaw after bisphosphonate    PONV (postoperative nausea and vomiting)     S/P laparoscopic appendectomy 07/16/2018    Snoring     denies apnea, mild daytime somnolence, just lost 35# so has more energy, denies waking coughing or choking,

## 2024-02-09 NOTE — H&P
[facility]  History and Physical Update    Pt Name: Luciana Dang  MRN: [id number]  YOB: 1960  Date of evaluation: 2/9/2024    I have examined the patient and reviewed the H&P/Consult and there are no changes to the patient or plans.      Stas Kearns MD  Electronically signed 2/9/2024 at 7:37 AM     Moderate Variability

## 2024-02-13 RX ORDER — LENALIDOMIDE 10 MG/1
CAPSULE ORAL
Qty: 14 CAPSULE | Refills: 0 | Status: ACTIVE | OUTPATIENT
Start: 2024-02-13

## 2024-02-15 ENCOUNTER — OFFICE VISIT (OUTPATIENT)
Dept: INTERNAL MEDICINE CLINIC | Age: 64
End: 2024-02-15
Payer: MEDICARE

## 2024-02-15 VITALS
WEIGHT: 185.6 LBS | HEART RATE: 68 BPM | HEIGHT: 63 IN | SYSTOLIC BLOOD PRESSURE: 118 MMHG | TEMPERATURE: 97 F | BODY MASS INDEX: 32.89 KG/M2 | DIASTOLIC BLOOD PRESSURE: 66 MMHG

## 2024-02-15 DIAGNOSIS — J30.89 NON-SEASONAL ALLERGIC RHINITIS, UNSPECIFIED TRIGGER: ICD-10-CM

## 2024-02-15 DIAGNOSIS — I10 PRIMARY HYPERTENSION: Primary | ICD-10-CM

## 2024-02-15 DIAGNOSIS — N95.1 MENOPAUSAL HOT FLUSHES: ICD-10-CM

## 2024-02-15 DIAGNOSIS — E55.9 VITAMIN D DEFICIENCY: ICD-10-CM

## 2024-02-15 DIAGNOSIS — C90.01 MULTIPLE MYELOMA IN REMISSION (HCC): ICD-10-CM

## 2024-02-15 PROBLEM — N17.9 ACUTE RENAL FAILURE (HCC): Status: RESOLVED | Noted: 2019-07-13 | Resolved: 2024-02-15

## 2024-02-15 PROBLEM — R76.8 INCREASED IMMUNOGLOBULIN: Status: RESOLVED | Noted: 2017-05-30 | Resolved: 2024-02-15

## 2024-02-15 PROBLEM — M87.08 ASEPTIC NECROSIS OF JAW (HCC): Status: RESOLVED | Noted: 2022-08-22 | Resolved: 2024-02-15

## 2024-02-15 PROBLEM — N17.9 ACUTE KIDNEY INJURY (HCC): Status: RESOLVED | Noted: 2019-07-12 | Resolved: 2024-02-15

## 2024-02-15 PROCEDURE — 99214 OFFICE O/P EST MOD 30 MIN: CPT | Performed by: INTERNAL MEDICINE

## 2024-02-15 PROCEDURE — 3074F SYST BP LT 130 MM HG: CPT | Performed by: INTERNAL MEDICINE

## 2024-02-15 PROCEDURE — 3078F DIAST BP <80 MM HG: CPT | Performed by: INTERNAL MEDICINE

## 2024-02-15 RX ORDER — CEFADROXIL 500 MG/1
CAPSULE ORAL
COMMUNITY
Start: 2024-01-30

## 2024-02-15 SDOH — ECONOMIC STABILITY: INCOME INSECURITY: HOW HARD IS IT FOR YOU TO PAY FOR THE VERY BASICS LIKE FOOD, HOUSING, MEDICAL CARE, AND HEATING?: NOT HARD AT ALL

## 2024-02-15 SDOH — ECONOMIC STABILITY: FOOD INSECURITY: WITHIN THE PAST 12 MONTHS, THE FOOD YOU BOUGHT JUST DIDN'T LAST AND YOU DIDN'T HAVE MONEY TO GET MORE.: NEVER TRUE

## 2024-02-15 SDOH — ECONOMIC STABILITY: FOOD INSECURITY: WITHIN THE PAST 12 MONTHS, YOU WORRIED THAT YOUR FOOD WOULD RUN OUT BEFORE YOU GOT MONEY TO BUY MORE.: NEVER TRUE

## 2024-02-15 ASSESSMENT — PATIENT HEALTH QUESTIONNAIRE - PHQ9
SUM OF ALL RESPONSES TO PHQ QUESTIONS 1-9: 2
10. IF YOU CHECKED OFF ANY PROBLEMS, HOW DIFFICULT HAVE THESE PROBLEMS MADE IT FOR YOU TO DO YOUR WORK, TAKE CARE OF THINGS AT HOME, OR GET ALONG WITH OTHER PEOPLE: 0
6. FEELING BAD ABOUT YOURSELF - OR THAT YOU ARE A FAILURE OR HAVE LET YOURSELF OR YOUR FAMILY DOWN: 0
SUM OF ALL RESPONSES TO PHQ QUESTIONS 1-9: 2
SUM OF ALL RESPONSES TO PHQ9 QUESTIONS 1 & 2: 0
SUM OF ALL RESPONSES TO PHQ QUESTIONS 1-9: 2
3. TROUBLE FALLING OR STAYING ASLEEP: 0
2. FEELING DOWN, DEPRESSED OR HOPELESS: 0
SUM OF ALL RESPONSES TO PHQ QUESTIONS 1-9: 2
7. TROUBLE CONCENTRATING ON THINGS, SUCH AS READING THE NEWSPAPER OR WATCHING TELEVISION: 1
9. THOUGHTS THAT YOU WOULD BE BETTER OFF DEAD, OR OF HURTING YOURSELF: 0
8. MOVING OR SPEAKING SO SLOWLY THAT OTHER PEOPLE COULD HAVE NOTICED. OR THE OPPOSITE, BEING SO FIGETY OR RESTLESS THAT YOU HAVE BEEN MOVING AROUND A LOT MORE THAN USUAL: 0
4. FEELING TIRED OR HAVING LITTLE ENERGY: 1
5. POOR APPETITE OR OVEREATING: 0
1. LITTLE INTEREST OR PLEASURE IN DOING THINGS: 0

## 2024-02-15 NOTE — PROGRESS NOTES
Luciana Dang (:  1960) is a 63 y.o. female,Established patient, here for evaluation of the following chief complaint(s): Hypertension, Vitamin D def. , and Chemotherapy Induced Neutropenia    SUBJECTIVE/OBJECTIVE:    Now watching granddaughter.    Hypertension - BP controlled, continue metoprolol and Norvasc, taken off lisinopril with renal decline when MM diagnosed.  CMP normal 2024.  No lightheadedness or headaches, syncope.     Hot flushes - improved on Effexor.  Worse on steroids.  No longer on steroids - still intermittent issues with hot flushes but tolerable - keep on Effexor at current dose.  At previous visit, we did discuss Veozah 45 mg but not on formulary and not bad enough to warrant this medication yet.    Vitamin D deficiency - on 5,000 IU daily.  Normal level 21 at 43, 37 2022, 37 2023.       Multiple Myeloma -dx 2019, in remission per Dr. Roberts note 2023- treated with [facility] (following yearly - Feb) and Dr. Roberts (local - every 3 months).  She has had stem cell collection done, reserved for first sign of relapse.   She is currently on Revlimid 10 mg 2 weeks on and 1 week off (lower dose due to neutropenia). No Zometa due to osteonecrosis of jaw.  On Xarelto for h/o DVT.  Last set of labs done 24 stable.      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  Seeing [facility] for this.  She is on a Clorhexidine mouth wash for this prn.  Finished with dentist at [facility] - small spot still an issue.  Following with  dental in [place] and they are following locally.      Diarrhea - felt to be a side effect of Revlimid - on Imodium 1-2 a day - tolerable on this, worse with immunotherapy.  She had colonoscopy with Dr. Brewster 2021 - due in 5 years.  She started a fiber pill daily and has helped some with diarrhea along with the Imodium.     H/o Anemia - due to MM - vitamin B 12 deficiency seen prior to MM dx -

## 2024-02-15 NOTE — PATIENT INSTRUCTIONS
Suggest COVID, RSV, Td (you got Tdap 2012 so don't need all 3 again).    Restart Flonase for cough.    Could consider glucosamine chondroitin.

## 2024-02-18 DIAGNOSIS — N95.1 MENOPAUSAL HOT FLUSHES: ICD-10-CM

## 2024-02-19 RX ORDER — RIVAROXABAN 10 MG/1
10 TABLET, FILM COATED ORAL DAILY
Qty: 30 TABLET | Refills: 0 | OUTPATIENT
Start: 2024-02-19

## 2024-02-19 RX ORDER — RIVAROXABAN 10 MG/1
10 TABLET, FILM COATED ORAL
Qty: 30 TABLET | Refills: 5 | Status: SHIPPED | OUTPATIENT
Start: 2024-02-19

## 2024-02-19 RX ORDER — VENLAFAXINE HYDROCHLORIDE 75 MG/1
CAPSULE, EXTENDED RELEASE ORAL
Qty: 90 CAPSULE | Refills: 1 | Status: SHIPPED | OUTPATIENT
Start: 2024-02-19

## 2024-02-19 NOTE — TELEPHONE ENCOUNTER
Last ordered 8/15, disp 90, refill 1     Last visit 2/15:    Menopausal hot flushes - controlled, on Effexor -will continue.     Next visit 9/30

## 2024-03-05 RX ORDER — LENALIDOMIDE 10 MG/1
CAPSULE ORAL
Qty: 14 CAPSULE | Refills: 0 | Status: ACTIVE | OUTPATIENT
Start: 2024-03-05

## 2024-03-27 RX ORDER — LENALIDOMIDE 10 MG/1
CAPSULE ORAL
Qty: 14 CAPSULE | Refills: 0 | Status: ACTIVE | OUTPATIENT
Start: 2024-03-27 | End: 2024-03-28 | Stop reason: SDUPTHER

## 2024-03-28 RX ORDER — LENALIDOMIDE 10 MG/1
CAPSULE ORAL
Qty: 14 CAPSULE | Refills: 0 | Status: ACTIVE | OUTPATIENT
Start: 2024-03-28

## 2024-03-28 RX ORDER — LENALIDOMIDE 10 MG/1
CAPSULE ORAL
Qty: 14 CAPSULE | Refills: 0 | OUTPATIENT
Start: 2024-03-28

## 2024-04-09 ENCOUNTER — HOSPITAL ENCOUNTER (OUTPATIENT)
Age: 64
Discharge: HOME | End: 2024-04-09
Payer: MEDICARE

## 2024-04-09 DIAGNOSIS — C90.01 MULTIPLE MYELOMA IN REMISSION (HCC): ICD-10-CM

## 2024-04-09 LAB
ALBUMIN SERPL BCG-MCNC: 4.2 G/DL (ref 3.5–5.1)
ALP SERPL-CCNC: 84 U/L (ref 38–126)
ALT SERPL W/O P-5'-P-CCNC: 22 U/L (ref 11–66)
ANION GAP SERPL CALC-SCNC: 11 MEQ/L (ref 8–16)
AST SERPL-CCNC: 20 U/L (ref 5–40)
BASOPHILS ABSOLUTE: 0 THOU/MM3 (ref 0–0.1)
BASOPHILS NFR BLD AUTO: 1 % (ref 0–3)
BILIRUB SERPL-MCNC: 0.3 MG/DL (ref 0.3–1.2)
BUN SERPL-MCNC: 12 MG/DL (ref 7–22)
CALCIUM SERPL-MCNC: 8.9 MG/DL (ref 8.5–10.5)
CHLORIDE SERPL-SCNC: 104 MEQ/L (ref 98–111)
CO2 SERPL-SCNC: 24 MEQ/L (ref 23–33)
CREAT SERPL-MCNC: 0.7 MG/DL (ref 0.4–1.2)
EOSINOPHIL NFR BLD AUTO: 9 % (ref 0–4)
EOSINOPHILS ABSOLUTE: 0.3 THOU/MM3 (ref 0–0.4)
ERYTHROCYTE [DISTWIDTH] IN BLOOD BY AUTOMATED COUNT: 13.4 % (ref 11.5–14.5)
GFR SERPL CREATININE-BSD FRML MDRD: > 90 ML/MIN/1.73M2
GLUCOSE SERPL-MCNC: 97 MG/DL (ref 70–108)
HCT VFR BLD AUTO: 38.6 % (ref 37–47)
HGB BLD-MCNC: 13.4 GM/DL (ref 12–16)
IMMATURE GRANULOCYTES %: 0 %
IMMATURE GRANULOCYTES ABSOLUTE: 0.01 THOU/MM3 (ref 0–0.07)
LYMPHOCYTES ABSOLUTE: 0.8 THOU/MM3 (ref 1–4.8)
LYMPHOCYTES NFR BLD AUTO: 27 % (ref 15–47)
MCH RBC QN AUTO: 34.1 PG (ref 26–33)
MCHC RBC AUTO-ENTMCNC: 34.7 GM/DL (ref 32.2–35.5)
MCV RBC AUTO: 98 FL (ref 81–99)
MONOCYTES ABSOLUTE: 0.7 THOU/MM3 (ref 0.4–1.3)
MONOCYTES NFR BLD AUTO: 22 % (ref 0–12)
NEUTROPHILS NFR BLD AUTO: 40 % (ref 43–75)
PLATELET # BLD AUTO: 184 THOU/MM3 (ref 130–400)
PMV BLD AUTO: 11.1 FL (ref 9.4–12.4)
POTASSIUM SERPL-SCNC: 3.6 MEQ/L (ref 3.5–5.2)
PROT SERPL-MCNC: 7 G/DL (ref 6.1–8)
RBC # BLD AUTO: 3.93 MILL/MM3 (ref 4.2–5.4)
SEGMENTED NEUTROPHILS ABSOLUTE COUNT: 1.2 THOU/MM3 (ref 1.8–7.7)
SODIUM SERPL-SCNC: 139 MEQ/L (ref 135–145)
WBC # BLD AUTO: 3 THOU/MM3 (ref 4.8–10.8)

## 2024-04-09 PROCEDURE — 84165 PROTEIN E-PHORESIS SERUM: CPT

## 2024-04-09 PROCEDURE — 36415 COLL VENOUS BLD VENIPUNCTURE: CPT

## 2024-04-09 PROCEDURE — 82784 ASSAY IGA/IGD/IGG/IGM EACH: CPT

## 2024-04-09 PROCEDURE — 85025 COMPLETE CBC W/AUTO DIFF WBC: CPT

## 2024-04-09 PROCEDURE — 84155 ASSAY OF PROTEIN SERUM: CPT

## 2024-04-09 PROCEDURE — 80053 COMPREHEN METABOLIC PANEL: CPT

## 2024-04-09 PROCEDURE — 83521 IG LIGHT CHAINS FREE EACH: CPT

## 2024-04-09 PROCEDURE — 82232 ASSAY OF BETA-2 PROTEIN: CPT

## 2024-04-10 LAB
IGA SERPL-MCNC: 148 MG/DL (ref 70–400)
IGG SERPL-MCNC: 927 MG/DL (ref 700–1600)
IGM SERPL-MCNC: 44 MG/DL (ref 40–230)

## 2024-04-11 LAB — B2 MICROGLOB SERPL-MCNC: 1.9 MG/L

## 2024-04-12 LAB — KAPPA/LAMBDA FREE LIGHT CHAINS: NORMAL

## 2024-04-13 LAB
ALBUMIN SERPL ELPH-MCNC: 4.3 G/DL (ref 3.75–5.01)
ALPHA1 GLOB SERPL ELPH-MCNC: 0.27 G/DL (ref 0.19–0.46)
ALPHA2 GLOB SERPL ELPH-MCNC: 0.6 G/DL (ref 0.48–1.05)
B-GLOBULIN SERPL ELPH-MCNC: 0.78 G/DL (ref 0.48–1.1)
GAMMA GLOB SERPL ELPH-MCNC: 0.84 G/DL (ref 0.62–1.51)
INTERPRETATION SERPL IFE-IMP: NORMAL
M PROTEIN SERPL ELPH-MCNC: NORMAL G/DL
MONOCLON BAND OBS SERPL: NORMAL
PATHOLOGY STUDY: NORMAL
PROT SERPL-MCNC: 6.8 G/DL (ref 6.3–8.2)

## 2024-04-18 RX ORDER — LENALIDOMIDE 10 MG/1
CAPSULE ORAL
Qty: 14 CAPSULE | Refills: 0 | Status: ACTIVE | OUTPATIENT
Start: 2024-04-18

## 2024-04-22 RX ORDER — LENALIDOMIDE 10 MG/1
CAPSULE ORAL
Qty: 14 CAPSULE | Refills: 0 | OUTPATIENT
Start: 2024-04-22

## 2024-04-22 RX ORDER — LENALIDOMIDE 10 MG/1
CAPSULE ORAL
Qty: 14 CAPSULE | Refills: 0 | Status: ACTIVE | OUTPATIENT
Start: 2024-04-22

## 2024-04-22 NOTE — PROGRESS NOTES
[facility] [place] [facility]  [facility]  [address]  [address]  [place] [address]  Dept: [phone]  Loc: [phone]   Hematology/Oncology Progress Note (Clinic)        Luciana Dang  1960    4/23/24    No ref. provider found   Dinora Traylor MD   Followed by Henry Wheeler MD, [facility] myeloma clinic    Diagnosis:   -Multiple myeloma in remission.  Dx: July 2019  Presented with hypercalcemia, calcium 11, renal insufficiency with creatinine of 4.9 and multiple lytic lesions on skeletal survey.  Bone marrow biopsy 7/15/2019: Plasma cells 30% of the cells on the aspirate and occupy 40% of the marrow cellularity consistent with kappa light chain restricted plasma cell myeloma.  IgA equal to 656    -5/6/2019 right calf DVT; on Xarelto    Treatment:   -Plasmapheresis x2 because of light chains greater than 10,000 in July 2019 at [facility]  Dr Henry Larry   -CyBorD (7/16/2019 began)  -Velcade Decadron and Revlimid (RVD) began 8/7/2019. RVD x8.  -Autologous  stem cell collection 2/20/2020;No Stem cell transplant  NOT done thus far.        (Maintenance Revlimid  began 3/17/2020)  Currently on : Revlimid 10 mg taken 2 weeks on and 1 week off  -Zometa Previously; held because of suspicion of jaw osteonecrosis  -Xarelto      Followable Disease:   -IgA, serum light chains .CBC CMP    Comorbidities:  See below      Subjective: Transferred from Dr. Rosario . On maintenance Revlimid 10 mg given 2 weeks on 1 week off. Remains asymptomatic.  No bone or back pain.  No trouble with frequent or severe infections.    Last follow-up at [facility] for annual visit was around February 2023.    Meanwhile she understands she will continue the Revlimid maintenance.  In February seen by  Dr. Larry's -remains in remission; cont maintenance Revlimid .  Tolerating Revlimid very well.  [place] transplant for relapse.  Sees Dr. Larry at [facility] yearly with next appointment scheduled for February

## 2024-04-23 ENCOUNTER — OFFICE VISIT (OUTPATIENT)
Dept: ONCOLOGY | Age: 64
End: 2024-04-23
Payer: MEDICARE

## 2024-04-23 ENCOUNTER — HOSPITAL ENCOUNTER (OUTPATIENT)
Dept: INFUSION THERAPY | Age: 64
Discharge: HOME | End: 2024-04-23
Payer: MEDICARE

## 2024-04-23 VITALS
RESPIRATION RATE: 16 BRPM | OXYGEN SATURATION: 95 % | SYSTOLIC BLOOD PRESSURE: 143 MMHG | DIASTOLIC BLOOD PRESSURE: 73 MMHG | TEMPERATURE: 97.9 F | HEART RATE: 61 BPM

## 2024-04-23 VITALS
SYSTOLIC BLOOD PRESSURE: 143 MMHG | OXYGEN SATURATION: 95 % | TEMPERATURE: 97.9 F | WEIGHT: 185 LBS | DIASTOLIC BLOOD PRESSURE: 73 MMHG | HEART RATE: 61 BPM | RESPIRATION RATE: 16 BRPM | HEIGHT: 63 IN | BODY MASS INDEX: 32.78 KG/M2

## 2024-04-23 DIAGNOSIS — C90.01 MULTIPLE MYELOMA IN REMISSION (HCC): Primary | ICD-10-CM

## 2024-04-23 PROCEDURE — 99211 OFF/OP EST MAY X REQ PHY/QHP: CPT

## 2024-04-23 PROCEDURE — 3077F SYST BP >= 140 MM HG: CPT | Performed by: INTERNAL MEDICINE

## 2024-04-23 PROCEDURE — 3078F DIAST BP <80 MM HG: CPT | Performed by: INTERNAL MEDICINE

## 2024-04-23 PROCEDURE — 99214 OFFICE O/P EST MOD 30 MIN: CPT | Performed by: INTERNAL MEDICINE

## 2024-04-23 NOTE — PATIENT INSTRUCTIONS
I have ordered myeloma labs to be drawn the week of August 6  Please schedule patient to follow-up with me 2 weeks later to review  Continue Revlimid 10 mg 2 weeks on 1 week off  CBC June 19

## 2024-05-07 RX ORDER — LENALIDOMIDE 10 MG/1
CAPSULE ORAL
Qty: 14 CAPSULE | Refills: 0 | Status: ACTIVE | OUTPATIENT
Start: 2024-05-07

## 2024-05-08 DIAGNOSIS — I10 PRIMARY HYPERTENSION: ICD-10-CM

## 2024-05-11 RX ORDER — AMLODIPINE BESYLATE 10 MG/1
10 TABLET ORAL DAILY
Qty: 90 TABLET | Refills: 1 | Status: SHIPPED | OUTPATIENT
Start: 2024-05-11

## 2024-05-19 DIAGNOSIS — I10 PRIMARY HYPERTENSION: ICD-10-CM

## 2024-05-21 RX ORDER — METOPROLOL SUCCINATE 50 MG/1
TABLET, EXTENDED RELEASE ORAL
Qty: 90 TABLET | Refills: 0 | OUTPATIENT
Start: 2024-05-21

## 2024-05-23 DIAGNOSIS — I10 PRIMARY HYPERTENSION: ICD-10-CM

## 2024-05-24 RX ORDER — METOPROLOL SUCCINATE 50 MG/1
TABLET, EXTENDED RELEASE ORAL
Qty: 90 TABLET | Refills: 0 | OUTPATIENT
Start: 2024-05-24

## 2024-05-25 RX ORDER — METOPROLOL SUCCINATE 50 MG/1
TABLET, EXTENDED RELEASE ORAL
Qty: 90 TABLET | Refills: 1 | Status: SHIPPED | OUTPATIENT
Start: 2024-05-25

## 2024-05-28 RX ORDER — LENALIDOMIDE 10 MG/1
CAPSULE ORAL
Qty: 14 CAPSULE | Refills: 0 | Status: ACTIVE | OUTPATIENT
Start: 2024-05-28

## 2024-06-07 DIAGNOSIS — I10 PRIMARY HYPERTENSION: ICD-10-CM

## 2024-06-14 RX ORDER — METOPROLOL SUCCINATE 50 MG/1
TABLET, EXTENDED RELEASE ORAL
Qty: 90 TABLET | Refills: 1 | Status: SHIPPED | OUTPATIENT
Start: 2024-06-14

## 2024-06-20 ENCOUNTER — HOSPITAL ENCOUNTER (OUTPATIENT)
Age: 64
Discharge: HOME | End: 2024-06-20
Payer: MEDICARE

## 2024-06-20 DIAGNOSIS — C90.01 MULTIPLE MYELOMA IN REMISSION (HCC): ICD-10-CM

## 2024-06-20 LAB
BASOPHILS ABSOLUTE: 0 THOU/MM3 (ref 0–0.1)
BASOPHILS NFR BLD AUTO: 1 % (ref 0–3)
EOSINOPHIL NFR BLD AUTO: 4 % (ref 0–4)
EOSINOPHILS ABSOLUTE: 0.2 THOU/MM3 (ref 0–0.4)
ERYTHROCYTE [DISTWIDTH] IN BLOOD BY AUTOMATED COUNT: 13.4 % (ref 11.5–14.5)
HCT VFR BLD AUTO: 38.3 % (ref 37–47)
HGB BLD-MCNC: 13 GM/DL (ref 12–16)
IMMATURE GRANULOCYTES %: 0 %
IMMATURE GRANULOCYTES ABSOLUTE: 0.01 THOU/MM3 (ref 0–0.07)
LYMPHOCYTES ABSOLUTE: 0.8 THOU/MM3 (ref 1–4.8)
LYMPHOCYTES NFR BLD AUTO: 18 % (ref 15–47)
MCH RBC QN AUTO: 33.9 PG (ref 26–33)
MCHC RBC AUTO-ENTMCNC: 33.9 GM/DL (ref 32.2–35.5)
MCV RBC AUTO: 100 FL (ref 81–99)
MONOCYTES ABSOLUTE: 0.6 THOU/MM3 (ref 0.4–1.3)
MONOCYTES NFR BLD AUTO: 14 % (ref 0–12)
NEUTROPHILS ABSOLUTE: 2.8 THOU/MM3 (ref 1.8–7.7)
NEUTROPHILS NFR BLD AUTO: 63 % (ref 43–75)
PLATELET # BLD AUTO: 187 THOU/MM3 (ref 130–400)
PMV BLD AUTO: 11.2 FL (ref 9.4–12.4)
RBC # BLD AUTO: 3.83 MILL/MM3 (ref 4.2–5.4)
WBC # BLD AUTO: 4.4 THOU/MM3 (ref 4.8–10.8)

## 2024-06-20 PROCEDURE — 36415 COLL VENOUS BLD VENIPUNCTURE: CPT

## 2024-06-20 PROCEDURE — 85025 COMPLETE CBC W/AUTO DIFF WBC: CPT

## 2024-06-20 RX ORDER — LENALIDOMIDE 10 MG/1
CAPSULE ORAL
Qty: 14 CAPSULE | Refills: 0 | Status: ACTIVE | OUTPATIENT
Start: 2024-06-20

## 2024-07-11 RX ORDER — LENALIDOMIDE 10 MG/1
CAPSULE ORAL
Qty: 14 CAPSULE | Refills: 0 | Status: ACTIVE | OUTPATIENT
Start: 2024-07-11

## 2024-07-31 RX ORDER — LENALIDOMIDE 10 MG/1
CAPSULE ORAL
Qty: 14 CAPSULE | Refills: 0 | Status: SHIPPED | OUTPATIENT
Start: 2024-07-31 | End: 2024-07-31

## 2024-07-31 RX ORDER — LENALIDOMIDE 10 MG/1
CAPSULE ORAL
Qty: 14 CAPSULE | Refills: 0 | Status: ACTIVE | OUTPATIENT
Start: 2024-07-31

## 2024-07-31 NOTE — TELEPHONE ENCOUNTER
Can you please resend this? Pharmacy emailed us and the previous refill had no instructions in the sig. It should be good to go now.

## 2024-08-06 ENCOUNTER — HOSPITAL ENCOUNTER (OUTPATIENT)
Age: 64
Discharge: HOME | End: 2024-08-06
Payer: MEDICARE

## 2024-08-06 DIAGNOSIS — E55.9 VITAMIN D DEFICIENCY: ICD-10-CM

## 2024-08-06 DIAGNOSIS — C90.01 MULTIPLE MYELOMA IN REMISSION (HCC): ICD-10-CM

## 2024-08-06 LAB
25(OH)D3 SERPL-MCNC: 30 NG/ML (ref 30–100)
ALBUMIN SERPL BCG-MCNC: 4.5 G/DL (ref 3.5–5.1)
ALP SERPL-CCNC: 82 U/L (ref 38–126)
ALT SERPL W/O P-5'-P-CCNC: 26 U/L (ref 11–66)
ANION GAP SERPL CALC-SCNC: 11 MEQ/L (ref 8–16)
AST SERPL-CCNC: 22 U/L (ref 5–40)
BASOPHILS ABSOLUTE: 0 THOU/MM3 (ref 0–0.1)
BASOPHILS NFR BLD AUTO: 1 % (ref 0–3)
BILIRUB SERPL-MCNC: 0.5 MG/DL (ref 0.3–1.2)
BUN SERPL-MCNC: 14 MG/DL (ref 7–22)
CALCIUM SERPL-MCNC: 9.3 MG/DL (ref 8.5–10.5)
CHLORIDE SERPL-SCNC: 103 MEQ/L (ref 98–111)
CO2 SERPL-SCNC: 24 MEQ/L (ref 23–33)
CREAT SERPL-MCNC: 0.6 MG/DL (ref 0.4–1.2)
EOSINOPHIL NFR BLD AUTO: 2 % (ref 0–4)
EOSINOPHILS ABSOLUTE: 0.1 THOU/MM3 (ref 0–0.4)
ERYTHROCYTE [DISTWIDTH] IN BLOOD BY AUTOMATED COUNT: 13 % (ref 11.5–14.5)
GFR SERPL CREATININE-BSD FRML MDRD: > 90 ML/MIN/1.73M2
GLUCOSE SERPL-MCNC: 93 MG/DL (ref 70–108)
HCT VFR BLD AUTO: 39.7 % (ref 37–47)
HGB BLD-MCNC: 13.6 GM/DL (ref 12–16)
IMMATURE GRANULOCYTES %: 0 %
IMMATURE GRANULOCYTES ABSOLUTE: 0.01 THOU/MM3 (ref 0–0.07)
LYMPHOCYTES ABSOLUTE: 1.1 THOU/MM3 (ref 1–4.8)
LYMPHOCYTES NFR BLD AUTO: 29 % (ref 15–47)
MCH RBC QN AUTO: 34.2 PG (ref 26–33)
MCHC RBC AUTO-ENTMCNC: 34.3 GM/DL (ref 32.2–35.5)
MCV RBC AUTO: 100 FL (ref 81–99)
MONOCYTES ABSOLUTE: 0.6 THOU/MM3 (ref 0.4–1.3)
MONOCYTES NFR BLD AUTO: 17 % (ref 0–12)
NEUTROPHILS ABSOLUTE: 1.8 THOU/MM3 (ref 1.8–7.7)
NEUTROPHILS NFR BLD AUTO: 50 % (ref 43–75)
PLATELET # BLD AUTO: 147 THOU/MM3 (ref 130–400)
PMV BLD AUTO: 11.2 FL (ref 9.4–12.4)
POTASSIUM SERPL-SCNC: 4 MEQ/L (ref 3.5–5.2)
PROT SERPL-MCNC: 6.9 G/DL (ref 6.1–8)
RBC # BLD AUTO: 3.98 MILL/MM3 (ref 4.2–5.4)
SODIUM SERPL-SCNC: 138 MEQ/L (ref 135–145)
WBC # BLD AUTO: 3.6 THOU/MM3 (ref 4.8–10.8)

## 2024-08-06 PROCEDURE — 80053 COMPREHEN METABOLIC PANEL: CPT

## 2024-08-06 PROCEDURE — 82784 ASSAY IGA/IGD/IGG/IGM EACH: CPT

## 2024-08-06 PROCEDURE — 82306 VITAMIN D 25 HYDROXY: CPT

## 2024-08-06 PROCEDURE — 84165 PROTEIN E-PHORESIS SERUM: CPT

## 2024-08-06 PROCEDURE — 86334 IMMUNOFIX E-PHORESIS SERUM: CPT

## 2024-08-06 PROCEDURE — 84155 ASSAY OF PROTEIN SERUM: CPT

## 2024-08-06 PROCEDURE — 85025 COMPLETE CBC W/AUTO DIFF WBC: CPT

## 2024-08-06 PROCEDURE — 82232 ASSAY OF BETA-2 PROTEIN: CPT

## 2024-08-06 PROCEDURE — 83521 IG LIGHT CHAINS FREE EACH: CPT

## 2024-08-06 PROCEDURE — 36415 COLL VENOUS BLD VENIPUNCTURE: CPT

## 2024-08-07 LAB
B2 MICROGLOB SERPL-MCNC: 2 MG/L
IGA SERPL-MCNC: 159 MG/DL (ref 70–400)
IGG SERPL-MCNC: 939 MG/DL (ref 700–1600)
IGM SERPL-MCNC: 47 MG/DL (ref 40–230)

## 2024-08-08 ENCOUNTER — HOSPITAL ENCOUNTER (OUTPATIENT)
Age: 64
Discharge: HOME | End: 2024-08-08
Payer: MEDICARE

## 2024-08-08 DIAGNOSIS — C90.01 MULTIPLE MYELOMA IN REMISSION (HCC): ICD-10-CM

## 2024-08-08 LAB
HOURS COLLECTED: 24 HRS
PROT 24H UR-MCNC: 91 MG/24 HR (ref 42–225)
PROT UR-MCNC: 5.7 MG/DL
URINE VOLUME: 1600 ML

## 2024-08-08 PROCEDURE — 84156 ASSAY OF PROTEIN URINE: CPT

## 2024-08-09 LAB — KAPPA/LAMBDA FREE LIGHT CHAINS: NORMAL

## 2024-08-11 LAB — IMMUNOFIXATION WITH QUANT: NORMAL

## 2024-08-19 NOTE — PROGRESS NOTES
[facility] [place] [facility]  [facility]CER [facility]ENTER  [address]  [address]  [place] [id number]  Dept: [phone]  Loc: [phone]   Hematology/Oncology Progress Note (Clinic)        Luciana Dang  1960    8/20/24    No ref. provider found   Dinora Traylor MD   Followed by Henry Wheeler MD, [facility] myeloma clinic    Diagnosis:   -Multiple Myeloma in remission.  Dx: July 2019  Presented with hypercalcemia, calcium 11, renal insufficiency with creatinine of 4.9 and multiple lytic lesions on skeletal survey.  Bone marrow biopsy 7/15/2019: Plasma cells 30% of the cells on the aspirate and occupy 40% of the marrow cellularity consistent with kappa light chain restricted plasma cell myeloma.  IgA equal to 656    -5/6/2019 right calf DVT; on Xarelto    Treatment:   -Plasmapheresis x2 because of light chains greater than 10,000 in July 2019 at [facility]  Dr Henry Larry   -CyBorD (7/16/2019 began)  -Velcade Decadron and Revlimid (RVD) began 8/7/2019. RVD x8.  -Autologous  stem cell collection 2/20/2020;No Stem cell transplant  NOT done thus far.        (Maintenance Revlimid  began 3/17/2020)  Currently on : Revlimid 10 mg taken 2 weeks on and 1 week off  -Zometa Previously; held because of suspicion of jaw osteonecrosis  -Xarelto      Followable Disease:   -IgA, serum light chains .CBC CMP    Comorbidities:  See below      Subjective: Transferred from Dr. Rosario . On maintenance Revlimid 10 mg given 2 weeks on 1 week off. Remains asymptomatic.  Last seen by me early April 2024.  No bone or back pain.  No trouble with frequent or severe infections.    Last follow-up at [facility] for annual visit was around February 2024.    Meanwhile she understands she will continue the Revlimid maintenance.  In February seen by  Dr. Larry's -remains in remission; cont maintenance Revlimid .  Tolerating Revlimid very well.  [place] transplant for relapse.  Sees Dr. Larry at [facility] yearly with next

## 2024-08-20 ENCOUNTER — HOSPITAL ENCOUNTER (OUTPATIENT)
Dept: INFUSION THERAPY | Age: 64
Discharge: HOME | End: 2024-08-20
Payer: MEDICARE

## 2024-08-20 ENCOUNTER — OFFICE VISIT (OUTPATIENT)
Dept: ONCOLOGY | Age: 64
End: 2024-08-20
Payer: MEDICARE

## 2024-08-20 VITALS
RESPIRATION RATE: 16 BRPM | HEIGHT: 63 IN | SYSTOLIC BLOOD PRESSURE: 128 MMHG | OXYGEN SATURATION: 96 % | TEMPERATURE: 97.7 F | DIASTOLIC BLOOD PRESSURE: 77 MMHG | HEART RATE: 74 BPM | BODY MASS INDEX: 32 KG/M2 | WEIGHT: 180.6 LBS

## 2024-08-20 VITALS
HEART RATE: 74 BPM | SYSTOLIC BLOOD PRESSURE: 128 MMHG | OXYGEN SATURATION: 96 % | DIASTOLIC BLOOD PRESSURE: 77 MMHG | RESPIRATION RATE: 13 BRPM | TEMPERATURE: 97.7 F

## 2024-08-20 DIAGNOSIS — C90.01 MULTIPLE MYELOMA IN REMISSION (HCC): Primary | ICD-10-CM

## 2024-08-20 PROCEDURE — 99211 OFF/OP EST MAY X REQ PHY/QHP: CPT

## 2024-08-20 PROCEDURE — 99213 OFFICE O/P EST LOW 20 MIN: CPT | Performed by: INTERNAL MEDICINE

## 2024-08-20 RX ORDER — LENALIDOMIDE 10 MG/1
CAPSULE ORAL
Qty: 14 CAPSULE | Refills: 0 | Status: ACTIVE | OUTPATIENT
Start: 2024-08-20

## 2024-08-20 NOTE — PATIENT INSTRUCTIONS
-Continue Revlimid 10 mg 2 weeks on 1 week off.  Check CBC 8/30 before next cycle of Revlimid start  -Continue Revlimid 10 mg days 1 - 14 every 21 days.  -Keep Feb 2025 F/U at [facility] Dr Larry, MM clinic  -Next fu w me in 4 months 12/4/24,    lab 2 wks before 11/20/24/ordered

## 2024-08-24 DIAGNOSIS — N95.1 MENOPAUSAL HOT FLUSHES: ICD-10-CM

## 2024-08-26 RX ORDER — RIVAROXABAN 10 MG/1
10 TABLET, FILM COATED ORAL DAILY
Qty: 30 TABLET | Refills: 5 | Status: SHIPPED | OUTPATIENT
Start: 2024-08-26

## 2024-08-28 RX ORDER — VENLAFAXINE HYDROCHLORIDE 75 MG/1
CAPSULE, EXTENDED RELEASE ORAL
Qty: 90 CAPSULE | Refills: 1 | Status: SHIPPED | OUTPATIENT
Start: 2024-08-28

## 2024-08-30 ENCOUNTER — LAB (OUTPATIENT)
Dept: LAB | Age: 64
End: 2024-08-30

## 2024-08-30 DIAGNOSIS — C90.01 MULTIPLE MYELOMA IN REMISSION (HCC): ICD-10-CM

## 2024-08-30 LAB
BASOPHILS ABSOLUTE: 0.1 THOU/MM3 (ref 0–0.1)
BASOPHILS NFR BLD AUTO: 1.6 %
DEPRECATED RDW RBC AUTO: 47.3 FL (ref 35–45)
EOSINOPHIL NFR BLD AUTO: 4.6 %
EOSINOPHILS ABSOLUTE: 0.2 THOU/MM3 (ref 0–0.4)
ERYTHROCYTE [DISTWIDTH] IN BLOOD BY AUTOMATED COUNT: 13.1 % (ref 11.5–14.5)
HCT VFR BLD AUTO: 39 % (ref 37–47)
HGB BLD-MCNC: 13.3 GM/DL (ref 12–16)
IMM GRANULOCYTES # BLD AUTO: 0 THOU/MM3 (ref 0–0.07)
IMM GRANULOCYTES NFR BLD AUTO: 0 %
LYMPHOCYTES ABSOLUTE: 1.2 THOU/MM3 (ref 1–4.8)
LYMPHOCYTES NFR BLD AUTO: 31.4 %
MCH RBC QN AUTO: 34 PG (ref 26–33)
MCHC RBC AUTO-ENTMCNC: 34.1 GM/DL (ref 32.2–35.5)
MCV RBC AUTO: 99.7 FL (ref 81–99)
MONOCYTES ABSOLUTE: 0.7 THOU/MM3 (ref 0.4–1.3)
MONOCYTES NFR BLD AUTO: 19.7 %
NEUTROPHILS ABSOLUTE: 1.6 THOU/MM3 (ref 1.8–7.7)
NEUTROPHILS NFR BLD AUTO: 42.7 %
NRBC BLD AUTO-RTO: 0 /100 WBC
PLATELET # BLD AUTO: 150 THOU/MM3 (ref 130–400)
PMV BLD AUTO: 12 FL (ref 9.4–12.4)
RBC # BLD AUTO: 3.91 MILL/MM3 (ref 4.2–5.4)
WBC # BLD AUTO: 3.7 THOU/MM3 (ref 4.8–10.8)

## 2024-09-09 RX ORDER — LENALIDOMIDE 10 MG/1
CAPSULE ORAL
Qty: 14 CAPSULE | Refills: 0 | Status: ACTIVE | OUTPATIENT
Start: 2024-09-09

## 2024-09-30 ENCOUNTER — OFFICE VISIT (OUTPATIENT)
Dept: INTERNAL MEDICINE CLINIC | Age: 64
End: 2024-09-30

## 2024-09-30 VITALS
HEIGHT: 63 IN | WEIGHT: 178.4 LBS | DIASTOLIC BLOOD PRESSURE: 80 MMHG | SYSTOLIC BLOOD PRESSURE: 110 MMHG | HEART RATE: 72 BPM | TEMPERATURE: 97.2 F | BODY MASS INDEX: 31.61 KG/M2

## 2024-09-30 DIAGNOSIS — N95.1 MENOPAUSAL HOT FLUSHES: ICD-10-CM

## 2024-09-30 DIAGNOSIS — Z00.00 ENCOUNTER FOR MEDICARE ANNUAL WELLNESS EXAM: Primary | ICD-10-CM

## 2024-09-30 DIAGNOSIS — D70.1 CHEMOTHERAPY-INDUCED NEUTROPENIA (HCC): ICD-10-CM

## 2024-09-30 DIAGNOSIS — I10 PRIMARY HYPERTENSION: ICD-10-CM

## 2024-09-30 DIAGNOSIS — Z23 NEED FOR INFLUENZA VACCINATION: ICD-10-CM

## 2024-09-30 DIAGNOSIS — Z13.220 SCREENING CHOLESTEROL LEVEL: ICD-10-CM

## 2024-09-30 DIAGNOSIS — T45.1X5A CHEMOTHERAPY-INDUCED NEUTROPENIA (HCC): ICD-10-CM

## 2024-09-30 PROBLEM — Z86.718 HISTORY OF DVT (DEEP VEIN THROMBOSIS): Status: ACTIVE | Noted: 2024-09-30

## 2024-09-30 PROBLEM — I82.431 ACUTE DEEP VEIN THROMBOSIS (DVT) OF RIGHT POPLITEAL VEIN (HCC): Status: RESOLVED | Noted: 2023-08-15 | Resolved: 2024-09-30

## 2024-09-30 PROBLEM — F33.0 MAJOR DEPRESSIVE DISORDER, RECURRENT, MILD (HCC): Status: RESOLVED | Noted: 2023-06-15 | Resolved: 2024-09-30

## 2024-09-30 PROBLEM — F33.1 MAJOR DEPRESSIVE DISORDER, RECURRENT, MODERATE (HCC): Status: RESOLVED | Noted: 2023-06-15 | Resolved: 2024-09-30

## 2024-09-30 PROBLEM — F33.9 MAJOR DEPRESSIVE DISORDER, RECURRENT, UNSPECIFIED (HCC): Status: RESOLVED | Noted: 2023-06-15 | Resolved: 2024-09-30

## 2024-09-30 RX ORDER — VENLAFAXINE HYDROCHLORIDE 75 MG/1
CAPSULE, EXTENDED RELEASE ORAL
Qty: 90 CAPSULE | Refills: 1 | Status: SHIPPED | OUTPATIENT
Start: 2024-09-30

## 2024-09-30 RX ORDER — AMLODIPINE BESYLATE 10 MG/1
10 TABLET ORAL DAILY
Qty: 90 TABLET | Refills: 1 | Status: SHIPPED | OUTPATIENT
Start: 2024-09-30

## 2024-09-30 RX ORDER — METOPROLOL SUCCINATE 50 MG/1
TABLET, EXTENDED RELEASE ORAL
Qty: 90 TABLET | Refills: 1 | Status: SHIPPED | OUTPATIENT
Start: 2024-09-30

## 2024-09-30 ASSESSMENT — PATIENT HEALTH QUESTIONNAIRE - PHQ9
SUM OF ALL RESPONSES TO PHQ QUESTIONS 1-9: 2
10. IF YOU CHECKED OFF ANY PROBLEMS, HOW DIFFICULT HAVE THESE PROBLEMS MADE IT FOR YOU TO DO YOUR WORK, TAKE CARE OF THINGS AT HOME, OR GET ALONG WITH OTHER PEOPLE: NOT DIFFICULT AT ALL
3. TROUBLE FALLING OR STAYING ASLEEP: SEVERAL DAYS
SUM OF ALL RESPONSES TO PHQ QUESTIONS 1-9: 2
2. FEELING DOWN, DEPRESSED OR HOPELESS: NOT AT ALL
SUM OF ALL RESPONSES TO PHQ QUESTIONS 1-9: 2
6. FEELING BAD ABOUT YOURSELF - OR THAT YOU ARE A FAILURE OR HAVE LET YOURSELF OR YOUR FAMILY DOWN: NOT AT ALL
SUM OF ALL RESPONSES TO PHQ QUESTIONS 1-9: 2
4. FEELING TIRED OR HAVING LITTLE ENERGY: SEVERAL DAYS
9. THOUGHTS THAT YOU WOULD BE BETTER OFF DEAD, OR OF HURTING YOURSELF: NOT AT ALL
1. LITTLE INTEREST OR PLEASURE IN DOING THINGS: NOT AT ALL
7. TROUBLE CONCENTRATING ON THINGS, SUCH AS READING THE NEWSPAPER OR WATCHING TELEVISION: NOT AT ALL
8. MOVING OR SPEAKING SO SLOWLY THAT OTHER PEOPLE COULD HAVE NOTICED. OR THE OPPOSITE, BEING SO FIGETY OR RESTLESS THAT YOU HAVE BEEN MOVING AROUND A LOT MORE THAN USUAL: NOT AT ALL
5. POOR APPETITE OR OVEREATING: NOT AT ALL
SUM OF ALL RESPONSES TO PHQ9 QUESTIONS 1 & 2: 0

## 2024-09-30 ASSESSMENT — LIFESTYLE VARIABLES
HOW OFTEN DO YOU HAVE A DRINK CONTAINING ALCOHOL: MONTHLY OR LESS
HOW MANY STANDARD DRINKS CONTAINING ALCOHOL DO YOU HAVE ON A TYPICAL DAY: 1 OR 2

## 2024-09-30 NOTE — PROGRESS NOTES
After obtaining consent, and per orders of Dr. Traylor, injection of flu vaccine given in Left deltoid by JEAN-PIERRE NORMAN LPN. Patient instructed to remain in clinic for 20 minutes afterwards, and to report any adverse reaction to me immediately.

## 2024-09-30 NOTE — PROGRESS NOTES
Medicare Annual Wellness Visit    Luciana Dang is here for Medicare AWV    Assessment & Plan   Encounter for Medicare annual wellness exam  Need for influenza vaccination  -     Influenza, FLUCELVAX Trivalent, (age 6 mo+) IM, Preservative Free, 0.5mL  Screening cholesterol level  -     Lipid Panel; Future  Primary hypertension  -     metoprolol succinate (TOPROL XL) 50 MG extended release tablet; TAKE ONE TABLET BY MOUTH DAILY, Disp-90 tablet, R-1Normal  -     amLODIPine (NORVASC) 10 MG tablet; Take 1 tablet by mouth daily, Disp-90 tablet, R-1Normal  Menopausal hot flushes  -     venlafaxine (EFFEXOR XR) 75 MG extended release capsule; TAKE 1 CAPSULE BY MOUTH EVERY DAY, Disp-90 capsule, R-1Normal  Chemotherapy-induced neutropenia (HCC)    Recommendations for Preventive Services Due: see orders and patient instructions/AVS.  Recommended screening schedule for the next 5-10 years is provided to the patient in written form: see Patient Instructions/AVS.    Increase walking and decrease cookies to lose weight and help with hip/knee discomfort.    Consider going to [place] [facility]/ - to consider getting shoes to help with leg length discrepancy.     Vaccines due now are COVID, RSV, Tdap - always check with Oncology about if ok to get vaccines.  She was given flu vaccine at visit today.    Ordered screening lab.    Refilled chronic medications as above.    Chemotherapy induced neutropenia (HCC) - stable, followed by Oncology.    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  Letter given.    This is her  visit - did Welcome to Medicare last year.     Return in about 6 months (around 3/30/2025) for chronic issues.  Yearly wellness visit.  Lipid panel due with next set of labs with other provider.     Subjective       Patient's complete Health Risk Assessment and screening values have been reviewed and are found in Flowsheets. The following problems were

## 2024-09-30 NOTE — PATIENT INSTRUCTIONS
Handicap placard    Increase walking and decrease cookies to lose weight and help with hip/knee discomfort.    Consider going to [place] [facility]/ - to consider getting shoes to help with leg length discrepancy.     Vaccines due now are COVID, RSV, Tdap - always check with Oncology about if ok to get vaccines.     Starting a Weight Loss Plan: Care Instructions  Overview     It can be a challenge to lose weight. But your doctor can help you make a weight-loss plan that meets your needs.  You don't have to make a lot of big changes at once. A better idea might be to focus on small changes and stick with them. When those changes become habit, you can add a few more changes.  Some people find it helpful to take an exercise or nutrition class. If you have questions, ask your doctor about seeing a registered dietitian or an exercise specialist. You might also think about joining a weight-loss support group.  If you're not ready to make changes right now, try to pick a date in the future. Then make an appointment with your doctor to talk about when and how you'll get started with a plan.  Follow-up care is a key part of your treatment and safety. Be sure to make and go to all appointments, and call your doctor if you are having problems. It's also a good idea to know your test results and keep a list of the medicines you take.  How can you care for yourself at home?  Set realistic goals. Many people expect to lose much more weight than is likely. A weight loss of 5% to 10% of your body weight may be enough to improve your health.  Get family and friends involved to provide support. Talk to them about why you are trying to lose weight, and ask them to help. They can help by participating in exercise and having meals with you, even if they may be eating something different.  Find what works best for you. If you do not have time or do not like to cook, a program that offers meal replacement bars or shakes may be

## 2024-10-01 RX ORDER — LENALIDOMIDE 10 MG/1
CAPSULE ORAL
Qty: 14 CAPSULE | Refills: 0 | Status: ACTIVE | OUTPATIENT
Start: 2024-10-01

## 2024-10-16 NOTE — PLAN OF CARE
Problem: Pain:  Goal: Control of acute pain  Description  Control of acute pain  Outcome: Met This Shift  Note:   Patient rating pain a 1 out of 10 using JANAK scale, Pain located in left side jaw. Intervention: Opioid analgesia side-effects  Note:   Patient encouraged to take prescribed pain medications and call Physician if pain is not controlled. Problem: Intellectual/Education/Knowledge Deficit  Goal: Teaching initiated upon admission  Outcome: Met This Shift  Note:   Patient verbalizes understanding to verbal information given on velcade injection,action and possible side effects. Aware to call MD if develop complications. Intervention: Verbal/written education provided  Note:   Chemotherapy Teaching     What is Chemotherapy   Drug action [x]   Method of Administration [x]   Handouts given []     Side Effects  Nausea/vomiting [x]   Diarrhea [x]   Fatigue [x]   Signs / Symptoms of infection [x]   Neutropenia [x]   Thrombocytopenia [x]   Alopecia [x]   neuropathy [x]   Holden diet &  the importance of fluids [x]       Micellaneous  Importance of nutrition [x]   Importance of oral hygiene [x]   When to call the MD [x]   Monitoring labs [x]   Use of supportive services []     Explanation of Drug Regimen / Frequency  Velcade subq injection  D1C8       Comments  Verbalized understanding to drug,action,side effects and when to call MD         Problem: Discharge Planning  Goal: Knowledge of discharge instructions  Description  Knowledge of discharge instructions     Outcome: Met This Shift  Note:   Verbalize understanding of discharge instructions, follow up appointments, and when to call Physician. Intervention: Discharge to appropriate level of care  Note:   Provide discharge instructions. Problem: Falls - Risk of:  Goal: Will remain free from falls  Description  Will remain free from falls  Outcome: Met This Shift  Note:   Free from falls while in [facility] Oncology.    Intervention: Assess risk factors for falls  Note:   Discussed the need to use the call light for assistance when getting up to ambulate. Call light within reach. Care plan reviewed with patient and spouse. Patient and spouse verbalize understanding of the plan of care and contribute to goal setting. 5

## 2024-10-21 RX ORDER — LENALIDOMIDE 10 MG/1
CAPSULE ORAL
Qty: 14 CAPSULE | Refills: 0 | Status: ACTIVE | OUTPATIENT
Start: 2024-10-21

## 2024-11-12 RX ORDER — LENALIDOMIDE 10 MG/1
CAPSULE ORAL
Qty: 14 CAPSULE | Refills: 0 | Status: ACTIVE | OUTPATIENT
Start: 2024-11-12

## 2024-11-20 ENCOUNTER — LAB (OUTPATIENT)
Dept: LAB | Age: 64
End: 2024-11-20

## 2024-11-20 DIAGNOSIS — C90.01 MULTIPLE MYELOMA IN REMISSION (HCC): ICD-10-CM

## 2024-11-20 DIAGNOSIS — Z13.220 SCREENING CHOLESTEROL LEVEL: ICD-10-CM

## 2024-11-20 LAB
ALBUMIN SERPL BCG-MCNC: 3.9 G/DL (ref 3.5–5.1)
ALP SERPL-CCNC: 76 U/L (ref 38–126)
ALT SERPL W/O P-5'-P-CCNC: 22 U/L (ref 11–66)
ANION GAP SERPL CALC-SCNC: 8 MEQ/L (ref 8–16)
AST SERPL-CCNC: 19 U/L (ref 5–40)
B2 MICROGLOB SERPL-MCNC: 1.9 MG/L
BASOPHILS ABSOLUTE: 0.1 THOU/MM3 (ref 0–0.1)
BASOPHILS NFR BLD AUTO: 2.2 %
BILIRUB SERPL-MCNC: 0.5 MG/DL (ref 0.3–1.2)
BUN SERPL-MCNC: 12 MG/DL (ref 7–22)
CALCIUM SERPL-MCNC: 9.1 MG/DL (ref 8.5–10.5)
CHLORIDE SERPL-SCNC: 107 MEQ/L (ref 98–111)
CHOLEST SERPL-MCNC: 148 MG/DL (ref 100–199)
CO2 SERPL-SCNC: 26 MEQ/L (ref 23–33)
CREAT SERPL-MCNC: 0.6 MG/DL (ref 0.4–1.2)
DEPRECATED RDW RBC AUTO: 47 FL (ref 35–45)
EOSINOPHIL NFR BLD AUTO: 3.3 %
EOSINOPHILS ABSOLUTE: 0.1 THOU/MM3 (ref 0–0.4)
ERYTHROCYTE [DISTWIDTH] IN BLOOD BY AUTOMATED COUNT: 13.1 % (ref 11.5–14.5)
FREE KAPPA/LAMBDA RATIO: 1.43 (ref 0.22–1.74)
GFR SERPL CREATININE-BSD FRML MDRD: > 90 ML/MIN/1.73M2
GLUCOSE SERPL-MCNC: 87 MG/DL (ref 70–108)
HCT VFR BLD AUTO: 38.7 % (ref 37–47)
HDLC SERPL-MCNC: 59 MG/DL
HGB BLD-MCNC: 13.3 GM/DL (ref 12–16)
IGA SERPL-MCNC: 148 MG/DL (ref 70–400)
IGG SERPL-MCNC: 884 MG/DL (ref 700–1600)
IGM SERPL-MCNC: 53 MG/DL (ref 40–230)
IMM GRANULOCYTES # BLD AUTO: 0.02 THOU/MM3 (ref 0–0.07)
IMM GRANULOCYTES NFR BLD AUTO: 0.7 %
KAPPA LC FREE SER-MCNC: 21.9 MG/L
LAMBDA LC FREE SERPL-MCNC: 15.3 MG/L (ref 4.2–27.7)
LDLC SERPL CALC-MCNC: 73 MG/DL
LYMPHOCYTES ABSOLUTE: 0.8 THOU/MM3 (ref 1–4.8)
LYMPHOCYTES NFR BLD AUTO: 28.2 %
MCH RBC QN AUTO: 34 PG (ref 26–33)
MCHC RBC AUTO-ENTMCNC: 34.4 GM/DL (ref 32.2–35.5)
MCV RBC AUTO: 99 FL (ref 81–99)
MONOCYTES ABSOLUTE: 0.5 THOU/MM3 (ref 0.4–1.3)
MONOCYTES NFR BLD AUTO: 19.4 %
NEUTROPHILS ABSOLUTE: 1.2 THOU/MM3 (ref 1.8–7.7)
NEUTROPHILS NFR BLD AUTO: 46.2 %
NRBC BLD AUTO-RTO: 0 /100 WBC
PLATELET # BLD AUTO: 137 THOU/MM3 (ref 130–400)
PMV BLD AUTO: 12 FL (ref 9.4–12.4)
POTASSIUM SERPL-SCNC: 3.9 MEQ/L (ref 3.5–5.2)
PROT SERPL-MCNC: 6.5 G/DL (ref 6.1–8)
RBC # BLD AUTO: 3.91 MILL/MM3 (ref 4.2–5.4)
SODIUM SERPL-SCNC: 141 MEQ/L (ref 135–145)
TRIGL SERPL-MCNC: 82 MG/DL (ref 0–199)
WBC # BLD AUTO: 2.7 THOU/MM3 (ref 4.8–10.8)

## 2024-11-22 LAB — INTERPRETATION 24H UR IFE-IMP: NORMAL

## 2024-11-23 LAB — IMMUNOFIXATION WITH QUANT: NORMAL

## 2024-12-03 RX ORDER — LENALIDOMIDE 10 MG/1
CAPSULE ORAL
Qty: 14 CAPSULE | Refills: 0 | Status: ACTIVE | OUTPATIENT
Start: 2024-12-03

## 2024-12-04 ENCOUNTER — OFFICE VISIT (OUTPATIENT)
Dept: ONCOLOGY | Age: 64
End: 2024-12-04
Payer: MEDICARE

## 2024-12-04 ENCOUNTER — HOSPITAL ENCOUNTER (OUTPATIENT)
Dept: INFUSION THERAPY | Age: 64
Discharge: HOME | End: 2024-12-04
Payer: MEDICARE

## 2024-12-04 VITALS
WEIGHT: 180 LBS | TEMPERATURE: 98 F | BODY MASS INDEX: 31.89 KG/M2 | SYSTOLIC BLOOD PRESSURE: 125 MMHG | OXYGEN SATURATION: 96 % | RESPIRATION RATE: 16 BRPM | DIASTOLIC BLOOD PRESSURE: 81 MMHG | HEART RATE: 62 BPM

## 2024-12-04 VITALS
SYSTOLIC BLOOD PRESSURE: 125 MMHG | OXYGEN SATURATION: 96 % | HEART RATE: 62 BPM | RESPIRATION RATE: 16 BRPM | DIASTOLIC BLOOD PRESSURE: 81 MMHG | TEMPERATURE: 98 F

## 2024-12-04 DIAGNOSIS — C90.01 MULTIPLE MYELOMA IN REMISSION (HCC): Primary | ICD-10-CM

## 2024-12-04 PROCEDURE — 99211 OFF/OP EST MAY X REQ PHY/QHP: CPT

## 2024-12-04 PROCEDURE — 3074F SYST BP LT 130 MM HG: CPT | Performed by: INTERNAL MEDICINE

## 2024-12-04 PROCEDURE — 3079F DIAST BP 80-89 MM HG: CPT | Performed by: INTERNAL MEDICINE

## 2024-12-04 PROCEDURE — 99214 OFFICE O/P EST MOD 30 MIN: CPT | Performed by: INTERNAL MEDICINE

## 2024-12-04 NOTE — PROGRESS NOTES
HGB 13.3 11/20/2024 08:40 AM    HCT 38.7 11/20/2024 08:40 AM     11/20/2024 08:40 AM    MCV 99.0 11/20/2024 08:40 AM    MCH 34.0 11/20/2024 08:40 AM    MCHC 34.4 11/20/2024 08:40 AM    RDW 13.0 08/06/2024 02:47 PM    NRBC 0 11/20/2024 08:40 AM    LYMPHOPCT 16.5 07/11/2019 08:05 AM    MONOPCT 19.4 11/20/2024 08:40 AM    EOSPCT 2 08/06/2024 02:47 PM    BASOPCT 0.3 07/11/2019 08:05 AM    MONOSABS 0.5 11/20/2024 08:40 AM    LYMPHSABS 0.8 11/20/2024 08:40 AM    EOSABS 0.1 11/20/2024 08:40 AM    BASOSABS 0.1 11/20/2024 08:40 AM    DIFFTYPE see below 12/19/2019 04:12 AM      No components found for: \"SEGSABS\"      CMP:    Lab Results   Component Value Date/Time     11/20/2024 08:40 AM    K 3.9 11/20/2024 08:40 AM     11/20/2024 08:40 AM    CO2 26 11/20/2024 08:40 AM    BUN 12 11/20/2024 08:40 AM    CREATININE 0.6 11/20/2024 08:40 AM    LABGLOM > 90 11/20/2024 08:40 AM    LABGLOM >90 04/09/2024 03:25 PM    GLUCOSE 87 11/20/2024 08:40 AM    GLUCOSE 92 07/11/2019 08:05 AM    CALCIUM 9.1 11/20/2024 08:40 AM    BILITOT 0.5 11/20/2024 08:40 AM    BILITOT NEGATIVE 08/04/2015 08:03 AM    ALKPHOS 76 11/20/2024 08:40 AM    AST 19 11/20/2024 08:40 AM    ALT 22 11/20/2024 08:40 AM       BMP:    Lab Results   Component Value Date/Time     11/20/2024 08:40 AM    K 3.9 11/20/2024 08:40 AM     11/20/2024 08:40 AM    CO2 26 11/20/2024 08:40 AM    BUN 12 11/20/2024 08:40 AM    CREATININE 0.6 11/20/2024 08:40 AM    CALCIUM 9.1 11/20/2024 08:40 AM    LABGLOM > 90 11/20/2024 08:40 AM    LABGLOM >90 04/09/2024 03:25 PM    GLUCOSE 87 11/20/2024 08:40 AM    GLUCOSE 92 07/11/2019 08:05 AM       Magnesium:    Lab Results   Component Value Date/Time    MG 1.6 12/19/2019 04:12 AM     PT/INR:    Lab Results   Component Value Date/Time    INR 1.28 01/18/2024 10:03 AM     TSH:    Lab Results   Component Value Date/Time    TSH 2.500 08/04/2016 08:06 AM     VITAMIN B12: No components found for: \"B12\"  FOLATE:    Lab

## 2024-12-04 NOTE — PATIENT INSTRUCTIONS
-Continue Revlimid 10 mg days 1 - 14 every 21 days.  -Keep Feb 2025 F/U at [facility] Dr Larry, MM clinic  -Follow-up with me 1/29/2025.  CBC ,CMP SPEP, IgA and light chains only at that time/ordered

## 2024-12-13 ENCOUNTER — HOSPITAL ENCOUNTER (OUTPATIENT)
Dept: WOMENS IMAGING | Age: 64
Discharge: HOME | End: 2024-12-13
Payer: MEDICARE

## 2024-12-13 VITALS — BODY MASS INDEX: 31.89 KG/M2 | HEIGHT: 63 IN | WEIGHT: 180 LBS

## 2024-12-13 DIAGNOSIS — Z12.31 VISIT FOR SCREENING MAMMOGRAM: ICD-10-CM

## 2024-12-13 PROCEDURE — 77063 BREAST TOMOSYNTHESIS BI: CPT

## 2024-12-24 RX ORDER — LENALIDOMIDE 10 MG/1
CAPSULE ORAL
Qty: 14 CAPSULE | Refills: 0 | Status: ACTIVE | OUTPATIENT
Start: 2024-12-24 | End: 2024-12-24 | Stop reason: SDUPTHER

## 2024-12-24 RX ORDER — LENALIDOMIDE 10 MG/1
CAPSULE ORAL
Qty: 14 CAPSULE | Refills: 0 | Status: ACTIVE | OUTPATIENT
Start: 2024-12-24

## 2025-01-14 RX ORDER — LENALIDOMIDE 10 MG/1
CAPSULE ORAL
Qty: 14 CAPSULE | Refills: 0 | Status: ACTIVE | OUTPATIENT
Start: 2025-01-14

## 2025-01-17 ENCOUNTER — LAB (OUTPATIENT)
Dept: LAB | Age: 65
End: 2025-01-17

## 2025-01-17 ENCOUNTER — TRANSCRIBE ORDERS (OUTPATIENT)
Dept: ADMINISTRATIVE | Age: 65
End: 2025-01-17

## 2025-01-17 DIAGNOSIS — Z12.31 ENCOUNTER FOR SCREENING MAMMOGRAM FOR MALIGNANT NEOPLASM OF BREAST: Primary | ICD-10-CM

## 2025-01-27 LAB — CYTOLOGY THIN PREP PAP: NORMAL

## 2025-01-29 ENCOUNTER — OFFICE VISIT (OUTPATIENT)
Dept: ONCOLOGY | Age: 65
End: 2025-01-29
Payer: MEDICARE

## 2025-01-29 ENCOUNTER — HOSPITAL ENCOUNTER (OUTPATIENT)
Dept: INFUSION THERAPY | Age: 65
Discharge: HOME | End: 2025-01-29
Payer: MEDICARE

## 2025-01-29 VITALS
HEART RATE: 74 BPM | DIASTOLIC BLOOD PRESSURE: 65 MMHG | RESPIRATION RATE: 16 BRPM | OXYGEN SATURATION: 96 % | SYSTOLIC BLOOD PRESSURE: 140 MMHG | TEMPERATURE: 98.1 F

## 2025-01-29 VITALS
HEART RATE: 74 BPM | DIASTOLIC BLOOD PRESSURE: 65 MMHG | TEMPERATURE: 98.1 F | RESPIRATION RATE: 16 BRPM | SYSTOLIC BLOOD PRESSURE: 140 MMHG | BODY MASS INDEX: 32.56 KG/M2 | OXYGEN SATURATION: 96 % | WEIGHT: 183.8 LBS

## 2025-01-29 DIAGNOSIS — C90.01 MULTIPLE MYELOMA IN REMISSION (HCC): ICD-10-CM

## 2025-01-29 DIAGNOSIS — C90.01 MULTIPLE MYELOMA IN REMISSION (HCC): Primary | ICD-10-CM

## 2025-01-29 LAB
ALBUMIN SERPL BCG-MCNC: 4.3 G/DL (ref 3.5–5.1)
ALP SERPL-CCNC: 80 U/L (ref 38–126)
ALT SERPL W/O P-5'-P-CCNC: 20 U/L (ref 11–66)
AST SERPL-CCNC: 18 U/L (ref 5–40)
BASOPHILS ABSOLUTE: 0 THOU/MM3 (ref 0–0.1)
BASOPHILS NFR BLD AUTO: 1 % (ref 0–3)
BILIRUB CONJ SERPL-MCNC: < 0.1 MG/DL (ref 0.1–13.8)
BILIRUB SERPL-MCNC: 0.4 MG/DL (ref 0.3–1.2)
BUN BLDP-MCNC: 14 MG/DL (ref 8–26)
CHLORIDE BLD-SCNC: 107 MEQ/L (ref 98–109)
CREAT BLD-MCNC: 0.7 MG/DL (ref 0.5–1.2)
EOSINOPHIL NFR BLD AUTO: 7 % (ref 0–4)
EOSINOPHILS ABSOLUTE: 0.2 THOU/MM3 (ref 0–0.4)
ERYTHROCYTE [DISTWIDTH] IN BLOOD BY AUTOMATED COUNT: 12.9 % (ref 11.5–14.5)
GFR SERPL CREATININE-BSD FRML MDRD: > 90 ML/MIN/1.73M2
GLUCOSE BLD-MCNC: 105 MG/DL (ref 70–108)
HCT VFR BLD AUTO: 41.3 % (ref 37–47)
HGB BLD-MCNC: 14.3 GM/DL (ref 12–16)
IMMATURE GRANULOCYTES %: 0 %
IMMATURE GRANULOCYTES ABSOLUTE: 0.01 THOU/MM3 (ref 0–0.07)
IONIZED CALCIUM, WHOLE BLOOD: 1.21 MMOL/L (ref 1.12–1.32)
LYMPHOCYTES ABSOLUTE: 0.7 THOU/MM3 (ref 1–4.8)
LYMPHOCYTES NFR BLD AUTO: 22 % (ref 15–47)
MCH RBC QN AUTO: 34.6 PG (ref 26–33)
MCHC RBC AUTO-ENTMCNC: 34.6 GM/DL (ref 32.2–35.5)
MCV RBC AUTO: 100 FL (ref 81–99)
MONOCYTES ABSOLUTE: 0.6 THOU/MM3 (ref 0.4–1.3)
MONOCYTES NFR BLD AUTO: 18 % (ref 0–12)
NEUTROPHILS ABSOLUTE: 1.6 THOU/MM3 (ref 1.8–7.7)
NEUTROPHILS NFR BLD AUTO: 51 % (ref 43–75)
PLATELET # BLD AUTO: 162 THOU/MM3 (ref 130–400)
PMV BLD AUTO: 11.7 FL (ref 9.4–12.4)
POTASSIUM BLD-SCNC: 3.8 MEQ/L (ref 3.5–4.9)
PROT SERPL-MCNC: 6.7 G/DL (ref 6.1–8)
RBC # BLD AUTO: 4.13 MILL/MM3 (ref 4.2–5.4)
SODIUM BLD-SCNC: 143 MEQ/L (ref 138–146)
TOTAL CO2, WHOLE BLOOD: 26 MEQ/L (ref 23–33)
WBC # BLD AUTO: 3 THOU/MM3 (ref 4.8–10.8)

## 2025-01-29 PROCEDURE — 99213 OFFICE O/P EST LOW 20 MIN: CPT | Performed by: INTERNAL MEDICINE

## 2025-01-29 PROCEDURE — 84165 PROTEIN E-PHORESIS SERUM: CPT

## 2025-01-29 PROCEDURE — 3077F SYST BP >= 140 MM HG: CPT | Performed by: INTERNAL MEDICINE

## 2025-01-29 PROCEDURE — 3078F DIAST BP <80 MM HG: CPT | Performed by: INTERNAL MEDICINE

## 2025-01-29 PROCEDURE — 82784 ASSAY IGA/IGD/IGG/IGM EACH: CPT

## 2025-01-29 PROCEDURE — 80047 BASIC METABLC PNL IONIZED CA: CPT

## 2025-01-29 PROCEDURE — 85025 COMPLETE CBC W/AUTO DIFF WBC: CPT

## 2025-01-29 PROCEDURE — 83883 ASSAY NEPHELOMETRY NOT SPEC: CPT

## 2025-01-29 PROCEDURE — 36415 COLL VENOUS BLD VENIPUNCTURE: CPT

## 2025-01-29 PROCEDURE — 99211 OFF/OP EST MAY X REQ PHY/QHP: CPT

## 2025-01-29 PROCEDURE — 80076 HEPATIC FUNCTION PANEL: CPT

## 2025-01-29 PROCEDURE — 84155 ASSAY OF PROTEIN SERUM: CPT

## 2025-01-29 NOTE — PROGRESS NOTES
[facility] [place] [facility]  [facility] CANCER CENTER  [address]  [address]  [place] [id number]  Dept: [phone]  Loc: [phone]   Hematology/Oncology Progress Note (Clinic)        Luciana Dang  1960    1/29/25    No ref. provider found   Dinora Traylor MD   Followed by Henry Wheeler MD, [facility] myeloma clinic    Diagnosis:   -Multiple Myeloma in remission.  Dx: July 2019  Presented with hypercalcemia, calcium 11, renal insufficiency with creatinine of 4.9 and multiple lytic lesions on skeletal survey.  Bone marrow biopsy 7/15/2019: Plasma cells 30% of the cells on the aspirate and occupy 40% of the marrow cellularity consistent with kappa light chain restricted plasma cell myeloma.  IgA equal to 656    -5/6/2019 right calf DVT; on Xarelto    Treatment:   -Plasmapheresis x2 because of light chains greater than 10,000 in July 2019 at [facility]  Dr Henry Larry   -CyBorD (7/16/2019 began)  -Velcade Decadron and Revlimid (RVD) began 8/7/2019. RVD x8.  -Autologous  stem cell collection 2/20/2020;No Stem cell transplant  NOT done thus far.        (Maintenance Revlimid  began 3/17/2020)  Currently on : Revlimid 10 mg taken 2 weeks on and 1 week off  -Zometa Previously; held because of suspicion of jaw osteonecrosis  -Xarelto      Followable Disease:   -IgA, serum light chains .CBC CMP    Comorbidities:  See below      Subjective: Transferred from Dr. Rosario .  Last seen by me 12/4/2024.  On maintenance Revlimid 10 mg given 2 weeks on 1 week off. Remains asymptomatic.  No bone or back pain.  No trouble with frequent or severe infections.  Limited myeloma labs ordered to be drawn today 1/29/2025.   Last follow-up at [facility] for annual visit was around February 2024.    Meanwhile she understands she will continue the Revlimid maintenance.  In February seen by  Dr. Larry's -remains in remission; cont maintenance Revlimid .  Tolerating Revlimid very well.  [facility] transplant for

## 2025-01-29 NOTE — PATIENT INSTRUCTIONS
-Continue Revlimid 10 mg days 1 - 14 every 21 days.  -Keep Feb17, 2025 F/U at [facility] Dr Larry, MM clinic  -Follow-up with me today 1/29/2025.  CBC ,CMP SPEP, IgA and light chains only at that time/ordered  -Follow-up with me in April.

## 2025-01-30 LAB
FREE KAPPA/LAMBDA RATIO: 1.05 (ref 0.22–1.74)
IGA SERPL-MCNC: 163 MG/DL (ref 70–400)
KAPPA LC FREE SER-MCNC: 19.6 MG/L
LAMBDA LC FREE SERPL-MCNC: 18.6 MG/L (ref 4.2–27.7)

## 2025-02-02 LAB
ALBUMIN SERPL ELPH-MCNC: 4.3 G/DL (ref 3.75–5.01)
ALPHA1 GLOB SERPL ELPH-MCNC: 0.25 G/DL (ref 0.19–0.46)
ALPHA2 GLOB SERPL ELPH-MCNC: 0.56 G/DL (ref 0.48–1.05)
B-GLOBULIN SERPL ELPH-MCNC: 0.77 G/DL (ref 0.48–1.1)
GAMMA GLOB SERPL ELPH-MCNC: 0.82 G/DL (ref 0.62–1.51)
INTERPRETATION SERPL IFE-IMP: NORMAL
M PROTEIN SERPL ELPH-MCNC: NORMAL G/DL
MONOCLON BAND OBS SERPL: NORMAL
PATHOLOGY STUDY: NORMAL
PROT SERPL-MCNC: 6.7 G/DL (ref 6.3–8.2)

## 2025-02-11 RX ORDER — LENALIDOMIDE 10 MG/1
CAPSULE ORAL
Qty: 14 CAPSULE | Refills: 0 | Status: ACTIVE | OUTPATIENT
Start: 2025-02-11

## 2025-02-17 RX ORDER — RIVAROXABAN 10 MG/1
10 TABLET, FILM COATED ORAL
Qty: 30 TABLET | Refills: 5 | Status: SHIPPED | OUTPATIENT
Start: 2025-02-17

## 2025-02-28 RX ORDER — LENALIDOMIDE 10 MG/1
CAPSULE ORAL
Qty: 14 CAPSULE | Refills: 0 | Status: ACTIVE | OUTPATIENT
Start: 2025-02-28

## 2025-03-21 RX ORDER — LENALIDOMIDE 10 MG/1
CAPSULE ORAL
Qty: 14 CAPSULE | Refills: 0 | Status: ACTIVE | OUTPATIENT
Start: 2025-03-21

## 2025-04-10 RX ORDER — LENALIDOMIDE 10 MG/1
CAPSULE ORAL
Qty: 14 CAPSULE | Refills: 0 | Status: ACTIVE | OUTPATIENT
Start: 2025-04-10

## 2025-04-14 DIAGNOSIS — C90.01 MULTIPLE MYELOMA IN REMISSION (HCC): Primary | ICD-10-CM

## 2025-04-21 NOTE — PROGRESS NOTES
[facility] [place] [facility]  [facility] CANCER CENTER  [address]  [address]  [place] [id number]  Dept: [phone]  Loc: [phone]   Hematology/Oncology Progress Note (Clinic)        Luciana Dang  1960    4/22/25    No ref. provider found   Dinora Traylor MD   Followed by Henry Wheeler MD, [facility] myeloma clinic    Diagnosis:   -Multiple Myeloma in remission.  Dx: July 2019  Presented with hypercalcemia, calcium 11, renal insufficiency with creatinine of 4.9 and multiple lytic lesions on skeletal survey.  Bone marrow biopsy 7/15/2019: Plasma cells 30% of the cells on the aspirate and occupy 40% of the marrow cellularity consistent with kappa light chain restricted plasma cell myeloma.  IgA equal to 656    -5/6/2019 right calf DVT; on Xarelto    Treatment:   -Plasmapheresis x2 because of light chains greater than 10,000 in July 2019 at [facility]  Dr Henry Larry   -CyBorD (7/16/2019 began)  -Velcade Decadron and Revlimid (RVD) began 8/7/2019. RVD x8.  -Autologous  stem cell collection 2/20/2020;No Stem cell transplant  NOT done thus far.        (Maintenance Revlimid  began 3/17/2020)  Currently on : Revlimid 10 mg taken 2 weeks on and 1 week off  -Zometa Previously; held because of suspicion of jaw osteonecrosis  -Xarelto      Followable Disease:   -IgA, serum light chains .CBC CMP    Comorbidities:  See below      Subjective: Transferred from Dr. Rosario .  Last seen by me 1/29/25.  On maintenance Revlimid 10 mg given 2 weeks on 1 week off. Remains asymptomatic.  No bone or back pain.  No trouble with frequent or severe infections.  Limited myeloma labs  drawn  1/29/2025.  Unremarkable, see below   Last follow-up at [facility] for annual visit was around 2/17/25.  Follow-ups are annually.   Meanwhile she understands she will continue the Revlimid maintenance.  In February seen by  Dr. Larry's -remains in remission; cont maintenance Revlimid .  Tolerating Revlimid very well.

## 2025-04-22 ENCOUNTER — CLINICAL DOCUMENTATION (OUTPATIENT)
Dept: CASE MANAGEMENT | Age: 65
End: 2025-04-22

## 2025-04-22 ENCOUNTER — OFFICE VISIT (OUTPATIENT)
Dept: ONCOLOGY | Age: 65
End: 2025-04-22
Payer: MEDICARE

## 2025-04-22 ENCOUNTER — HOSPITAL ENCOUNTER (OUTPATIENT)
Dept: INFUSION THERAPY | Age: 65
Discharge: HOME | End: 2025-04-22
Payer: MEDICARE

## 2025-04-22 VITALS
WEIGHT: 181.4 LBS | DIASTOLIC BLOOD PRESSURE: 73 MMHG | HEIGHT: 63 IN | BODY MASS INDEX: 32.14 KG/M2 | SYSTOLIC BLOOD PRESSURE: 134 MMHG | HEART RATE: 75 BPM | RESPIRATION RATE: 16 BRPM | TEMPERATURE: 97.6 F | OXYGEN SATURATION: 96 %

## 2025-04-22 VITALS
SYSTOLIC BLOOD PRESSURE: 134 MMHG | RESPIRATION RATE: 16 BRPM | TEMPERATURE: 97.6 F | HEART RATE: 75 BPM | DIASTOLIC BLOOD PRESSURE: 73 MMHG | OXYGEN SATURATION: 96 %

## 2025-04-22 DIAGNOSIS — C90.01 MULTIPLE MYELOMA IN REMISSION (HCC): Primary | ICD-10-CM

## 2025-04-22 DIAGNOSIS — C90.01 MULTIPLE MYELOMA IN REMISSION (HCC): ICD-10-CM

## 2025-04-22 LAB
ALBUMIN SERPL BCG-MCNC: 4.1 G/DL (ref 3.4–4.9)
ALP SERPL-CCNC: 87 U/L (ref 38–126)
ALT SERPL W/O P-5'-P-CCNC: 21 U/L (ref 10–35)
AST SERPL-CCNC: 21 U/L (ref 10–35)
BASOPHILS ABSOLUTE: 0 THOU/MM3 (ref 0–0.1)
BASOPHILS NFR BLD AUTO: 1 % (ref 0–3)
BILIRUB CONJ SERPL-MCNC: 0.2 MG/DL (ref 0–0.2)
BILIRUB SERPL-MCNC: 0.4 MG/DL (ref 0.3–1.2)
BUN BLDP-MCNC: 11 MG/DL (ref 8–26)
CHLORIDE BLD-SCNC: 108 MEQ/L (ref 98–109)
CREAT BLD-MCNC: 0.7 MG/DL (ref 0.5–1.2)
EOSINOPHIL NFR BLD AUTO: 10 % (ref 0–4)
EOSINOPHILS ABSOLUTE: 0.3 THOU/MM3 (ref 0–0.4)
ERYTHROCYTE [DISTWIDTH] IN BLOOD BY AUTOMATED COUNT: 13.1 % (ref 11.5–14.5)
FREE KAPPA/LAMBDA RATIO: 1.15 (ref 0.22–1.74)
GFR SERPL CREATININE-BSD FRML MDRD: > 90 ML/MIN/1.73M2
GLUCOSE BLD-MCNC: 81 MG/DL (ref 70–108)
HCT VFR BLD AUTO: 40.3 % (ref 37–47)
HGB BLD-MCNC: 13.7 GM/DL (ref 12–16)
IGA SERPL-MCNC: 171 MG/DL (ref 70–400)
IGG SERPL-MCNC: 892 MG/DL (ref 700–1600)
IGM SERPL-MCNC: 54 MG/DL (ref 40–230)
IMMATURE GRANULOCYTES %: 0 %
IMMATURE GRANULOCYTES ABSOLUTE: 0.01 THOU/MM3 (ref 0–0.07)
IONIZED CALCIUM, WHOLE BLOOD: 1.25 MMOL/L (ref 1.12–1.32)
KAPPA LC FREE SER-MCNC: 19.4 MG/L
LAMBDA LC FREE SERPL-MCNC: 16.9 MG/L (ref 4.2–27.7)
LYMPHOCYTES ABSOLUTE: 0.6 THOU/MM3 (ref 1–4.8)
LYMPHOCYTES NFR BLD AUTO: 23 % (ref 15–47)
MCH RBC QN AUTO: 34.2 PG (ref 26–33)
MCHC RBC AUTO-ENTMCNC: 34 GM/DL (ref 32.2–35.5)
MCV RBC AUTO: 101 FL (ref 81–99)
MONOCYTES ABSOLUTE: 0.7 THOU/MM3 (ref 0.4–1.3)
MONOCYTES NFR BLD AUTO: 26 % (ref 0–12)
NEUTROPHILS ABSOLUTE: 1.1 THOU/MM3 (ref 1.8–7.7)
NEUTROPHILS NFR BLD AUTO: 40 % (ref 43–75)
PLATELET # BLD AUTO: 158 THOU/MM3 (ref 130–400)
PMV BLD AUTO: 10.9 FL (ref 9.4–12.4)
POTASSIUM BLD-SCNC: 3.6 MEQ/L (ref 3.5–4.9)
PROT SERPL-MCNC: 6.6 G/DL (ref 6.4–8.3)
RBC # BLD AUTO: 4.01 MILL/MM3 (ref 4.2–5.4)
SODIUM BLD-SCNC: 144 MEQ/L (ref 138–146)
TOTAL CO2, WHOLE BLOOD: 26 MEQ/L (ref 23–33)
WBC # BLD AUTO: 2.7 THOU/MM3 (ref 4.8–10.8)

## 2025-04-22 PROCEDURE — 83883 ASSAY NEPHELOMETRY NOT SPEC: CPT

## 2025-04-22 PROCEDURE — 3078F DIAST BP <80 MM HG: CPT | Performed by: INTERNAL MEDICINE

## 2025-04-22 PROCEDURE — 86334 IMMUNOFIX E-PHORESIS SERUM: CPT

## 2025-04-22 PROCEDURE — 84155 ASSAY OF PROTEIN SERUM: CPT

## 2025-04-22 PROCEDURE — 3075F SYST BP GE 130 - 139MM HG: CPT | Performed by: INTERNAL MEDICINE

## 2025-04-22 PROCEDURE — 82784 ASSAY IGA/IGD/IGG/IGM EACH: CPT

## 2025-04-22 PROCEDURE — 85025 COMPLETE CBC W/AUTO DIFF WBC: CPT

## 2025-04-22 PROCEDURE — 99211 OFF/OP EST MAY X REQ PHY/QHP: CPT

## 2025-04-22 PROCEDURE — 80076 HEPATIC FUNCTION PANEL: CPT

## 2025-04-22 PROCEDURE — 99214 OFFICE O/P EST MOD 30 MIN: CPT | Performed by: INTERNAL MEDICINE

## 2025-04-22 PROCEDURE — 84165 PROTEIN E-PHORESIS SERUM: CPT

## 2025-04-22 PROCEDURE — 36415 COLL VENOUS BLD VENIPUNCTURE: CPT

## 2025-04-22 PROCEDURE — 80047 BASIC METABLC PNL IONIZED CA: CPT

## 2025-04-22 NOTE — PATIENT INSTRUCTIONS
- Patient will need a vacation override for an additional prescription for Revlimid 10 mg days 1 through 14 every 21 days this 1 will actually start day 1 on May 9 while she is on vacation.  Please assist with also the authorization number so I can put in a new prescription while she is on vacation    Continue Revlimid 10 mg days 1 through 14 every 21 days    Please follow-up with me in 4 months and I have ordered myeloma labs to be done 2 weeks before

## 2025-04-22 NOTE — PROGRESS NOTES
Name: Luciana Dang  : 1960  MRN: [id number]    Oncology Navigation Follow-Up Note    Contact Type:  Medical Oncology    Subjective: appt with ONC    Objective: Doing ok; shares some discomfort in her knee, relates to a congenital hip, resulting in a shorter leg.  Pt planning evaluation in [place] at [facility].  Cont with Revlimid 2 weeks on & 1 week off, every 21 days.       Oncology Plan of Care:    -Cont FU at [facility]; being seen annually now, with last appt in 2025.  -Labwork completed.  -Cont Revlimid as prescribed.  -Return in 4 months, with labwork completed 2 weeks prior ().    Referrals: N/A    Plan of Care Reviewed / Education:  POC reiterated    Assistance Needed: inquires about \"vacation over-ride for Revlimid RX so she has it to start on .  Pt leaving for [place] , with plan to return .    Misti, checking with [employer] program re: the above.  Pt may opt for RX delivery to her son or sister's home in [place] while she is vacationing.      Receptive to Advanced Care Planning / Palliative Care:  deferred    Notes: Navigator is following to assist & support as needed.    Electronically signed by Lorri Arcos RN on 2025 at 10:46 AM

## 2025-04-23 DIAGNOSIS — I10 PRIMARY HYPERTENSION: ICD-10-CM

## 2025-04-24 LAB — IMMUNOFIXATION WITH QUANT: NORMAL

## 2025-04-24 RX ORDER — AMLODIPINE BESYLATE 10 MG/1
10 TABLET ORAL DAILY
Qty: 90 TABLET | Refills: 1 | OUTPATIENT
Start: 2025-04-24

## 2025-04-24 RX ORDER — AMLODIPINE BESYLATE 10 MG/1
10 TABLET ORAL DAILY
Qty: 90 TABLET | Refills: 0 | Status: SHIPPED | OUTPATIENT
Start: 2025-04-24

## 2025-05-01 RX ORDER — LENALIDOMIDE 10 MG/1
CAPSULE ORAL
Qty: 14 CAPSULE | Refills: 0 | Status: ACTIVE | OUTPATIENT
Start: 2025-05-01

## 2025-05-27 RX ORDER — LENALIDOMIDE 10 MG/1
CAPSULE ORAL
Qty: 14 CAPSULE | Refills: 0 | Status: ACTIVE | OUTPATIENT
Start: 2025-05-27

## 2025-05-31 DIAGNOSIS — I10 PRIMARY HYPERTENSION: ICD-10-CM

## 2025-06-02 ENCOUNTER — OFFICE VISIT (OUTPATIENT)
Dept: INTERNAL MEDICINE CLINIC | Age: 65
End: 2025-06-02
Payer: MEDICARE

## 2025-06-02 VITALS
HEIGHT: 63 IN | HEART RATE: 68 BPM | WEIGHT: 176.4 LBS | SYSTOLIC BLOOD PRESSURE: 108 MMHG | TEMPERATURE: 98 F | DIASTOLIC BLOOD PRESSURE: 80 MMHG | BODY MASS INDEX: 31.25 KG/M2

## 2025-06-02 DIAGNOSIS — J30.89 NON-SEASONAL ALLERGIC RHINITIS, UNSPECIFIED TRIGGER: ICD-10-CM

## 2025-06-02 DIAGNOSIS — E55.9 VITAMIN D DEFICIENCY: ICD-10-CM

## 2025-06-02 DIAGNOSIS — I10 PRIMARY HYPERTENSION: Primary | ICD-10-CM

## 2025-06-02 DIAGNOSIS — N95.1 MENOPAUSAL HOT FLUSHES: ICD-10-CM

## 2025-06-02 PROCEDURE — 3074F SYST BP LT 130 MM HG: CPT | Performed by: INTERNAL MEDICINE

## 2025-06-02 PROCEDURE — 3079F DIAST BP 80-89 MM HG: CPT | Performed by: INTERNAL MEDICINE

## 2025-06-02 PROCEDURE — 99214 OFFICE O/P EST MOD 30 MIN: CPT | Performed by: INTERNAL MEDICINE

## 2025-06-02 PROCEDURE — 93000 ELECTROCARDIOGRAM COMPLETE: CPT | Performed by: INTERNAL MEDICINE

## 2025-06-02 RX ORDER — AMLODIPINE BESYLATE 10 MG/1
10 TABLET ORAL DAILY
Qty: 90 TABLET | Refills: 1 | Status: SHIPPED | OUTPATIENT
Start: 2025-06-02

## 2025-06-02 RX ORDER — METOPROLOL SUCCINATE 50 MG/1
TABLET, EXTENDED RELEASE ORAL
Qty: 90 TABLET | Refills: 1 | Status: SHIPPED | OUTPATIENT
Start: 2025-06-02

## 2025-06-02 RX ORDER — VENLAFAXINE HYDROCHLORIDE 75 MG/1
CAPSULE, EXTENDED RELEASE ORAL
Qty: 90 CAPSULE | Refills: 1 | Status: SHIPPED | OUTPATIENT
Start: 2025-06-02

## 2025-06-02 RX ORDER — METOPROLOL SUCCINATE 50 MG/1
50 TABLET, EXTENDED RELEASE ORAL DAILY
Qty: 90 TABLET | Refills: 0 | OUTPATIENT
Start: 2025-06-02

## 2025-06-02 NOTE — PROGRESS NOTES
cancer.      HM - did discuss RSV, COVID, Td vaccine - consider getting at pharmacy.  Mammogram was done and benign 12/2024.    HCC dx - DVT right popliteal vein - no longer acute - stable on anticoagulation.    Chemotherapy induced neutropenia-  stable WBC 2.7 - defer to hem/onc - on lower dose Revlimid.    Medicare wellness in 3 months and one year follow up.    --Dinora Traylor MD

## 2025-06-11 DIAGNOSIS — C90.01 MULTIPLE MYELOMA IN REMISSION (HCC): Primary | ICD-10-CM

## 2025-06-11 RX ORDER — LENALIDOMIDE 10 MG/1
CAPSULE ORAL
Qty: 14 CAPSULE | Refills: 0 | Status: ACTIVE | OUTPATIENT
Start: 2025-06-11

## 2025-07-01 DIAGNOSIS — C90.01 MULTIPLE MYELOMA IN REMISSION (HCC): ICD-10-CM

## 2025-07-01 RX ORDER — LENALIDOMIDE 10 MG/1
CAPSULE ORAL
Qty: 14 CAPSULE | Refills: 0 | Status: ACTIVE | OUTPATIENT
Start: 2025-07-01

## 2025-07-22 DIAGNOSIS — C90.01 MULTIPLE MYELOMA IN REMISSION (HCC): ICD-10-CM

## 2025-07-22 RX ORDER — LENALIDOMIDE 10 MG/1
CAPSULE ORAL
Qty: 14 CAPSULE | Refills: 0 | Status: ACTIVE | OUTPATIENT
Start: 2025-07-22

## 2025-08-06 ENCOUNTER — LAB (OUTPATIENT)
Dept: LAB | Age: 65
End: 2025-08-06

## 2025-08-06 DIAGNOSIS — C90.01 MULTIPLE MYELOMA IN REMISSION (HCC): ICD-10-CM

## 2025-08-06 LAB
ALBUMIN SERPL BCG-MCNC: 4.2 G/DL (ref 3.4–4.9)
ALP SERPL-CCNC: 83 U/L (ref 38–126)
ALT SERPL W/O P-5'-P-CCNC: 18 U/L (ref 10–35)
ANION GAP SERPL CALC-SCNC: 13 MEQ/L (ref 8–16)
AST SERPL-CCNC: 19 U/L (ref 10–35)
BASOPHILS ABSOLUTE: 0 THOU/MM3 (ref 0–0.1)
BASOPHILS NFR BLD AUTO: 0.8 %
BILIRUB SERPL-MCNC: 0.5 MG/DL (ref 0.3–1.2)
BUN SERPL-MCNC: 7 MG/DL (ref 8–23)
CALCIUM SERPL-MCNC: 9.5 MG/DL (ref 8.8–10.2)
CHLORIDE SERPL-SCNC: 104 MEQ/L (ref 98–111)
CO2 SERPL-SCNC: 25 MEQ/L (ref 22–29)
CREAT SERPL-MCNC: 0.7 MG/DL (ref 0.5–0.9)
DEPRECATED RDW RBC AUTO: 48.1 FL (ref 35–45)
EOSINOPHIL NFR BLD AUTO: 9.1 %
EOSINOPHILS ABSOLUTE: 0.2 THOU/MM3 (ref 0–0.4)
ERYTHROCYTE [DISTWIDTH] IN BLOOD BY AUTOMATED COUNT: 13.2 % (ref 11.5–14.5)
GFR SERPL CREATININE-BSD FRML MDRD: > 90 ML/MIN/1.73M2
GLUCOSE SERPL-MCNC: 128 MG/DL (ref 74–109)
HCT VFR BLD AUTO: 41 % (ref 37–47)
HGB BLD-MCNC: 14.3 GM/DL (ref 12–16)
IMM GRANULOCYTES # BLD AUTO: 0.02 THOU/MM3 (ref 0–0.07)
IMM GRANULOCYTES NFR BLD AUTO: 0.8 %
LYMPHOCYTES ABSOLUTE: 0.5 THOU/MM3 (ref 1–4.8)
LYMPHOCYTES NFR BLD AUTO: 20.5 %
MCH RBC QN AUTO: 34.7 PG (ref 26–33)
MCHC RBC AUTO-ENTMCNC: 34.9 GM/DL (ref 32.2–35.5)
MCV RBC AUTO: 99.5 FL (ref 81–99)
MONOCYTES ABSOLUTE: 0.4 THOU/MM3 (ref 0.4–1.3)
MONOCYTES NFR BLD AUTO: 14.8 %
NEUTROPHILS ABSOLUTE: 1.4 THOU/MM3 (ref 1.8–7.7)
NEUTROPHILS NFR BLD AUTO: 54 %
NRBC BLD AUTO-RTO: 0 /100 WBC
PLATELET # BLD AUTO: 181 THOU/MM3 (ref 130–400)
PMV BLD AUTO: 13 FL (ref 9.4–12.4)
POTASSIUM SERPL-SCNC: 3.3 MEQ/L (ref 3.5–5.2)
PROT SERPL-MCNC: 6.4 G/DL (ref 6.4–8.3)
RBC # BLD AUTO: 4.12 MILL/MM3 (ref 4.2–5.4)
SODIUM SERPL-SCNC: 142 MEQ/L (ref 135–145)
WBC # BLD AUTO: 2.6 THOU/MM3 (ref 4.8–10.8)

## 2025-08-07 LAB
FREE KAPPA/LAMBDA RATIO: 0.82 (ref 0.22–1.74)
IGA SERPL-MCNC: 127 MG/DL (ref 70–400)
IGG SERPL-MCNC: 706 MG/DL (ref 700–1600)
IGM SERPL-MCNC: 50 MG/DL (ref 40–230)
KAPPA LC FREE SER-MCNC: 13.8 MG/L
LAMBDA LC FREE SERPL-MCNC: 16.9 MG/L (ref 4.2–27.7)

## 2025-08-10 LAB — IMMUNOFIXATION WITH QUANT: NORMAL

## 2025-08-11 ENCOUNTER — LAB (OUTPATIENT)
Dept: LAB | Age: 65
End: 2025-08-11

## 2025-08-11 DIAGNOSIS — E55.9 VITAMIN D DEFICIENCY: ICD-10-CM

## 2025-08-11 LAB — 25(OH)D3 SERPL-MCNC: 25 NG/ML (ref 30–100)

## 2025-08-14 DIAGNOSIS — C90.01 MULTIPLE MYELOMA IN REMISSION (HCC): ICD-10-CM

## 2025-08-14 RX ORDER — LENALIDOMIDE 10 MG/1
CAPSULE ORAL
Qty: 14 CAPSULE | Refills: 0 | Status: ACTIVE | OUTPATIENT
Start: 2025-08-14

## 2025-08-20 ENCOUNTER — OFFICE VISIT (OUTPATIENT)
Dept: ONCOLOGY | Age: 65
End: 2025-08-20
Payer: MEDICARE

## 2025-08-20 ENCOUNTER — HOSPITAL ENCOUNTER (OUTPATIENT)
Dept: INFUSION THERAPY | Age: 65
Discharge: HOME | End: 2025-08-20
Payer: MEDICARE

## 2025-08-20 VITALS
TEMPERATURE: 98 F | OXYGEN SATURATION: 95 % | WEIGHT: 174.4 LBS | RESPIRATION RATE: 16 BRPM | HEIGHT: 63 IN | DIASTOLIC BLOOD PRESSURE: 61 MMHG | BODY MASS INDEX: 30.9 KG/M2 | SYSTOLIC BLOOD PRESSURE: 128 MMHG | HEART RATE: 63 BPM

## 2025-08-20 VITALS
SYSTOLIC BLOOD PRESSURE: 128 MMHG | TEMPERATURE: 98 F | HEART RATE: 63 BPM | RESPIRATION RATE: 16 BRPM | OXYGEN SATURATION: 95 % | DIASTOLIC BLOOD PRESSURE: 61 MMHG

## 2025-08-20 DIAGNOSIS — C90.01 MULTIPLE MYELOMA IN REMISSION (HCC): Primary | ICD-10-CM

## 2025-08-20 PROCEDURE — 99211 OFF/OP EST MAY X REQ PHY/QHP: CPT

## 2025-08-20 PROCEDURE — 3074F SYST BP LT 130 MM HG: CPT | Performed by: INTERNAL MEDICINE

## 2025-08-20 PROCEDURE — 99214 OFFICE O/P EST MOD 30 MIN: CPT | Performed by: INTERNAL MEDICINE

## 2025-08-20 PROCEDURE — 3078F DIAST BP <80 MM HG: CPT | Performed by: INTERNAL MEDICINE

## 2025-08-25 RX ORDER — RIVAROXABAN 10 MG/1
10 TABLET, FILM COATED ORAL
Qty: 30 TABLET | Refills: 5 | Status: SHIPPED | OUTPATIENT
Start: 2025-08-25

## 2025-08-28 DIAGNOSIS — I10 PRIMARY HYPERTENSION: ICD-10-CM

## 2025-08-28 RX ORDER — METOPROLOL SUCCINATE 50 MG/1
TABLET, EXTENDED RELEASE ORAL
Qty: 90 TABLET | Refills: 1 | Status: SHIPPED | OUTPATIENT
Start: 2025-08-28

## (undated) DEVICE — INSUFFLATION NEEDLE TO ESTABLISH PNEUMOPERITONEUM.: Brand: INSUFFLATION NEEDLE

## (undated) DEVICE — COVER ARMBRD W13XL28.5IN IMPERV BLU FOR OP RM

## (undated) DEVICE — GLOVE ORANGE PI 7   MSG9070

## (undated) DEVICE — GLOVE ORANGE PI 8   MSG9080

## (undated) DEVICE — GLOVE ORANGE PI 7 1/2   MSG9075

## (undated) DEVICE — BLADE CLIPPER GEN PURP NS

## (undated) DEVICE — 4-PORT MANIFOLD: Brand: NEPTUNE 2

## (undated) DEVICE — SUTURE MCRYL SZ 4-0 L18IN ABSRB UD P-3 L13MM 3/8 CIR PRIM Y494G

## (undated) DEVICE — BAG SPEC REM 224ML W4XL6IN DIA10MM 1 HND GYN DISP ENDOPCH

## (undated) DEVICE — UNIVERSAL MONOPOLAR LAPAROSCOPIC CABLE 10FT, 4MM PIN CONNECTOR: Brand: CONMED

## (undated) DEVICE — SOLUTION IV IRRIG WATER 1000ML POUR BRL 2F7114

## (undated) DEVICE — GENERAL LAPAROSCOPY PACK-LF: Brand: MEDLINE INDUSTRIES, INC.

## (undated) DEVICE — TROCAR ENDOSCP L100MM DIA11MM DIL TIP STBL SL DISP ENDOPATH

## (undated) DEVICE — PAD,EYE,1-5/8X2 5/8,STERILE,LF,1/PK: Brand: MEDLINE

## (undated) DEVICE — LINER SUCT CANSTR 1500CC SEMI RIG W/ POR HYDROPHOBIC SHUT

## (undated) DEVICE — EXCEL 10FT (3.05 M) INSUFFLATION TUBING SET WITH 0.1 MICRON FILTER: Brand: EXCEL

## (undated) DEVICE — COTTON BALL ST

## (undated) DEVICE — GLOVE SURG SZ 8 L11.77IN FNGR THK9.8MIL STRW LTX POLYMER

## (undated) DEVICE — TROCAR ENDOSCP L100MM DIA12MM DIL TIP STBL SL ENDOPATH XCEL

## (undated) DEVICE — SOLUTION IV 1000ML 0.9% SOD CHL PH 5 INJ USP VIAFLX PLAS

## (undated) DEVICE — GLOVE SURG SZ 65 THK91MIL LTX FREE SYN POLYISOPRENE

## (undated) DEVICE — GOWN,SIRUS,NONRNF,SETINSLV,XL,20/CS: Brand: MEDLINE

## (undated) DEVICE — TROCAR ENDOSCP SHFT L100MM DIA5MM DIL TIP ENDOPATH XCEL

## (undated) DEVICE — GARMENT,MEDLINE,DVT,INT,CALF,MED, GEN2: Brand: MEDLINE

## (undated) DEVICE — BANDAGE ADH W1XL3IN NAT FAB WVN FLX DURABLE N ADH PD SEAL

## (undated) DEVICE — BANDAGE,GAUZE,4.5"X4.1YD,STERILE,LF: Brand: MEDLINE

## (undated) DEVICE — PACK PROCEDURE SURG PLAS SC MIN SRHP LF

## (undated) DEVICE — TUBING, SUCTION, 1/4" X 20', STRAIGHT: Brand: MEDLINE INDUSTRIES, INC.

## (undated) DEVICE — CUTTER ENDOSCP L340MM LIN ARTC SGL STROKE FIRING ENDOPATH

## (undated) DEVICE — CORE TRUMPET FOR SINGLE SOLUTION BAG: Brand: CORE DYNAMICS

## (undated) DEVICE — YANKAUER,BULB TIP,W/O VENT,RIGID,STERILE: Brand: MEDLINE